# Patient Record
Sex: MALE | Race: WHITE | NOT HISPANIC OR LATINO | Employment: FULL TIME | URBAN - METROPOLITAN AREA
[De-identification: names, ages, dates, MRNs, and addresses within clinical notes are randomized per-mention and may not be internally consistent; named-entity substitution may affect disease eponyms.]

---

## 2017-01-23 ENCOUNTER — OFFICE VISIT (OUTPATIENT)
Dept: INTERNAL MEDICINE | Facility: CLINIC | Age: 37
End: 2017-01-23

## 2017-01-23 VITALS
BODY MASS INDEX: 26.09 KG/M2 | WEIGHT: 192.6 LBS | TEMPERATURE: 98.2 F | HEIGHT: 72 IN | SYSTOLIC BLOOD PRESSURE: 126 MMHG | DIASTOLIC BLOOD PRESSURE: 82 MMHG

## 2017-01-23 DIAGNOSIS — I10 ESSENTIAL HYPERTENSION: ICD-10-CM

## 2017-01-23 DIAGNOSIS — R03.0 ELEVATED BP WITHOUT DIAGNOSIS OF HYPERTENSION: ICD-10-CM

## 2017-01-23 DIAGNOSIS — G47.30 SLEEP APNEA, UNSPECIFIED TYPE: ICD-10-CM

## 2017-01-23 DIAGNOSIS — F41.8 DEPRESSION WITH ANXIETY: Primary | ICD-10-CM

## 2017-01-23 LAB
ALBUMIN SERPL-MCNC: 4.8 G/DL (ref 3.2–4.8)
ALBUMIN/GLOB SERPL: 2 G/DL (ref 1.5–2.5)
ALP SERPL-CCNC: 97 U/L (ref 25–100)
ALT SERPL W P-5'-P-CCNC: 38 U/L (ref 7–40)
ANION GAP SERPL CALCULATED.3IONS-SCNC: 9 MMOL/L (ref 3–11)
AST SERPL-CCNC: 22 U/L (ref 0–33)
BASOPHILS # BLD AUTO: 0.01 10*3/MM3 (ref 0–0.2)
BASOPHILS NFR BLD AUTO: 0.2 % (ref 0–1)
BILIRUB SERPL-MCNC: 0.4 MG/DL (ref 0.3–1.2)
BUN BLD-MCNC: 21 MG/DL (ref 9–23)
BUN/CREAT SERPL: 21 (ref 7–25)
CALCIUM SPEC-SCNC: 10.2 MG/DL (ref 8.7–10.4)
CHLORIDE SERPL-SCNC: 107 MMOL/L (ref 99–109)
CO2 SERPL-SCNC: 29 MMOL/L (ref 20–31)
CREAT BLD-MCNC: 1 MG/DL (ref 0.6–1.3)
DEPRECATED RDW RBC AUTO: 44.1 FL (ref 37–54)
EOSINOPHIL # BLD AUTO: 0.14 10*3/MM3 (ref 0.1–0.3)
EOSINOPHIL NFR BLD AUTO: 2.8 % (ref 0–3)
ERYTHROCYTE [DISTWIDTH] IN BLOOD BY AUTOMATED COUNT: 13.3 % (ref 11.3–14.5)
GFR SERPL CREATININE-BSD FRML MDRD: 84 ML/MIN/1.73
GLOBULIN UR ELPH-MCNC: 2.4 GM/DL
GLUCOSE BLD-MCNC: 95 MG/DL (ref 70–100)
HCT VFR BLD AUTO: 48 % (ref 38.9–50.9)
HGB BLD-MCNC: 16.4 G/DL (ref 13.1–17.5)
IMM GRANULOCYTES # BLD: 0.01 10*3/MM3 (ref 0–0.03)
IMM GRANULOCYTES NFR BLD: 0.2 % (ref 0–0.6)
LYMPHOCYTES # BLD AUTO: 1.43 10*3/MM3 (ref 0.6–4.8)
LYMPHOCYTES NFR BLD AUTO: 28.9 % (ref 24–44)
MCH RBC QN AUTO: 30.9 PG (ref 27–31)
MCHC RBC AUTO-ENTMCNC: 34.2 G/DL (ref 32–36)
MCV RBC AUTO: 90.4 FL (ref 80–99)
MONOCYTES # BLD AUTO: 0.53 10*3/MM3 (ref 0–1)
MONOCYTES NFR BLD AUTO: 10.7 % (ref 0–12)
NEUTROPHILS # BLD AUTO: 2.82 10*3/MM3 (ref 1.5–8.3)
NEUTROPHILS NFR BLD AUTO: 57.2 % (ref 41–71)
PLATELET # BLD AUTO: 247 10*3/MM3 (ref 150–450)
PMV BLD AUTO: 10.9 FL (ref 6–12)
POTASSIUM BLD-SCNC: 5.1 MMOL/L (ref 3.5–5.5)
PROT SERPL-MCNC: 7.2 G/DL (ref 5.7–8.2)
RBC # BLD AUTO: 5.31 10*6/MM3 (ref 4.2–5.76)
SODIUM BLD-SCNC: 145 MMOL/L (ref 132–146)
TSH SERPL DL<=0.05 MIU/L-ACNC: 2.02 MIU/ML (ref 0.35–5.35)
VIT B12 BLD-MCNC: 769 PG/ML (ref 211–911)
WBC NRBC COR # BLD: 4.94 10*3/MM3 (ref 3.5–10.8)

## 2017-01-23 PROCEDURE — 99214 OFFICE O/P EST MOD 30 MIN: CPT | Performed by: FAMILY MEDICINE

## 2017-01-23 PROCEDURE — 84402 ASSAY OF FREE TESTOSTERONE: CPT | Performed by: FAMILY MEDICINE

## 2017-01-23 PROCEDURE — 85025 COMPLETE CBC W/AUTO DIFF WBC: CPT | Performed by: FAMILY MEDICINE

## 2017-01-23 PROCEDURE — 84443 ASSAY THYROID STIM HORMONE: CPT | Performed by: FAMILY MEDICINE

## 2017-01-23 PROCEDURE — 82607 VITAMIN B-12: CPT | Performed by: FAMILY MEDICINE

## 2017-01-23 PROCEDURE — 84403 ASSAY OF TOTAL TESTOSTERONE: CPT | Performed by: FAMILY MEDICINE

## 2017-01-23 PROCEDURE — 80053 COMPREHEN METABOLIC PANEL: CPT | Performed by: FAMILY MEDICINE

## 2017-01-23 RX ORDER — BUPROPION HYDROCHLORIDE 150 MG/1
TABLET ORAL
Qty: 60 TABLET | Refills: 0 | Status: SHIPPED | OUTPATIENT
Start: 2017-01-23 | End: 2017-02-24 | Stop reason: DRUGHIGH

## 2017-01-23 RX ORDER — DESVENLAFAXINE 25 MG/1
TABLET, EXTENDED RELEASE ORAL
Qty: 7 TABLET | Refills: 0 | Status: SHIPPED | OUTPATIENT
Start: 2017-01-23 | End: 2017-02-28

## 2017-01-23 RX ORDER — LISINOPRIL 10 MG/1
10 TABLET ORAL DAILY
Qty: 90 TABLET | Refills: 1 | Status: SHIPPED | OUTPATIENT
Start: 2017-01-23 | End: 2017-02-28

## 2017-01-23 NOTE — PATIENT INSTRUCTIONS
1. PSYCH- depression with anxiety- will stop the zyprexa as unneeded now that his BP is at goal. Will try wellbutrin instead of pristiq to see if he has resolution of the sexual S/E. If not, he can remain on whichever works best. Will check his testosterone level now as this could be an undelrying issue. Will do his routine labs except for lipid  2. CV- HTN- BP at goal. Rf lisinopril x6mo for now.  3. RESP- sleep apnea. Will refer to sleep specialist.  4. RECHECK- 1mo

## 2017-01-23 NOTE — PROGRESS NOTES
Subjective   Eduardo Luna is a 36 y.o. male.     History of Present Illness   Here for 1mo recheck. Last seen 12/22/16.  Pt seen 9/22/16 as a new patient. Pt has a h/o Hodgkins lymphoma 2002 in full remission. Hx PAT post ablation 2008. Last labs 3/2016.     1.ENDO- hypothyroid- currently on levothyroxine 75. At his initial visit pt was c/o raspy voice, trouble concentrating, occasionally feeling depressed, slow speech and dry skin. Was on generic and taking it with other meds. Was changed to branded synthroid and advised how to take correctly.      2.PSYCH/ CV- depression with anxiety, new HTN. Pt developed issues 2002 when diagnosed with lymphoma. Initally he was on Lexapro, valium and ambien. Was still c/o feeling anxious, guilty, feeling overwhelmed, withdrawing and concentration problems on occassion. Was waking up frequently, even on ambien. Was started on low dose Zyprexa and able to wean the valium and ambien. We tried abilify but he did better with Zyprexa. He then weaned the Lexapro (sexual S/E) but continued to feel anxious. Was tried on Effexor and then changed to pristiq due to S/E. At his last visit he was still having sexual S/E and was having elevated BP. Discussed that this could be from the SNRI or be primary, especially given his fam hx. If primary, HTN can contribute to anxiety. Pt was started on lisinopril. Today he reports he has done well with the lisinopril and pristiq. Only remaining issue is the decreased libido. Is still having sleep issues. Can fall asleep but not stay asleep. On discussion, he has a h/o sleep apnea. Was treated with CPAP but did not tolerate it. Last time was 10 yr ago.    The following portions of the patient's history were reviewed and updated as appropriate: current medications, past family history, past medical history, past social history, past surgical history and problem list.    Review of Systems   Cardiovascular: Negative for chest pain.   Gastrointestinal:  "Negative for abdominal distention and abdominal pain.   Skin: Negative for color change.   Neurological: Negative for tremors, speech difficulty and headaches.   Psychiatric/Behavioral: Negative for agitation and confusion.   All other systems reviewed and are negative.        Current Outpatient Prescriptions:   •  buPROPion XL (WELLBUTRIN XL) 150 MG 24 hr tablet, Take 1 tab po qd x1wk then 2 tab po qd, Disp: 60 tablet, Rfl: 0  •  Desvenlafaxine Succinate ER 25 MG tablet sustained-release 24 hour, Take 1 tab po qd x1wk, Disp: 7 tablet, Rfl: 0  •  levothyroxine (SYNTHROID) 75 MCG tablet, Take 1 tablet by mouth Daily. Take 1 tab po qam fasting and wait 1hr for food or other meds. BRAND ONLY, Disp: 30 tablet, Rfl: 5  •  lisinopril (PRINIVIL,ZESTRIL) 10 MG tablet, Take 1 tablet by mouth Daily., Disp: 90 tablet, Rfl: 1  •  Vitamin D, Cholecalciferol, 1000 UNITS capsule, Take 1 tablet by mouth daily., Disp: , Rfl:     Objective     Visit Vitals   • /82   • Temp 98.2 °F (36.8 °C)   • Ht 72\" (182.9 cm)   • Wt 192 lb 9.6 oz (87.4 kg)   • BMI 26.12 kg/m2       Physical Exam   Constitutional: He is oriented to person, place, and time. He appears well-developed and well-nourished.   HENT:   Right Ear: Tympanic membrane and ear canal normal.   Left Ear: Tympanic membrane and ear canal normal.   Mouth/Throat: Oropharynx is clear and moist.   Eyes: Conjunctivae and EOM are normal. Pupils are equal, round, and reactive to light.   Neck: No thyromegaly present.   Cardiovascular: Normal rate and regular rhythm.    Pulmonary/Chest: Effort normal and breath sounds normal.   Neurological: He is alert and oriented to person, place, and time.   Skin: Skin is warm and dry.   Psychiatric: He has a normal mood and affect.   Vitals reviewed.      Assessment/Plan   Eduardo was seen today for follow-up.    Diagnoses and all orders for this visit:    Depression with anxiety  -     buPROPion XL (WELLBUTRIN XL) 150 MG 24 hr tablet; Take 1 tab po " qd x1wk then 2 tab po qd  -     Desvenlafaxine Succinate ER 25 MG tablet sustained-release 24 hour; Take 1 tab po qd x1wk    Essential hypertension    Sleep apnea, unspecified type  -     Ambulatory Referral to Sleep Medicine    Elevated BP without diagnosis of hypertension  -     lisinopril (PRINIVIL,ZESTRIL) 10 MG tablet; Take 1 tablet by mouth Daily.      1. PSYCH- depression with anxiety- will stop the zyprexa as unneeded now that his BP is at goal. Will try wellbutrin instead of pristiq to see if he has resolution of the sexual S/E. If not, he can remain on whichever works best. Will check his testosterone level now as this could be an undelrying issue. Will do his routine labs except for lipid  2. CV- HTN- BP at goal. Rf lisinopril x6mo for now.  3. RESP- sleep apnea. Will refer to sleep specialist.  4. RECHECK- 1mo

## 2017-01-23 NOTE — MR AVS SNAPSHOT
Eduardo Luna   1/23/2017 3:30 PM   Office Visit    Dept Phone:  [phone]   Encounter #:  [id number]    Provider:  Svetlana Henry MD   Department:  [facility] PRIMARY CARE                Your Full Care Plan              Today's Medication Changes          These changes are accurate as of: 1/23/17  4:28 PM.  If you have any questions, ask your nurse or doctor.               New Medication(s)Ordered:     buPROPion  MG 24 hr tablet   Commonly known as:  WELLBUTRIN XL   Take 1 tab po qd x1wk then 2 tab po qd   Started by:  Svetlana Henry MD         Medication(s)that have changed:     Desvenlafaxine Succinate ER 25 MG tablet sustained-release 24 hour   Commonly known as:  PRISTIQ   Take 1 tab po qd x1wk   What changed:    - medication strength  - how much to take  - how to take this  - when to take this  - additional instructions   Changed by:  Svetlana Henry MD         Stop taking medication(s)listed here:     OLANZapine 5 MG tablet   Commonly known as:  ZYPREXA   Stopped by:  Svetlana Henry MD                Where to Get Your Medications      These medications were sent to [facility]/pharmacy #[id number] - [place], [place] - [address] [facility] - [phone]  - [phone] [address] [address]     Phone:  [phone]     buPROPion  MG 24 hr tablet    Desvenlafaxine Succinate ER 25 MG tablet sustained-release 24 hour    lisinopril 10 MG tablet                  Your Updated Medication List          This list is accurate as of: 1/23/17  4:28 PM.  Always use your most recent med list.                buPROPion  MG 24 hr tablet   Commonly known as:  WELLBUTRIN XL   Take 1 tab po qd x1wk then 2 tab po qd       Desvenlafaxine Succinate ER 25 MG tablet sustained-release 24 hour   Commonly known as:  PRISTIQ   Take 1 tab po qd x1wk       levothyroxine 75 MCG tablet   Commonly known as:  SYNTHROID   Take 1 tablet by mouth Daily. Take 1 tab  po qam fasting and wait 1hr for food or other meds. BRAND ONLY       lisinopril 10 MG tablet   Commonly known as:  PRINIVIL,ZESTRIL   Take 1 tablet by mouth Daily.       Vitamin D (Cholecalciferol) 1000 UNITS capsule               We Performed the Following     Ambulatory Referral to Sleep Medicine     CBC & Differential     CBC Auto Differential     Comprehensive Metabolic Panel     Testosterone, Free, Total     TSH     Vitamin B12       You Were Diagnosed With        Codes Comments    Depression with anxiety    -  Primary ICD-10-CM: F41.8  ICD-9-CM: 300.4     Essential hypertension     ICD-10-CM: I10  ICD-9-CM: 401.9     Sleep apnea, unspecified type     ICD-10-CM: G47.30  ICD-9-CM: 780.57     Elevated BP without diagnosis of hypertension     ICD-10-CM: R03.0  ICD-9-CM: 796.2       Instructions    1. PSYCH- depression with anxiety- will stop the zyprexa as unneeded now that his BP is at goal. Will try wellbutrin instead of pristiq to see if he has resolution of the sexual S/E. If not, he can remain on whichever works best. Will check his testosterone level now as this could be an undelrying issue. Will do his routine labs except for lipid  2. CV- HTN- BP at goal. Rf lisinopril x6mo for now.  3. RESP- sleep apnea. Will refer to sleep specialist.  4. RECHECK- 1mo     Patient Instructions History      Upcoming Appointments     Visit Type Date Time Department    FOLLOW UP 1/23/2017  3:30 PM [facility]    FOLLOW UP 1 UNIT 9/5/2017  3:15 PM [facility]      [employer] Signup     Our records indicate that you have an active [employer] account.    You can view your After Visit Summary by going to "[employer] and logging in with your [employer] username and password.  If you don't have a [employer] username and password but a parent or guardian has access to your record, the parent or guardian should login with their own [employer] username and password and access your record to view the After Visit  "Summary.    If you have questions, you can email [email or web address] or call [phone] to talk to our MyChart staff.  Remember, MyChart is NOT to be used for urgent needs.  For medical emergencies, dial 911.               Other Info from Your Visit           Your Appointments     Sep 05, 2017  3:15 PM EDT   FOLLOW UP with Laly Vazquez MD   [facility] HEMATOLOGY  AND ONCOLOGY ([place])    [address] [facility] [address]  [facility] [address] [phone]              Allergies     Amoxicillin  Hives, Itching, Rash      Reason for Visit     Follow-up 1 MONTH RECHECK MOOD, BP      Vital Signs     Blood Pressure Temperature Height Weight Body Mass Index Smoking Status    126/82 98.2 °F (36.8 °C) 72\" (182.9 cm) 192 lb 9.6 oz (87.4 kg) 26.12 kg/m2 Never Smoker      Problems and Diagnoses Noted     Depression with anxiety    High blood pressure    Sleep apnea    Elevated blood pressure          Results         "

## 2017-01-26 LAB
CONV COMMENT: ABNORMAL
TESTOST FREE SERPL-MCNC: 6.6 PG/ML (ref 8.7–25.1)
TESTOST SERPL-MCNC: 297 NG/DL (ref 348–1197)

## 2017-02-21 ENCOUNTER — CONSULT (OUTPATIENT)
Dept: SLEEP MEDICINE | Facility: HOSPITAL | Age: 37
End: 2017-02-21

## 2017-02-21 VITALS
DIASTOLIC BLOOD PRESSURE: 104 MMHG | WEIGHT: 187.6 LBS | OXYGEN SATURATION: 92 % | HEART RATE: 102 BPM | HEIGHT: 72 IN | BODY MASS INDEX: 25.41 KG/M2 | SYSTOLIC BLOOD PRESSURE: 152 MMHG

## 2017-02-21 DIAGNOSIS — G47.33 OBSTRUCTIVE SLEEP APNEA SYNDROME: ICD-10-CM

## 2017-02-21 DIAGNOSIS — G47.61 PERIODIC LIMB MOVEMENT DISORDER: Primary | ICD-10-CM

## 2017-02-21 PROCEDURE — 99244 OFF/OP CNSLTJ NEW/EST MOD 40: CPT | Performed by: INTERNAL MEDICINE

## 2017-02-21 NOTE — PATIENT INSTRUCTIONS
Restless Legs Syndrome  Restless legs syndrome is a condition that causes uncomfortable feelings or sensations in the legs, especially while sitting or lying down. The sensations usually cause an overwhelming urge to move the legs. The arms can also sometimes be affected.  The condition can range from mild to severe. The symptoms often interfere with a person's ability to sleep.  CAUSES  The cause of this condition is not known.  RISK FACTORS  This condition is more likely to develop in:  · People who are older than age 50.  · Pregnant women. In general, restless legs syndrome is more common in women than in men.  · People who have a family history of the condition.  · People who have certain medical conditions, such as iron deficiency, kidney disease, Parkinson disease, or nerve damage.  · People who take certain medicines, such as medicines for high blood pressure, nausea, colds, allergies, depression, and some heart conditions.  SYMPTOMS  The main symptom of this condition is uncomfortable sensations in the legs. These sensations may be:  · Described as pulling, tingling, prickling, throbbing, crawling, or burning.  · Worse while you are sitting or lying down.  · Worse during periods of rest or inactivity.  · Worse at night, often interfering with your sleep.  · Accompanied by a very strong urge to move your legs.  · Temporarily relieved by movement of your legs.  The sensations usually affect both sides of the body. The arms can also be affected, but this is rare. People who have this condition often have tiredness during the day because of their lack of sleep at night.  DIAGNOSIS  This condition may be diagnosed based on your description of the symptoms. You may also have tests, including blood tests, to check for other conditions that may lead to your symptoms. In some cases, you may be asked to spend some time in a sleep lab so your sleeping can be monitored.  TREATMENT  Treatment for this condition is  focused on managing the symptoms. Treatment may include:  · Self-help and lifestyle changes.  · Medicines.  HOME CARE INSTRUCTIONS  · Take medicines only as directed by your health care provider.  · Try these methods to get temporary relief from the uncomfortable sensations:    Massage your legs.    Walk or stretch.    Take a cold or hot bath.  · Practice good sleep habits. For example, go to bed and get up at the same time every day.  · Exercise regularly.  · Practice ways of relaxing, such as yoga or meditation.  · Avoid caffeine and alcohol.  · Do not use any tobacco products, including cigarettes, chewing tobacco, or electronic cigarettes. If you need help quitting, ask your health care provider.  · Keep all follow-up visits as directed by your health care provider. This is important.  SEEK MEDICAL CARE IF:  Your symptoms do not improve with treatment, or they get worse.     This information is not intended to replace advice given to you by your health care provider. Make sure you discuss any questions you have with your health care provider.     Document Released: 12/08/2003 Document Revised: 05/03/2016 Document Reviewed: 12/14/2015  [employer]app[employer] Interactive Patient Education ©2016 [employer]  Sleep Apnea   Sleep apnea is a sleep disorder characterized by abnormal pauses in breathing while you sleep. When your breathing pauses, the level of oxygen in your blood decreases. This causes you to move out of deep sleep and into light sleep. As a result, your quality of sleep is poor, and the system that carries your blood throughout your body (cardiovascular system) experiences stress. If sleep apnea remains untreated, the following conditions can develop:  · High blood pressure (hypertension).  · Coronary artery disease.  · Inability to achieve or maintain an erection (impotence).  · Impairment of your thought process (cognitive dysfunction).  There are three types of sleep apnea:  1. Obstructive sleep apnea--Pauses  in breathing during sleep because of a blocked airway.  2. Central sleep apnea--Pauses in breathing during sleep because the area of the brain that controls your breathing does not send the correct signals to the muscles that control breathing.  3. Mixed sleep apnea--A combination of both obstructive and central sleep apnea.  RISK FACTORS  The following risk factors can increase your risk of developing sleep apnea:  · Being overweight.  · Smoking.  · Having narrow passages in your nose and throat.  · Being of older age.  · Being male.  · Alcohol use.  · Sedative and tranquilizer use.  · Ethnicity. Among individuals younger than 35 years,  Americans are at increased risk of sleep apnea.  SYMPTOMS   · Difficulty staying asleep.  · Daytime sleepiness and fatigue.  · Loss of energy.  · Irritability.  · Loud, heavy snoring.  · Morning headaches.  · Trouble concentrating.  · Forgetfulness.  · Decreased interest in sex.  · Unexplained sleepiness.  DIAGNOSIS   In order to diagnose sleep apnea, your caregiver will perform a physical examination. A sleep study done in the comfort of your own home may be appropriate if you are otherwise healthy. Your caregiver may also recommend that you spend the night in a sleep lab. In the sleep lab, several monitors record information about your heart, lungs, and brain while you sleep. Your leg and arm movements and blood oxygen level are also recorded.  TREATMENT  The following actions may help to resolve mild sleep apnea:  · Sleeping on your side.    · Using a decongestant if you have nasal congestion.    · Avoiding the use of depressants, including alcohol, sedatives, and narcotics.    · Losing weight and modifying your diet if you are overweight.  There also are devices and treatments to help open your airway:  · Oral appliances. These are custom-made mouthpieces that shift your lower jaw forward and slightly open your bite. This opens your airway.  · Devices that create  "positive airway pressure. This positive pressure \"splints\" your airway open to help you breathe better during sleep. The following devices create positive airway pressure:    Continuous positive airway pressure (CPAP) device. The CPAP device creates a continuous level of air pressure with an air pump. The air is delivered to your airway through a mask while you sleep. This continuous pressure keeps your airway open.    Nasal expiratory positive airway pressure (EPAP) device. The EPAP device creates positive air pressure as you exhale. The device consists of single-use valves, which are inserted into each nostril and held in place by adhesive. The valves create very little resistance when you inhale but create much more resistance when you exhale. That increased resistance creates the positive airway pressure. This positive pressure while you exhale keeps your airway open, making it easier to breath when you inhale again.    Bilevel positive airway pressure (BPAP) device. The BPAP device is used mainly in patients with central sleep apnea. This device is similar to the CPAP device because it also uses an air pump to deliver continuous air pressure through a mask. However, with the BPAP machine, the pressure is set at two different levels. The pressure when you exhale is lower than the pressure when you inhale.  · Surgery. Typically, surgery is only done if you cannot comply with less invasive treatments or if the less invasive treatments do not improve your condition. Surgery involves removing excess tissue in your airway to create a wider passage way.     This information is not intended to replace advice given to you by your health care provider. Make sure you discuss any questions you have with your health care provider.     Document Released: 12/08/2003 Document Revised: 01/08/2016 Document Reviewed: 09/26/2016  [employer] Interactive Patient Education ©2016 [employer]    "

## 2017-02-23 NOTE — PROGRESS NOTES
Subjective   Eduardo Luna is a 36 y.o. male is being seen for consultation today at the request of Svetlana Henry MD for the evaluation of snoring and difficulty sleeping.    History of Present Illness  Patient has a history of snoring he says since 2006.  He reportedly snores in all positions.  He is unsure if he stops breathing at night.  He denies awakening gasping for breath.  Says he's occasionally rested in the morning but sometimes not.  He has a headache maybe 1 day per week.  He was diagnosed in 2006 sleep study at the [facility] to have mild obstructive sleep apnea AHI of 8.1 but was not started on therapy.  He has gained weight since then.  He denies falling asleep inappropriately or having problems while driving.  He does have loud snoring has awakened with a dry mouth he denies ever breaking his nose.    He has occasional reflux symptoms he denies hypnagogic hallucinations he has rarely experiences sleep paralysis.  He has occasional kicking and jerking of his legs at night and doesn't not have a problem getting comfortable.  He is gained 30 pounds in the past year.    He goes to bed about 7 PM will fall asleep in 20-30 minutes.  He awakens 3 times during the night.  He thinks gets about 6 hours of sleep but still feels groggy.    Past medical history surgeries had hypertension known for the past 3 months.  He denies any history of heart disease or diabetes.  He has a history of hypothyroidism.  He was having some tachycardia problems in 2008 and had an ablation.    Past medical history surgeries had tonsillectomy said a vasectomy    Allergies are to amoxicillin ragweed and dog's    Habits: Smoking: Without    Ethanol: Without    Caffeine: He has 3 cups of coffee and 4 caffeinated soft drinks per day.    Family history is positive for heart disease hypertension stroke obesity sleep apnea and narcolepsy daytime sleepiness asthma and cancer and mood disorder.  The following portions of the  "patient's history were reviewed and updated as appropriate: allergies, current medications, past family history, past medical history, past social history, past surgical history and problem list.    Review of Systems   Constitutional: Positive for unexpected weight change.   Eyes: Negative.    Respiratory: Negative.    Cardiovascular: Negative.    Gastrointestinal: Negative.    Endocrine: Positive for cold intolerance and heat intolerance.   Musculoskeletal: Negative.    Skin: Negative.    Allergic/Immunologic: Positive for environmental allergies.   Neurological: Positive for headaches.   Hematological: Negative.    Psychiatric/Behavioral: Positive for dysphoric mood. The patient is nervous/anxious.     [place] scores 8/24    Objective    Visit Vitals   • BP (!) 152/104   • Pulse 102   • Ht 72\" (182.9 cm)   • Wt 187 lb 9.6 oz (85.1 kg)   • SpO2 92%   • BMI 25.44 kg/m2       Physical Exam   Constitutional: He is oriented to person, place, and time. He appears well-developed and well-nourished.   HENT:   Head: Normocephalic and atraumatic.   He has nasal airway narrowing with nasal septal deviation the right.  He has Mallampati class III anatomy and  mild retrognathia   Eyes: EOM are normal. Pupils are equal, round, and reactive to light.   Neck: Normal range of motion. Neck supple.   Cardiovascular: Normal rate, regular rhythm and normal heart sounds.    Pulmonary/Chest: Effort normal and breath sounds normal.   Abdominal: Soft. Bowel sounds are normal.   Musculoskeletal: Normal range of motion. He exhibits no edema.   Neurological: He is alert and oriented to person, place, and time.   Skin: Skin is warm and dry.   Psychiatric: He has a normal mood and affect. His behavior is normal.         Assessment/Plan   Eduardo was seen today for sleeping problem.    Diagnoses and all orders for this visit:    Periodic limb movement disorder  -     Polysomnography 4 or More Parameters; Future    Obstructive sleep apnea " syndrome  -     Polysomnography 4 or More Parameters; Future     ration presents issues noted above.  He has a history of snoring and has gained weight since his previous study.  I suspect he still has obstructive sleep apnea.  He also has a history of kicking and jerking his legs at night and may well have significant periodic limb movement disorder.  We will plan to proceed to polysomnogram at [facility] sleep lab.  I've discussed potential therapies including CPAP weight loss oral appliances and surgery.  He is to return in roughly 2 weeks after her study and we'll reassess situation.  We have also discussed possible complications of long-term untreated obstructive sleep apnea.    Jason Recio MD [facility]  Sleep Medicine  Pulmonary and Critical Care Medicine

## 2017-02-24 ENCOUNTER — TELEPHONE (OUTPATIENT)
Dept: INTERNAL MEDICINE | Facility: CLINIC | Age: 37
End: 2017-02-24

## 2017-02-24 RX ORDER — BUPROPION HYDROCHLORIDE 300 MG/1
300 TABLET ORAL DAILY
Qty: 30 TABLET | Refills: 0 | Status: SHIPPED | OUTPATIENT
Start: 2017-02-24 | End: 2017-02-28 | Stop reason: SDUPTHER

## 2017-02-24 NOTE — TELEPHONE ENCOUNTER
----- Message from Eduardo Luna sent at 2/24/2017  1:13 PM EST -----  Regarding: RE: Prescription Question  Contact: [phone]  The 300 mg once a day would be fine. Thank you. Have a great day!    ----- Message -----  From: DAWIT STAUFFER  Sent: 2/23/17, 5:12 PM  To: Eduardo Luna  Subject: RE: Prescription Question    Mona Clark! Would you like me to send that in as the 150 twice a day or 300 once a day? I wasn't sure if it made a big difference to you or not. Just let me know and I'm happy to send in a few days worth as these are generally medications that we don't like to stop without titration. Thanks!       ----- Message -----     From: Eduardo Luna     Sent: 2/23/2017  3:56 PM EST       To: Svetlana Henry MD  Subject: Prescription Question    Dear Dr. Henry & staff,    I only have 2 days of Wellbutrin  X 4 tabs left before my appointment on Tuesday, February 28th. Will it be ok to go without it until my appointment, or could I get a few tablets to get me through?    Have a great day!    Sincerely,    Eduardo Luna

## 2017-02-28 ENCOUNTER — OFFICE VISIT (OUTPATIENT)
Dept: INTERNAL MEDICINE | Facility: CLINIC | Age: 37
End: 2017-02-28

## 2017-02-28 VITALS
WEIGHT: 186 LBS | BODY MASS INDEX: 26.63 KG/M2 | HEIGHT: 70 IN | SYSTOLIC BLOOD PRESSURE: 124 MMHG | TEMPERATURE: 98.2 F | HEART RATE: 76 BPM | RESPIRATION RATE: 16 BRPM | DIASTOLIC BLOOD PRESSURE: 86 MMHG

## 2017-02-28 DIAGNOSIS — R79.89 LOW TESTOSTERONE: ICD-10-CM

## 2017-02-28 DIAGNOSIS — F41.8 DEPRESSION WITH ANXIETY: Primary | ICD-10-CM

## 2017-02-28 PROCEDURE — 99214 OFFICE O/P EST MOD 30 MIN: CPT | Performed by: FAMILY MEDICINE

## 2017-02-28 RX ORDER — BUPROPION HYDROCHLORIDE 300 MG/1
300 TABLET ORAL DAILY
Qty: 30 TABLET | Refills: 5 | Status: SHIPPED | OUTPATIENT
Start: 2017-02-28 | End: 2017-08-14 | Stop reason: SDUPTHER

## 2017-02-28 RX ORDER — TESTOSTERONE 12.5 MG/1.25G
GEL TOPICAL
Qty: 1 BOTTLE | Refills: 0 | Status: SHIPPED | OUTPATIENT
Start: 2017-02-28 | End: 2017-04-14 | Stop reason: SDUPTHER

## 2017-02-28 NOTE — PATIENT INSTRUCTIONS
1. PSYCH- depression with anxiety- mood improved on wellbutrin with no problem S/E. Will continue this and expect to reach maximum improvement in another 2mo.  2. - low testosterone- edu provided today. Pros and cons discussed. Will do trail with androderm. How to apply medicine discussed. Will recheck in 1mo with recheck testosterone, PSA and possibely lipid. Will do contract if pt to remain on treatment.  3. CV- HTN- discussed that his BP is fine today, untreated. This raises the question again of whether is elevated BP is primary or secondary. Will remain off treatment while we continue to address other issues and pt will do routine home monitoring. To log reading and time of day for review at next appointment.  4. RECHECK- 1mo, fasting

## 2017-02-28 NOTE — PROGRESS NOTES
Subjective   Eduardo Luna is a 36 y.o. male.     History of Present Illness   Here for 1mo recheck. Last seen 1/23/17.  Pt was seen 9/22/16 as a new patient. Pt has a h/o Hodgkins lymphoma 2002 in full remission. Hx PAT post ablation 2008. Labs 1/23/17 demo normal CBC, CMP and B12. TSH was at goal on levothyroxine 75. Had low testosterone with total 297, free 6.6.     1.ENDO- hypothyroid- currently on levothyroxine 75. At his initial visit pt was c/o raspy voice, trouble concentrating, occasionally feeling depressed, slow speech and dry skin. Was on generic and taking it with other meds. Was changed to branded synthroid and advised how to take correctly. TSH at goal 1/23/17. Today pt reports that his symptoms have improved with addressing the mood.     2. CV- HTN. Diagnosed 12/22/16. Pt started on Lisinopril HCT and did well. No known cardio sequelae. Today pt reports he stopped the lisinopril HCT due to a cough.    3. RESP- sleep apnea. Had been intolerant to CPAP in past. Was referred back to sleep center for re eval and has sleep study pending 3/2017.     4. - low testosterone. Found on lab 1/23/17. Here for discussion. Today pt reports no hx of issues. His libido has improved since stopping the previous antidepressant.     5.PSYCH- depression with anxiety. Diagnosed 2002 when diagnosed with lymphoma. Initally he was on Lexapro, valium and ambien. Was still c/o feeling anxious, guilty, feeling overwhelmed, withdrawing and concentration problems on occassion. Was waking up frequently, even on ambien. Did well with Zyprexa and was able to wean the valium and ambien. He then weaned the Lexapro but still felt anxious. Was eventually changed to pristiq and did well other than continued libido issues. Was changed to Wellbutrin. Was also then found to have low testosterone. Today he reports no issues with S/E other than dry mouth. Feels well on it.     The following portions of the patient's history were reviewed and  "updated as appropriate: current medications, past family history, past medical history, past social history, past surgical history and problem list.    Review of Systems   Cardiovascular: Negative for chest pain.   Gastrointestinal: Negative for abdominal distention and abdominal pain.   Skin: Negative for color change.   Neurological: Negative for tremors, speech difficulty and headaches.   Psychiatric/Behavioral: Negative for agitation and confusion.   All other systems reviewed and are negative.        Current Outpatient Prescriptions:   •  buPROPion XL (WELLBUTRIN XL) 300 MG 24 hr tablet, Take 1 tablet by mouth Daily., Disp: 30 tablet, Rfl: 5  •  levothyroxine (SYNTHROID) 75 MCG tablet, Take 1 tablet by mouth Daily. Take 1 tab po qam fasting and wait 1hr for food or other meds. BRAND ONLY, Disp: 30 tablet, Rfl: 5  •  Vitamin D, Cholecalciferol, 1000 UNITS capsule, Take 1 tablet by mouth daily., Disp: , Rfl:   •  testosterone 12.5 MG/ACT (1%) gel, 1 pump applied to dry skin qd, Disp: 1 bottle, Rfl: 0    Objective     Visit Vitals   • /86   • Pulse 76   • Temp 98.2 °F (36.8 °C)   • Resp 16   • Ht 70\" (177.8 cm)   • Wt 186 lb (84.4 kg)   • BMI 26.69 kg/m2       Physical Exam   Constitutional: He is oriented to person, place, and time. He appears well-developed and well-nourished.   HENT:   Right Ear: Tympanic membrane and ear canal normal.   Left Ear: Tympanic membrane and ear canal normal.   Mouth/Throat: Oropharynx is clear and moist.   Eyes: Conjunctivae and EOM are normal. Pupils are equal, round, and reactive to light.   Neck: No thyromegaly present.   Cardiovascular: Normal rate and regular rhythm.    Pulmonary/Chest: Effort normal and breath sounds normal.   Neurological: He is alert and oriented to person, place, and time.   Skin: Skin is warm and dry.   Psychiatric: He has a normal mood and affect.   Vitals reviewed.      Assessment/Plan   Eduardo was seen today for anxiety.    Diagnoses and all orders " for this visit:    Depression with anxiety  -     buPROPion XL (WELLBUTRIN XL) 300 MG 24 hr tablet; Take 1 tablet by mouth Daily.    Low testosterone  -     testosterone 12.5 MG/ACT (1%) gel; 1 pump applied to dry skin qd      1. PSYCH- depression with anxiety- mood improved on wellbutrin with no problem S/E. Will continue this and expect to reach maximum improvement in another 2mo.  2. - low testosterone- edu provided today. Pros and cons discussed. Will do trail with androderm. How to apply medicine discussed. Will recheck in 1mo with recheck testosterone, PSA and possibely lipid. Will do contract if pt to remain on treatment.  3. CV- HTN- discussed that his BP is fine today, untreated. This raises the question again of whether is elevated BP is primary or secondary. Will remain off treatment while we continue to address other issues and pt will do routine home monitoring. To log reading and time of day for review at next appointment.  4. RECHECK- 1mo, fasting

## 2017-03-17 ENCOUNTER — HOSPITAL ENCOUNTER (OUTPATIENT)
Dept: SLEEP MEDICINE | Facility: HOSPITAL | Age: 37
Discharge: HOME | End: 2017-03-17
Attending: INTERNAL MEDICINE | Admitting: INTERNAL MEDICINE

## 2017-03-17 VITALS
WEIGHT: 186.6 LBS | SYSTOLIC BLOOD PRESSURE: 155 MMHG | DIASTOLIC BLOOD PRESSURE: 101 MMHG | BODY MASS INDEX: 25.27 KG/M2 | HEIGHT: 72 IN | HEART RATE: 101 BPM | OXYGEN SATURATION: 98 %

## 2017-03-17 DIAGNOSIS — G47.61 PERIODIC LIMB MOVEMENT DISORDER: ICD-10-CM

## 2017-03-17 DIAGNOSIS — G47.33 OBSTRUCTIVE SLEEP APNEA SYNDROME: ICD-10-CM

## 2017-03-17 PROCEDURE — 95810 POLYSOM 6/> YRS 4/> PARAM: CPT | Performed by: INTERNAL MEDICINE

## 2017-03-18 DIAGNOSIS — G47.61 PERIODIC LIMB MOVEMENT DISORDER: ICD-10-CM

## 2017-03-18 DIAGNOSIS — G47.33 OBSTRUCTIVE SLEEP APNEA (ADULT) (PEDIATRIC): Primary | ICD-10-CM

## 2017-03-22 ENCOUNTER — TELEPHONE (OUTPATIENT)
Dept: SLEEP MEDICINE | Facility: HOSPITAL | Age: 37
End: 2017-03-22

## 2017-03-22 DIAGNOSIS — F51.04 PSYCHOPHYSIOLOGICAL INSOMNIA: Primary | ICD-10-CM

## 2017-03-22 NOTE — TELEPHONE ENCOUNTER
I spoke with Mr. Luna, he expressed interest in wanting to pursue home sleep testing.  I explained to him that HST is used in patients whose primary suspected diagnosis is JAVON, and that since he has strong likelihood of PLMD as his primary diagnosis, that the HST is not indicated in his case.      He said that he found the in-lab test uncomfortable and when I asked him what he found most uncomfortable to see if it was an issue that we could correct for him, he stated he thought it was mostly the pillows.  I explained to him that if he wanted to he could bring a pillow from home and we would be happy to let him use our pillowcases (to absorb the paste) if that would help.  I also let him know that Dr. Recio is willing to prescribe Ambien for the night of the next test to help sedate.    He was reluctant to schedule at this time and wants to take some time to think about it.  He will call us back if he wishes to reschedule.

## 2017-03-22 NOTE — TELEPHONE ENCOUNTER
Patient returned phone call. Dr. Recio wants patient to re-do in lab sleep study bcuz there was not enough time recorded.    Patient is not comfortable with re-doing it but he said that if he is able to he can do the HST

## 2017-03-23 DIAGNOSIS — R06.83 SNORING: ICD-10-CM

## 2017-03-23 DIAGNOSIS — G47.33 OSA (OBSTRUCTIVE SLEEP APNEA): Primary | ICD-10-CM

## 2017-03-29 RX ORDER — ZOLPIDEM TARTRATE 5 MG/1
5 TABLET ORAL NIGHTLY PRN
Qty: 2 TABLET | Refills: 0 | OUTPATIENT
Start: 2017-03-29 | End: 2017-04-03

## 2017-03-31 ENCOUNTER — HOSPITAL ENCOUNTER (OUTPATIENT)
Dept: SLEEP MEDICINE | Facility: HOSPITAL | Age: 37
Discharge: HOME | End: 2017-03-31
Attending: INTERNAL MEDICINE | Admitting: INTERNAL MEDICINE

## 2017-03-31 VITALS
OXYGEN SATURATION: 96 % | HEIGHT: 72 IN | WEIGHT: 184.6 LBS | DIASTOLIC BLOOD PRESSURE: 94 MMHG | SYSTOLIC BLOOD PRESSURE: 150 MMHG | HEART RATE: 114 BPM | BODY MASS INDEX: 25 KG/M2

## 2017-03-31 DIAGNOSIS — G47.33 OSA (OBSTRUCTIVE SLEEP APNEA): ICD-10-CM

## 2017-03-31 DIAGNOSIS — R06.83 SNORING: ICD-10-CM

## 2017-03-31 DIAGNOSIS — E34.9 TESTOSTERONE DEFICIENCY: Primary | ICD-10-CM

## 2017-03-31 DIAGNOSIS — E78.5 HYPERLIPIDEMIA, UNSPECIFIED HYPERLIPIDEMIA TYPE: ICD-10-CM

## 2017-03-31 PROCEDURE — 95800 SLP STDY UNATTENDED: CPT | Performed by: INTERNAL MEDICINE

## 2017-04-03 DIAGNOSIS — R06.83 SNORING: ICD-10-CM

## 2017-04-03 DIAGNOSIS — G47.33 OBSTRUCTIVE SLEEP APNEA, ADULT: Primary | ICD-10-CM

## 2017-04-12 ENCOUNTER — LAB (OUTPATIENT)
Dept: LAB | Facility: HOSPITAL | Age: 37
End: 2017-04-12

## 2017-04-12 DIAGNOSIS — E34.9 TESTOSTERONE DEFICIENCY: ICD-10-CM

## 2017-04-12 LAB
ARTICHOKE IGE QN: 146 MG/DL (ref 0–130)
CHOLEST SERPL-MCNC: 201 MG/DL (ref 0–200)
HDLC SERPL-MCNC: 47 MG/DL (ref 40–60)
PSA SERPL-MCNC: 1.18 NG/ML (ref 0–4)
TRIGL SERPL-MCNC: 70 MG/DL (ref 0–150)

## 2017-04-12 PROCEDURE — 84402 ASSAY OF FREE TESTOSTERONE: CPT

## 2017-04-12 PROCEDURE — 36415 COLL VENOUS BLD VENIPUNCTURE: CPT

## 2017-04-12 PROCEDURE — 80061 LIPID PANEL: CPT | Performed by: FAMILY MEDICINE

## 2017-04-12 PROCEDURE — 84403 ASSAY OF TOTAL TESTOSTERONE: CPT

## 2017-04-12 PROCEDURE — G0103 PSA SCREENING: HCPCS

## 2017-04-14 ENCOUNTER — OFFICE VISIT (OUTPATIENT)
Dept: INTERNAL MEDICINE | Facility: CLINIC | Age: 37
End: 2017-04-14

## 2017-04-14 VITALS
TEMPERATURE: 98.4 F | SYSTOLIC BLOOD PRESSURE: 136 MMHG | HEIGHT: 72 IN | DIASTOLIC BLOOD PRESSURE: 84 MMHG | WEIGHT: 182.4 LBS | BODY MASS INDEX: 24.71 KG/M2

## 2017-04-14 DIAGNOSIS — F41.8 DEPRESSION WITH ANXIETY: Primary | ICD-10-CM

## 2017-04-14 DIAGNOSIS — R79.89 LOW TESTOSTERONE: ICD-10-CM

## 2017-04-14 LAB
CONV COMMENT: NORMAL
TESTOST FREE SERPL-MCNC: 13.1 PG/ML (ref 8.7–25.1)
TESTOST SERPL-MCNC: 466 NG/DL (ref 348–1197)

## 2017-04-14 PROCEDURE — 99214 OFFICE O/P EST MOD 30 MIN: CPT | Performed by: FAMILY MEDICINE

## 2017-04-14 RX ORDER — TESTOSTERONE 12.5 MG/1.25G
GEL TOPICAL
Qty: 1 BOTTLE | Refills: 5 | Status: SHIPPED | OUTPATIENT
Start: 2017-04-14 | End: 2017-07-17 | Stop reason: SDUPTHER

## 2017-04-14 RX ORDER — FLUOXETINE 10 MG/1
CAPSULE ORAL
Qty: 30 CAPSULE | Refills: 0 | Status: SHIPPED | OUTPATIENT
Start: 2017-04-14 | End: 2017-05-04 | Stop reason: SDUPTHER

## 2017-04-14 NOTE — PROGRESS NOTES
Subjective   Eduardo Luna is a 36 y.o. male.     History of Present Illness   Here for recheck. Last seen 2/28/17. Pt was seen 9/22/16 as a new patient. Pt has a h/o Hodgkins lymphoma 2002 in full remission. Hx PAT post ablation 2008. Labs 1/23/17 demo normal CBC, CMP and B12. TSH was at goal on levothyroxine 75. Had low testosterone with total 297, free 6.6. Recheck lab 4/12/17 demo normal PSA, total testosterone 466, free 13.1, lipid normal with , tg 70, HDL 47, .     1.ENDO- hypothyroid- currently on levothyroxine 75. At his initial visit pt was c/o raspy voice, trouble concentrating, occasionally feeling depressed, slow speech and dry skin. Was on generic and taking it with other meds. Was changed to branded synthroid and advised how to take correctly. TSH at goal 1/23/17. Pt feeling at goal since mood improved.  2. CV- HTN, resolved. Diagnosed 12/22/16. Pt started on Lisinopril HCT and did well. No known cardio sequelae. Had borderline lipid that improved with treating thyroid and testosterone deficiency. BP also improved and pt was able to stop the lisinopril HCT as well.    3. PSYCH- depression with anxiety. Diagnosed 2002 when diagnosed with lymphoma. Initally he was on Lexapro, valium and ambien. Was still c/o feeling anxious, guilty, feeling overwhelmed, withdrawing and concentration problems on occassion. Was waking up frequently, even on ambien. Did well with Zyprexa and was able to wean the valium and ambien. He then weaned the Lexapro but still felt anxious. Was eventually changed to pristiq and did well other than continued libido issues. Was changed to Wellbutrin and reached goal as of 2/28/17. Today pt reports he does not feel at goal any more. Still has some sadness. Felt funny on buspar in past. Was on prozac in remote past and does not remember how he did on it.    3. RESP- sleep apnea. Had been intolerant to CPAP in past. Was referred back to sleep center for re eval and was + for  "sleep apnea 3/2017. Today he reports he gets the CPAP this week.      5. - low testosterone. Found on lab 1/23/17. Was seen to start treatment; had to change to testim due to insur; was started at 12.5. Here for recheck. Today he reports he was given a generic pump. He feels well on it.      The following portions of the patient's history were reviewed and updated as appropriate: current medications, past family history, past medical history, past social history, past surgical history and problem list.    Review of Systems   Cardiovascular: Negative for chest pain.   Gastrointestinal: Negative for abdominal distention and abdominal pain.   Skin: Negative for color change.   Neurological: Negative for tremors, speech difficulty and headaches.   Psychiatric/Behavioral: Negative for agitation and confusion.   All other systems reviewed and are negative.        Current Outpatient Prescriptions:   •  buPROPion XL (WELLBUTRIN XL) 300 MG 24 hr tablet, Take 1 tablet by mouth Daily., Disp: 30 tablet, Rfl: 5  •  FLUoxetine (PROzac) 10 MG capsule, 1 tab po qd, Disp: 30 capsule, Rfl: 0  •  levothyroxine (SYNTHROID) 75 MCG tablet, Take 1 tablet by mouth Daily. Take 1 tab po qam fasting and wait 1hr for food or other meds. BRAND ONLY, Disp: 30 tablet, Rfl: 5  •  testosterone 12.5 MG/ACT (1%) gel, 1 pump applied to dry skin qd, Disp: 1 bottle, Rfl: 5  •  Vitamin D, Cholecalciferol, 1000 UNITS capsule, Take 1 tablet by mouth daily., Disp: , Rfl:     Objective     /84  Temp 98.4 °F (36.9 °C)  Ht 72\" (182.9 cm)  Wt 182 lb 6.4 oz (82.7 kg)  BMI 24.74 kg/m2    Physical Exam   Constitutional: He is oriented to person, place, and time. He appears well-developed and well-nourished.   HENT:   Right Ear: Tympanic membrane and ear canal normal.   Left Ear: Tympanic membrane and ear canal normal.   Mouth/Throat: Oropharynx is clear and moist.   Eyes: Conjunctivae and EOM are normal. Pupils are equal, round, and reactive to light. "   Neck: No thyromegaly present.   Cardiovascular: Normal rate and regular rhythm.    Pulmonary/Chest: Effort normal and breath sounds normal.   Neurological: He is alert and oriented to person, place, and time.   Skin: Skin is warm and dry.   Psychiatric: He has a normal mood and affect.   Vitals reviewed.      Assessment/Plan   Eduardo was seen today for follow-up.    Diagnoses and all orders for this visit:    Depression with anxiety  -     FLUoxetine (PROzac) 10 MG capsule; 1 tab po qd    Low testosterone  -     testosterone 12.5 MG/ACT (1%) gel; 1 pump applied to dry skin qd      1. PSYCH- depression with anxiety- discussed that now that the testosterone is being replaced he may tolerate serotonin better. However, i will keep the norepinephrine a nd serotonin treatment separate to allow dose flexibility, especially as I think he needs low doses. Will continue the wellbutrin and add prozac 10mg.  2. - testosterone defic- at goal. RF today x6mo. Will do contract today.  3. RECHECK- 1mo

## 2017-04-14 NOTE — PATIENT INSTRUCTIONS
1. PSYCH- depression with anxiety- discussed that now that the testosterone is being replaced he may tolerate serotonin better. However, i will keep the norepinephrine a nd serotonin treatment separate to allow dose flexibility, especially as I think he needs low doses. Will continue the wellbutrin and add prozac 10mg.  2. - testosterone defic- at goal. RF today x6mo. Will do contract today.  3. RECHECK- 1mo

## 2017-05-04 ENCOUNTER — TELEPHONE (OUTPATIENT)
Dept: INTERNAL MEDICINE | Facility: CLINIC | Age: 37
End: 2017-05-04

## 2017-05-04 DIAGNOSIS — F41.8 DEPRESSION WITH ANXIETY: ICD-10-CM

## 2017-05-04 RX ORDER — FLUOXETINE 10 MG/1
CAPSULE ORAL
Qty: 30 CAPSULE | Refills: 0 | Status: SHIPPED | OUTPATIENT
Start: 2017-05-04 | End: 2017-05-19 | Stop reason: SDUPTHER

## 2017-05-19 ENCOUNTER — OFFICE VISIT (OUTPATIENT)
Dept: INTERNAL MEDICINE | Facility: CLINIC | Age: 37
End: 2017-05-19

## 2017-05-19 VITALS
SYSTOLIC BLOOD PRESSURE: 128 MMHG | BODY MASS INDEX: 23.92 KG/M2 | WEIGHT: 176.6 LBS | HEIGHT: 72 IN | TEMPERATURE: 97.9 F | DIASTOLIC BLOOD PRESSURE: 76 MMHG

## 2017-05-19 DIAGNOSIS — F41.8 DEPRESSION WITH ANXIETY: ICD-10-CM

## 2017-05-19 PROCEDURE — 99213 OFFICE O/P EST LOW 20 MIN: CPT | Performed by: FAMILY MEDICINE

## 2017-05-19 RX ORDER — FLUOXETINE 10 MG/1
CAPSULE ORAL
Qty: 30 CAPSULE | Refills: 1 | Status: SHIPPED | OUTPATIENT
Start: 2017-05-19 | End: 2017-07-17 | Stop reason: SDUPTHER

## 2017-06-09 DIAGNOSIS — F41.8 DEPRESSION WITH ANXIETY: ICD-10-CM

## 2017-06-09 RX ORDER — FLUOXETINE 10 MG/1
CAPSULE ORAL
Qty: 30 CAPSULE | Refills: 0 | Status: SHIPPED | OUTPATIENT
Start: 2017-06-09 | End: 2017-07-17 | Stop reason: SDUPTHER

## 2017-06-14 ENCOUNTER — TELEPHONE (OUTPATIENT)
Dept: INTERNAL MEDICINE | Facility: CLINIC | Age: 37
End: 2017-06-14

## 2017-06-14 DIAGNOSIS — E03.9 HYPOTHYROIDISM (ACQUIRED): ICD-10-CM

## 2017-06-14 RX ORDER — LEVOTHYROXINE SODIUM 0.07 MG/1
75 TABLET ORAL DAILY
Qty: 30 TABLET | Refills: 5 | Status: SHIPPED | OUTPATIENT
Start: 2017-06-14 | End: 2017-12-11 | Stop reason: SDUPTHER

## 2017-06-14 NOTE — TELEPHONE ENCOUNTER
Pt sent message via [employer] asking for refills. Rx faxed to pt pharm and informed that refills were sent via [employer] [facility].

## 2017-06-14 NOTE — TELEPHONE ENCOUNTER
----- Message from Eduardo Luna sent at 6/13/2017  5:50 PM EDT -----  Regarding: Prescription Question  Contact: [phone]  Desmond Wheeler or staff of Dr. Henry,     May I have refills for levothyroxine 75 mcg tablets?     Thanks so much! Have a great day!    Sincerely,    Eduardo Luna

## 2017-06-29 ENCOUNTER — OFFICE VISIT (OUTPATIENT)
Dept: SLEEP MEDICINE | Facility: HOSPITAL | Age: 37
End: 2017-06-29

## 2017-06-29 VITALS
HEART RATE: 90 BPM | SYSTOLIC BLOOD PRESSURE: 148 MMHG | DIASTOLIC BLOOD PRESSURE: 96 MMHG | BODY MASS INDEX: 23.84 KG/M2 | WEIGHT: 176 LBS | OXYGEN SATURATION: 96 % | HEIGHT: 72 IN

## 2017-06-29 DIAGNOSIS — G47.33 OBSTRUCTIVE SLEEP APNEA, ADULT: ICD-10-CM

## 2017-06-29 DIAGNOSIS — R06.83 SNORING: Primary | ICD-10-CM

## 2017-06-29 PROCEDURE — 99214 OFFICE O/P EST MOD 30 MIN: CPT | Performed by: INTERNAL MEDICINE

## 2017-06-29 NOTE — PATIENT INSTRUCTIONS
Sleep Apnea  Sleep apnea is a condition in which breathing pauses or becomes shallow during sleep. Episodes of sleep apnea usually last 10 seconds or longer, and they may occur as many as 20 times an hour. Sleep apnea disrupts your sleep and keeps your body from getting the rest that it needs. This condition can increase your risk of certain health problems, including:  · Heart attack.  · Stroke.  · Obesity.  · Diabetes.  · Heart failure.  · Irregular heartbeat.  There are three kinds of sleep apnea:  · Obstructive sleep apnea. This kind is caused by a blocked or collapsed airway.  · Central sleep apnea. This kind happens when the part of the brain that controls breathing does not send the correct signals to the muscles that control breathing.  · Mixed sleep apnea. This is a combination of obstructive and central sleep apnea.  CAUSES  The most common cause of this condition is a collapsed or blocked airway. An airway can collapse or become blocked if:  · Your throat muscles are abnormally relaxed.  · Your tongue and tonsils are larger than normal.  · You are overweight.  · Your airway is smaller than normal.  RISK FACTORS  This condition is more likely to develop in people who:  · Are overweight.  · Smoke.  · Have a smaller than normal airway.  · Are elderly.  · Are male.  · Drink alcohol.  · Take sedatives or tranquilizers.  · Have a family history of sleep apnea.  SYMPTOMS  Symptoms of this condition include:  · Trouble staying asleep.  · Daytime sleepiness and tiredness.  · Irritability.  · Loud snoring.  · Morning headaches.  · Trouble concentrating.  · Forgetfulness.  · Decreased interest in sex.  · Unexplained sleepiness.  · Mood swings.  · Personality changes.  · Feelings of depression.  · Waking up often during the night to urinate.  · Dry mouth.  · Sore throat.  DIAGNOSIS  This condition may be diagnosed with:  · A medical history.  · A physical exam.  · A series of tests that are done while you are  sleeping (sleep study). These tests are usually done in a sleep lab, but they may also be done at home.  TREATMENT  Treatment for this condition aims to restore normal breathing and to ease symptoms during sleep. It may involve managing health issues that can affect breathing, such as high blood pressure or obesity. Treatment may include:  · Sleeping on your side.  · Using a decongestant if you have nasal congestion.  · Avoiding the use of depressants, including alcohol, sedatives, and narcotics.  · Losing weight if you are overweight.  · Making changes to your diet.  · Quitting smoking.  · Using a device to open your airway while you sleep, such as:    An oral appliance. This is a custom-made mouthpiece that shifts your lower jaw forward.    A continuous positive airway pressure (CPAP) device. This device delivers oxygen to your airway through a mask.    A nasal expiratory positive airway pressure (EPAP) device. This device has valves that you put into each nostril.    A bi-level positive airway pressure (BPAP) device. This device delivers oxygen to your airway through a mask.  · Surgery if other treatments do not work. During surgery, excess tissue is removed to create a wider airway.  It is important to get treatment for sleep apnea. Without treatment, this condition can lead to:  · High blood pressure.  · Coronary artery disease.  · (Men) An inability to achieve or maintain an erection (impotence).  · Reduced thinking abilities.  HOME CARE INSTRUCTIONS  · Make any lifestyle changes that your health care provider recommends.  · Eat a healthy, well-balanced diet.  · Take over-the-counter and prescription medicines only as told by your health care provider.  · Avoid using depressants, including alcohol, sedatives, and narcotics.  · Take steps to lose weight if you are overweight.  · If you were given a device to open your airway while you sleep, use it only as told by your health care provider.  · Do not use any  tobacco products, such as cigarettes, chewing tobacco, and e-cigarettes. If you need help quitting, ask your health care provider.  · Keep all follow-up visits as told by your health care provider. This is important.  SEEK MEDICAL CARE IF:  · The device that you received to open your airway during sleep is uncomfortable or does not seem to be working.  · Your symptoms do not improve.  · Your symptoms get worse.  SEEK IMMEDIATE MEDICAL CARE IF:  · You develop chest pain.  · You develop shortness of breath.  · You develop discomfort in your back, arms, or stomach.  · You have trouble speaking.  · You have weakness on one side of your body.  · You have drooping in your face.  These symptoms may represent a serious problem that is an emergency. Do not wait to see if the symptoms will go away. Get medical help right away. Call your local emergency services (911 in the [place] Do not drive yourself to the hospital.     This information is not intended to replace advice given to you by your health care provider. Make sure you discuss any questions you have with your health care provider.     Document Released: 12/08/2003 Document Revised: 04/10/2017 Document Reviewed: 09/26/2016  [employer] Patient Education ©2017 [employer]

## 2017-06-29 NOTE — PROGRESS NOTES
Subjective   Eduardo Luna is a 36 y.o. male is here today for follow-up.  He is seen follow-up of snoring and nonrestorative sleep.  His primary care physician is Dr. Svetlana Henry    History of Present Illness  Patient was seen here February 21 with a history of snoring and nonrestorative sleep has a history of hypertension and hypothyroidism he had home sleep testing done on March 31 that showed mild obstructive sleep apnea he was started on CPAP been used it for a few weeks but could not tolerate CPAP been turned it back in.  He apparently is still snoring and not feeling very rested.  Past Medical History:   Diagnosis Date   • Cancer     hodgkins lymphoma   • Colon polyp    • Depression    • GERD (gastroesophageal reflux disease)    • Hyperlipidemia    • Hypertension    • Migraine    • SVT (supraventricular tachycardia)        Past Surgical History:   Procedure Laterality Date   • LYMPH NODE BIOPSY  11/2001    @ [facility] with Dr. Daniel Marinelli   • PORTACATH PLACEMENT  01/2002    [facility] Placement @ [facility] with Dr. Daniel Marinelli   • TONSILLECTOMY  09/2013    @ [facility] with Lexa Castillo   • VASECTOMY  01/2012    @ [facility] with Dr. Aidan Castellano   • WISDOM TOOTH EXTRACTION  10/1997    @ [facility] with Dr. Momo Nunn           Current Outpatient Prescriptions:   •  buPROPion XL (WELLBUTRIN XL) 300 MG 24 hr tablet, Take 1 tablet by mouth Daily., Disp: 30 tablet, Rfl: 5  •  FLUoxetine (PROzac) 10 MG capsule, 1 tab po qd, Disp: 30 capsule, Rfl: 1  •  FLUoxetine (PROzac) 10 MG capsule, TAKE 1 CAPSULE BY MOUTH EVERYDAY, Disp: 30 capsule, Rfl: 0  •  levothyroxine (SYNTHROID) 75 MCG tablet, Take 1 tablet by mouth Daily. Take 1 tab po qam fasting and wait 1hr for food or other meds., Disp: 30 tablet, Rfl: 5  •  testosterone 12.5 MG/ACT (1%) gel, 1 pump applied to dry skin qd, Disp: 1 bottle, Rfl: 5  •  Vitamin D, Cholecalciferol, 1000 UNITS capsule, Take 1 tablet by mouth daily., Disp: , Rfl:     Allergies  "  Allergen Reactions   • Amoxicillin Hives, Itching and Rash       The following portions of the patient's history were reviewed and updated as appropriate: allergies, current medications and problem list.    Review of Systems   Constitutional: Negative.    HENT: Positive for congestion and postnasal drip.    Eyes: Negative.    Respiratory: Negative.    Cardiovascular: Negative.    Gastrointestinal: Negative.    Endocrine: Positive for cold intolerance.   Genitourinary: Negative.    Musculoskeletal: Negative.    Skin: Negative.    Allergic/Immunologic: Positive for environmental allergies.   Neurological: Positive for headaches.   Hematological: Negative.    Psychiatric/Behavioral: Positive for dysphoric mood. The patient is nervous/anxious.    [place] scores 5/24    Objective     /96  Pulse 90  Ht 72\" (182.9 cm)  Wt 176 lb (79.8 kg)  SpO2 96%  BMI 23.87 kg/m2    Physical Exam   Constitutional: He is oriented to person, place, and time. He appears well-developed and well-nourished.   HENT:   Head: Normocephalic and atraumatic.   He has Mallampati class II anatomy   Eyes: EOM are normal. Pupils are equal, round, and reactive to light.   Neck: Normal range of motion. Neck supple.   Cardiovascular: Normal rate, regular rhythm and normal heart sounds.    Pulmonary/Chest: Effort normal and breath sounds normal.   Abdominal: Soft. Bowel sounds are normal.   Musculoskeletal: Normal range of motion. He exhibits no edema.   Neurological: He is alert and oriented to person, place, and time.   Skin: Skin is warm and dry.   Psychiatric: He has a normal mood and affect. His behavior is normal.    home sleep testing on March 31 showed an  AHI of 10.6.  This consistent with mild obstructive sleep apnea.      Assessment/Plan   Eduardo was seen today for follow-up.    Diagnoses and all orders for this visit:    Snoring  -     Ambulatory Referral to Dentistry    Obstructive sleep apnea, adult  -     Ambulatory Referral to " Dentistry    Patient does have mild obstructive sleep apnea he has been intolerant of CPAP been sent that back.  Do think he would be a good candidate for an oral appliance particularly in combination with weight control and lateral position sleep.  He is to discuss this with his dentist Dr. Grullon.  He is to practice lateral position sleep.  He is to avoid alcohol and sedatives close to bedtime.  We will plan to see him back in roughly 8 months.  He is to contact us earlier symptoms worsen.             Jason Recio MD [facility]  Sleep Medicine  Pulmonary and Critical Care Medicine      06/29/17  3:48 PM

## 2017-07-17 ENCOUNTER — OFFICE VISIT (OUTPATIENT)
Dept: INTERNAL MEDICINE | Facility: CLINIC | Age: 37
End: 2017-07-17

## 2017-07-17 VITALS
SYSTOLIC BLOOD PRESSURE: 128 MMHG | BODY MASS INDEX: 24.22 KG/M2 | HEIGHT: 72 IN | WEIGHT: 178.8 LBS | TEMPERATURE: 98.2 F | DIASTOLIC BLOOD PRESSURE: 78 MMHG

## 2017-07-17 DIAGNOSIS — F41.8 DEPRESSION WITH ANXIETY: ICD-10-CM

## 2017-07-17 DIAGNOSIS — R79.89 LOW TESTOSTERONE: ICD-10-CM

## 2017-07-17 PROCEDURE — 99213 OFFICE O/P EST LOW 20 MIN: CPT | Performed by: FAMILY MEDICINE

## 2017-07-17 RX ORDER — TESTOSTERONE 12.5 MG/1.25G
GEL TOPICAL
Qty: 1 BOTTLE | Refills: 5 | Status: SHIPPED | OUTPATIENT
Start: 2017-07-17 | End: 2018-01-19 | Stop reason: SDUPTHER

## 2017-07-17 RX ORDER — FLUOXETINE 10 MG/1
CAPSULE ORAL
Qty: 60 CAPSULE | Refills: 5 | Status: SHIPPED | OUTPATIENT
Start: 2017-07-17 | End: 2017-09-29 | Stop reason: SDUPTHER

## 2017-07-17 NOTE — PATIENT INSTRUCTIONS
1. PSYCH- depression with anxiety- discussed that he may need to increase the prozac to 20mg just seasonally or episodically. Will write for 10mg #60 so he can adjust the dose accordingly. Continue the wellbutrin at 300mg  2. RECHECK- 6mo routine with annual labs

## 2017-07-17 NOTE — PROGRESS NOTES
Subjective   Eduardo Luna is a 36 y.o. male.     History of Present Illness   Here for 2mo recheck mood. Last seen 5/19/17. Was seen 9/22/16 as a new patient. Pt has a h/o Hodgkins lymphoma 2002 in full remission. Hx PAT post ablation 2008. Labs 1/23/17 demo normal CBC, CMP and B12. TSH was at goal on levothyroxine 75. Had low testosterone with total 297, free 6.6. Recheck lab 4/12/17 demo normal PSA, total testosterone 466, free 13.1, lipid normal with , tg 70, HDL 47, .     1.ENDO- hypothyroid- currently on levothyroxine 75. At his initial visit pt was c/o raspy voice, trouble concentrating, occasionally feeling depressed, slow speech and dry skin. Was on generic and taking it with other meds. Was changed to branded synthroid and advised how to take correctly. TSH at goal 1/23/17. Pt feeling at goal since mood improved.  2. CV- HTN, resolved. Diagnosed 12/22/16. Pt started on Lisinopril HCT and did well. No known cardio sequelae. Had borderline lipid that improved with treating thyroid and testosterone deficiency. BP also improved and pt was able to stop the lisinopril HCT as well.  3. RESP- sleep apnea. Had been intolerant to CPAP in past. Was referred back to sleep center for re eval and was + for sleep apnea 3/2017. Did not tolerate CPAP and has dental appliance in process.   4. - low testosterone. Found on lab 1/23/17. Was seen to start treatment; had to change to testim due to insur; was started at 12.5 pump and did well.      5. PSYCH- depression with anxiety. Diagnosed 2002 when diagnosed with lymphoma. Initally he was on Lexapro, valium and ambien. Was still c/o feeling anxious, guilty, feeling overwhelmed, withdrawing and concentration problems on occassion. Was waking up frequently, even on ambien. Did well with Zyprexa and was able to wean the valium and ambien. He stopped Lexapro but was still sad, even after testosterone replaced. Did not do well with pristiq or buspar. Did well with  "wellbutrin but did not reach goal. Was given addition of low dose Prozac and reached 50% of goal at 1mo with no S/E. Was continued on same. Today he reports he feels he is at goal. Has thought about increasing the prozac to 20mg but is worried about sexual S/E.     The following portions of the patient's history were reviewed and updated as appropriate: current medications, past family history, past medical history, past social history, past surgical history and problem list.    Review of Systems   Cardiovascular: Negative for chest pain.   Gastrointestinal: Negative for abdominal distention and abdominal pain.   Skin: Negative for color change.   Neurological: Negative for tremors, speech difficulty and headaches.   Psychiatric/Behavioral: Negative for agitation and confusion.   All other systems reviewed and are negative.        Current Outpatient Prescriptions:   •  diphenhydrAMINE (SOMINEX) 25 MG tablet, Take 1-2 tablets at bedtime, Disp: , Rfl:   •  buPROPion XL (WELLBUTRIN XL) 300 MG 24 hr tablet, Take 1 tablet by mouth Daily., Disp: 30 tablet, Rfl: 5  •  FLUoxetine (PROzac) 10 MG capsule, Take 1-2 caps po qd, Disp: 60 capsule, Rfl: 5  •  levothyroxine (SYNTHROID) 75 MCG tablet, Take 1 tablet by mouth Daily. Take 1 tab po qam fasting and wait 1hr for food or other meds., Disp: 30 tablet, Rfl: 5  •  testosterone 12.5 MG/ACT (1%) gel, 1 pump applied to dry skin qd, Disp: 1 bottle, Rfl: 5  •  Vitamin D, Cholecalciferol, 1000 UNITS capsule, Take 1 tablet by mouth daily., Disp: , Rfl:     Objective     /78  Temp 98.2 °F (36.8 °C)  Ht 72\" (182.9 cm)  Wt 178 lb 12.8 oz (81.1 kg)  BMI 24.25 kg/m2    Physical Exam   Constitutional: He is oriented to person, place, and time. He appears well-developed and well-nourished.   HENT:   Right Ear: Tympanic membrane and ear canal normal.   Left Ear: Tympanic membrane and ear canal normal.   Mouth/Throat: Oropharynx is clear and moist.   Eyes: Conjunctivae and EOM are " normal. Pupils are equal, round, and reactive to light.   Neck: No thyromegaly present.   Cardiovascular: Normal rate and regular rhythm.    Pulmonary/Chest: Effort normal and breath sounds normal.   Neurological: He is alert and oriented to person, place, and time.   Skin: Skin is warm and dry.   Psychiatric: He has a normal mood and affect.   Vitals reviewed.      Assessment/Plan   Eduardo was seen today for follow-up.    Diagnoses and all orders for this visit:    Depression with anxiety  -     FLUoxetine (PROzac) 10 MG capsule; Take 1-2 caps po qd    Low testosterone  -     testosterone 12.5 MG/ACT (1%) gel; 1 pump applied to dry skin qd      1. PSYCH- depression with anxiety- discussed that he may need to increase the prozac to 20mg just seasonally or episodically. Will write for 10mg #60 so he can adjust the dose accordingly. Continue the wellbutrin at 300mg  2. RECHECK- 6mo routine with annual labs

## 2017-08-14 ENCOUNTER — TELEPHONE (OUTPATIENT)
Dept: INTERNAL MEDICINE | Facility: CLINIC | Age: 37
End: 2017-08-14

## 2017-08-14 DIAGNOSIS — F41.8 DEPRESSION WITH ANXIETY: ICD-10-CM

## 2017-08-14 RX ORDER — BUPROPION HYDROCHLORIDE 300 MG/1
300 TABLET ORAL DAILY
Qty: 90 TABLET | Refills: 0 | Status: SHIPPED | OUTPATIENT
Start: 2017-08-14 | End: 2017-12-02 | Stop reason: SDUPTHER

## 2017-08-14 NOTE — TELEPHONE ENCOUNTER
----- Message from Eduardo Luna sent at 8/14/2017  4:16 PM EDT -----  Regarding: Prescription Question  Contact: [phone]  Desmond Wheeler or staff of Dr. Henry,    I filled my last refill on Wellbutrin XL 300mg today. There is no rush, but may I have refills before 30 days?    Thanks so much! Have a great day!    Eduardo Luna

## 2017-09-26 ENCOUNTER — OFFICE VISIT (OUTPATIENT)
Dept: ONCOLOGY | Facility: CLINIC | Age: 37
End: 2017-09-26

## 2017-09-26 VITALS
HEIGHT: 72 IN | RESPIRATION RATE: 16 BRPM | DIASTOLIC BLOOD PRESSURE: 100 MMHG | HEART RATE: 104 BPM | BODY MASS INDEX: 24.64 KG/M2 | TEMPERATURE: 98.6 F | SYSTOLIC BLOOD PRESSURE: 138 MMHG | WEIGHT: 181.9 LBS

## 2017-09-26 DIAGNOSIS — Z85.71 HISTORY OF HODGKIN'S LYMPHOMA: Primary | ICD-10-CM

## 2017-09-26 PROCEDURE — 99213 OFFICE O/P EST LOW 20 MIN: CPT | Performed by: INTERNAL MEDICINE

## 2017-09-26 NOTE — PROGRESS NOTES
"      PROBLEM LIST:  1. Stage IIB Nodular sclerosing Hodgkin Lymphoma  A) diagnosed age 21, presented with cervical and axillary adenopathy, sweats, weight loss.  Treated with ABVD, changed to CMOPP after acute bleomycin lung reaction.  Chemotherapy followed by IFRT.  2. Hypothyroidism, secondary to radiation therapy    Subjective     HISTORY OF PRESENT ILLNESS:   Eduardo Luna returns for 1 year follow up.  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   He had a colonoscopy which revealed some polyps and was instructed to return in 5 years.  He has had some skin lesions removed by dermatology.  He follow regularly with his PCP and was started on testosterone for low testosterone levels.    Past Medical History, Past Surgical History, Social History, Family History have been reviewed and are without significant changes except as mentioned.    Review of Systems   A comprehensive 14 point review of systems was performed and was negative except as mentioned.    Medications:  The current medication list was reviewed in the EMR    ALLERGIES:    Allergies   Allergen Reactions   • Amoxicillin Hives, Itching and Rash       Objective      /100  Pulse 104  Temp 98.6 °F (37 °C) (Temporal Artery )   Resp 16  Ht 72\" (182.9 cm)  Wt 181 lb 14.4 oz (82.5 kg)  BMI 24.67 kg/m2     Performance Status: 0    General: well appearing male in no acute distress  Neuro: alert and oriented  HEENT: sclera anicteric, oropharynx clear  Lymphatics: no cervical, supraclavicular, or axillary adenopathy  Cardiovascular: regular rate and rhythm, no murmurs  Lungs: clear to auscultation bilaterally  Abdomen: soft, nontender, nondistended.  No palpable organomegaly  Extremeties: no lower extremity edema  Skin: no rashes, lesions, bruising, or petechiae  Psych: mood and affect appropriate      RECENT LABS:  Blood work was reviewed and is notable for normal CBC and CMP in January.  Cholesterol showed total cholesterol 201, HD 47, LDL " 146.  TSH 2.02        Assessment/Plan   Eduardo Luna is a 36 y.o. year old male with a remote history of Hodgkin lymphoma in 2002.  He is doing well and we discussed that the likelihood of lymphoma recurrence at this point is minimal.  We reviewed the NCCN guidelines for long term follow up of Hodgkin lymphoma survivors.  This includes the following:    Annual H&P  Annual flu vaccine  Consider stress test/echo at 10 yr intervals  Consider carotid US at 10 yr intervals  Annual CBC, CMP, fasting glucose.  Biannual lipids    He did have a prior echocardiogram.  He is interested in having a carotid ultrasound to screen for early vascular disease given his history of neck radiation.  I will order this and will call him with the results.    I will otherwise see him back in 1 year.                  Visit time was 15 minutes, greater than 50% spent in counseling      Laly Vazquez MD  [facility]    9/26/2017          CC:

## 2017-09-28 ENCOUNTER — HOSPITAL ENCOUNTER (OUTPATIENT)
Dept: CARDIOLOGY | Facility: HOSPITAL | Age: 37
Discharge: HOME | End: 2017-09-28
Attending: INTERNAL MEDICINE | Admitting: INTERNAL MEDICINE

## 2017-09-28 DIAGNOSIS — Z85.71 HISTORY OF HODGKIN'S LYMPHOMA: ICD-10-CM

## 2017-09-28 LAB
BH CV XLRA MEAS LEFT CCA RATIO VEL: 134 CM/SEC
BH CV XLRA MEAS LEFT DIST CCA EDV: 33.4 CM/SEC
BH CV XLRA MEAS LEFT DIST CCA PSV: 134 CM/SEC
BH CV XLRA MEAS LEFT DIST ICA EDV: 35.4 CM/SEC
BH CV XLRA MEAS LEFT DIST ICA PSV: 81.7 CM/SEC
BH CV XLRA MEAS LEFT ICA RATIO VEL: 105 CM/SEC
BH CV XLRA MEAS LEFT ICA/CCA RATIO: 0.78
BH CV XLRA MEAS LEFT MID ICA EDV: 27.9 CM/SEC
BH CV XLRA MEAS LEFT MID ICA PSV: 80.5 CM/SEC
BH CV XLRA MEAS LEFT PROX CCA EDV: 32.4 CM/SEC
BH CV XLRA MEAS LEFT PROX CCA PSV: 151 CM/SEC
BH CV XLRA MEAS LEFT PROX ECA PSV: 154 CM/SEC
BH CV XLRA MEAS LEFT PROX ICA EDV: 33.4 CM/SEC
BH CV XLRA MEAS LEFT PROX ICA PSV: 105 CM/SEC
BH CV XLRA MEAS LEFT PROX SCLA PSV: 164 CM/SEC
BH CV XLRA MEAS LEFT VERTEBRAL A EDV: 17.3 CM/SEC
BH CV XLRA MEAS LEFT VERTEBRAL A PSV: 57.8 CM/SEC
BH CV XLRA MEAS RIGHT CCA RATIO VEL: 127 CM/SEC
BH CV XLRA MEAS RIGHT DIST CCA EDV: 40.9 CM/SEC
BH CV XLRA MEAS RIGHT DIST CCA PSV: 127 CM/SEC
BH CV XLRA MEAS RIGHT DIST ICA EDV: 39.3 CM/SEC
BH CV XLRA MEAS RIGHT DIST ICA PSV: 81.7 CM/SEC
BH CV XLRA MEAS RIGHT ICA RATIO VEL: 89.6 CM/SEC
BH CV XLRA MEAS RIGHT ICA/CCA RATIO: 0.71
BH CV XLRA MEAS RIGHT MID ICA EDV: 36.9 CM/SEC
BH CV XLRA MEAS RIGHT MID ICA PSV: 89.6 CM/SEC
BH CV XLRA MEAS RIGHT PROX CCA EDV: 37.7 CM/SEC
BH CV XLRA MEAS RIGHT PROX CCA PSV: 149 CM/SEC
BH CV XLRA MEAS RIGHT PROX ECA PSV: 116 CM/SEC
BH CV XLRA MEAS RIGHT PROX ICA EDV: 29.9 CM/SEC
BH CV XLRA MEAS RIGHT PROX ICA PSV: 82.5 CM/SEC
BH CV XLRA MEAS RIGHT PROX SCLA PSV: 140 CM/SEC
BH CV XLRA MEAS RIGHT VERTEBRAL A EDV: 19.6 CM/SEC
BH CV XLRA MEAS RIGHT VERTEBRAL A PSV: 60.5 CM/SEC

## 2017-09-28 PROCEDURE — 93880 EXTRACRANIAL BILAT STUDY: CPT | Performed by: INTERNAL MEDICINE

## 2017-09-28 PROCEDURE — 93880 EXTRACRANIAL BILAT STUDY: CPT

## 2017-09-29 DIAGNOSIS — F41.8 DEPRESSION WITH ANXIETY: ICD-10-CM

## 2017-09-29 RX ORDER — FLUOXETINE 10 MG/1
CAPSULE ORAL
Qty: 180 CAPSULE | Refills: 2 | Status: SHIPPED | OUTPATIENT
Start: 2017-09-29 | End: 2018-01-19 | Stop reason: SDUPTHER

## 2017-12-02 DIAGNOSIS — F41.8 DEPRESSION WITH ANXIETY: ICD-10-CM

## 2017-12-04 RX ORDER — BUPROPION HYDROCHLORIDE 300 MG/1
TABLET ORAL
Qty: 90 TABLET | Refills: 0 | Status: SHIPPED | OUTPATIENT
Start: 2017-12-04 | End: 2018-01-19 | Stop reason: SDUPTHER

## 2017-12-11 DIAGNOSIS — E03.9 HYPOTHYROIDISM (ACQUIRED): ICD-10-CM

## 2017-12-11 RX ORDER — LEVOTHYROXINE SODIUM 0.07 MG/1
TABLET ORAL
Qty: 30 TABLET | Refills: 5 | Status: SHIPPED | OUTPATIENT
Start: 2017-12-11 | End: 2018-01-19 | Stop reason: SDUPTHER

## 2018-01-08 PROCEDURE — 87070 CULTURE OTHR SPECIMN AEROBIC: CPT | Performed by: NURSE PRACTITIONER

## 2018-01-10 ENCOUNTER — TELEPHONE (OUTPATIENT)
Dept: URGENT CARE | Facility: CLINIC | Age: 38
End: 2018-01-10

## 2018-01-19 ENCOUNTER — OFFICE VISIT (OUTPATIENT)
Dept: INTERNAL MEDICINE | Facility: CLINIC | Age: 38
End: 2018-01-19

## 2018-01-19 VITALS
TEMPERATURE: 98.3 F | SYSTOLIC BLOOD PRESSURE: 122 MMHG | HEIGHT: 72 IN | DIASTOLIC BLOOD PRESSURE: 76 MMHG | WEIGHT: 189.8 LBS | BODY MASS INDEX: 25.71 KG/M2

## 2018-01-19 DIAGNOSIS — E03.9 HYPOTHYROIDISM (ACQUIRED): Primary | ICD-10-CM

## 2018-01-19 DIAGNOSIS — F41.8 DEPRESSION WITH ANXIETY: ICD-10-CM

## 2018-01-19 DIAGNOSIS — R79.89 LOW TESTOSTERONE: ICD-10-CM

## 2018-01-19 PROBLEM — I10 ESSENTIAL HYPERTENSION: Status: RESOLVED | Noted: 2017-01-23 | Resolved: 2018-01-19

## 2018-01-19 LAB
25(OH)D3 SERPL-MCNC: 24.8 NG/ML
ALBUMIN SERPL-MCNC: 4.6 G/DL (ref 3.2–4.8)
ALBUMIN/GLOB SERPL: 2.1 G/DL (ref 1.5–2.5)
ALP SERPL-CCNC: 82 U/L (ref 25–100)
ALT SERPL W P-5'-P-CCNC: 43 U/L (ref 7–40)
ANION GAP SERPL CALCULATED.3IONS-SCNC: 9 MMOL/L (ref 3–11)
ARTICHOKE IGE QN: 167 MG/DL (ref 0–130)
AST SERPL-CCNC: 23 U/L (ref 0–33)
BASOPHILS # BLD AUTO: 0.03 10*3/MM3 (ref 0–0.2)
BASOPHILS NFR BLD AUTO: 0.5 % (ref 0–1)
BILIRUB SERPL-MCNC: 0.4 MG/DL (ref 0.3–1.2)
BUN BLD-MCNC: 19 MG/DL (ref 9–23)
BUN/CREAT SERPL: 17.3 (ref 7–25)
CALCIUM SPEC-SCNC: 9.7 MG/DL (ref 8.7–10.4)
CHLORIDE SERPL-SCNC: 105 MMOL/L (ref 99–109)
CHOLEST SERPL-MCNC: 229 MG/DL (ref 0–200)
CO2 SERPL-SCNC: 28 MMOL/L (ref 20–31)
CREAT BLD-MCNC: 1.1 MG/DL (ref 0.6–1.3)
DEPRECATED RDW RBC AUTO: 44.9 FL (ref 37–54)
EOSINOPHIL # BLD AUTO: 0.11 10*3/MM3 (ref 0–0.3)
EOSINOPHIL NFR BLD AUTO: 1.8 % (ref 0–3)
ERYTHROCYTE [DISTWIDTH] IN BLOOD BY AUTOMATED COUNT: 13.5 % (ref 11.3–14.5)
GFR SERPL CREATININE-BSD FRML MDRD: 75 ML/MIN/1.73
GLOBULIN UR ELPH-MCNC: 2.2 GM/DL
GLUCOSE BLD-MCNC: 73 MG/DL (ref 70–100)
HCT VFR BLD AUTO: 45 % (ref 38.9–50.9)
HDLC SERPL-MCNC: 59 MG/DL (ref 40–60)
HGB BLD-MCNC: 15.1 G/DL (ref 13.1–17.5)
IMM GRANULOCYTES # BLD: 0.01 10*3/MM3 (ref 0–0.03)
IMM GRANULOCYTES NFR BLD: 0.2 % (ref 0–0.6)
LYMPHOCYTES # BLD AUTO: 1.4 10*3/MM3 (ref 0.6–4.8)
LYMPHOCYTES NFR BLD AUTO: 22.3 % (ref 24–44)
MCH RBC QN AUTO: 30.8 PG (ref 27–31)
MCHC RBC AUTO-ENTMCNC: 33.6 G/DL (ref 32–36)
MCV RBC AUTO: 91.8 FL (ref 80–99)
MONOCYTES # BLD AUTO: 0.73 10*3/MM3 (ref 0–1)
MONOCYTES NFR BLD AUTO: 11.6 % (ref 0–12)
NEUTROPHILS # BLD AUTO: 3.99 10*3/MM3 (ref 1.5–8.3)
NEUTROPHILS NFR BLD AUTO: 63.6 % (ref 41–71)
PLATELET # BLD AUTO: 276 10*3/MM3 (ref 150–450)
PMV BLD AUTO: 9.9 FL (ref 6–12)
POTASSIUM BLD-SCNC: 4.9 MMOL/L (ref 3.5–5.5)
PROT SERPL-MCNC: 6.8 G/DL (ref 5.7–8.2)
RBC # BLD AUTO: 4.9 10*6/MM3 (ref 4.2–5.76)
SODIUM BLD-SCNC: 142 MMOL/L (ref 132–146)
T4 FREE SERPL-MCNC: 1.28 NG/DL (ref 0.89–1.76)
TRIGL SERPL-MCNC: 116 MG/DL (ref 0–150)
TSH SERPL DL<=0.05 MIU/L-ACNC: 1.6 MIU/ML (ref 0.35–5.35)
VIT B12 BLD-MCNC: 896 PG/ML (ref 211–911)
WBC NRBC COR # BLD: 6.27 10*3/MM3 (ref 3.5–10.8)

## 2018-01-19 PROCEDURE — 80061 LIPID PANEL: CPT | Performed by: FAMILY MEDICINE

## 2018-01-19 PROCEDURE — 82607 VITAMIN B-12: CPT | Performed by: FAMILY MEDICINE

## 2018-01-19 PROCEDURE — 84443 ASSAY THYROID STIM HORMONE: CPT | Performed by: FAMILY MEDICINE

## 2018-01-19 PROCEDURE — 84402 ASSAY OF FREE TESTOSTERONE: CPT | Performed by: FAMILY MEDICINE

## 2018-01-19 PROCEDURE — 82306 VITAMIN D 25 HYDROXY: CPT | Performed by: FAMILY MEDICINE

## 2018-01-19 PROCEDURE — 84439 ASSAY OF FREE THYROXINE: CPT | Performed by: FAMILY MEDICINE

## 2018-01-19 PROCEDURE — 84403 ASSAY OF TOTAL TESTOSTERONE: CPT | Performed by: FAMILY MEDICINE

## 2018-01-19 PROCEDURE — 85025 COMPLETE CBC W/AUTO DIFF WBC: CPT | Performed by: FAMILY MEDICINE

## 2018-01-19 PROCEDURE — 80053 COMPREHEN METABOLIC PANEL: CPT | Performed by: FAMILY MEDICINE

## 2018-01-19 PROCEDURE — 99214 OFFICE O/P EST MOD 30 MIN: CPT | Performed by: FAMILY MEDICINE

## 2018-01-19 RX ORDER — TESTOSTERONE 12.5 MG/1.25G
GEL TOPICAL
Qty: 1 BOTTLE | Refills: 5 | Status: SHIPPED | OUTPATIENT
Start: 2018-01-19 | End: 2018-02-16

## 2018-01-19 RX ORDER — FLUOXETINE 10 MG/1
CAPSULE ORAL
Qty: 180 CAPSULE | Refills: 1 | Status: SHIPPED | OUTPATIENT
Start: 2018-01-19 | End: 2018-07-19 | Stop reason: SDUPTHER

## 2018-01-19 RX ORDER — PROPRANOLOL HYDROCHLORIDE 20 MG/1
TABLET ORAL
Qty: 30 TABLET | Refills: 0 | Status: SHIPPED | OUTPATIENT
Start: 2018-01-19 | End: 2018-04-19 | Stop reason: SDUPTHER

## 2018-01-19 RX ORDER — BUPROPION HYDROCHLORIDE 300 MG/1
300 TABLET ORAL EVERY MORNING
Qty: 90 TABLET | Refills: 1 | Status: SHIPPED | OUTPATIENT
Start: 2018-01-19 | End: 2018-07-19 | Stop reason: SDUPTHER

## 2018-01-19 RX ORDER — LEVOTHYROXINE SODIUM 0.07 MG/1
TABLET ORAL
Qty: 90 TABLET | Refills: 1 | Status: SHIPPED | OUTPATIENT
Start: 2018-01-19 | End: 2018-07-19 | Stop reason: SDUPTHER

## 2018-01-19 NOTE — PROGRESS NOTES
Subjective   Eduardo Luna is a 37 y.o. male.     History of Present Illness   Here for routine 6mo recheck with labs. Last seen 7/17/17 for 2mo recheck mood. Was seen 9/22/16 as a new patient. Pt has a h/o Hodgkins lymphoma 2002 in full remission. Hx PAT post ablation 2008. Labs 1/23/17 demo normal CBC, CMP and B12. TSH was at goal on levothyroxine 75. Had low testosterone with total 297, free 6.6. Recheck lab 4/12/17 demo normal PSA, total testosterone 466, free 13.1, lipid normal with , tg 70, HDL 47, . Sees Heme/Onc and Sleep Medicine ([facility])    1.ENDO- hypothyroid- currently on levothyroxine 75. At his initial visit pt was c/o raspy voice, trouble concentrating, occasionally feeling depressed, slow speech and dry skin. Was on generic and taking it with other meds. Was changed to branded synthroid and advised how to take correctly. TSH at goal 1/23/17. Pt feeling at goal since mood improved. Today he reports he feels at goal.    2. - low testosterone. Found on lab 1/23/17. Was seen to start treatment; had to change to testim due to insur; was started at 12.5 pump and did well. Today he reports he feels well.    3. PSYCH- depression with anxiety. Diagnosed 2002 when diagnosed with lymphoma. Initally he was on Lexapro, valium and ambien. Was still c/o feeling anxious, guilty, feeling overwhelmed, withdrawing and concentration problems on occassion. Was waking up frequently, even on ambien. Did well with Zyprexa and was able to wean the valium and ambien. He stopped Lexapro but was still sad, even after testosterone replaced. Did not do well with pristiq or buspar. Did well with wellbutrin and Prozac 10. Was at goal at discussion 7/17/17 except for situational issues. Was advised he could increase the Prozac to 20mg situationally. Today pt reports he has done well since on prozac 20mg. Is gaining weight.     4. CV- no HTN. However, today pt reports he gets BP done at derm before procedures and is  "high.    The following portions of the patient's history were reviewed and updated as appropriate: current medications, past family history, past medical history, past social history, past surgical history and problem list.    Review of Systems   Cardiovascular: Negative for chest pain.   Gastrointestinal: Negative for abdominal distention and abdominal pain.   Skin: Negative for color change.   Neurological: Negative for tremors, speech difficulty and headaches.   Psychiatric/Behavioral: Negative for agitation and confusion.   All other systems reviewed and are negative.        Current Outpatient Prescriptions:   •  buPROPion XL (WELLBUTRIN XL) 300 MG 24 hr tablet, Take 1 tablet by mouth Every Morning., Disp: 90 tablet, Rfl: 1  •  diphenhydrAMINE (SOMINEX) 25 MG tablet, Take 1-2 tablets at bedtime, Disp: , Rfl:   •  FLUoxetine (PROzac) 10 MG capsule, Take 1-2 caps po qd, Disp: 180 capsule, Rfl: 1  •  levothyroxine (SYNTHROID, LEVOTHROID) 75 MCG tablet, TAKE 1 TAB BY MOUTH IN THE AM FASTING AND WAIT 1HR FOR FOOD OR OTHER MEDS, Disp: 30 tablet, Rfl: 5  •  propranolol (INDERAL) 20 MG tablet, Take 1 tab po 1hr prior to doctor visits /  procedures, Disp: 30 tablet, Rfl: 0  •  testosterone 12.5 MG/ACT (1%) gel, 1 pump applied to dry skin qd, Disp: 1 bottle, Rfl: 5  •  Vitamin D, Cholecalciferol, 1000 UNITS capsule, Take 1 tablet by mouth daily., Disp: , Rfl:     Objective     /76  Temp 98.3 °F (36.8 °C)  Ht 182.9 cm (72\")  Wt 86.1 kg (189 lb 12.8 oz)  BMI 25.74 kg/m2    Physical Exam   Constitutional: He is oriented to person, place, and time. He appears well-developed and well-nourished.   HENT:   Right Ear: Tympanic membrane and ear canal normal.   Left Ear: Tympanic membrane and ear canal normal.   Mouth/Throat: Oropharynx is clear and moist.   Eyes: Conjunctivae and EOM are normal. Pupils are equal, round, and reactive to light.   Neck: No thyromegaly present.   Cardiovascular: Normal rate and regular " "rhythm.    Pulmonary/Chest: Effort normal and breath sounds normal.   Neurological: He is alert and oriented to person, place, and time.   Skin: Skin is warm and dry.   Psychiatric: He has a normal mood and affect.   Vitals reviewed.      Assessment/Plan   Eduardo was seen today for follow-up.    Diagnoses and all orders for this visit:    Hypothyroidism (acquired)  -     CBC & Differential  -     Comprehensive Metabolic Panel  -     Lipid Panel  -     Vitamin B12  -     Vitamin D 25 Hydroxy  -     TSH  -     T4, Free  -     levothyroxine (SYNTHROID, LEVOTHROID) 75 MCG tablet; TAKE 1 TAB BY MOUTH IN THE AM FASTING AND WAIT 1HR FOR FOOD OR OTHER MEDS  -     CBC Auto Differential    Low testosterone  -     Testosterone, Free, Total  -     testosterone 12.5 MG/ACT (1%) gel; 1 pump applied to dry skin qd    Depression with anxiety  -     propranolol (INDERAL) 20 MG tablet; Take 1 tab po 1hr prior to doctor visits /  procedures  -     FLUoxetine (PROzac) 10 MG capsule; Take 1-2 caps po qd  -     buPROPion XL (WELLBUTRIN XL) 300 MG 24 hr tablet; Take 1 tablet by mouth Every Morning.      1. ENDO- hypothyroid- clinically at goal. Labs today but will RF his current dose of synthroid 75 x6mo.  2.- testosterone defic- clinically at goal. Labs today but will RF his current dose x6mo.  3. PSYCH- depression with anxiety- mood at goal on wellbutrin with higher dose of prozac. Advised using a water bottle to address the \"munchies\". RF x6mo today. Discussed \"white coat syndrome\" re his BP. Will write for propranolol to take prior to Derm procedures.  4. RECHECK- 6mo CPE       "

## 2018-01-19 NOTE — PATIENT INSTRUCTIONS
"1. ENDO- hypothyroid- clinically at goal. Labs today but will RF his current dose of synthroid 75 x6mo.  2.- testosterone defic- clinically at goal. Labs today but will RF his current dose x6mo.  3. PSYCH- depression with anxiety- mood at goal on wellbutrin with higher dose of prozac. Advised using a water bottle to address the \"munchies\". RF x6mo today. Discussed \"white coat syndrome\" re his BP. Will write for propranolol to take prior to Derm procedures.  4. RECHECK- 6mo CPE  "

## 2018-01-23 LAB
TESTOST FREE SERPL-MCNC: 6.7 PG/ML (ref 8.7–25.1)
TESTOST SERPL-MCNC: 251 NG/DL (ref 264–916)

## 2018-02-16 ENCOUNTER — OFFICE VISIT (OUTPATIENT)
Dept: INTERNAL MEDICINE | Facility: CLINIC | Age: 38
End: 2018-02-16

## 2018-02-16 VITALS
SYSTOLIC BLOOD PRESSURE: 126 MMHG | TEMPERATURE: 98.3 F | BODY MASS INDEX: 25.81 KG/M2 | HEIGHT: 72 IN | WEIGHT: 190.6 LBS | DIASTOLIC BLOOD PRESSURE: 70 MMHG

## 2018-02-16 DIAGNOSIS — E78.00 PURE HYPERCHOLESTEROLEMIA: ICD-10-CM

## 2018-02-16 DIAGNOSIS — R79.89 LOW TESTOSTERONE: Primary | ICD-10-CM

## 2018-02-16 LAB
ARTICHOKE IGE QN: 193 MG/DL (ref 0–130)
CHOLEST SERPL-MCNC: 261 MG/DL (ref 0–200)
HDLC SERPL-MCNC: 64 MG/DL (ref 40–60)
TRIGL SERPL-MCNC: 97 MG/DL (ref 0–150)

## 2018-02-16 PROCEDURE — 99214 OFFICE O/P EST MOD 30 MIN: CPT | Performed by: FAMILY MEDICINE

## 2018-02-16 PROCEDURE — 84402 ASSAY OF FREE TESTOSTERONE: CPT | Performed by: FAMILY MEDICINE

## 2018-02-16 PROCEDURE — 80061 LIPID PANEL: CPT | Performed by: FAMILY MEDICINE

## 2018-02-16 PROCEDURE — 84403 ASSAY OF TOTAL TESTOSTERONE: CPT | Performed by: FAMILY MEDICINE

## 2018-02-16 RX ORDER — TESTOSTERONE 40.5 MG/2.5G
1 GEL TOPICAL DAILY
Qty: 2.5 G | Refills: 5 | Status: SHIPPED | OUTPATIENT
Start: 2018-02-16 | End: 2018-07-19

## 2018-02-16 NOTE — PATIENT INSTRUCTIONS
1. - testosterone defic- will change his testosterone dose to the 40mg so he can do 1 pump again. Will recheck his levels today.  2. CV- hyperlipid- will recheck his level today now that his testosterone should be back at goal. However, given his fam hx, this could be genetic. Discussed lipitor and will start this if his LDL is still over 160. Pt advised that if we do use the lipitor and he gets muscle aches, to cut the dose in half. Will provide with lipid diet guideline today.  3. RECHECK- keep appt July. Will make fasting appt in 1mo if we start lipitor.

## 2018-02-16 NOTE — PROGRESS NOTES
Subjective   Eduardo Luna is a 37 y.o. male.     History of Present Illness   Here for 1mo recheck testosterone and cholesterol. Last seen 1/19/18 for routine 6mo recheck. Was seen 9/22/16 as a new patient. Pt has a h/o Hodgkins lymphoma 2002 in full remission. Hx PAT post ablation 2008. Labs 1/19/18 demo normal CBC, CMP and B12. Thyroid at goal on synthroi 75 with TSH 1.6 and free T4 1.28. Testosterone low again with total 251, free 6.7; pt on testim. Lipid was high with , tg 116, HDL 59, . Labs 1/23/17 demo low testosterone with total 297, free 6.6. Recheck lab 4/12/17 demo normal PSA, total testosterone 466, free 13.1, lipid normal with , tg 70, HDL 47, . Sees Heme/Onc and Sleep Medicine ([facility])     1.- low testosterone. Found on lab 1/23/17. Was seen to start treatment; had to change to testim due to insur; was started at 12.5 pump and did well. However, at his last visit, while he felt well, his lab was low again. Pt also reported having left over medicine every month. Was advised to increase his dose (2 to 4) Today he reports he still feels fine. Is at 4 pumps.     2. CV- hyperlipid. Found on screening labs 1/19/18. Had been normal on screen 4/12/17. No HTN but does have situational elevations at derm. Today pt reports his father had high cholesterol.     3.ENDO- hypothyroid- currently on levothyroxine 75. At his initial visit pt was c/o raspy voice, trouble concentrating, occasionally feeling depressed, slow speech and dry skin. Was on generic and taking it with other meds. Was changed to branded synthroid and advised how to take correctly. Has been at goal on synthroid 75 since 1/23/17; still at goal at visit 1/19/18 including on labs..  4. PSYCH- depression with anxiety. Diagnosed 2002 when diagnosed with lymphoma. Initally he was on Lexapro, valium and ambien. Was still c/o feeling anxious, guilty, feeling overwhelmed, withdrawing and concentration problems on occassion. Was waking  "up frequently, even on ambien. Did well with Zyprexa and was able to wean the valium and ambien. He stopped Lexapro but was still sad, even after testosterone replaced. Did not do well with pristiq or buspar. Eventually did well with just Wellbutrin and Prozac.     The following portions of the patient's history were reviewed and updated as appropriate: current medications, past family history, past medical history, past social history, past surgical history and problem list.    Review of Systems   Cardiovascular: Negative for chest pain.   Gastrointestinal: Negative for abdominal distention and abdominal pain.   Skin: Negative for color change.   Neurological: Negative for tremors, speech difficulty and headaches.   Psychiatric/Behavioral: Negative for agitation and confusion.   All other systems reviewed and are negative.        Current Outpatient Prescriptions:   •  Cholecalciferol (VITAMIN D3) 2000 units capsule, , Disp: , Rfl:   •  buPROPion XL (WELLBUTRIN XL) 300 MG 24 hr tablet, Take 1 tablet by mouth Every Morning., Disp: 90 tablet, Rfl: 1  •  diphenhydrAMINE (SOMINEX) 25 MG tablet, Take 1-2 tablets at bedtime, Disp: , Rfl:   •  FLUoxetine (PROzac) 10 MG capsule, Take 1-2 caps po qd, Disp: 180 capsule, Rfl: 1  •  levothyroxine (SYNTHROID, LEVOTHROID) 75 MCG tablet, TAKE 1 TAB BY MOUTH IN THE AM FASTING AND WAIT 1HR FOR FOOD OR OTHER MEDS, Disp: 30 tablet, Rfl: 5  •  propranolol (INDERAL) 20 MG tablet, Take 1 tab po 1hr prior to doctor visits /  procedures, Disp: 30 tablet, Rfl: 0  •  Testosterone 40.5 MG/2.5GM (1.62%) gel, Place 1 Act on the skin Daily., Disp: 2.5 g, Rfl: 5    Objective     /70  Temp 98.3 °F (36.8 °C)  Ht 182.9 cm (72\")  Wt 86.5 kg (190 lb 9.6 oz)  BMI 25.85 kg/m2    Physical Exam   Constitutional: He is oriented to person, place, and time. He appears well-developed and well-nourished.   HENT:   Right Ear: Tympanic membrane and ear canal normal.   Left Ear: Tympanic membrane and " ear canal normal.   Mouth/Throat: Oropharynx is clear and moist.   Eyes: Conjunctivae and EOM are normal. Pupils are equal, round, and reactive to light.   Neck: No thyromegaly present.   Cardiovascular: Normal rate and regular rhythm.    Pulmonary/Chest: Effort normal and breath sounds normal.   Neurological: He is alert and oriented to person, place, and time.   Skin: Skin is warm and dry.   Psychiatric: He has a normal mood and affect.   Vitals reviewed.      Assessment/Plan   Eduardo was seen today for follow-up.    Diagnoses and all orders for this visit:    Low testosterone  -     Testosterone 40.5 MG/2.5GM (1.62%) gel; Place 1 Act on the skin Daily.  -     Testosterone, Free, Total    Pure hypercholesterolemia  -     Lipid Panel        1. - testosterone defic- will change his testosterone dose to the 40mg so he can do 1 pump again. Will recheck his levels today.  2. CV- hyperlipid- will recheck his level today now that his testosterone should be back at goal. However, given his fam hx, this could be genetic. Discussed lipitor and will start this if his LDL is still over 160. Pt advised that if we do use the lipitor and he gets muscle aches, to cut the dose in half. Will provide with lipid diet guideline today.  3. RECHECK- keep appt July. Will make fasting appt in 1mo if we start lipitor.

## 2018-02-19 ENCOUNTER — TELEPHONE (OUTPATIENT)
Dept: INTERNAL MEDICINE | Facility: CLINIC | Age: 38
End: 2018-02-19

## 2018-02-19 RX ORDER — ATORVASTATIN CALCIUM 10 MG/1
10 TABLET, FILM COATED ORAL DAILY
Qty: 30 TABLET | Refills: 0 | Status: SHIPPED | OUTPATIENT
Start: 2018-02-19 | End: 2018-03-18 | Stop reason: SDUPTHER

## 2018-02-20 LAB
TESTOST FREE SERPL-MCNC: 11.3 PG/ML (ref 8.7–25.1)
TESTOST SERPL-MCNC: 520 NG/DL (ref 264–916)

## 2018-02-21 ENCOUNTER — TELEPHONE (OUTPATIENT)
Dept: INTERNAL MEDICINE | Facility: CLINIC | Age: 38
End: 2018-02-21

## 2018-02-21 NOTE — TELEPHONE ENCOUNTER
----- Message from Svetlana Henry MD sent at 2/20/2018 12:42 PM EST -----  Your testosterone level is perfect on the 40mg. I want you to continue this dose. The Rx showing in my computer is 40mg. Perhaps the pharmacy only had 20mg and he needs to use 2 pumps? Please check with the pharmacy.

## 2018-03-15 DIAGNOSIS — E78.00 PURE HYPERCHOLESTEROLEMIA: Primary | ICD-10-CM

## 2018-03-19 ENCOUNTER — LAB (OUTPATIENT)
Dept: LAB | Facility: HOSPITAL | Age: 38
End: 2018-03-19

## 2018-03-19 DIAGNOSIS — E78.00 PURE HYPERCHOLESTEROLEMIA: ICD-10-CM

## 2018-03-19 LAB
ARTICHOKE IGE QN: 102 MG/DL (ref 0–130)
CHOLEST SERPL-MCNC: 164 MG/DL (ref 0–200)
HDLC SERPL-MCNC: 53 MG/DL (ref 40–60)
TRIGL SERPL-MCNC: 115 MG/DL (ref 0–150)

## 2018-03-19 PROCEDURE — 36415 COLL VENOUS BLD VENIPUNCTURE: CPT

## 2018-03-19 PROCEDURE — 80061 LIPID PANEL: CPT

## 2018-03-19 RX ORDER — ATORVASTATIN CALCIUM 10 MG/1
10 TABLET, FILM COATED ORAL DAILY
Qty: 30 TABLET | Refills: 3 | Status: SHIPPED | OUTPATIENT
Start: 2018-03-19 | End: 2018-04-10 | Stop reason: SDUPTHER

## 2018-03-19 RX ORDER — ATORVASTATIN CALCIUM 10 MG/1
TABLET, FILM COATED ORAL
Qty: 30 TABLET | Refills: 0 | Status: SHIPPED | OUTPATIENT
Start: 2018-03-19 | End: 2018-03-19 | Stop reason: SDUPTHER

## 2018-04-10 ENCOUNTER — TELEPHONE (OUTPATIENT)
Dept: INTERNAL MEDICINE | Facility: CLINIC | Age: 38
End: 2018-04-10

## 2018-04-10 RX ORDER — ATORVASTATIN CALCIUM 10 MG/1
10 TABLET, FILM COATED ORAL DAILY
Qty: 90 TABLET | Refills: 0 | Status: SHIPPED | OUTPATIENT
Start: 2018-04-10 | End: 2018-07-12 | Stop reason: SDUPTHER

## 2018-04-10 NOTE — TELEPHONE ENCOUNTER
----- Message from Eduardo Luna sent at 4/10/2018  4:06 PM EDT -----  Regarding: Prescription Question  Contact: [phone]  Desmond Wheeler,    May I have a 90 day supply of atorvastatin 10mg?    Have a good day!     Eduardo Luna

## 2018-04-19 DIAGNOSIS — F41.8 DEPRESSION WITH ANXIETY: ICD-10-CM

## 2018-04-19 RX ORDER — PROPRANOLOL HYDROCHLORIDE 20 MG/1
TABLET ORAL
Qty: 90 TABLET | Refills: 0 | Status: SHIPPED | OUTPATIENT
Start: 2018-04-19 | End: 2018-07-19

## 2018-04-19 RX ORDER — TESTOSTERONE 16.2 MG/G
GEL TRANSDERMAL
Refills: 5 | COMMUNITY
Start: 2018-02-16 | End: 2018-07-19 | Stop reason: SDUPTHER

## 2018-04-19 NOTE — TELEPHONE ENCOUNTER
Per chris Saldana for pt to take medication daily. Rx faxed to pt pharmacy and advised via [employer] that this has been done.

## 2018-05-31 ENCOUNTER — HOSPITAL ENCOUNTER (EMERGENCY)
Facility: HOSPITAL | Age: 38
Discharge: HOME | End: 2018-06-01
Attending: EMERGENCY MEDICINE | Admitting: EMERGENCY MEDICINE

## 2018-05-31 ENCOUNTER — APPOINTMENT (OUTPATIENT)
Dept: CT IMAGING | Facility: HOSPITAL | Age: 38
End: 2018-05-31

## 2018-05-31 DIAGNOSIS — N20.1 RIGHT URETERAL STONE: Primary | ICD-10-CM

## 2018-05-31 LAB
ALBUMIN SERPL-MCNC: 5 G/DL (ref 3.2–4.8)
ALBUMIN/GLOB SERPL: 1.9 G/DL (ref 1.5–2.5)
ALP SERPL-CCNC: 94 U/L (ref 25–100)
ALT SERPL W P-5'-P-CCNC: 74 U/L (ref 7–40)
ANION GAP SERPL CALCULATED.3IONS-SCNC: 5 MMOL/L (ref 3–11)
AST SERPL-CCNC: 38 U/L (ref 0–33)
BACTERIA UR QL AUTO: ABNORMAL /HPF
BASOPHILS # BLD AUTO: 0.02 10*3/MM3 (ref 0–0.2)
BASOPHILS NFR BLD AUTO: 0.2 % (ref 0–1)
BILIRUB SERPL-MCNC: 0.5 MG/DL (ref 0.3–1.2)
BILIRUB UR QL STRIP: NEGATIVE
BUN BLD-MCNC: 16 MG/DL (ref 9–23)
BUN/CREAT SERPL: 16 (ref 7–25)
CALCIUM SPEC-SCNC: 9.8 MG/DL (ref 8.7–10.4)
CHLORIDE SERPL-SCNC: 105 MMOL/L (ref 99–109)
CLARITY UR: CLEAR
CO2 SERPL-SCNC: 34 MMOL/L (ref 20–31)
COLOR UR: YELLOW
CREAT BLD-MCNC: 1 MG/DL (ref 0.6–1.3)
DEPRECATED RDW RBC AUTO: 43.6 FL (ref 37–54)
EOSINOPHIL # BLD AUTO: 0.13 10*3/MM3 (ref 0–0.3)
EOSINOPHIL NFR BLD AUTO: 1.2 % (ref 0–3)
ERYTHROCYTE [DISTWIDTH] IN BLOOD BY AUTOMATED COUNT: 13.4 % (ref 11.3–14.5)
GFR SERPL CREATININE-BSD FRML MDRD: 84 ML/MIN/1.73
GLOBULIN UR ELPH-MCNC: 2.7 GM/DL
GLUCOSE BLD-MCNC: 122 MG/DL (ref 70–100)
GLUCOSE UR STRIP-MCNC: NEGATIVE MG/DL
HCT VFR BLD AUTO: 47.6 % (ref 38.9–50.9)
HGB BLD-MCNC: 16.4 G/DL (ref 13.1–17.5)
HGB UR QL STRIP.AUTO: ABNORMAL
HOLD SPECIMEN: NORMAL
HOLD SPECIMEN: NORMAL
HYALINE CASTS UR QL AUTO: ABNORMAL /LPF
IMM GRANULOCYTES # BLD: 0.03 10*3/MM3 (ref 0–0.03)
IMM GRANULOCYTES NFR BLD: 0.3 % (ref 0–0.6)
KETONES UR QL STRIP: NEGATIVE
LEUKOCYTE ESTERASE UR QL STRIP.AUTO: NEGATIVE
LIPASE SERPL-CCNC: 43 U/L (ref 6–51)
LYMPHOCYTES # BLD AUTO: 1.43 10*3/MM3 (ref 0.6–4.8)
LYMPHOCYTES NFR BLD AUTO: 13.7 % (ref 24–44)
MCH RBC QN AUTO: 31.1 PG (ref 27–31)
MCHC RBC AUTO-ENTMCNC: 34.5 G/DL (ref 32–36)
MCV RBC AUTO: 90.2 FL (ref 80–99)
MONOCYTES # BLD AUTO: 0.85 10*3/MM3 (ref 0–1)
MONOCYTES NFR BLD AUTO: 8.1 % (ref 0–12)
NEUTROPHILS # BLD AUTO: 8 10*3/MM3 (ref 1.5–8.3)
NEUTROPHILS NFR BLD AUTO: 76.8 % (ref 41–71)
NITRITE UR QL STRIP: NEGATIVE
PH UR STRIP.AUTO: 7 [PH] (ref 5–8)
PLATELET # BLD AUTO: 241 10*3/MM3 (ref 150–450)
PMV BLD AUTO: 10.2 FL (ref 6–12)
POTASSIUM BLD-SCNC: 4.7 MMOL/L (ref 3.5–5.5)
PROT SERPL-MCNC: 7.7 G/DL (ref 5.7–8.2)
PROT UR QL STRIP: ABNORMAL
RBC # BLD AUTO: 5.28 10*6/MM3 (ref 4.2–5.76)
RBC # UR: ABNORMAL /HPF
REF LAB TEST METHOD: ABNORMAL
SODIUM BLD-SCNC: 144 MMOL/L (ref 132–146)
SP GR UR STRIP: 1.02 (ref 1–1.03)
SQUAMOUS #/AREA URNS HPF: ABNORMAL /HPF
UROBILINOGEN UR QL STRIP: ABNORMAL
WBC NRBC COR # BLD: 10.43 10*3/MM3 (ref 3.5–10.8)
WBC UR QL AUTO: ABNORMAL /HPF
WHOLE BLOOD HOLD SPECIMEN: NORMAL
WHOLE BLOOD HOLD SPECIMEN: NORMAL

## 2018-05-31 PROCEDURE — 83690 ASSAY OF LIPASE: CPT

## 2018-05-31 PROCEDURE — 99284 EMERGENCY DEPT VISIT MOD MDM: CPT

## 2018-05-31 PROCEDURE — 80053 COMPREHEN METABOLIC PANEL: CPT

## 2018-05-31 PROCEDURE — 96374 THER/PROPH/DIAG INJ IV PUSH: CPT

## 2018-05-31 PROCEDURE — 36415 COLL VENOUS BLD VENIPUNCTURE: CPT

## 2018-05-31 PROCEDURE — 85025 COMPLETE CBC W/AUTO DIFF WBC: CPT

## 2018-05-31 PROCEDURE — 96361 HYDRATE IV INFUSION ADD-ON: CPT

## 2018-05-31 PROCEDURE — 81001 URINALYSIS AUTO W/SCOPE: CPT | Performed by: EMERGENCY MEDICINE

## 2018-05-31 PROCEDURE — 74177 CT ABD & PELVIS W/CONTRAST: CPT

## 2018-05-31 PROCEDURE — 25010000002 IOPAMIDOL 61 % SOLUTION: Performed by: EMERGENCY MEDICINE

## 2018-05-31 RX ORDER — SODIUM CHLORIDE 0.9 % (FLUSH) 0.9 %
10 SYRINGE (ML) INJECTION AS NEEDED
Status: DISCONTINUED | OUTPATIENT
Start: 2018-05-31 | End: 2018-06-01 | Stop reason: HOSPADM

## 2018-05-31 RX ADMIN — SODIUM CHLORIDE 1000 ML: 9 INJECTION, SOLUTION INTRAVENOUS at 22:29

## 2018-05-31 RX ADMIN — IOPAMIDOL 100 ML: 612 INJECTION, SOLUTION INTRAVENOUS at 23:33

## 2018-06-01 VITALS
WEIGHT: 190 LBS | HEIGHT: 72 IN | TEMPERATURE: 98.1 F | OXYGEN SATURATION: 97 % | SYSTOLIC BLOOD PRESSURE: 171 MMHG | DIASTOLIC BLOOD PRESSURE: 124 MMHG | BODY MASS INDEX: 25.73 KG/M2 | RESPIRATION RATE: 18 BRPM | HEART RATE: 105 BPM

## 2018-06-01 PROCEDURE — 25010000002 KETOROLAC TROMETHAMINE PER 15 MG: Performed by: NURSE PRACTITIONER

## 2018-06-01 PROCEDURE — 96374 THER/PROPH/DIAG INJ IV PUSH: CPT

## 2018-06-01 RX ORDER — TAMSULOSIN HYDROCHLORIDE 0.4 MG/1
1 CAPSULE ORAL DAILY
Qty: 7 CAPSULE | Refills: 0 | Status: SHIPPED | OUTPATIENT
Start: 2018-06-01 | End: 2018-07-19

## 2018-06-01 RX ORDER — TAMSULOSIN HYDROCHLORIDE 0.4 MG/1
0.4 CAPSULE ORAL ONCE
Status: COMPLETED | OUTPATIENT
Start: 2018-06-01 | End: 2018-06-01

## 2018-06-01 RX ORDER — KETOROLAC TROMETHAMINE 30 MG/ML
30 INJECTION, SOLUTION INTRAMUSCULAR; INTRAVENOUS EVERY 6 HOURS PRN
Status: DISCONTINUED | OUTPATIENT
Start: 2018-06-01 | End: 2018-06-01 | Stop reason: HOSPADM

## 2018-06-01 RX ORDER — HYDROCODONE BITARTRATE AND ACETAMINOPHEN 5; 325 MG/1; MG/1
1 TABLET ORAL EVERY 6 HOURS PRN
Qty: 12 TABLET | Refills: 0 | Status: SHIPPED | OUTPATIENT
Start: 2018-06-01 | End: 2018-07-19

## 2018-06-01 RX ORDER — ONDANSETRON 4 MG/1
4 TABLET, ORALLY DISINTEGRATING ORAL 4 TIMES DAILY PRN
Qty: 16 TABLET | Refills: 0 | Status: SHIPPED | OUTPATIENT
Start: 2018-06-01 | End: 2018-07-19

## 2018-06-01 RX ADMIN — TAMSULOSIN HYDROCHLORIDE 0.4 MG: 0.4 CAPSULE ORAL at 01:09

## 2018-06-01 RX ADMIN — KETOROLAC TROMETHAMINE 30 MG: 30 INJECTION, SOLUTION INTRAMUSCULAR; INTRAVENOUS at 00:59

## 2018-06-01 NOTE — ED PROVIDER NOTES
Subjective   Eduardo Luna is a 37 y.o.male who presents to the ED by personal vehicle with c/o RLQ pain with onset this morning upon waking. His RLQ pain remained constant until 1600 where it improved without intervention but then worsened several hours ago prompting him to come to [facility] ED for evaluation. He denies nausea, vomiting, decreased PO intake, radiating pain, diarrhea, constipation or any other complaints at this time. He experienced a normal bowel movement one hour ago. He denies any h/o appendectomy or any other abdominal surgeries.           History provided by:  Patient  Abdominal Pain   Pain location:  RLQ  Pain radiates to:  Does not radiate  Pain severity:  Moderate  Onset quality:  Sudden  Duration:  12 hours  Timing:  Constant  Progression:  Waxing and waning  Chronicity:  New  Context: awakening from sleep    Relieved by:  Nothing  Worsened by:  Nothing  Ineffective treatments:  None tried  Associated symptoms: no chills, no constipation, no diarrhea, no fever, no nausea and no vomiting    Risk factors comment:  Cancer      Review of Systems   Constitutional: Negative for appetite change, chills and fever.   Gastrointestinal: Positive for abdominal pain. Negative for constipation, diarrhea, nausea and vomiting.   All other systems reviewed and are negative.      Past Medical History:   Diagnosis Date   • Cancer     hodgkins lymphoma   • Colon polyp    • Depression    • GERD (gastroesophageal reflux disease)    • Hyperlipidemia    • Hypertension    • Migraine    • SVT (supraventricular tachycardia)        Allergies   Allergen Reactions   • Amoxicillin Hives, Itching and Rash       Past Surgical History:   Procedure Laterality Date   • LYMPH NODE BIOPSY  11/2001    @ [facility] with Dr. Daniel Marinelli   • MOLE REMOVAL  11/2016    Dr Loyd at [facility] dermatology.    • PORTACATH PLACEMENT  01/2002    [facility] Placement @ [facility] with Dr. Daniel Marinelli   • TONSILLECTOMY  09/2013    @ [facility] with Lexa Castillo    • VASECTOMY  01/2012    @ [facility] with Dr. Aidan Castellano   • WISDOM TOOTH EXTRACTION  10/1997    @ [facility] with Dr. Momo Nunn       Family History   Problem Relation Age of Onset   • Hypertension Mother    • Colon cancer Father    • Colon polyps Father    • Hypertension Father    • Hyperlipidemia Father    • Breast cancer Sister        Social History     Social History   • Marital status:      Social History Main Topics   • Smoking status: Never Smoker   • Smokeless tobacco: Never Used   • Alcohol use No   • Drug use: No     Other Topics Concern   • Not on file         Objective   Physical Exam   Constitutional: He is oriented to person, place, and time. He appears well-developed and well-nourished. No distress.   HENT:   Head: Normocephalic and atraumatic.   Right Ear: External ear normal.   Left Ear: External ear normal.   Nose: Nose normal.   Eyes: Conjunctivae and EOM are normal. No scleral icterus.   Neck: Normal range of motion. Neck supple.   Cardiovascular: Normal rate and regular rhythm.    Pulmonary/Chest: Effort normal and breath sounds normal. No respiratory distress.   Abdominal: Soft. Bowel sounds are normal. He exhibits no distension. There is tenderness (Mild RLQ tenderness).   Musculoskeletal: Normal range of motion. He exhibits no tenderness.   Neurological: He is alert and oriented to person, place, and time. No cranial nerve deficit. Coordination normal.   Skin: Skin is warm and dry. Capillary refill takes less than 2 seconds. He is not diaphoretic.   Psychiatric: He has a normal mood and affect. His behavior is normal.   Nursing note and vitals reviewed.      Procedures         ED Course  ED Course as of Jun 01 0123 Fri Jun 01, 2018   0121 Patient is advised results at this time.Pt will be dc home. Pt to take meds as ordered. Pt to f/u with PCP/ urology.  Pt agrees and verb understanding.   [KG]      ED Course User Index  [KG] CORINNE Rayo      Recent  Results (from the past 24 hour(s))   Comprehensive Metabolic Panel    Collection Time: 05/31/18  9:49 PM   Result Value Ref Range    Glucose 122 (H) 70 - 100 mg/dL    BUN 16 9 - 23 mg/dL    Creatinine 1.00 0.60 - 1.30 mg/dL    Sodium 144 132 - 146 mmol/L    Potassium 4.7 3.5 - 5.5 mmol/L    Chloride 105 99 - 109 mmol/L    CO2 34.0 (H) 20.0 - 31.0 mmol/L    Calcium 9.8 8.7 - 10.4 mg/dL    Total Protein 7.7 5.7 - 8.2 g/dL    Albumin 5.00 (H) 3.20 - 4.80 g/dL    ALT (SGPT) 74 (H) 7 - 40 U/L    AST (SGOT) 38 (H) 0 - 33 U/L    Alkaline Phosphatase 94 25 - 100 U/L    Total Bilirubin 0.5 0.3 - 1.2 mg/dL    eGFR Non African Amer 84 >60 mL/min/1.73    Globulin 2.7 gm/dL    A/G Ratio 1.9 1.5 - 2.5 g/dL    BUN/Creatinine Ratio 16.0 7.0 - 25.0    Anion Gap 5.0 3.0 - 11.0 mmol/L   Lipase    Collection Time: 05/31/18  9:49 PM   Result Value Ref Range    Lipase 43 6 - 51 U/L   Light Blue Top    Collection Time: 05/31/18  9:49 PM   Result Value Ref Range    Extra Tube hold for add-on    Green Top (Gel)    Collection Time: 05/31/18  9:49 PM   Result Value Ref Range    Extra Tube Hold for add-ons.    Lavender Top    Collection Time: 05/31/18  9:49 PM   Result Value Ref Range    Extra Tube hold for add-on    Gold Top - SST    Collection Time: 05/31/18  9:49 PM   Result Value Ref Range    Extra Tube Hold for add-ons.    CBC Auto Differential    Collection Time: 05/31/18  9:49 PM   Result Value Ref Range    WBC 10.43 3.50 - 10.80 10*3/mm3    RBC 5.28 4.20 - 5.76 10*6/mm3    Hemoglobin 16.4 13.1 - 17.5 g/dL    Hematocrit 47.6 38.9 - 50.9 %    MCV 90.2 80.0 - 99.0 fL    MCH 31.1 (H) 27.0 - 31.0 pg    MCHC 34.5 32.0 - 36.0 g/dL    RDW 13.4 11.3 - 14.5 %    RDW-SD 43.6 37.0 - 54.0 fl    MPV 10.2 6.0 - 12.0 fL    Platelets 241 150 - 450 10*3/mm3    Neutrophil % 76.8 (H) 41.0 - 71.0 %    Lymphocyte % 13.7 (L) 24.0 - 44.0 %    Monocyte % 8.1 0.0 - 12.0 %    Eosinophil % 1.2 0.0 - 3.0 %    Basophil % 0.2 0.0 - 1.0 %    Immature Grans % 0.3 0.0  - 0.6 %    Neutrophils, Absolute 8.00 1.50 - 8.30 10*3/mm3    Lymphocytes, Absolute 1.43 0.60 - 4.80 10*3/mm3    Monocytes, Absolute 0.85 0.00 - 1.00 10*3/mm3    Eosinophils, Absolute 0.13 0.00 - 0.30 10*3/mm3    Basophils, Absolute 0.02 0.00 - 0.20 10*3/mm3    Immature Grans, Absolute 0.03 0.00 - 0.03 10*3/mm3   Urinalysis With / Culture If Indicated - Urine, Clean Catch    Collection Time: 05/31/18 10:20 PM   Result Value Ref Range    Color, UA Yellow Yellow, Straw    Appearance, UA Clear Clear    pH, UA 7.0 5.0 - 8.0    Specific Gravity, UA 1.020 1.001 - 1.030    Glucose, UA Negative Negative    Ketones, UA Negative Negative    Bilirubin, UA Negative Negative    Blood, UA Large (3+) (A) Negative    Protein, UA Trace (A) Negative    Leuk Esterase, UA Negative Negative    Nitrite, UA Negative Negative    Urobilinogen, UA 0.2 E.U./dL 0.2 - 1.0 E.U./dL   Urinalysis, Microscopic Only - Urine, Clean Catch    Collection Time: 05/31/18 10:20 PM   Result Value Ref Range    RBC, UA Too Numerous to Count (A) None Seen, 0-2 /HPF    WBC, UA 0-2 None Seen, 0-2 /HPF    Bacteria, UA None Seen None Seen, Trace /HPF    Squamous Epithelial Cells, UA 0-2 None Seen, 0-2 /HPF    Hyaline Casts, UA 0-6 0 - 6 /LPF    Methodology Automated Microscopy      Note: In addition to lab results from this visit, the labs listed above may include labs taken at another facility or during a different encounter within the last 24 hours. Please correlate lab times with ED admission and discharge times for further clarification of the services performed during this visit.    CT Abdomen Pelvis With Contrast   Final Result     OBSTRUCTIVE DISTAL RIGHT URETERIC CALCULUS JUST PROXIMAL TO THE    VESICOURETERAL JUNCTION. Also noted nonobstructive RIGHT renal stone(s).         Other nonemergent/incidental findings as described.         NOTE: NORMAL APPENDIX without CT evidence of acute appendicitis.         THIS DOCUMENT HAS BEEN ELECTRONICALLY SIGNED BY  "ULYSSES MAHMOOD MD        Vitals:    05/31/18 2126 06/01/18 0037 06/01/18 0100   BP: (!) 184/125 (!) 173/120 (!) 171/124   BP Location:  Left arm Left arm   Patient Position:  Lying Lying   Pulse: 100 101 105   Resp: 18     Temp: 98.1 °F (36.7 °C)     SpO2: 99% 97% 97%   Weight: 86.2 kg (190 lb)     Height: 182.9 cm (72\")       Medications   sodium chloride 0.9 % flush 10 mL (not administered)   sodium chloride 0.9 % flush 10 mL (not administered)   ketorolac (TORADOL) injection 30 mg (30 mg Intravenous Given 6/1/18 0059)   iopamidol (ISOVUE-300) 61 % injection 100 mL (100 mL Intravenous Given 5/31/18 2333)   tamsulosin (FLOMAX) 24 hr capsule 0.4 mg (0.4 mg Oral Given 6/1/18 0109)     ECG/EMG Results (last 24 hours)     ** No results found for the last 24 hours. **                          [facility]    Final diagnoses:   Right ureteral stone       Documentation assistance provided by ana Penn.  Information recorded by the scribe was done at my direction and has been verified and validated by me.     Matthew Penn  05/31/18 4374       CORINNE Rayo  06/01/18 0123    "

## 2018-07-12 ENCOUNTER — TELEPHONE (OUTPATIENT)
Dept: INTERNAL MEDICINE | Facility: CLINIC | Age: 38
End: 2018-07-12

## 2018-07-12 RX ORDER — ATORVASTATIN CALCIUM 10 MG/1
10 TABLET, FILM COATED ORAL DAILY
Qty: 90 TABLET | Refills: 0 | Status: SHIPPED | OUTPATIENT
Start: 2018-07-12 | End: 2018-07-19 | Stop reason: SDUPTHER

## 2018-07-12 NOTE — TELEPHONE ENCOUNTER
----- Message from Eduardo Luna sent at 7/11/2018  2:50 PM EDT -----  Regarding: RE: Visit Follow-Up Question  Contact: [phone]  Desmond Wheeler,    Thank you for getting me in with Teresa on the 19th. I'll run out of atorvastatin 10mg before my appointment. May I have a refill for a 90 day supply called into [facility] retail pharmacy?    Thanks so much! We'll see you on the 19th!    Eduardo Luna  ----- Message -----  From: DAWIT STAUFFER  Sent: 7/3/18, 10:31 AM  To: Eduardo Luna  Subject: RE: Visit Follow-Up Question    Mona Clark! I can actually put you in the spot with Teresa as procedures always give 30 minutes but I doubt she will need that long. So I'll just schedule you with her  On the 19th :) Have a good day and holiday!     Summer Stinson       ----- Message -----     From: Eduardo Luna     Sent: 7/2/2018  6:25 PM EDT       To: Svetlana Henry MD  Subject: Visit Follow-Up Question    Desmond Wheeler,    I have an appointment with Dr. Henry on Friday, July 20th at 3:15.  I was wondering if there might be an open appointment on Friday, July 13th or August 3rd with the latest appointment you have in the day. If not, Would it be possible to make my appointment on the same day as Teresa's, and close to the same time? Thursday, July 19th, or July 26th at 3:30 or later?    Thanks for your help! Have a good day!    Eduardo Luna

## 2018-07-19 ENCOUNTER — OFFICE VISIT (OUTPATIENT)
Dept: INTERNAL MEDICINE | Facility: CLINIC | Age: 38
End: 2018-07-19

## 2018-07-19 VITALS
WEIGHT: 200.4 LBS | SYSTOLIC BLOOD PRESSURE: 118 MMHG | BODY MASS INDEX: 27.14 KG/M2 | HEIGHT: 72 IN | DIASTOLIC BLOOD PRESSURE: 84 MMHG | TEMPERATURE: 98.4 F

## 2018-07-19 DIAGNOSIS — E78.00 PURE HYPERCHOLESTEROLEMIA: ICD-10-CM

## 2018-07-19 DIAGNOSIS — E03.9 HYPOTHYROIDISM (ACQUIRED): Primary | ICD-10-CM

## 2018-07-19 DIAGNOSIS — R79.89 LOW TESTOSTERONE: ICD-10-CM

## 2018-07-19 DIAGNOSIS — F51.01 PRIMARY INSOMNIA: ICD-10-CM

## 2018-07-19 DIAGNOSIS — F41.8 DEPRESSION WITH ANXIETY: ICD-10-CM

## 2018-07-19 PROCEDURE — 99214 OFFICE O/P EST MOD 30 MIN: CPT | Performed by: FAMILY MEDICINE

## 2018-07-19 RX ORDER — BUPROPION HYDROCHLORIDE 300 MG/1
300 TABLET ORAL EVERY MORNING
Qty: 90 TABLET | Refills: 1 | Status: SHIPPED | OUTPATIENT
Start: 2018-07-19 | End: 2018-10-29

## 2018-07-19 RX ORDER — LEVOTHYROXINE SODIUM 0.07 MG/1
TABLET ORAL
Qty: 90 TABLET | Refills: 1 | Status: SHIPPED | OUTPATIENT
Start: 2018-07-19 | End: 2019-02-05 | Stop reason: SDUPTHER

## 2018-07-19 RX ORDER — TESTOSTERONE 16.2 MG/G
GEL TRANSDERMAL
Qty: 75 G | Refills: 5 | Status: SHIPPED | OUTPATIENT
Start: 2018-07-19 | End: 2018-07-20

## 2018-07-19 RX ORDER — PROPRANOLOL HCL 60 MG
60 CAPSULE, EXTENDED RELEASE 24HR ORAL DAILY
Qty: 90 CAPSULE | Refills: 1 | Status: SHIPPED | OUTPATIENT
Start: 2018-07-19 | End: 2018-10-29 | Stop reason: SDUPTHER

## 2018-07-19 RX ORDER — FLUOXETINE HYDROCHLORIDE 20 MG/1
20 CAPSULE ORAL DAILY
Qty: 90 CAPSULE | Refills: 1 | Status: SHIPPED | OUTPATIENT
Start: 2018-07-19 | End: 2019-01-13 | Stop reason: SDUPTHER

## 2018-07-19 RX ORDER — ATORVASTATIN CALCIUM 10 MG/1
10 TABLET, FILM COATED ORAL DAILY
Qty: 90 TABLET | Refills: 1 | Status: SHIPPED | OUTPATIENT
Start: 2018-07-19 | End: 2019-02-05 | Stop reason: SDUPTHER

## 2018-07-19 NOTE — PROGRESS NOTES
Subjective   Eduardo Luna is a 37 y.o. male.     History of Present Illness   Here for routine. Last seen 2/16/18 for 1mo recheck testosterone and cholesterol. Last seen 1/19/18 for routine 6mo recheck. Was seen 9/22/16 as a new patient. Pt has a h/o Hodgkins lymphoma 2002 in full remission. Hx PAT post ablation 2008. Lab 3/19/18 demo , tg 115, HDL 53,  on Lipitor. Lab 2/16/18 demo testosterone at goal on 4 pumps. Labs 1/19/18 demo normal CBC, CMP and B12. Thyroid at goal on synthroid 75 with TSH 1.6 and free T4 1.28. Testosterone low again with total 251, free 6.7; pt on testim. Lipid was high with , tg 116, HDL 59, . Labs 1/23/17 demo low testosterone with total 297, free 6.6. Recheck lab 4/12/17 demo normal PSA, total testosterone 466, free 13.1, lipid normal with , tg 70, HDL 47, . Sees Heme/Onc and Sleep Medicine ([facility])     1.- low testosterone, new issue kidney stone. Low testosterone was found on lab 1/23/17. Pt has done well since on 4 pumps with lab at goal 3/19/18. Pt went to  5/31/18 and was diagnosed with kidney stone. Had UA with large amount of blood. Had normal CBC, CMP and lipase except ALT 74. CT demo right distal stone. Pt was treated with Flomax, zofran and hydrocodone. Today he reports he is currently using 1 pump a day to get 2mo use of a Rx due to cost. Feels ok. He was able to pass the stone.      2. ENDO- hypothyroid- currently on levothyroxine 75. At his initial visit pt was c/o raspy voice, trouble concentrating, occasionally feeling depressed, slow speech and dry skin. Was on generic and taking it with other meds. Was changed to branded synthroid and advised how to take correctly. Has been at goal on synthroid 75 since 1/23/17; still at goal at visit 1/19/18 including on labs. Today pt reports he still feels at goal.    3. PSYCH- depression with anxiety. Diagnosed 2002 when diagnosed with lymphoma. Initally he was on Lexapro, valium and ambien. Was  still c/o feeling anxious, guilty, feeling overwhelmed, withdrawing and concentration problems on occassion. Was waking up frequently, even on ambien. Did well with Zyprexa and was able to wean the valium and ambien. He stopped Lexapro but was still sad, even after testosterone replaced. Did not do well with pristiq or buspar. Eventually did well with just Wellbutrin and Prozac. Today pt reports he is still doing well. He is taking the propranolol qd now and doing well and would like to change to XR. He is having sleep issues again and would like to try something. Is having issues getting to sleep and staying to sleep. Has not responded to trazodone. Would like something non narcotic.    4.CV- hyperlipid. Found on screening labs 1/19/18. Had been normal on screen 4/12/17. Pt worked on diet but LDL was 193 on lab 2/16/18. Was started on Lipitor and did well with recheck lab at goal 3/19/18. Today he reports no S/E with lipitor.     5. RESP- JAVON. Pt using oral device. Is still biting his tongue.     The following portions of the patient's history were reviewed and updated as appropriate: current medications, past family history, past medical history, past social history, past surgical history and problem list.    Review of Systems   Cardiovascular: Negative for chest pain.   Gastrointestinal: Negative for abdominal distention and abdominal pain.   Skin: Negative for color change.   Neurological: Negative for tremors, speech difficulty and headaches.   Psychiatric/Behavioral: Negative for agitation and confusion.   All other systems reviewed and are negative.        Current Outpatient Prescriptions:   •  ANDROGEL PUMP 20.25 MG/ACT (1.62%) gel, Apply daily, Disp: 75 g, Rfl: 5  •  atorvastatin (LIPITOR) 10 MG tablet, Take 1 tablet by mouth Daily., Disp: 90 tablet, Rfl: 1  •  buPROPion XL (WELLBUTRIN XL) 300 MG 24 hr tablet, Take 1 tablet by mouth Every Morning., Disp: 90 tablet, Rfl: 1  •  Cholecalciferol (VITAMIN D3)  "2000 units capsule, , Disp: , Rfl:   •  diphenhydrAMINE (SOMINEX) 25 MG tablet, Take 1-2 tablets at bedtime, Disp: , Rfl:   •  FLUoxetine (PROzac) 20 MG capsule, Take 1 capsule by mouth Daily., Disp: 90 capsule, Rfl: 1  •  levothyroxine (SYNTHROID, LEVOTHROID) 75 MCG tablet, TAKE 1 TAB BY MOUTH IN THE AM FASTING AND WAIT 1HR FOR FOOD OR OTHER MEDS, Disp: 90 tablet, Rfl: 1  •  propranolol LA (INDERAL LA) 60 MG 24 hr capsule, Take 1 capsule by mouth Daily., Disp: 90 capsule, Rfl: 1  •  Suvorexant (BELSOMRA) 10 MG tablet, Take 1 tablet by mouth Every Night., Disp: 10 tablet, Rfl: 0  •  Suvorexant (BELSOMRA) 15 MG tablet, Take 1 tablet by mouth Every Night., Disp: 10 tablet, Rfl: 0  •  Suvorexant (BELSOMRA) 20 MG tablet, Take 20 mg by mouth Every Night., Disp: 10 tablet, Rfl: 0    Objective     /84   Temp 98.4 °F (36.9 °C)   Ht 182.9 cm (72\")   Wt 90.9 kg (200 lb 6.4 oz)   BMI 27.18 kg/m²     Physical Exam   Constitutional: He is oriented to person, place, and time. He appears well-developed and well-nourished.   HENT:   Right Ear: Tympanic membrane and ear canal normal.   Left Ear: Tympanic membrane and ear canal normal.   Mouth/Throat: Oropharynx is clear and moist.   Eyes: Pupils are equal, round, and reactive to light. Conjunctivae and EOM are normal.   Neck: No thyromegaly present.   Cardiovascular: Normal rate and regular rhythm.    Pulmonary/Chest: Effort normal and breath sounds normal.   Neurological: He is alert and oriented to person, place, and time.   Skin: Skin is warm and dry.   Psychiatric: He has a normal mood and affect.   Vitals reviewed.      Assessment/Plan   Eduardo was seen today for follow-up.    Diagnoses and all orders for this visit:    Hypothyroidism (acquired)  -     levothyroxine (SYNTHROID, LEVOTHROID) 75 MCG tablet; TAKE 1 TAB BY MOUTH IN THE AM FASTING AND WAIT 1HR FOR FOOD OR OTHER MEDS    Low testosterone  -     ANDROGEL PUMP 20.25 MG/ACT (1.62%) gel; Apply daily    Pure " hypercholesterolemia  -     atorvastatin (LIPITOR) 10 MG tablet; Take 1 tablet by mouth Daily.    Depression with anxiety  -     buPROPion XL (WELLBUTRIN XL) 300 MG 24 hr tablet; Take 1 tablet by mouth Every Morning.  -     FLUoxetine (PROzac) 20 MG capsule; Take 1 capsule by mouth Daily.  -     propranolol LA (INDERAL LA) 60 MG 24 hr capsule; Take 1 capsule by mouth Daily.    Primary insomnia  -     Suvorexant (BELSOMRA) 10 MG tablet; Take 1 tablet by mouth Every Night.  -     Suvorexant (BELSOMRA) 15 MG tablet; Take 1 tablet by mouth Every Night.  -     Suvorexant (BELSOMRA) 20 MG tablet; Take 20 mg by mouth Every Night.        1. ENDO- hypothryoid- clinically at goal. Will RF synthroid 75 today x6mo with recheck lab in 6mo  2. - low testosterone- clinically at goal. Also discussed kidney stones. Will address if he has additional issues. Discussed the mild LFT elevation which I believe was just situational.  3. CV- hyperlipid- at goal on lipitor. RF x6mo today. Discussed that he needs to take this for a year before trying to stop it and using just diet.   4. PSYCH- depression with anxiety- mood at goal. RF meds today.  5. RECHECK- 6mo CPE

## 2018-07-19 NOTE — PATIENT INSTRUCTIONS
1. ENDO- hypothryoid- clinically at goal. Will RF synthroid 75 today x6mo with recheck lab in 6mo  2. - low testosterone- clinically at goal. Also discussed kidney stones. Will address if he has additional issues. Discussed the mild LFT elevation which I believe was just situational.  3. CV- hyperlipid- at goal on lipitor. RF x6mo today. Discussed that he needs to take this for a year before trying to stop it and using just diet.   4. PSYCH- depression with anxiety- mood at goal. RF meds today.  5. RECHECK- 6mo CPE

## 2018-07-20 ENCOUNTER — PATIENT MESSAGE (OUTPATIENT)
Dept: INTERNAL MEDICINE | Facility: CLINIC | Age: 38
End: 2018-07-20

## 2018-07-20 DIAGNOSIS — R79.89 LOW TESTOSTERONE: ICD-10-CM

## 2018-07-23 RX ORDER — TESTOSTERONE 16.2 MG/G
GEL TRANSDERMAL
Qty: 75 G | Refills: 5 | Status: SHIPPED | OUTPATIENT
Start: 2018-07-23 | End: 2019-01-22 | Stop reason: SDUPTHER

## 2018-07-23 NOTE — TELEPHONE ENCOUNTER
Pharmacy states for insurance purposes they must know how many pumps, please correct and fax. Thank you!

## 2018-07-26 NOTE — TELEPHONE ENCOUNTER
From: Eduardo Luna  To: Svetlana Henry MD  Sent: 7/20/2018 9:34 AM EDT  Subject: Prescription Question    Hi Summer,    Could you take diphenhydramine 25mg off my med profile? I'm not taking it anymore.     Thanks so much! Have a great weekend!    Eduardo Luna

## 2018-08-26 DIAGNOSIS — F41.8 DEPRESSION WITH ANXIETY: ICD-10-CM

## 2018-08-27 RX ORDER — BUPROPION HYDROCHLORIDE 300 MG/1
300 TABLET ORAL EVERY MORNING
Qty: 90 TABLET | Refills: 1 | Status: SHIPPED | OUTPATIENT
Start: 2018-08-27 | End: 2018-12-06 | Stop reason: SDUPTHER

## 2018-09-24 NOTE — PROGRESS NOTES
"      PROBLEM LIST:  1. Stage IIB Nodular sclerosing Hodgkin Lymphoma  A) diagnosed age 21, presented with cervical and axillary adenopathy, sweats, weight loss.  Treated with ABVD, changed to CMOPP after acute bleomycin lung reaction.  Chemotherapy followed by IFRT.  2. Hypothyroidism, secondary to radiation therapy    Subjective     HISTORY OF PRESENT ILLNESS:   Eduardo Luna returns for 1 year follow up.   He had a kidney stone this summer.  He says he's undergoing evaluation for sleep apnea and may be having a septoplasty and turbinate reduction.  Otherwise he has been in good health.    Past Medical History, Past Surgical History, Social History, Family History have been reviewed and are without significant changes except as mentioned.    Review of Systems   A comprehensive 14 point review of systems was performed and was negative except as mentioned.    Medications:  The current medication list was reviewed in the EMR    ALLERGIES:    Allergies   Allergen Reactions   • Amoxicillin Hives, Itching and Rash       Objective      /98 Comment: LUE  Pulse 82   Temp 98 °F (36.7 °C) (Temporal Artery )   Resp 16   Ht 182.9 cm (72\")   Wt 92.5 kg (204 lb)   SpO2 97% Comment: RA  BMI 27.67 kg/m²      Performance Status: 0    General: well appearing male in no acute distress  Neuro: alert and oriented  HEENT: sclera anicteric, oropharynx clear  Lymphatics: no cervical, supraclavicular, or axillary adenopathy  Cardiovascular: regular rate and rhythm, no murmurs  Lungs: clear to auscultation bilaterally  Abdomen: soft, nontender, nondistended.  No palpable organomegaly  Extremeties: no lower extremity edema  Skin: no rashes, lesions, bruising, or petechiae  Psych: mood and affect appropriate      RECENT LABS:  From 5/31/18 - glucose 122, ALT 74, AST 38.  Normal CBC.  Lipids march 2018 - total chol 164, HDL 53, ,   Jan 2018 - TSH 1.599        Assessment/Plan   Eduardo Luna is a 37 y.o. year old male with a " remote history of Hodgkin lymphoma in 2002.  He returns for monitoring for long term toxicity of treatment.    The NCCN guidelines for long term follow up of Hodgkin lymphoma survivors includes the following:    Annual H&P  Annual flu vaccine  Consider stress test/echo at 10 yr intervals  Consider carotid US at 10 yr intervals  Annual CBC, CMP, fasting glucose, TSH.  Biannual lipids    He is at risk for early cardiovascular disease and early screening is recommended.  We will plan for repeat screening carotid duplex in 2027 and stress echo in 2024.    I will otherwise see him back in 1 year.                  Visit time was 15 minutes, greater than 50% spent in counseling      Laly Vazquez MD  [facility]ematology [facility] On[facility]    9/25/2018          CC:

## 2018-09-25 ENCOUNTER — OFFICE VISIT (OUTPATIENT)
Dept: ONCOLOGY | Facility: CLINIC | Age: 38
End: 2018-09-25

## 2018-09-25 VITALS
BODY MASS INDEX: 27.63 KG/M2 | RESPIRATION RATE: 16 BRPM | DIASTOLIC BLOOD PRESSURE: 98 MMHG | WEIGHT: 204 LBS | HEART RATE: 82 BPM | TEMPERATURE: 98 F | OXYGEN SATURATION: 97 % | SYSTOLIC BLOOD PRESSURE: 144 MMHG | HEIGHT: 72 IN

## 2018-09-25 DIAGNOSIS — Z85.71 HISTORY OF HODGKIN'S LYMPHOMA: Primary | ICD-10-CM

## 2018-09-25 PROCEDURE — 99213 OFFICE O/P EST LOW 20 MIN: CPT | Performed by: INTERNAL MEDICINE

## 2018-10-17 ENCOUNTER — ANESTHESIA EVENT (OUTPATIENT)
Dept: PERIOP | Facility: HOSPITAL | Age: 38
End: 2018-10-17

## 2018-10-18 ENCOUNTER — ANESTHESIA (OUTPATIENT)
Dept: PERIOP | Facility: HOSPITAL | Age: 38
End: 2018-10-18

## 2018-10-18 ENCOUNTER — HOSPITAL ENCOUNTER (OUTPATIENT)
Facility: HOSPITAL | Age: 38
Setting detail: HOSPITAL OUTPATIENT SURGERY
Discharge: HOME | End: 2018-10-18
Attending: OTOLARYNGOLOGY | Admitting: ANESTHESIOLOGY

## 2018-10-18 VITALS
DIASTOLIC BLOOD PRESSURE: 76 MMHG | RESPIRATION RATE: 20 BRPM | SYSTOLIC BLOOD PRESSURE: 112 MMHG | TEMPERATURE: 98.2 F | HEART RATE: 81 BPM | OXYGEN SATURATION: 96 %

## 2018-10-18 LAB
ANION GAP SERPL CALCULATED.3IONS-SCNC: 7 MMOL/L (ref 3–11)
BUN BLD-MCNC: 18 MG/DL (ref 9–23)
BUN/CREAT SERPL: 17.1 (ref 7–25)
CALCIUM SPEC-SCNC: 9.7 MG/DL (ref 8.7–10.4)
CHLORIDE SERPL-SCNC: 103 MMOL/L (ref 99–109)
CO2 SERPL-SCNC: 30 MMOL/L (ref 20–31)
CREAT BLD-MCNC: 1.05 MG/DL (ref 0.6–1.3)
DEPRECATED RDW RBC AUTO: 41.4 FL (ref 37–54)
ERYTHROCYTE [DISTWIDTH] IN BLOOD BY AUTOMATED COUNT: 12.9 % (ref 11.3–14.5)
GFR SERPL CREATININE-BSD FRML MDRD: 79 ML/MIN/1.73
GLUCOSE BLD-MCNC: 82 MG/DL (ref 70–100)
HCT VFR BLD AUTO: 48.4 % (ref 38.9–50.9)
HGB BLD-MCNC: 16.7 G/DL (ref 13.1–17.5)
MCH RBC QN AUTO: 30.4 PG (ref 27–31)
MCHC RBC AUTO-ENTMCNC: 34.5 G/DL (ref 32–36)
MCV RBC AUTO: 88 FL (ref 80–99)
PLATELET # BLD AUTO: 223 10*3/MM3 (ref 150–450)
PMV BLD AUTO: 10.4 FL (ref 6–12)
POTASSIUM BLD-SCNC: 4.1 MMOL/L (ref 3.5–5.5)
RBC # BLD AUTO: 5.5 10*6/MM3 (ref 4.2–5.76)
SODIUM BLD-SCNC: 140 MMOL/L (ref 132–146)
WBC NRBC COR # BLD: 5.34 10*3/MM3 (ref 3.5–10.8)

## 2018-10-18 PROCEDURE — 25010000002 PHENYLEPHRINE PER 1 ML: Performed by: NURSE ANESTHETIST, CERTIFIED REGISTERED

## 2018-10-18 PROCEDURE — 25010000002 FENTANYL CITRATE (PF) 100 MCG/2ML SOLUTION: Performed by: NURSE ANESTHETIST, CERTIFIED REGISTERED

## 2018-10-18 PROCEDURE — 93005 ELECTROCARDIOGRAM TRACING: CPT | Performed by: ANESTHESIOLOGY

## 2018-10-18 PROCEDURE — 25010000002 ONDANSETRON PER 1 MG: Performed by: NURSE ANESTHETIST, CERTIFIED REGISTERED

## 2018-10-18 PROCEDURE — 25010000002 PROPOFOL 10 MG/ML EMULSION: Performed by: NURSE ANESTHETIST, CERTIFIED REGISTERED

## 2018-10-18 PROCEDURE — 85027 COMPLETE CBC AUTOMATED: CPT | Performed by: ANESTHESIOLOGY

## 2018-10-18 PROCEDURE — 25010000002 DEXAMETHASONE PER 1 MG: Performed by: NURSE ANESTHETIST, CERTIFIED REGISTERED

## 2018-10-18 PROCEDURE — 80048 BASIC METABOLIC PNL TOTAL CA: CPT | Performed by: ANESTHESIOLOGY

## 2018-10-18 PROCEDURE — 25010000002 NEOSTIGMINE 10 MG/10ML SOLUTION: Performed by: NURSE ANESTHETIST, CERTIFIED REGISTERED

## 2018-10-18 PROCEDURE — 93010 ELECTROCARDIOGRAM REPORT: CPT | Performed by: INTERNAL MEDICINE

## 2018-10-18 PROCEDURE — 25010000002 HYDROMORPHONE PER 4 MG: Performed by: NURSE ANESTHETIST, CERTIFIED REGISTERED

## 2018-10-18 RX ORDER — SODIUM CHLORIDE 0.9 % (FLUSH) 0.9 %
3 SYRINGE (ML) INJECTION EVERY 12 HOURS SCHEDULED
Status: DISCONTINUED | OUTPATIENT
Start: 2018-10-18 | End: 2018-10-18 | Stop reason: HOSPADM

## 2018-10-18 RX ORDER — SODIUM CHLORIDE, SODIUM LACTATE, POTASSIUM CHLORIDE, CALCIUM CHLORIDE 600; 310; 30; 20 MG/100ML; MG/100ML; MG/100ML; MG/100ML
9 INJECTION, SOLUTION INTRAVENOUS CONTINUOUS
Status: DISCONTINUED | OUTPATIENT
Start: 2018-10-18 | End: 2018-10-18 | Stop reason: HOSPADM

## 2018-10-18 RX ORDER — FAMOTIDINE 20 MG/1
20 TABLET, FILM COATED ORAL ONCE
Status: DISCONTINUED | OUTPATIENT
Start: 2018-10-18 | End: 2018-10-18 | Stop reason: HOSPADM

## 2018-10-18 RX ORDER — FAMOTIDINE 20 MG/1
20 TABLET, FILM COATED ORAL ONCE
Status: COMPLETED | OUTPATIENT
Start: 2018-10-18 | End: 2018-10-18

## 2018-10-18 RX ORDER — SULFAMETHOXAZOLE AND TRIMETHOPRIM 800; 160 MG/1; MG/1
1 TABLET ORAL EVERY 12 HOURS
Qty: 14 TABLET | Refills: 0 | Status: SHIPPED | OUTPATIENT
Start: 2018-10-18 | End: 2018-10-29

## 2018-10-18 RX ORDER — FAMOTIDINE 10 MG/ML
20 INJECTION, SOLUTION INTRAVENOUS ONCE
Status: DISCONTINUED | OUTPATIENT
Start: 2018-10-18 | End: 2018-10-18 | Stop reason: HOSPADM

## 2018-10-18 RX ORDER — MAGNESIUM HYDROXIDE 1200 MG/15ML
LIQUID ORAL AS NEEDED
Status: DISCONTINUED | OUTPATIENT
Start: 2018-10-18 | End: 2018-10-18 | Stop reason: HOSPADM

## 2018-10-18 RX ORDER — LIDOCAINE HYDROCHLORIDE 10 MG/ML
INJECTION, SOLUTION EPIDURAL; INFILTRATION; INTRACAUDAL; PERINEURAL AS NEEDED
Status: DISCONTINUED | OUTPATIENT
Start: 2018-10-18 | End: 2018-10-18 | Stop reason: SURG

## 2018-10-18 RX ORDER — PROPOFOL 10 MG/ML
VIAL (ML) INTRAVENOUS AS NEEDED
Status: DISCONTINUED | OUTPATIENT
Start: 2018-10-18 | End: 2018-10-18 | Stop reason: SURG

## 2018-10-18 RX ORDER — LIDOCAINE HYDROCHLORIDE 10 MG/ML
0.5 INJECTION, SOLUTION EPIDURAL; INFILTRATION; INTRACAUDAL; PERINEURAL ONCE AS NEEDED
Status: COMPLETED | OUTPATIENT
Start: 2018-10-18 | End: 2018-10-18

## 2018-10-18 RX ORDER — SODIUM CHLORIDE 0.9 % (FLUSH) 0.9 %
3-10 SYRINGE (ML) INJECTION AS NEEDED
Status: DISCONTINUED | OUTPATIENT
Start: 2018-10-18 | End: 2018-10-18 | Stop reason: HOSPADM

## 2018-10-18 RX ORDER — BACITRACIN ZINC 500 [USP'U]/G
OINTMENT TOPICAL AS NEEDED
Status: DISCONTINUED | OUTPATIENT
Start: 2018-10-18 | End: 2018-10-18 | Stop reason: HOSPADM

## 2018-10-18 RX ORDER — FENTANYL CITRATE 50 UG/ML
50 INJECTION, SOLUTION INTRAMUSCULAR; INTRAVENOUS
Status: DISCONTINUED | OUTPATIENT
Start: 2018-10-18 | End: 2018-10-18 | Stop reason: HOSPADM

## 2018-10-18 RX ORDER — OXYCODONE HYDROCHLORIDE AND ACETAMINOPHEN 5; 325 MG/1; MG/1
1 TABLET ORAL ONCE AS NEEDED
Status: DISCONTINUED | OUTPATIENT
Start: 2018-10-18 | End: 2018-10-18 | Stop reason: HOSPADM

## 2018-10-18 RX ORDER — HYDROMORPHONE HYDROCHLORIDE 1 MG/ML
0.5 INJECTION, SOLUTION INTRAMUSCULAR; INTRAVENOUS; SUBCUTANEOUS
Status: DISCONTINUED | OUTPATIENT
Start: 2018-10-18 | End: 2018-10-18 | Stop reason: HOSPADM

## 2018-10-18 RX ORDER — SODIUM CHLORIDE, SODIUM LACTATE, POTASSIUM CHLORIDE, CALCIUM CHLORIDE 600; 310; 30; 20 MG/100ML; MG/100ML; MG/100ML; MG/100ML
100 INJECTION, SOLUTION INTRAVENOUS CONTINUOUS
Status: DISCONTINUED | OUTPATIENT
Start: 2018-10-18 | End: 2018-10-18 | Stop reason: HOSPADM

## 2018-10-18 RX ORDER — ROCURONIUM BROMIDE 10 MG/ML
INJECTION, SOLUTION INTRAVENOUS AS NEEDED
Status: DISCONTINUED | OUTPATIENT
Start: 2018-10-18 | End: 2018-10-18 | Stop reason: SURG

## 2018-10-18 RX ORDER — MEPERIDINE HYDROCHLORIDE 25 MG/ML
12.5 INJECTION INTRAMUSCULAR; INTRAVENOUS; SUBCUTANEOUS
Status: DISCONTINUED | OUTPATIENT
Start: 2018-10-18 | End: 2018-10-18 | Stop reason: HOSPADM

## 2018-10-18 RX ORDER — NALOXONE HCL 0.4 MG/ML
0.4 VIAL (ML) INJECTION AS NEEDED
Status: DISCONTINUED | OUTPATIENT
Start: 2018-10-18 | End: 2018-10-18 | Stop reason: HOSPADM

## 2018-10-18 RX ORDER — DEXAMETHASONE SODIUM PHOSPHATE 4 MG/ML
INJECTION, SOLUTION INTRA-ARTICULAR; INTRALESIONAL; INTRAMUSCULAR; INTRAVENOUS; SOFT TISSUE AS NEEDED
Status: DISCONTINUED | OUTPATIENT
Start: 2018-10-18 | End: 2018-10-18 | Stop reason: SURG

## 2018-10-18 RX ORDER — LIDOCAINE HYDROCHLORIDE 10 MG/ML
0.5 INJECTION, SOLUTION EPIDURAL; INFILTRATION; INTRACAUDAL; PERINEURAL ONCE AS NEEDED
Status: DISCONTINUED | OUTPATIENT
Start: 2018-10-18 | End: 2018-10-18 | Stop reason: HOSPADM

## 2018-10-18 RX ORDER — ONDANSETRON 2 MG/ML
INJECTION INTRAMUSCULAR; INTRAVENOUS AS NEEDED
Status: DISCONTINUED | OUTPATIENT
Start: 2018-10-18 | End: 2018-10-18 | Stop reason: SURG

## 2018-10-18 RX ORDER — NEOSTIGMINE METHYLSULFATE 1 MG/ML
INJECTION, SOLUTION INTRAVENOUS AS NEEDED
Status: DISCONTINUED | OUTPATIENT
Start: 2018-10-18 | End: 2018-10-18 | Stop reason: SURG

## 2018-10-18 RX ORDER — LIDOCAINE HYDROCHLORIDE AND EPINEPHRINE 10; 10 MG/ML; UG/ML
INJECTION, SOLUTION INFILTRATION; PERINEURAL AS NEEDED
Status: DISCONTINUED | OUTPATIENT
Start: 2018-10-18 | End: 2018-10-18 | Stop reason: HOSPADM

## 2018-10-18 RX ORDER — OXYMETAZOLINE HYDROCHLORIDE 0.05 G/100ML
SPRAY NASAL AS NEEDED
Status: DISCONTINUED | OUTPATIENT
Start: 2018-10-18 | End: 2018-10-18 | Stop reason: HOSPADM

## 2018-10-18 RX ORDER — LABETALOL HYDROCHLORIDE 5 MG/ML
5 INJECTION, SOLUTION INTRAVENOUS
Status: DISCONTINUED | OUTPATIENT
Start: 2018-10-18 | End: 2018-10-18 | Stop reason: HOSPADM

## 2018-10-18 RX ORDER — HYDRALAZINE HYDROCHLORIDE 20 MG/ML
10 INJECTION INTRAMUSCULAR; INTRAVENOUS EVERY 6 HOURS PRN
Status: DISCONTINUED | OUTPATIENT
Start: 2018-10-18 | End: 2018-10-18 | Stop reason: HOSPADM

## 2018-10-18 RX ORDER — FENTANYL CITRATE 50 UG/ML
INJECTION, SOLUTION INTRAMUSCULAR; INTRAVENOUS AS NEEDED
Status: DISCONTINUED | OUTPATIENT
Start: 2018-10-18 | End: 2018-10-18 | Stop reason: SURG

## 2018-10-18 RX ORDER — HYDROCODONE BITARTRATE AND ACETAMINOPHEN 5; 325 MG/1; MG/1
1-2 TABLET ORAL EVERY 4 HOURS PRN
Qty: 40 TABLET | Refills: 0 | Status: SHIPPED | OUTPATIENT
Start: 2018-10-18 | End: 2018-12-06 | Stop reason: SDUPTHER

## 2018-10-18 RX ORDER — ONDANSETRON 2 MG/ML
4 INJECTION INTRAMUSCULAR; INTRAVENOUS ONCE AS NEEDED
Status: COMPLETED | OUTPATIENT
Start: 2018-10-18 | End: 2018-10-18

## 2018-10-18 RX ORDER — EPHEDRINE SULFATE 50 MG/ML
5 INJECTION, SOLUTION INTRAVENOUS ONCE AS NEEDED
Status: DISCONTINUED | OUTPATIENT
Start: 2018-10-18 | End: 2018-10-18 | Stop reason: HOSPADM

## 2018-10-18 RX ADMIN — PROPOFOL 200 MG: 10 INJECTION, EMULSION INTRAVENOUS at 14:35

## 2018-10-18 RX ADMIN — FAMOTIDINE 20 MG: 20 TABLET ORAL at 12:48

## 2018-10-18 RX ADMIN — PHENYLEPHRINE HYDROCHLORIDE 80 MCG: 10 INJECTION INTRAVENOUS at 15:02

## 2018-10-18 RX ADMIN — PHENYLEPHRINE HYDROCHLORIDE 80 MCG: 10 INJECTION INTRAVENOUS at 15:26

## 2018-10-18 RX ADMIN — SODIUM CHLORIDE, POTASSIUM CHLORIDE, SODIUM LACTATE AND CALCIUM CHLORIDE: 600; 310; 30; 20 INJECTION, SOLUTION INTRAVENOUS at 15:30

## 2018-10-18 RX ADMIN — HYDROMORPHONE HYDROCHLORIDE 0.5 MG: 1 INJECTION, SOLUTION INTRAMUSCULAR; INTRAVENOUS; SUBCUTANEOUS at 17:45

## 2018-10-18 RX ADMIN — DEXAMETHASONE SODIUM PHOSPHATE 8 MG: 4 INJECTION, SOLUTION INTRAMUSCULAR; INTRAVENOUS at 14:35

## 2018-10-18 RX ADMIN — SODIUM CHLORIDE, POTASSIUM CHLORIDE, SODIUM LACTATE AND CALCIUM CHLORIDE 9 ML/HR: 600; 310; 30; 20 INJECTION, SOLUTION INTRAVENOUS at 12:48

## 2018-10-18 RX ADMIN — LABETALOL HYDROCHLORIDE 5 MG: 5 INJECTION, SOLUTION INTRAVENOUS at 16:34

## 2018-10-18 RX ADMIN — LABETALOL HYDROCHLORIDE 5 MG: 5 INJECTION, SOLUTION INTRAVENOUS at 16:20

## 2018-10-18 RX ADMIN — FENTANYL CITRATE: 50 INJECTION, SOLUTION INTRAMUSCULAR; INTRAVENOUS at 16:00

## 2018-10-18 RX ADMIN — PROPOFOL 100 MG: 10 INJECTION, EMULSION INTRAVENOUS at 14:36

## 2018-10-18 RX ADMIN — LIDOCAINE HYDROCHLORIDE 30 MG: 10 INJECTION, SOLUTION EPIDURAL; INFILTRATION; INTRACAUDAL; PERINEURAL at 14:35

## 2018-10-18 RX ADMIN — LIDOCAINE HYDROCHLORIDE 0.5 ML: 10 INJECTION, SOLUTION EPIDURAL; INFILTRATION; INTRACAUDAL; PERINEURAL at 12:48

## 2018-10-18 RX ADMIN — FENTANYL CITRATE 100 MCG: 50 INJECTION, SOLUTION INTRAMUSCULAR; INTRAVENOUS at 14:35

## 2018-10-18 RX ADMIN — FENTANYL CITRATE: 50 INJECTION, SOLUTION INTRAMUSCULAR; INTRAVENOUS at 16:15

## 2018-10-18 RX ADMIN — NEOSTIGMINE METHYLSULFATE 3 MG: 1 INJECTION, SOLUTION INTRAVENOUS at 15:32

## 2018-10-18 RX ADMIN — ROCURONIUM BROMIDE 40 MG: 10 SOLUTION INTRAVENOUS at 14:35

## 2018-10-18 RX ADMIN — SODIUM CHLORIDE, POTASSIUM CHLORIDE, SODIUM LACTATE AND CALCIUM CHLORIDE: 600; 310; 30; 20 INJECTION, SOLUTION INTRAVENOUS at 14:32

## 2018-10-18 RX ADMIN — ONDANSETRON 4 MG: 2 INJECTION, SOLUTION INTRAMUSCULAR; INTRAVENOUS at 17:00

## 2018-10-18 RX ADMIN — ONDANSETRON 4 MG: 2 INJECTION INTRAMUSCULAR; INTRAVENOUS at 15:25

## 2018-10-18 RX ADMIN — HYDROMORPHONE HYDROCHLORIDE 0.5 MG: 1 INJECTION, SOLUTION INTRAMUSCULAR; INTRAVENOUS; SUBCUTANEOUS at 16:49

## 2018-10-18 NOTE — ANESTHESIA PROCEDURE NOTES
Airway  Urgency: elective    Airway not difficult    General Information and Staff    Patient location during procedure: OR  CRNA: ABRAHAN BARRAZA    Indications and Patient Condition  Indications for airway management: airway protection    Preoxygenated: yes  MILS not maintained throughout  Mask difficulty assessment: 1 - vent by mask    Final Airway Details  Final airway type: endotracheal airway      Successful airway: ETT  Cuffed: yes   Successful intubation technique: direct laryngoscopy  Endotracheal tube insertion site: oral  Blade: Rc  Blade size: 3  ETT size: 7.5 mm  Cormack-Lehane Classification: grade I - full view of glottis  Placement verified by: chest auscultation and capnometry   Cuff volume (mL): 5  Measured from: lips  ETT to lips (cm): 0  Number of attempts at approach: 1    Additional Comments  Negative epigastric sounds, Breath sound equal bilaterally with symmetric chest rise and fall

## 2018-10-18 NOTE — OP NOTE
SEPTOPLASTY, RESECTION INFERIOR TURBINATES  Procedure Report    Patient Name:  Eduardo Luna  YOB: 1980    Date of Surgery:  10/18/2018     Indications:  38 year old white male with nasal obstruction and JAVON. Has had a tonsillectomy. On cpap for javon but still symptomatic and cpap not working well. Felt that this could be due to his nasal obstruction. He elected to proceed with septoplasty and bilateral inferior turbinate reduction to try to improve his physical anatomic obstruction and improve his JAVON or at least his CPAP function. Discussed risks.    Pre-op Diagnosis:   deviated septum, inferior turbinate hypertrophy       Post-Op Diagnosis Codes:     * Deviated septum [J34.2]     * Hypertrophy of both inferior nasal turbinates [J34.3]    Procedure/CPT® Codes:  AZ REPAIR OF NASAL SEPTUM [56405]  AZ EXCISION TURBINATE,SUBMUCOUS [26859]    Procedure(s):  SEPTOPLASTY, BILATERAL RESECTION INFERIOR TURBINATES    Staff:  Surgeon(s):  Lexa Castillo MD         Anesthesia: General    Estimated Blood Loss: minimal    Implants:    Nothing was implanted during the procedure    Specimen:          None      Findings: bilateral nsd, bith    Complications: none     Description of Procedure: Patient was taken to the operating room and placed on the table in supine position.  After induction of general endotracheal anesthesia the nose was prepped by injecting 1% lidocaine with epinephrine in each submucosal area of the septum.  Afrin-soaked pledgets were placed for decongestion and the patient was draped.    Pledgets were removed.  Left sided hemitransfixion incision was made.  A mucoperichondrial flap was elevated on the left side.  The bony cartilaginous junction was disarticulated.  Bony septum that was deviated was removed in a submucous fashion.  Cartilaginous septum was addressed as well leaving about 2 cm of dorsal and caudal cartilage.  Irrigation was performed.  The hemitransfixion incision was closed with  chromic suture.  A 40 plain gut quilting stitch was placed between the flaps.    Attention was turned to the inferior turbinates.  Each turbinate was injected with injectable saline.  A 2 mm inferior turbinate blade was used to make an incision in the anterior aspect of each inferior turbinate.  Submucous elevation and then resection was performed with the start instrument.  Each inferior turbinate was then outfractured with a [place] elevator.  Bro splints were placed on each side of the nasal cavity and sutured with a nylon suture.  Telfa packing was placed in each nasal cavity and sutured anteriorly to one another.  He tolerated the procedure well and was awakened from anesthesia and taken to the recovery room in stable condition.  He will go home on Bactrim and Ware      Lexa Castillo MD     Date: 10/18/2018  Time: 2:15 PM

## 2018-10-18 NOTE — ANESTHESIA PREPROCEDURE EVALUATION
Anesthesia Evaluation     Patient summary reviewed and Nursing notes reviewed   NPO Solid Status: > 8 hours  NPO Liquid Status: > 8 hours           Airway   Mallampati: I  TM distance: >3 FB  Neck ROM: full  No difficulty expected  Dental - normal exam     Pulmonary - normal exam   Cardiovascular   Exercise tolerance: good (4-7 METS)    Rhythm: regular  Rate: normal        Neuro/Psych  GI/Hepatic/Renal/Endo      Musculoskeletal     Abdominal    Substance History      OB/GYN          Other                        Anesthesia Plan    ASA 2     general     intravenous induction   Anesthetic plan, all risks, benefits, and alternatives have been provided, discussed and informed consent has been obtained with: patient.

## 2018-10-18 NOTE — ANESTHESIA POSTPROCEDURE EVALUATION
Patient: Eduardo Luna    Procedure Summary     Date:  10/18/18 Room / Location:   [facility] / [facility]    Anesthesia Start:  1432 Anesthesia Stop:      Procedure:  SEPTOPLASTY, BILATERAL RESECTION INFERIOR TURBINATES (Bilateral Nose) Diagnosis:       Deviated septum      Hypertrophy of both inferior nasal turbinates      (deviated septum, inferior turbinate hypertrophy)    Surgeon:  Lexa Castillo MD Provider:  Zana Perez MD    Anesthesia Type:  general ASA Status:  2          Anesthesia Type: general  Last vitals  BP   (!) 156/114 (10/18/18 1238)167/107   Temp   97.7 °F (36.5 °C) (10/18/18 1238)97.8   Pulse   81 (10/18/18 1238)79   Resp   18 (10/18/18 1238)  18   SpO2   98 % (10/18/18 1238)95     Post Anesthesia Care and Evaluation    Patient location during evaluation: PACU  Patient participation: complete - patient participated  Level of consciousness: awake and alert  Pain score: 0  Pain management: adequate  Airway patency: patent  Anesthetic complications: No anesthetic complications  PONV Status: none  Cardiovascular status: hemodynamically stable and acceptable  Respiratory status: nonlabored ventilation, acceptable and nasal cannula  Hydration status: acceptable

## 2018-10-18 NOTE — BRIEF OP NOTE
SEPTOPLASTY, RESECTION INFERIOR TURBINATES  Progress Note    Eduardo BEHZAD Jeremy  10/18/2018    Pre-op Diagnosis:   deviated septum, inferior turbinate hypertrophy       Post-Op Diagnosis Codes:     * Deviated septum [J34.2]     * Hypertrophy of both inferior nasal turbinates [J34.3]    Procedure/CPT® Codes:  NV REPAIR OF NASAL SEPTUM [17447]  NV EXCISION TURBINATE,SUBMUCOUS [89919]    Procedure(s):  SEPTOPLASTY, BILATERAL RESECTION INFERIOR TURBINATES    Surgeon(s):  Lexa Castillo MD    Anesthesia: General    Staff:   * No surgical staff found *    Estimated Blood Loss: minimal    Urine Voided: * No values recorded between 10/18/2018 12:00 AM and 10/18/2018  2:15 PM *    Specimens:                None      Drains:      Findings: bilateranl nsd, bith    Complications: none      Lexa Castillo MD     Date: 10/18/2018  Time: 2:15 PM

## 2018-10-29 DIAGNOSIS — F41.8 DEPRESSION WITH ANXIETY: ICD-10-CM

## 2018-10-29 RX ORDER — PROPRANOLOL HYDROCHLORIDE 80 MG/1
80 CAPSULE, EXTENDED RELEASE ORAL DAILY
Qty: 30 CAPSULE | Refills: 0 | Status: SHIPPED | OUTPATIENT
Start: 2018-10-29 | End: 2018-11-27 | Stop reason: SDUPTHER

## 2018-11-27 DIAGNOSIS — F41.8 DEPRESSION WITH ANXIETY: ICD-10-CM

## 2018-11-27 RX ORDER — PROPRANOLOL HYDROCHLORIDE 80 MG/1
80 CAPSULE, EXTENDED RELEASE ORAL DAILY
Qty: 30 CAPSULE | Refills: 0 | Status: SHIPPED | OUTPATIENT
Start: 2018-11-27 | End: 2018-12-06 | Stop reason: SDUPTHER

## 2018-12-06 ENCOUNTER — OFFICE VISIT (OUTPATIENT)
Dept: INTERNAL MEDICINE | Facility: CLINIC | Age: 38
End: 2018-12-06

## 2018-12-06 VITALS
BODY MASS INDEX: 27.44 KG/M2 | DIASTOLIC BLOOD PRESSURE: 86 MMHG | HEIGHT: 72 IN | WEIGHT: 202.6 LBS | SYSTOLIC BLOOD PRESSURE: 144 MMHG | TEMPERATURE: 97.3 F

## 2018-12-06 DIAGNOSIS — F41.8 DEPRESSION WITH ANXIETY: ICD-10-CM

## 2018-12-06 PROCEDURE — 99214 OFFICE O/P EST MOD 30 MIN: CPT | Performed by: FAMILY MEDICINE

## 2018-12-06 RX ORDER — ONDANSETRON 4 MG/1
4 TABLET, ORALLY DISINTEGRATING ORAL EVERY 8 HOURS PRN
Qty: 30 TABLET | Refills: 0 | Status: SHIPPED | OUTPATIENT
Start: 2018-12-06 | End: 2019-05-16

## 2018-12-06 RX ORDER — PROPRANOLOL HYDROCHLORIDE 120 MG/1
120 CAPSULE, EXTENDED RELEASE ORAL DAILY
Qty: 90 CAPSULE | Refills: 0 | Status: SHIPPED | OUTPATIENT
Start: 2018-12-06 | End: 2019-03-15

## 2018-12-06 NOTE — PROGRESS NOTES
Subjective   Eduardo Luna is a 38 y.o. male.     History of Present Illness   Here for BP concerns. Last seen 7/19/18 for routine. Was seen 9/22/16 as a new patient. Pt has a h/o Hodgkins lymphoma 2002 in full remission. Hx PAT post ablation 2008. Lab 3/19/18 demo , tg 115, HDL 53,  on Lipitor. Lab 2/16/18 demo testosterone at goal on 4 pumps. Labs 1/19/18 demo normal CBC, CMP and B12. Thyroid at goal on synthroid 75 with TSH 1.6 and free T4 1.28. Testosterone low again with total 251, free 6.7; pt on testim. Lipid was high with , tg 116, HDL 59, . Labs 1/23/17 demo low testosterone with total 297, free 6.6. Recheck lab 4/12/17 demo normal PSA, total testosterone 466, free 13.1, lipid normal with , tg 70, HDL 47, . Sees Heme/Onc and Sleep Medicine ([facility])     1.CV- hyperlipid. Found on screening labs 1/19/18. Had been normal on screen 4/12/17. Pt worked on diet but LDL was 193 on lab 2/16/18. Was started on Lipitor and did well with recheck lab at goal 3/19/18. Pt has h/o PAT post ablation. Has had some SVT related to anxiety, controlled on propranolol. Today he reports no racing heart. His BP at home is normal.      2. PSYCH- depression with anxiety. Diagnosed 2002 when diagnosed with lymphoma. Initally he was on Lexapro, valium and ambien. Was still c/o feeling anxious, guilty, feeling overwhelmed, withdrawing and concentration problems on occassion. Was waking up frequently, even on ambien. Was tried on multiple meds including Zyprexa, pristiq, buspar and trazodone. Did well once on Wellbutrin and Prozac once he was on propranolol. On discussion he has taken viibryd in the past and it worked well at first but did not keep him at goal. Was not on wellbutrin with it.      3. ENDO- hypothyroid- currently on levothyroxine 75. At his initial visit pt was c/o raspy voice, trouble concentrating, occasionally feeling depressed, slow speech and dry skin. Was on generic and taking it with  other meds. Was changed to branded synthroid and advised how to take correctly. Has been at goal on synthroid 75 since 1/23/17; lab at goal 1/19/18 and pt clinically at goal 7/19/18.  4. - low testosterone, new issue kidney stone. Low testosterone was found on lab 1/23/17. Pt has done well since on 4 pumps with lab at goal 3/19/18; since reduced dose to 1 pump/day due to cost; felt well encough. Pt went to  5/31/18 and was diagnosed with kidney stone. Had UA with large amount of blood. Had normal CBC, CMP and lipase except ALT 74. CT demo right distal stone. Pt was treated with Flomax, zofran and hydrocodone and passed it..   5. RESP- JAVON. Pt using oral device. Is now post septoplasty    The following portions of the patient's history were reviewed and updated as appropriate: current medications, past family history, past medical history, past social history, past surgical history and problem list.    Review of Systems   Respiratory: Negative for shortness of breath.    Cardiovascular: Negative for chest pain and palpitations.   Gastrointestinal: Negative for abdominal distention and abdominal pain.   Musculoskeletal: Negative for neck pain.   Skin: Negative for color change.   Neurological: Positive for headaches. Negative for tremors and speech difficulty.   Psychiatric/Behavioral: Negative for agitation and confusion.   All other systems reviewed and are negative.        Current Outpatient Medications:   •  ANDROGEL PUMP 20.25 MG/ACT (1.62%) gel, Apply 4 pumps daily, Disp: 75 g, Rfl: 5  •  atorvastatin (LIPITOR) 10 MG tablet, Take 1 tablet by mouth Daily., Disp: 90 tablet, Rfl: 1  •  Cholecalciferol (VITAMIN D3) 2000 units capsule, , Disp: , Rfl:   •  FLUoxetine (PROzac) 20 MG capsule, Take 1 capsule by mouth Daily., Disp: 90 capsule, Rfl: 1  •  hepatitis A (HAVRIX) 1440 EL U/ML vaccine, Inject 1 mL into the appropriate muscle as directed by prescriber. Repeat in 6 months, Disp: 1 mL, Rfl: 1  •  levothyroxine  "(SYNTHROID, LEVOTHROID) 75 MCG tablet, TAKE 1 TAB BY MOUTH IN THE AM FASTING AND WAIT 1HR FOR FOOD OR OTHER MEDS, Disp: 90 tablet, Rfl: 1  •  ondansetron ODT (ZOFRAN ODT) 4 MG disintegrating tablet, Take 1 tablet by mouth Every 8 (Eight) Hours As Needed for Nausea or Vomiting., Disp: 30 tablet, Rfl: 0  •  propranolol LA (INDERAL LA) 120 MG 24 hr capsule, Take 1 capsule by mouth Daily., Disp: 90 capsule, Rfl: 0    Objective     /86   Temp 97.3 °F (36.3 °C)   Ht 182.9 cm (72\")   Wt 91.9 kg (202 lb 9.6 oz)   BMI 27.48 kg/m²     Physical Exam   Constitutional: He is oriented to person, place, and time. He appears well-developed and well-nourished.   HENT:   Right Ear: Tympanic membrane and ear canal normal.   Left Ear: Tympanic membrane and ear canal normal.   Mouth/Throat: Oropharynx is clear and moist.   Eyes: Conjunctivae and EOM are normal. Pupils are equal, round, and reactive to light.   Neck: No thyromegaly present.   Cardiovascular: Normal rate and regular rhythm.   Pulmonary/Chest: Effort normal and breath sounds normal.   Neurological: He is alert and oriented to person, place, and time.   Skin: Skin is warm and dry.   Psychiatric: He has a normal mood and affect.   Vitals reviewed.      Assessment/Plan   Eduardo was seen today for follow-up.    Diagnoses and all orders for this visit:    Depression with anxiety  -     propranolol LA (INDERAL LA) 120 MG 24 hr capsule; Take 1 capsule by mouth Daily.    Other orders  -     ondansetron ODT (ZOFRAN ODT) 4 MG disintegrating tablet; Take 1 tablet by mouth Every 8 (Eight) Hours As Needed for Nausea or Vomiting.        1. PSYCH/ CV- palpitations and mood- will increase the propranolol from 80 to 120 mg. Pt is watch his BP at home to make sure he does not go too low. If he does well, to continue this until his next routine. However, it it does not work, we can consider changing the prozac to viibryd. Pt to email for change within the next 4wk if needed.  2. " RECHECK- keep appt Feb

## 2018-12-06 NOTE — PATIENT INSTRUCTIONS
1. PSYCH/ CV- palpitations and mood- will increase the propranolol from 80 to 120 mg. Pt is watch his BP at home to make sure he does not go too low. If he does well, to continue this until his next routine. However, it it does not work, we can consider changing the prozac to viibryd. Pt to email for change within the next 4wk if needed.  2. RECHECK- keep appt Feb

## 2018-12-11 RX ORDER — BUPROPION HYDROCHLORIDE 300 MG/1
300 TABLET ORAL DAILY
Qty: 90 TABLET | Refills: 0 | Status: SHIPPED | OUTPATIENT
Start: 2018-12-11 | End: 2019-02-05 | Stop reason: SDUPTHER

## 2018-12-11 NOTE — TELEPHONE ENCOUNTER
Pt emailed and stated that wellbutrin  mg was taken out of his chart. I will put this back in and refill this now.

## 2018-12-18 RX ORDER — HYDROXYZINE HYDROCHLORIDE 25 MG/1
25-50 TABLET, FILM COATED ORAL NIGHTLY PRN
Qty: 60 TABLET | Refills: 1 | Status: SHIPPED | OUTPATIENT
Start: 2018-12-18 | End: 2019-02-05

## 2019-01-13 DIAGNOSIS — F41.8 DEPRESSION WITH ANXIETY: ICD-10-CM

## 2019-01-14 RX ORDER — FLUOXETINE HYDROCHLORIDE 20 MG/1
20 CAPSULE ORAL DAILY
Qty: 90 CAPSULE | Refills: 1 | Status: SHIPPED | OUTPATIENT
Start: 2019-01-14 | End: 2019-02-05

## 2019-01-22 DIAGNOSIS — R79.89 LOW TESTOSTERONE: ICD-10-CM

## 2019-01-22 RX ORDER — TESTOSTERONE 16.2 MG/G
GEL TRANSDERMAL
Qty: 75 G | Refills: 5 | Status: SHIPPED | OUTPATIENT
Start: 2019-01-22 | End: 2019-06-20

## 2019-01-22 NOTE — TELEPHONE ENCOUNTER
"The pharmacy states that if you put on the prescription \"process as generic\" it should be okay. Rx has been corrected and is ready to fax. Thank you!   "

## 2019-01-22 NOTE — TELEPHONE ENCOUNTER
Regarding: RE: RE: RE: Prescription Question  Contact: [phone]  ----- Message from Mychart, Generic sent at 1/22/2019 12:01 PM EST -----    Desmond Henry,    They said they called and talked to Summer. She should have the information you need.    Thanks again!    ----- Message -----  From: Svetlana Henry MD  Sent: 1/21/19, 12:59 PM  To: Eduardo Luna  Subject: RE: RE: Prescription Question    Our Rx does not show brand. Can you find out exactly which generic and how they want it written?    ----- Message -----     From: Eduardo Luna     Sent: 1/21/2019 10:55 AM EST       To: Svetlana Henry MD  Subject: RE: RE: Prescription Question    Desmond Henry,    My prescription insurance, [employer], says there are generic versions available for a $10.00 co-pay. They said my current prescription was written for name brand. I'm thinking that was because of the dosage. Do I need a new prescription in order to get a generic?    Eduardo Luna    ----- Message -----  From: Svetlana Henry MD  Sent: 1/14/19, 5:42 PM  To: Eduardo Luna  Subject: RE: Prescription Question    I will ask Summer what she knows about getting this covered as she has done a lot of the PA's. You can also check [employer] and see if one suits you. Lastly, you can check with your insurance to see if there is one they cover better now that it is a new year.     ----- Message -----     From: Eduardo Luna     Sent: 1/14/2019  4:17 PM EST       To: Svetlana Henry MD  Subject: Prescription Question    Desmond Henry,    The [employer] website is no longer offering a discount card for their products. The co-pay on my insurance is $60.00 per month. I won't be able to work this in my budget. I was wondering if there are any other options. If not, I'll have to discontinue treatment once my last bottle runs out. Thanks for your help. Have a great day!    Eduardo Luna

## 2019-02-05 ENCOUNTER — OFFICE VISIT (OUTPATIENT)
Dept: INTERNAL MEDICINE | Facility: CLINIC | Age: 39
End: 2019-02-05

## 2019-02-05 VITALS
SYSTOLIC BLOOD PRESSURE: 130 MMHG | TEMPERATURE: 97.1 F | HEIGHT: 72 IN | BODY MASS INDEX: 27.85 KG/M2 | DIASTOLIC BLOOD PRESSURE: 72 MMHG | WEIGHT: 205.6 LBS

## 2019-02-05 DIAGNOSIS — E03.9 HYPOTHYROIDISM (ACQUIRED): ICD-10-CM

## 2019-02-05 DIAGNOSIS — E78.00 PURE HYPERCHOLESTEROLEMIA: ICD-10-CM

## 2019-02-05 DIAGNOSIS — I47.1 PAT (PAROXYSMAL ATRIAL TACHYCARDIA) (HCC): ICD-10-CM

## 2019-02-05 DIAGNOSIS — K21.9 GASTRO-ESOPHAGEAL REFLUX DISEASE WITHOUT ESOPHAGITIS: ICD-10-CM

## 2019-02-05 DIAGNOSIS — R79.89 LOW TESTOSTERONE: ICD-10-CM

## 2019-02-05 DIAGNOSIS — Z00.00 ANNUAL PHYSICAL EXAM: Primary | ICD-10-CM

## 2019-02-05 DIAGNOSIS — F41.8 DEPRESSION WITH ANXIETY: ICD-10-CM

## 2019-02-05 LAB
25(OH)D3 SERPL-MCNC: 29.9 NG/ML
ALBUMIN SERPL-MCNC: 4.9 G/DL (ref 3.2–4.8)
ALBUMIN/GLOB SERPL: 2.3 G/DL (ref 1.5–2.5)
ALP SERPL-CCNC: 122 U/L (ref 25–100)
ALT SERPL W P-5'-P-CCNC: 102 U/L (ref 7–40)
ANION GAP SERPL CALCULATED.3IONS-SCNC: 8 MMOL/L (ref 3–11)
ARTICHOKE IGE QN: 121 MG/DL (ref 0–130)
AST SERPL-CCNC: 51 U/L (ref 0–33)
BASOPHILS # BLD AUTO: 0.02 10*3/MM3 (ref 0–0.2)
BASOPHILS NFR BLD AUTO: 0.3 % (ref 0–1)
BILIRUB SERPL-MCNC: 0.4 MG/DL (ref 0.3–1.2)
BUN BLD-MCNC: 14 MG/DL (ref 9–23)
BUN/CREAT SERPL: 12.8 (ref 7–25)
CALCIUM SPEC-SCNC: 9.8 MG/DL (ref 8.7–10.4)
CHLORIDE SERPL-SCNC: 104 MMOL/L (ref 99–109)
CHOLEST SERPL-MCNC: 190 MG/DL (ref 0–200)
CO2 SERPL-SCNC: 31 MMOL/L (ref 20–31)
CREAT BLD-MCNC: 1.09 MG/DL (ref 0.6–1.3)
DEPRECATED RDW RBC AUTO: 44.8 FL (ref 37–54)
EOSINOPHIL # BLD AUTO: 0.17 10*3/MM3 (ref 0–0.3)
EOSINOPHIL NFR BLD AUTO: 2.5 % (ref 0–3)
ERYTHROCYTE [DISTWIDTH] IN BLOOD BY AUTOMATED COUNT: 13.8 % (ref 11.3–14.5)
GFR SERPL CREATININE-BSD FRML MDRD: 75 ML/MIN/1.73
GLOBULIN UR ELPH-MCNC: 2.1 GM/DL
GLUCOSE BLD-MCNC: 85 MG/DL (ref 70–100)
HCT VFR BLD AUTO: 48.9 % (ref 38.9–50.9)
HDLC SERPL-MCNC: 57 MG/DL (ref 40–60)
HGB BLD-MCNC: 16.4 G/DL (ref 13.1–17.5)
IMM GRANULOCYTES # BLD AUTO: 0.02 10*3/MM3 (ref 0–0.03)
IMM GRANULOCYTES NFR BLD AUTO: 0.3 % (ref 0–0.6)
LYMPHOCYTES # BLD AUTO: 1.78 10*3/MM3 (ref 0.6–4.8)
LYMPHOCYTES NFR BLD AUTO: 26.2 % (ref 24–44)
MCH RBC QN AUTO: 30 PG (ref 27–31)
MCHC RBC AUTO-ENTMCNC: 33.5 G/DL (ref 32–36)
MCV RBC AUTO: 89.4 FL (ref 80–99)
MONOCYTES # BLD AUTO: 0.67 10*3/MM3 (ref 0–1)
MONOCYTES NFR BLD AUTO: 9.9 % (ref 0–12)
NEUTROPHILS # BLD AUTO: 4.13 10*3/MM3 (ref 1.5–8.3)
NEUTROPHILS NFR BLD AUTO: 60.8 % (ref 41–71)
PLATELET # BLD AUTO: 251 10*3/MM3 (ref 150–450)
PMV BLD AUTO: 10.4 FL (ref 6–12)
POTASSIUM BLD-SCNC: 4.4 MMOL/L (ref 3.5–5.5)
PROT SERPL-MCNC: 7 G/DL (ref 5.7–8.2)
RBC # BLD AUTO: 5.47 10*6/MM3 (ref 4.2–5.76)
SODIUM BLD-SCNC: 143 MMOL/L (ref 132–146)
T4 FREE SERPL-MCNC: 1.32 NG/DL (ref 0.89–1.76)
TRIGL SERPL-MCNC: 170 MG/DL (ref 0–150)
TSH SERPL DL<=0.05 MIU/L-ACNC: 4.79 MIU/ML (ref 0.35–5.35)
VIT B12 BLD-MCNC: 921 PG/ML (ref 211–911)
WBC NRBC COR # BLD: 6.79 10*3/MM3 (ref 3.5–10.8)

## 2019-02-05 PROCEDURE — 99213 OFFICE O/P EST LOW 20 MIN: CPT | Performed by: FAMILY MEDICINE

## 2019-02-05 PROCEDURE — 86800 THYROGLOBULIN ANTIBODY: CPT | Performed by: FAMILY MEDICINE

## 2019-02-05 PROCEDURE — 86376 MICROSOMAL ANTIBODY EACH: CPT | Performed by: FAMILY MEDICINE

## 2019-02-05 PROCEDURE — 84443 ASSAY THYROID STIM HORMONE: CPT | Performed by: FAMILY MEDICINE

## 2019-02-05 PROCEDURE — 84403 ASSAY OF TOTAL TESTOSTERONE: CPT | Performed by: FAMILY MEDICINE

## 2019-02-05 PROCEDURE — 85025 COMPLETE CBC W/AUTO DIFF WBC: CPT | Performed by: FAMILY MEDICINE

## 2019-02-05 PROCEDURE — 80074 ACUTE HEPATITIS PANEL: CPT | Performed by: FAMILY MEDICINE

## 2019-02-05 PROCEDURE — 82306 VITAMIN D 25 HYDROXY: CPT | Performed by: FAMILY MEDICINE

## 2019-02-05 PROCEDURE — 84439 ASSAY OF FREE THYROXINE: CPT | Performed by: FAMILY MEDICINE

## 2019-02-05 PROCEDURE — 80061 LIPID PANEL: CPT | Performed by: FAMILY MEDICINE

## 2019-02-05 PROCEDURE — 82607 VITAMIN B-12: CPT | Performed by: FAMILY MEDICINE

## 2019-02-05 PROCEDURE — 84402 ASSAY OF FREE TESTOSTERONE: CPT | Performed by: FAMILY MEDICINE

## 2019-02-05 PROCEDURE — 80053 COMPREHEN METABOLIC PANEL: CPT | Performed by: FAMILY MEDICINE

## 2019-02-05 PROCEDURE — 99395 PREV VISIT EST AGE 18-39: CPT | Performed by: FAMILY MEDICINE

## 2019-02-05 RX ORDER — HYDROXYZINE PAMOATE 25 MG/1
100 CAPSULE ORAL 3 TIMES DAILY PRN
Qty: 180 CAPSULE | Refills: 1 | Status: SHIPPED | OUTPATIENT
Start: 2019-02-05 | End: 2019-04-19 | Stop reason: SDUPTHER

## 2019-02-05 RX ORDER — RANITIDINE 300 MG/1
300 TABLET ORAL
Qty: 90 TABLET | Refills: 1 | Status: SHIPPED | OUTPATIENT
Start: 2019-02-05 | End: 2019-03-15

## 2019-02-05 RX ORDER — ATORVASTATIN CALCIUM 10 MG/1
10 TABLET, FILM COATED ORAL DAILY
Qty: 90 TABLET | Refills: 1 | Status: SHIPPED | OUTPATIENT
Start: 2019-02-05 | End: 2019-07-09 | Stop reason: SDUPTHER

## 2019-02-05 RX ORDER — BUPROPION HYDROCHLORIDE 300 MG/1
300 TABLET ORAL DAILY
Qty: 90 TABLET | Refills: 0 | Status: SHIPPED | OUTPATIENT
Start: 2019-02-05 | End: 2019-04-18 | Stop reason: SDUPTHER

## 2019-02-05 RX ORDER — LEVOTHYROXINE SODIUM 0.07 MG/1
75 TABLET ORAL EVERY MORNING
Qty: 90 TABLET | Refills: 1 | Status: SHIPPED | OUTPATIENT
Start: 2019-02-05 | End: 2019-07-09 | Stop reason: SDUPTHER

## 2019-02-05 NOTE — PATIENT INSTRUCTIONS
1. CPE- screening labs. Discussed [facility] but pt does not qualify at this time. To continue the colonoscopy at 5yr intervals for now  2. CV- PAT, hyperlipid- will continue the propranolol at 120mg for now. May consider alternative if treating mood does not resolve the BP fluctuations.   3. PSYCH- will change the prozac to viibryd. No change in wellbutrin.   4. ENDO- hypothyroid- clinically at goal. Will confirm with labs (will include thyroid abs) but will send RF synthroid 75 now.  5. - tesostosterone defic- will recheck his level with pt on 2 pumps currently.  6. RECHECK- 1mo PAT, mood

## 2019-02-05 NOTE — PROGRESS NOTES
Subjective   Eduardo Luna is a 38 y.o. male.   History of Present Illness     Here for CPE. Last seen 12/6/18 for BP concerns. Was seen 7/19/18 for routine. Was seen 9/22/16 as a new patient. Pt has a h/o Hodgkins lymphoma 2002 in full remission. Hx PAT post ablation 2008. Lab 3/19/18 demo , tg 115, HDL 53,  on Lipitor. Lab 2/16/18 demo testosterone at goal on 4 pumps. Labs 1/19/18 demo normal CBC, CMP and B12. Thyroid at goal on synthroid 75 with TSH 1.6 and free T4 1.28. Testosterone low again with total 251, free 6.7; pt on testim. Lipid was high with , tg 116, HDL 59, . Labs 1/23/17 demo low testosterone with total 297, free 6.6. Recheck lab 4/12/17 demo normal PSA, total testosterone 466, free 13.1, lipid normal with , tg 70, HDL 47, . Sees Heme/Onc and Sleep Medicine ([facility])     1.CV- SVT, hyperlipid. Lipid diagnosed 1/19/18. Recheck LDL was 193 on lab 2/16/18. Was started on Lipitor and did well with recheck lab at goal 3/19/18. Pt has h/o PAT post ablation. Has had some SVT related to anxiety, controlled on propranolol. Pt was then seen 12/6/18 to change to XL propranolol 120. Today he reports he is doing well on the higher dose of propranolol. Is not going too low. Still goes high with procedures or anything where he feels stressed.       2. PSYCH- depression with anxiety. Diagnosed 2002 when diagnosed with lymphoma. Initally he was on Lexapro, valium and ambien. Was still c/o feeling anxious, guilty, feeling overwhelmed, withdrawing and concentration problems on occassion. Was waking up frequently, even on ambien. Was tried on multiple meds including Zyprexa, pristiq, buspar and trazodone. Did well once on Wellbutrin and Prozac once he was on propranolol. On discussion he has taken viibryd in the past and it worked well at first but did not keep him at goal. Was not on wellbutrin with it. Today he reports he does not feel at goal. Would like to retry the viibryd with the  wellbutrin.      3. ENDO- hypothyroid- currently on levothyroxine 75. At his initial visit pt was c/o raspy voice, trouble concentrating, occasionally feeling depressed, slow speech and dry skin. Was on generic and taking it with other meds. Was changed to branded synthroid and advised how to take correctly. Has been at goal on synthroid 75 since 17; lab at goal 18 and pt clinically at goal 18. Today he reports he feels at goal.    4. - low testosterone, new issue kidney stone. Low testosterone was found on lab 17. Pt has done well since on 4 pumps with lab at goal 3/19/18; since reduced dose to 1 pump/day due to cost; felt well encough. Pt went to  18 and was diagnosed with kidney stone. Had UA with large amount of blood. Had normal CBC, CMP and lipase except ALT 74. CT demo right distal stone. Pt was treated with Flomax, zofran and hydrocodone and passed it. Today pt reports he was finally able to get this covered and is now on the correct dose of 2 pumps. Cannot tell if it is making any difference in how he feels.     5. RESP- JAVON. Pt using oral device. Is now post septoplasty    6. CPE- initial here  CV- risk factors: see above. No fam hx premature CAD   GI- no rectal bleeding. Father  of colon ca. Previous colonoscopy:  with 2 adenomas; advised 5yr. Sister had breast ca. Paternal grandmother had a ca that met to her liver. Mother  from sarcoma.  - no prostate or bladder c/o. No ED. No fam hx prostate ca.  RESP- never a smoker.  Immunizations: Last TDaP: 11. No previous pneumovax or zostavax.    The following portions of the patient's history were reviewed and updated as appropriate: current medications, past family history, past medical history, past social history, past surgical history and problem list.    Review of Systems   Cardiovascular: Negative for chest pain.   Gastrointestinal: Negative for abdominal distention and abdominal pain.   Skin: Negative for  "color change.   Neurological: Negative for tremors, speech difficulty and headaches.   Psychiatric/Behavioral: Negative for agitation and confusion.   All other systems reviewed and are negative.        Current Outpatient Medications:   •  atorvastatin (LIPITOR) 10 MG tablet, Take 1 tablet by mouth Daily., Disp: 90 tablet, Rfl: 1  •  buPROPion XL (WELLBUTRIN XL) 300 MG 24 hr tablet, Take 1 tablet by mouth Daily for 90 days., Disp: 90 tablet, Rfl: 0  •  Cholecalciferol (VITAMIN D3) 2000 units capsule, , Disp: , Rfl:   •  hydrOXYzine pamoate (VISTARIL) 25 MG capsule, Take 4 capsules by mouth 3 (Three) Times a Day As Needed for Anxiety., Disp: 180 capsule, Rfl: 1  •  levothyroxine (SYNTHROID, LEVOTHROID) 75 MCG tablet, TAKE 1 TAB BY MOUTH IN THE AM FASTING AND WAIT 1HR FOR FOOD OR OTHER MEDS, Disp: 90 tablet, Rfl: 1  •  omeprazole (priLOSEC) 40 MG capsule, Take 1 capsule by mouth Every Morning., Disp: 30 capsule, Rfl: 1  •  ondansetron ODT (ZOFRAN ODT) 4 MG disintegrating tablet, Take 1 tablet by mouth Every 8 (Eight) Hours As Needed for Nausea or Vomiting., Disp: 30 tablet, Rfl: 0  •  propranolol LA (INDERAL LA) 120 MG 24 hr capsule, Take 1 capsule by mouth Daily., Disp: 90 capsule, Rfl: 0  •  raNITIdine (ZANTAC) 300 MG tablet, Take 1 tablet by mouth every night at bedtime., Disp: 90 tablet, Rfl: 1  •  Testosterone (ANDROGEL PUMP) 20.25 MG/ACT (1.62%) gel, Apply 4 pumps daily, Disp: 75 g, Rfl: 5      Objective     /72   Temp 97.1 °F (36.2 °C)   Ht 182.9 cm (72\")   Wt 93.3 kg (205 lb 9.6 oz)   BMI 27.88 kg/m²     Physical Exam   Constitutional: He is oriented to person, place, and time. He appears well-developed and well-nourished.   HENT:   Head: Normocephalic.   Right Ear: Tympanic membrane and ear canal normal.   Left Ear: Tympanic membrane and ear canal normal.   Mouth/Throat: Oropharynx is clear and moist.   Eyes: Conjunctivae, EOM and lids are normal. Pupils are equal, round, and reactive to light. "   Neck: Normal range of motion. Neck supple. No thyromegaly present.   Cardiovascular: Normal rate, regular rhythm and normal heart sounds.   Pulses:       Carotid pulses are 2+ on the right side, and 2+ on the left side.       Femoral pulses are 2+ on the right side, and 2+ on the left side.  Pulmonary/Chest: Effort normal and breath sounds normal.   Abdominal: Soft. Normal appearance and bowel sounds are normal. He exhibits no distension. There is no hepatosplenomegaly. There is no tenderness. Hernia confirmed negative in the right inguinal area and confirmed negative in the left inguinal area.   Musculoskeletal: Normal range of motion. He exhibits no edema or tenderness.   Lymphadenopathy:     He has no cervical adenopathy. No inguinal adenopathy noted on the right or left side.   Neurological: He is alert and oriented to person, place, and time.   Skin: Skin is warm and dry.   Psychiatric: He has a normal mood and affect. His behavior is normal.   Nursing note and vitals reviewed.      Reviewed the following with the patient: Discussion today with patient regarding current guidelines for timing/ frequency of colonoscopy and lab tests (including PSA).     Immunizations discussed today with current recommendations advised for DTaP, Zostavax, Pneumovax and Prevnar 13.    Appropriate diet and stretching discussed with handouts provided.        Assessment/Plan   Eduardo was seen today for annual exam.    Diagnoses and all orders for this visit:    Annual physical exam  -     CBC & Differential  -     Comprehensive Metabolic Panel  -     Lipid Panel  -     Vitamin B12  -     Vitamin D 25 Hydroxy  -     CBC Auto Differential    PAT (paroxysmal atrial tachycardia) (CMS/HCC)    Pure hypercholesterolemia  -     atorvastatin (LIPITOR) 10 MG tablet; Take 1 tablet by mouth Daily.    Hypothyroidism (acquired)  -     TSH  -     T4, Free  -     Thyroid Antibodies  -     levothyroxine (SYNTHROID, LEVOTHROID) 75 MCG tablet; TAKE 1  TAB BY MOUTH IN THE AM FASTING AND WAIT 1HR FOR FOOD OR OTHER MEDS    Depression with anxiety  -     hydrOXYzine pamoate (VISTARIL) 25 MG capsule; Take 4 capsules by mouth 3 (Three) Times a Day As Needed for Anxiety.    Low testosterone  -     Testosterone, Free, Total    Gastro-esophageal reflux disease without esophagitis  -     raNITIdine (ZANTAC) 300 MG tablet; Take 1 tablet by mouth every night at bedtime.    Other orders  -     buPROPion XL (WELLBUTRIN XL) 300 MG 24 hr tablet; Take 1 tablet by mouth Daily for 90 days.        1. CPE- screening labs. Discussed [facility] but pt does not qualify at this time. To continue the colonoscopy at 5yr intervals for now  2. CV- PAT, hyperlipid- will continue the propranolol at 120mg for now. May consider alternative if treating mood does not resolve the BP fluctuations.   3. PSYCH- will change the prozac to viibryd. No change in wellbutrin.   4. ENDO- hypothyroid- clinically at goal. Will confirm with labs (will include thyroid abs) but will send RF synthroid 75 now.  5. - tesostosterone defic- will recheck his level with pt on 2 pumps currently.  6. RECHECK- 1mo PAT, mood

## 2019-02-06 DIAGNOSIS — K76.9 HEPATIC DYSFUNCTION: Primary | ICD-10-CM

## 2019-02-06 DIAGNOSIS — R89.9 ABNORMAL LABORATORY TEST RESULT: Primary | ICD-10-CM

## 2019-02-06 LAB
HAV IGM SERPL QL IA: NORMAL
HBV CORE IGM SERPL QL IA: NORMAL
HBV SURFACE AG SERPL QL IA: NORMAL
HCV AB SER DONR QL: NORMAL

## 2019-02-06 RX ORDER — VILAZODONE HYDROCHLORIDE 10 MG/1
10 TABLET ORAL DAILY
Qty: 30 TABLET | Refills: 0 | Status: SHIPPED | OUTPATIENT
Start: 2019-02-06 | End: 2019-03-04 | Stop reason: SDUPTHER

## 2019-02-07 LAB
THYROGLOB AB SERPL-ACNC: <1 IU/ML (ref 0–0.9)
THYROPEROXIDASE AB SERPL-ACNC: <6 IU/ML (ref 0–34)

## 2019-02-08 ENCOUNTER — HOSPITAL ENCOUNTER (OUTPATIENT)
Dept: ULTRASOUND IMAGING | Facility: HOSPITAL | Age: 39
Discharge: HOME | End: 2019-02-08
Admitting: FAMILY MEDICINE

## 2019-02-08 LAB
TESTOST FREE SERPL-MCNC: 5.6 PG/ML (ref 8.7–25.1)
TESTOST SERPL-MCNC: 252 NG/DL (ref 264–916)

## 2019-02-08 PROCEDURE — 76705 ECHO EXAM OF ABDOMEN: CPT

## 2019-02-14 ENCOUNTER — OFFICE VISIT (OUTPATIENT)
Dept: SLEEP MEDICINE | Facility: HOSPITAL | Age: 39
End: 2019-02-14

## 2019-02-14 VITALS
DIASTOLIC BLOOD PRESSURE: 106 MMHG | SYSTOLIC BLOOD PRESSURE: 172 MMHG | BODY MASS INDEX: 28.31 KG/M2 | HEIGHT: 72 IN | WEIGHT: 209 LBS | HEART RATE: 80 BPM | OXYGEN SATURATION: 98 %

## 2019-02-14 DIAGNOSIS — Z98.890 S/P SURGERY ON NASAL SEPTUM: ICD-10-CM

## 2019-02-14 DIAGNOSIS — R06.89 GASPING FOR BREATH: ICD-10-CM

## 2019-02-14 DIAGNOSIS — R06.83 SNORING: ICD-10-CM

## 2019-02-14 DIAGNOSIS — G47.33 OSA (OBSTRUCTIVE SLEEP APNEA): Primary | ICD-10-CM

## 2019-02-14 PROCEDURE — 99213 OFFICE O/P EST LOW 20 MIN: CPT | Performed by: NURSE PRACTITIONER

## 2019-02-14 NOTE — PROGRESS NOTES
Subjective: Follow-up        Chief Complaint:   Chief Complaint   Patient presents with   • Follow-up       HPI:    Eduardo Luna is a 38 y.o. male here for follow-up of lea.  Patient was last seen on 6/29/17.  Patient does have mild obstructive sleep apnea and was started on CPAP therapy area patient did turn his machine back and because he could not tolerate.  He did get the oral mandibular device but still continued to  Snore and was  still excessively sleepy the next day.  He continued to have apneas.  He did see Dr. Castillo at the [facility] clinic and had a deviated septum repair and inferior nasal turbinate surgery.  Dr. Castillo has requested he have a split-night study to reassess sleep apnea.    Patient is sleeping 4-5 hours nightly and does not feel refreshed upon awakening.  He has an [place] score of 15/24.        Current medications are:   Current Outpatient Medications:   •  atorvastatin (LIPITOR) 10 MG tablet, Take 1 tablet by mouth Daily., Disp: 90 tablet, Rfl: 1  •  buPROPion XL (WELLBUTRIN XL) 300 MG 24 hr tablet, Take 1 tablet by mouth Daily for 90 days., Disp: 90 tablet, Rfl: 0  •  Cholecalciferol (VITAMIN D3 PO), 4,000 Units., Disp: , Rfl:   •  hydrOXYzine pamoate (VISTARIL) 25 MG capsule, Take 4 capsules by mouth 3 (Three) Times a Day As Needed for Anxiety., Disp: 180 capsule, Rfl: 1  •  levothyroxine (SYNTHROID, LEVOTHROID) 75 MCG tablet, Take 1 tablet by mouth Every Morning 1 hour prior to food or other medications, Disp: 90 tablet, Rfl: 1  •  ondansetron ODT (ZOFRAN ODT) 4 MG disintegrating tablet, Take 1 tablet by mouth Every 8 (Eight) Hours As Needed for Nausea or Vomiting., Disp: 30 tablet, Rfl: 0  •  propranolol LA (INDERAL LA) 120 MG 24 hr capsule, Take 1 capsule by mouth Daily., Disp: 90 capsule, Rfl: 0  •  raNITIdine (ZANTAC) 300 MG tablet, Take 1 tablet by mouth every night at bedtime., Disp: 90 tablet, Rfl: 1  •  Testosterone (ANDROGEL PUMP) 20.25 MG/ACT (1.62%) gel, Apply 4 pumps  daily, Disp: 75 g, Rfl: 5  •  vilazodone (VIIBRYD) 10 MG tablet tablet, Take 1 tablet by mouth Daily., Disp: 30 tablet, Rfl: 0  •  omeprazole (priLOSEC) 40 MG capsule, Take 1 capsule by mouth Every Morning., Disp: 30 capsule, Rfl: 1.      The patient's relevant past medical, surgical, family and social history were reviewed and updated in Epic as appropriate.       Review of Systems   Constitutional: Positive for fatigue.   HENT: Positive for postnasal drip.    Endocrine: Positive for cold intolerance.   Allergic/Immunologic: Positive for environmental allergies.   Neurological: Positive for headaches.   Psychiatric/Behavioral: Positive for dysphoric mood and sleep disturbance. The patient is nervous/anxious.          Objective:    Physical Exam   Constitutional: He is oriented to person, place, and time. He appears well-developed and well-nourished.   HENT:   Head: Normocephalic and atraumatic.   Mouth/Throat: Oropharynx is clear and moist.   Mallampati 2 anatomy   Eyes: Conjunctivae are normal.   Neck: Neck supple. No thyromegaly present.   Cardiovascular: Normal rate and regular rhythm.   Pulmonary/Chest: Effort normal and breath sounds normal.   Lymphadenopathy:     He has no cervical adenopathy.   Neurological: He is alert and oriented to person, place, and time.   Skin: Skin is warm and dry.   Psychiatric: He has a normal mood and affect. His behavior is normal. Judgment and thought content normal.   Nursing note and vitals reviewed.        ASSESSMENT/PLAN    Eduardo was seen today for follow-up.    Diagnoses and all orders for this visit:    JAVON (obstructive sleep apnea)  -     Polysomnography 4 or More Parameters; Future    Snoring  -     Polysomnography 4 or More Parameters; Future    Gasping for breath  -     Polysomnography 4 or More Parameters; Future    S/P surgery on nasal septum            1. Counseled patient regarding multimodal approach with healthy nutrition, healthy sleep, regular physical activity,  social activities, counseling, and medications. Encouraged to practice lateral sleep position. Avoid alcohol and sedatives close to bedtime.  2. Split-night study ordered.  I will see patient back after testing.    I have reviewed the results of my evaluation and impression and discussed my recommendations in detail with the patient.      Signed by  CORINNE Teague    February 14, 2019      CC: Svetlana Henry MD          No ref. provider found

## 2019-03-04 RX ORDER — VILAZODONE HYDROCHLORIDE 10 MG/1
10 TABLET ORAL DAILY
Qty: 30 TABLET | Refills: 0 | Status: SHIPPED | OUTPATIENT
Start: 2019-03-04 | End: 2019-03-15 | Stop reason: SDUPTHER

## 2019-03-12 NOTE — PROGRESS NOTES
LVM 4/6/2017  
Patient called back and would like to be set up on pap therapy. Patient did not have a preference so went down our rotation and will be using karla  
within normal limits

## 2019-03-13 ENCOUNTER — HOSPITAL ENCOUNTER (OUTPATIENT)
Dept: SLEEP MEDICINE | Facility: HOSPITAL | Age: 39
Discharge: HOME | End: 2019-03-13
Admitting: NURSE PRACTITIONER

## 2019-03-13 VITALS
DIASTOLIC BLOOD PRESSURE: 109 MMHG | BODY MASS INDEX: 27.98 KG/M2 | HEIGHT: 72 IN | HEART RATE: 109 BPM | SYSTOLIC BLOOD PRESSURE: 176 MMHG | OXYGEN SATURATION: 90 % | WEIGHT: 206.57 LBS

## 2019-03-13 DIAGNOSIS — R06.83 SNORING: ICD-10-CM

## 2019-03-13 DIAGNOSIS — G47.33 OSA (OBSTRUCTIVE SLEEP APNEA): ICD-10-CM

## 2019-03-13 DIAGNOSIS — R06.89 GASPING FOR BREATH: ICD-10-CM

## 2019-03-13 PROCEDURE — 95810 POLYSOM 6/> YRS 4/> PARAM: CPT | Performed by: INTERNAL MEDICINE

## 2019-03-13 PROCEDURE — 95810 POLYSOM 6/> YRS 4/> PARAM: CPT

## 2019-03-14 DIAGNOSIS — I47.1 PAT (PAROXYSMAL ATRIAL TACHYCARDIA) (HCC): ICD-10-CM

## 2019-03-14 DIAGNOSIS — G47.33 OSA (OBSTRUCTIVE SLEEP APNEA): Primary | ICD-10-CM

## 2019-03-15 ENCOUNTER — OFFICE VISIT (OUTPATIENT)
Dept: INTERNAL MEDICINE | Facility: CLINIC | Age: 39
End: 2019-03-15

## 2019-03-15 VITALS
SYSTOLIC BLOOD PRESSURE: 128 MMHG | DIASTOLIC BLOOD PRESSURE: 84 MMHG | WEIGHT: 208.4 LBS | TEMPERATURE: 97.4 F | HEIGHT: 72 IN | BODY MASS INDEX: 28.23 KG/M2

## 2019-03-15 DIAGNOSIS — R79.89 LFT ELEVATION: ICD-10-CM

## 2019-03-15 DIAGNOSIS — F41.8 DEPRESSION WITH ANXIETY: Primary | ICD-10-CM

## 2019-03-15 DIAGNOSIS — E03.9 HYPOTHYROIDISM (ACQUIRED): ICD-10-CM

## 2019-03-15 DIAGNOSIS — R79.89 LOW TESTOSTERONE: ICD-10-CM

## 2019-03-15 DIAGNOSIS — I47.1 PAT (PAROXYSMAL ATRIAL TACHYCARDIA) (HCC): ICD-10-CM

## 2019-03-15 LAB
ALBUMIN SERPL-MCNC: 4.88 G/DL (ref 3.2–4.8)
ALP SERPL-CCNC: 117 U/L (ref 25–100)
ALT SERPL W P-5'-P-CCNC: 53 U/L (ref 7–40)
AST SERPL-CCNC: 33 U/L (ref 0–33)
BILIRUB CONJ SERPL-MCNC: 0.1 MG/DL (ref 0–0.2)
BILIRUB INDIRECT SERPL-MCNC: 0.3 MG/DL (ref 0.1–1.1)
BILIRUB SERPL-MCNC: 0.4 MG/DL (ref 0.3–1.2)
PROT SERPL-MCNC: 6.8 G/DL (ref 5.7–8.2)
T4 FREE SERPL-MCNC: 1.38 NG/DL (ref 0.89–1.76)
TSH SERPL DL<=0.05 MIU/L-ACNC: 2.32 MIU/ML (ref 0.35–5.35)

## 2019-03-15 PROCEDURE — 84439 ASSAY OF FREE THYROXINE: CPT | Performed by: FAMILY MEDICINE

## 2019-03-15 PROCEDURE — 99214 OFFICE O/P EST MOD 30 MIN: CPT | Performed by: FAMILY MEDICINE

## 2019-03-15 PROCEDURE — 84443 ASSAY THYROID STIM HORMONE: CPT | Performed by: FAMILY MEDICINE

## 2019-03-15 PROCEDURE — 84402 ASSAY OF FREE TESTOSTERONE: CPT | Performed by: FAMILY MEDICINE

## 2019-03-15 PROCEDURE — 80076 HEPATIC FUNCTION PANEL: CPT | Performed by: FAMILY MEDICINE

## 2019-03-15 PROCEDURE — 84403 ASSAY OF TOTAL TESTOSTERONE: CPT | Performed by: FAMILY MEDICINE

## 2019-03-15 RX ORDER — VILAZODONE HYDROCHLORIDE 10 MG/1
10 TABLET ORAL DAILY
Qty: 90 TABLET | Refills: 0 | Status: SHIPPED | OUTPATIENT
Start: 2019-03-15 | End: 2019-03-25 | Stop reason: SDUPTHER

## 2019-03-15 RX ORDER — CLONIDINE HYDROCHLORIDE 0.3 MG/1
0.3 TABLET ORAL EVERY 8 HOURS PRN
Qty: 180 TABLET | Refills: 0 | Status: SHIPPED | OUTPATIENT
Start: 2019-03-15 | End: 2019-06-06 | Stop reason: SINTOL

## 2019-03-15 RX ORDER — RANITIDINE 300 MG/1
TABLET ORAL
COMMUNITY
End: 2019-04-19 | Stop reason: SDUPTHER

## 2019-03-15 NOTE — PROGRESS NOTES
Subjective   Eduardo Luna is a 38 y.o. male.     History of Present Illness   Here for 1mo recheck mood, testosterone and LFT abn. Last seen 2/5/19 for CPE. Wasseen 12/6/18 for BP concerns. Was seen 7/19/18 for routine. Was seen 9/22/16 as a new patient. Pt has a h/o Hodgkins lymphoma 2002 in full remission. Hx PAT post ablation 2008. Lab 2/5/19 demo normal CBC, CMP (except , previous 74), B12 and vit D 30. Lipid at goal on Lipitor with , tg 170, HDL 57, . Thyroid at goal on synthroid 75 with TSH 4.8 and free T4 1.32. TT still low on 2 pumps with , free T 5.6. Lab 3/19/18 demo , tg 115, HDL 53,  on Lipitor. Lab 2/16/18 demo testosterone at goal on 4 pumps. Labs 1/19/18 demo normal CBC, CMP and B12. Thyroid at goal on synthroid 75 with TSH 1.6 and free T4 1.28. Testosterone low again with total 251, free 6.7; pt on testim. Lipid was high with , tg 116, HDL 59, . Labs 1/23/17 demo low testosterone with total 297, free 6.6. Recheck lab 4/12/17 demo normal PSA, total testosterone 466, free 13.1, lipid normal with , tg 70, HDL 47, . Sees Heme/Onc and Sleep Medicine ([facility])     1.PSYCH- depression with anxiety. Diagnosed 2002 when diagnosed with lymphoma. Initally he was on Lexapro, valium and ambien. Was still c/o feeling anxious, guilty, feeling overwhelmed, withdrawing and concentration problems on occassion. Was waking up frequently, even on ambien. Was tried on multiple meds including Zyprexa, pristiq, buspar and trazodone. Did well once on Wellbutrin and Prozac once he was on propranolol. But then relapsed and was changed to viibryd with Wellbutrin, no change in propranolol. Today pt reports no S/E. Is feeling less anxious but not at goal. Is having some irritability. Stopped the propranolol as he ran out and it did not seem to be helping anyway.     2./ GI- low testosterone, kidney stone mild LFT elevation. Low testosterone was found on lab  1/23/17. Pt has done well since on 4 pumps with lab at goal 3/19/18; since reduced dose to 1 pump/day due to cost; felt well encough. Pt went to  5/31/18 and was diagnosed with kidney stone. Had UA with large amount of blood. Had normal CBC, CMP and lipase except ALT 74. CT demo right distal stone. Pt was treated with Flomax, zofran and hydrocodone and passed it. However, his recheck ALT was 102. Hepatitis panel was neg. Pt was not at goal on testosterone 2 pumps and was increased to 3 pumps. Today pt reports      3. ENDO- hypothyroid- currently on levothyroxine 75. At his initial visit pt was c/o raspy voice, trouble concentrating, occasionally feeling depressed, slow speech and dry skin. Was on generic and taking it with other meds. Was changed to branded synthroid and advised how to take correctly. Has been at goal on synthroid 75 since 1/23/17; lab at goal 2/5/19 and pt clinically at goal as well.   4. CV- SVT, hyperlipid. Lipid diagnosed 1/19/18. Recheck LDL was 193 on lab 2/16/18. Was started on Lipitor and did well with recheck lab at goal 3/19/18. Pt has h/o PAT post ablation. Has had some SVT related to anxiety, controlled on propranolol. Pt was then seen 12/6/18 to change to XL propranolol 120 and did well but would still have high BP if stressed.   5. RESP- JAVON. Pt using oral device. Is now post septoplasty    The following portions of the patient's history were reviewed and updated as appropriate: current medications, past family history, past medical history, past social history, past surgical history and problem list.    Review of Systems   Cardiovascular: Negative for chest pain.   Gastrointestinal: Negative for abdominal distention and abdominal pain.   Skin: Negative for color change.   Neurological: Negative for tremors, speech difficulty and headaches.   Psychiatric/Behavioral: Negative for agitation and confusion.   All other systems reviewed and are negative.        Current Outpatient  "Medications:   •  atorvastatin (LIPITOR) 10 MG tablet, Take 1 tablet by mouth Daily., Disp: 90 tablet, Rfl: 1  •  buPROPion XL (WELLBUTRIN XL) 300 MG 24 hr tablet, Take 1 tablet by mouth Daily for 90 days., Disp: 90 tablet, Rfl: 0  •  Cholecalciferol (VITAMIN D3 PO), 4,000 Units., Disp: , Rfl:   •  CloNIDine (CATAPRES) 0.3 MG tablet, q8hr prn elevated BP, Disp: 180 tablet, Rfl: 0  •  hydrOXYzine pamoate (VISTARIL) 25 MG capsule, Take 4 capsules by mouth 3 (Three) Times a Day As Needed for Anxiety., Disp: 180 capsule, Rfl: 1  •  levothyroxine (SYNTHROID, LEVOTHROID) 75 MCG tablet, Take 1 tablet by mouth Every Morning 1 hour prior to food or other medications, Disp: 90 tablet, Rfl: 1  •  ondansetron ODT (ZOFRAN ODT) 4 MG disintegrating tablet, Take 1 tablet by mouth Every 8 (Eight) Hours As Needed for Nausea or Vomiting., Disp: 30 tablet, Rfl: 0  •  raNITIdine (ZANTAC) 300 MG tablet, ranitidine 300 mg tablet, Disp: , Rfl:   •  Testosterone (ANDROGEL PUMP) 20.25 MG/ACT (1.62%) gel, Apply 4 pumps daily, Disp: 75 g, Rfl: 5  •  vilazodone (VIIBRYD) 10 MG tablet tablet, Take 1 tablet by mouth Daily., Disp: 90 tablet, Rfl: 0    Objective     /84   Temp 97.4 °F (36.3 °C)   Ht 182.9 cm (72\")   Wt 94.5 kg (208 lb 6.4 oz)   BMI 28.26 kg/m²     Physical Exam   Constitutional: He is oriented to person, place, and time. He appears well-developed and well-nourished.   HENT:   Right Ear: Tympanic membrane and ear canal normal.   Left Ear: Tympanic membrane and ear canal normal.   Mouth/Throat: Oropharynx is clear and moist.   Eyes: Conjunctivae and EOM are normal. Pupils are equal, round, and reactive to light.   Neck: No thyromegaly present.   Cardiovascular: Normal rate and regular rhythm.   Pulmonary/Chest: Effort normal and breath sounds normal.   Neurological: He is alert and oriented to person, place, and time.   Skin: Skin is warm and dry.   Psychiatric: He has a normal mood and affect.   Vitals " reviewed.      Assessment/Plan   Eduardo was seen today for follow-up.    Diagnoses and all orders for this visit:    Depression with anxiety  -     vilazodone (VIIBRYD) 10 MG tablet tablet; Take 1 tablet by mouth Daily.    Low testosterone  -     Testosterone, Free, Total    LFT elevation  -     Hepatic Function Panel    Hypothyroidism (acquired)  -     TSH  -     T4, Free    PAT (paroxysmal atrial tachycardia) (CMS/HCC)  -     CloNIDine (CATAPRES) 0.3 MG tablet; q8hr prn elevated BP        1. PSYCH- depression with anxiety- mood improving but not at goal. Will increase the viibryd from 10 to 20mg. Discussed that this can take a total of 3mo of treatment to reach goal. Will stop the propranolol as ineffective. Discussed that his BP may relate more to mood or hormonal issues. Will provide with Rx of clonidine to use as needed but will focus more on the possible underlying issues for now.   2. GI- elevated LFT- discussed that this could be secondary to his testosterone or thyroid defic. Will recheck the LFT and his testosterone and TSH/ free T4 today.   3. - low testosterone- will recheck his level with him on 3 pumps a day now.   4. RECHECK- 1mo

## 2019-03-15 NOTE — PATIENT INSTRUCTIONS
1. PSYCH- depression with anxiety- mood improving but not at goal. Will increase the viibryd from 10 to 20mg. Discussed that this can take a total of 3mo of treatment to reach goal. Will stop the propranolol as ineffective. Discussed that his BP may relate more to mood or hormonal issues. Will provide with Rx of clonidine to use as needed but will focus more on the possible underlying issues for now.   2. GI- elevated LFT- discussed that this could be secondary to his testosterone or thyroid defic. Will recheck the LFT and his testosterone and TSH/ free T4 today.   3. - low testosterone- will recheck his level with him on 3 pumps a day now.   4. RECHECK- 1mo

## 2019-03-18 LAB
TESTOST FREE SERPL-MCNC: 5.6 PG/ML (ref 8.7–25.1)
TESTOST SERPL-MCNC: 304 NG/DL (ref 264–916)

## 2019-03-22 NOTE — PROGRESS NOTES
CALLED PATIENT AND ADVISED OF STUDY RESULTS. PATIENT VERBALIZED UNDERSTANDING AND WAS AGREEABLE TO PAP THERAPY. FAXED ORDER TO [facility]  03/22/19 TRC

## 2019-03-25 DIAGNOSIS — F41.8 DEPRESSION WITH ANXIETY: ICD-10-CM

## 2019-03-25 RX ORDER — VILAZODONE HYDROCHLORIDE 20 MG/1
20 TABLET ORAL DAILY
Qty: 90 TABLET | Refills: 1 | Status: SHIPPED | OUTPATIENT
Start: 2019-03-25 | End: 2019-08-31

## 2019-04-18 ENCOUNTER — OFFICE VISIT (OUTPATIENT)
Dept: INTERNAL MEDICINE | Facility: CLINIC | Age: 39
End: 2019-04-18

## 2019-04-18 VITALS
BODY MASS INDEX: 27.9 KG/M2 | TEMPERATURE: 98.4 F | SYSTOLIC BLOOD PRESSURE: 128 MMHG | DIASTOLIC BLOOD PRESSURE: 80 MMHG | WEIGHT: 206 LBS | HEIGHT: 72 IN

## 2019-04-18 DIAGNOSIS — R79.89 LOW TESTOSTERONE: ICD-10-CM

## 2019-04-18 DIAGNOSIS — I10 ESSENTIAL HYPERTENSION: ICD-10-CM

## 2019-04-18 DIAGNOSIS — F41.8 DEPRESSION WITH ANXIETY: Primary | ICD-10-CM

## 2019-04-18 PROCEDURE — 99214 OFFICE O/P EST MOD 30 MIN: CPT | Performed by: FAMILY MEDICINE

## 2019-04-18 RX ORDER — BUPROPION HYDROCHLORIDE 300 MG/1
300 TABLET ORAL DAILY
Qty: 90 TABLET | Refills: 1 | Status: SHIPPED | OUTPATIENT
Start: 2019-04-18 | End: 2019-06-06

## 2019-04-18 RX ORDER — VALSARTAN 160 MG/1
160 TABLET ORAL DAILY
Qty: 90 TABLET | Refills: 0 | Status: SHIPPED | OUTPATIENT
Start: 2019-04-18 | End: 2019-05-16

## 2019-04-18 NOTE — PATIENT INSTRUCTIONS
1. PSYCH- depression with anxiety- doing well with viibryd 20 and wellbutrin. RF x6mo now.  2. - low testosterone- clinically at goal. Will continue on 3 pumps. No recheck of labs indicated at this time.   3. CV- HTN- discussed that he may have intermittently high BP. Will do trial with diovan which can address the BP without causing any cardio affects.   4. RECHECK- 1mo

## 2019-04-18 NOTE — PROGRESS NOTES
Subjective   Eduardo Luna is a 38 y.o. male.     History of Present Illness   Here for 1mo recheck. Last seen 3/15/19 for 1mo recheck mood, testosterone and LFT abn. Last seen 2/5/19 for CPE. Wasseen 12/6/18 for BP concerns. Was seen 7/19/18 for routine. Was seen 9/22/16 as a new patient. Pt has a h/o Hodgkins lymphoma 2002 in full remission. Hx PAT post ablation 2008. Lab 3/15/19 demo normal LFT, thyroid at goal with TSH 2.32 and free T4 1.38 on synthroid 75. Testosterone improving on replacement with total 304, free 5.6. Lab 2/5/19 demo normal CBC, CMP (except , previous 74), B12 and vit D 30. Lipid at goal on Lipitor with , tg 170, HDL 57, . Thyroid at goal on synthroid 75 with TSH 4.8 and free T4 1.32. Testosterone still low on 2 pumps with , free T 5.6. Sees Heme/Onc and Sleep Medicine ([facility])     1.PSYCH- depression with anxiety. Diagnosed 2002 when diagnosed with lymphoma. Initally he was on Lexapro, valium and ambien. Was still c/o feeling anxious, guilty, feeling overwhelmed, withdrawing and concentration problems on occassion. Was waking up frequently, even on ambien. Was tried on multiple meds including Zyprexa, pristiq, buspar and trazodone. Did well once on Wellbutrin and Prozac once he was on propranolol. But then relapsed and was changed to viibryd with Wellbutrin. Eventually stopped the propranolol as ineffective but was given clonidine to use prn. At his last visit he was feeling better, less anxious but still irritable. Recheck thyroid still at goal. Testosterone not at goal yet. Pt was increased on viibryd from 10 to 20mg. Today pt reports no S/E and he is feeling well.      2./ GI- low testosterone, kidney stone mild LFT elevation. Low testosterone was found on lab 1/23/17. Pt has done well since on 4 pumps with lab at goal 3/19/18; since reduced dose to 1 pump/day due to cost; felt well encough. Pt went to  5/31/18 and was diagnosed with kidney stone. Had UA with  large amount of blood. Had normal CBC, CMP and lipase except ALT 74. CT demo right distal stone. Pt was treated with Flomax, zofran and hydrocodone and passed it. However, his recheck ALT was 102. Hepatitis panel was neg and recheck LFT was normal. Pt was not at goal on testosterone 2 pumps and was increased to 3 pumps; was improving but still low with , free 5.6.  Was given longer trial on 3 pumps. Today pt reports he is doing well and feels his mood is good and he feels well in general.     3. CV- SVT, hyperlipid. Lipid diagnosed 1/19/18. Recheck LDL was 193 on lab 2/16/18. Was started on Lipitor and did well with recheck lab at goal 3/19/18. Pt has h/o PAT post ablation. Has had some SVT related to anxiety, controlled on propranolol. Pt was then seen 12/6/18 to change to XL propranolol 120 and did well but would still have high BP if stressed. Was eventually changed to clonidine. Today he reports this does lower his BP but is also very sedating. BP is still going high in evenings, up to 174.     4.ENDO- hypothyroid- currently on levothyroxine 75. At his initial visit pt was c/o raspy voice, trouble concentrating, occasionally feeling depressed, slow speech and dry skin. Was on generic and taking it with other meds. Was changed to branded synthroid and advised how to take correctly. Has been at goal on synthroid 75 since 1/23/17; lab at goal 2/5/19 and pt clinically at goal as well. Lab still at goal 3/15/19.  5. RESP- JAVON. Pt using oral device. Is now post septoplasty    The following portions of the patient's history were reviewed and updated as appropriate: current medications, past family history, past medical history, past social history, past surgical history and problem list.    Review of Systems   Cardiovascular: Negative for chest pain.   Gastrointestinal: Negative for abdominal distention and abdominal pain.   Skin: Negative for color change.   Neurological: Negative for tremors, speech difficulty  "and headaches.   Psychiatric/Behavioral: Negative for agitation and confusion.   All other systems reviewed and are negative.        Current Outpatient Medications:   •  atorvastatin (LIPITOR) 10 MG tablet, Take 1 tablet by mouth Daily., Disp: 90 tablet, Rfl: 1  •  buPROPion XL (WELLBUTRIN XL) 300 MG 24 hr tablet, Take 1 tablet by mouth Daily for 90 days., Disp: 90 tablet, Rfl: 1  •  Cholecalciferol (VITAMIN D3 PO), 4,000 Units., Disp: , Rfl:   •  CloNIDine (CATAPRES) 0.3 MG tablet, Take 1 tablet by mouth Every 8 (Eight) Hours As Needed for elevated BP, Disp: 180 tablet, Rfl: 0  •  hydrOXYzine pamoate (VISTARIL) 25 MG capsule, Take 4 capsules by mouth 3 (Three) Times a Day As Needed for Anxiety., Disp: 180 capsule, Rfl: 1  •  levothyroxine (SYNTHROID, LEVOTHROID) 75 MCG tablet, Take 1 tablet by mouth Every Morning 1 hour prior to food or other medications, Disp: 90 tablet, Rfl: 1  •  ondansetron ODT (ZOFRAN ODT) 4 MG disintegrating tablet, Take 1 tablet by mouth Every 8 (Eight) Hours As Needed for Nausea or Vomiting., Disp: 30 tablet, Rfl: 0  •  raNITIdine (ZANTAC) 300 MG tablet, ranitidine 300 mg tablet, Disp: , Rfl:   •  Testosterone (ANDROGEL PUMP) 20.25 MG/ACT (1.62%) gel, Apply 4 pumps daily, Disp: 75 g, Rfl: 5  •  valsartan (DIOVAN) 160 MG tablet, Take 1 tablet by mouth Daily., Disp: 90 tablet, Rfl: 0  •  vilazodone (VIIBRYD) 20 MG tablet tablet, Take 1 tablet by mouth Daily., Disp: 90 tablet, Rfl: 1    Objective     /80 (BP Location: Left arm, Patient Position: Sitting, Cuff Size: Adult)   Temp 98.4 °F (36.9 °C) (Temporal)   Ht 182.9 cm (72\")   Wt 93.4 kg (206 lb)   BMI 27.94 kg/m²     Physical Exam   Constitutional: He is oriented to person, place, and time. He appears well-developed and well-nourished.   HENT:   Right Ear: Tympanic membrane and ear canal normal.   Left Ear: Tympanic membrane and ear canal normal.   Mouth/Throat: Oropharynx is clear and moist.   Eyes: Conjunctivae and EOM are " normal. Pupils are equal, round, and reactive to light.   Neck: No thyromegaly present.   Cardiovascular: Normal rate and regular rhythm.   Pulmonary/Chest: Effort normal and breath sounds normal.   Neurological: He is alert and oriented to person, place, and time.   Skin: Skin is warm and dry.   Psychiatric: He has a normal mood and affect.   Vitals reviewed.      Assessment/Plan   Eduardo was seen today for follow-up.    Diagnoses and all orders for this visit:    Depression with anxiety  -     buPROPion XL (WELLBUTRIN XL) 300 MG 24 hr tablet; Take 1 tablet by mouth Daily for 90 days.    Low testosterone    Essential hypertension    Other orders  -     valsartan (DIOVAN) 160 MG tablet; Take 1 tablet by mouth Daily.        1. PSYCH- depression with anxiety- doing well with viibryd 20 and wellbutrin. RF x6mo now.  2. - low testosterone- clinically at goal. Will continue on 3 pumps. No recheck of labs indicated at this time.   3. CV- HTN- discussed that he may have intermittently high BP. Will do trial with diovan which can address the BP without causing any cardio affects.   4. RECHECK- 1mo

## 2019-04-19 ENCOUNTER — TELEPHONE (OUTPATIENT)
Dept: INTERNAL MEDICINE | Facility: CLINIC | Age: 39
End: 2019-04-19

## 2019-04-19 DIAGNOSIS — F41.8 DEPRESSION WITH ANXIETY: ICD-10-CM

## 2019-04-19 RX ORDER — RANITIDINE 300 MG/1
300 TABLET ORAL NIGHTLY
Qty: 90 TABLET | Refills: 2 | Status: SHIPPED | OUTPATIENT
Start: 2019-04-19 | End: 2019-10-11

## 2019-04-19 RX ORDER — HYDROXYZINE PAMOATE 25 MG/1
100 CAPSULE ORAL 3 TIMES DAILY PRN
Qty: 180 CAPSULE | Refills: 2 | Status: SHIPPED | OUTPATIENT
Start: 2019-04-19 | End: 2019-06-20

## 2019-04-19 NOTE — TELEPHONE ENCOUNTER
Regarding: Prescription Question  Contact: [phone]  ----- Message from [place], Generic sent at 4/18/2019  5:16 PM EDT -----    Hi Dr. Henry,    I forgot to ask for refills on a couple of medicines. May i have refills for:    Ranitidine 300mg (90 day supply)  Hydroxyzine Pamoate 25mg (90 day supply if possible)     Thank you!     Eduardo Luna

## 2019-05-16 ENCOUNTER — OFFICE VISIT (OUTPATIENT)
Dept: INTERNAL MEDICINE | Facility: CLINIC | Age: 39
End: 2019-05-16

## 2019-05-16 VITALS
DIASTOLIC BLOOD PRESSURE: 76 MMHG | BODY MASS INDEX: 27.74 KG/M2 | WEIGHT: 204.8 LBS | TEMPERATURE: 99.3 F | SYSTOLIC BLOOD PRESSURE: 130 MMHG | HEIGHT: 72 IN

## 2019-05-16 DIAGNOSIS — I10 ESSENTIAL HYPERTENSION: Primary | ICD-10-CM

## 2019-05-16 DIAGNOSIS — I47.1 PAT (PAROXYSMAL ATRIAL TACHYCARDIA) (HCC): ICD-10-CM

## 2019-05-16 PROCEDURE — 99213 OFFICE O/P EST LOW 20 MIN: CPT | Performed by: FAMILY MEDICINE

## 2019-05-16 RX ORDER — DILTIAZEM HYDROCHLORIDE 180 MG/1
180 CAPSULE, EXTENDED RELEASE ORAL DAILY
Qty: 30 CAPSULE | Refills: 0 | Status: SHIPPED | OUTPATIENT
Start: 2019-05-16 | End: 2019-06-20

## 2019-05-16 NOTE — PATIENT INSTRUCTIONS
1. CV- HTN, PVT- discussed that he is not responding to the diovan. Will stop this. Will do trial with diltiazem. Could also consider toprol as an alternative beta blocker. Can still use clonidine if needed but the goal is to not need it. However, if nothing else works well, he can use this for his BP but would need a lower dose, taken tid.   2. RECHECK- keep recheck with new PCP

## 2019-05-16 NOTE — PROGRESS NOTES
Subjective   Eduardo Luna is a 38 y.o. male.     History of Present Illness   Here for 1mo recheck HTN. Last seen 4/18/19 for 1mo recheck. Last seen 3/15/19 for 1mo recheck mood, testosterone and LFT abn. Last seen 2/5/19 for CPE. Wasseen 12/6/18 for BP concerns. Was seen 7/19/18 for routine. Was seen 9/22/16 as a new patient. Pt has a h/o Hodgkins lymphoma 2002 in full remission. Hx PAT post ablation 2008. Lab 3/15/19 demo normal LFT, thyroid at goal with TSH 2.32 and free T4 1.38 on synthroid 75. Testosterone improving on replacement with total 304, free 5.6. Lab 2/5/19 demo normal CBC, CMP (except , previous 74), B12 and vit D 30. Lipid at goal on Lipitor with , tg 170, HDL 57, . Thyroid at goal on synthroid 75 with TSH 4.8 and free T4 1.32. Testosterone still low on 2 pumps with , free T 5.6. Sees Heme/Onc and Sleep Medicine (J[facility]ON)     1. CV- SVT, hyperlipid. Lipid diagnosed 1/19/18. Recheck LDL was 193 on lab 2/16/18. Was started on Lipitor and did well with recheck lab at goal 3/19/18. Pt has h/o PAT post ablation. Has had some SVT related to anxiety, controlled on propranolol. Pt was then seen 12/6/18 to change to XL propranolol 120 and did well but would still have high BP if stressed. Was eventually changed to clonidine. Worked but was sedating, BP still up to 174 hs. Was changed to diovan. Today he reports no S/E but his BP has not responded either. Does not feel any different. Has used the clonidine when he needs to and this does work and then he sleeps well but it is giving him constipation and is making him very sleepy; is limiting it to nighttime.     2.PSYCH- depression with anxiety. Diagnosed 2002 when diagnosed with lymphoma. Initally he was on Lexapro, valium and ambien. Was still c/o feeling anxious, guilty, feeling overwhelmed, withdrawing and concentration problems on occassion. Was waking up frequently, even on ambien. Was tried on multiple meds including Zyprexa,  pristiq, buspar and trazodone. Did well once on Wellbutrin and Prozac once he was on propranolol. But then relapsed and was changed to viibryd with Wellbutrin and was improving but was still irritable and was increased on viibryd to 20mg and reached goal as of visit 4/18/19.   3./ GI- low testosterone, kidney stone mild LFT elevation. Low testosterone was found on lab 1/23/17. Pt has done well since on 4 pumps with lab at goal 3/19/18; since reduced dose to 1 pump/day due to cost; felt well encough. Pt went to  5/31/18 and was diagnosed with kidney stone. Had UA with large amount of blood. Had normal CBC, CMP and lipase except ALT 74. CT demo right distal stone. Pt was treated with Flomax, zofran and hydrocodone and passed it. However, his recheck ALT was 102. Hepatitis panel was neg and recheck LFT was normal. Pt was not at goal on testosterone 2 pumps and was increased to 3 pumps; was improving but still low with , free 5.6.  Was given longer trial on 3 pumps and reached goal.   4.ENDO- hypothyroid- currently on levothyroxine 75. At his initial visit pt was c/o raspy voice, trouble concentrating, occasionally feeling depressed, slow speech and dry skin. Was on generic and taking it with other meds. Was changed to branded synthroid and advised how to take correctly. Has been at goal on synthroid 75 since 1/23/17; lab at goal 2/5/19 and pt clinically at goal as well. Lab still at goal 3/15/19.  5. RESP- JAVON. Pt using oral device. Is now post septoplasty    The following portions of the patient's history were reviewed and updated as appropriate: current medications, past family history, past medical history, past social history, past surgical history and problem list.    Review of Systems   Cardiovascular: Negative for chest pain.   Gastrointestinal: Negative for abdominal distention and abdominal pain.   Skin: Negative for color change.   Neurological: Negative for tremors, speech difficulty and headaches.  "  Psychiatric/Behavioral: Negative for agitation and confusion.   All other systems reviewed and are negative.        Current Outpatient Medications:   •  atorvastatin (LIPITOR) 10 MG tablet, Take 1 tablet by mouth Daily., Disp: 90 tablet, Rfl: 1  •  buPROPion XL (WELLBUTRIN XL) 300 MG 24 hr tablet, Take 1 tablet by mouth Daily., Disp: 90 tablet, Rfl: 1  •  Cholecalciferol (VITAMIN D3 PO), 4,000 Units., Disp: , Rfl:   •  CloNIDine (CATAPRES) 0.3 MG tablet, Take 1 tablet by mouth Every 8 (Eight) Hours As Needed for elevated BP, Disp: 180 tablet, Rfl: 0  •  diltiazem XR (DILACOR XR) 180 MG 24 hr capsule, Take 1 capsule by mouth Daily., Disp: 30 capsule, Rfl: 0  •  hydrOXYzine pamoate (VISTARIL) 25 MG capsule, Take 4 capsules by mouth 3 (Three) Times a Day As Needed for Anxiety., Disp: 180 capsule, Rfl: 2  •  levothyroxine (SYNTHROID, LEVOTHROID) 75 MCG tablet, Take 1 tablet by mouth Every Morning 1 hour prior to food or other medications, Disp: 90 tablet, Rfl: 1  •  raNITIdine (ZANTAC) 300 MG tablet, Take 1 tablet by mouth Every Night., Disp: 90 tablet, Rfl: 2  •  Testosterone (ANDROGEL PUMP) 20.25 MG/ACT (1.62%) gel, Apply 4 pumps daily, Disp: 75 g, Rfl: 5  •  vilazodone (VIIBRYD) 20 MG tablet tablet, Take 1 tablet by mouth Daily., Disp: 90 tablet, Rfl: 1    Objective     /76   Temp 99.3 °F (37.4 °C)   Ht 182.9 cm (72\")   Wt 92.9 kg (204 lb 12.8 oz)   BMI 27.78 kg/m²     Physical Exam   Constitutional: He is oriented to person, place, and time. He appears well-developed and well-nourished.   HENT:   Right Ear: Tympanic membrane and ear canal normal.   Left Ear: Tympanic membrane and ear canal normal.   Mouth/Throat: Oropharynx is clear and moist.   Eyes: Conjunctivae and EOM are normal. Pupils are equal, round, and reactive to light.   Neck: No thyromegaly present.   Cardiovascular: Normal rate and regular rhythm.   Pulmonary/Chest: Effort normal and breath sounds normal.   Neurological: He is alert and " oriented to person, place, and time.   Skin: Skin is warm and dry.   Psychiatric: He has a normal mood and affect.   Vitals reviewed.      Assessment/Plan   Eduardo was seen today for follow-up.    Diagnoses and all orders for this visit:    Essential hypertension  -     diltiazem XR (DILACOR XR) 180 MG 24 hr capsule; Take 1 capsule by mouth Daily.    PAT (paroxysmal atrial tachycardia) (CMS/HCC)  -     diltiazem XR (DILACOR XR) 180 MG 24 hr capsule; Take 1 capsule by mouth Daily.        1. CV- HTN, PVT- discussed that he is not responding to the diovan. Will stop this. Will do trial with diltiazem. Could also consider toprol as an alternative beta blocker. Can still use clonidine if needed but the goal is to not need it. However, if nothing else works well, he can use this for his BP but would need a lower dose, taken tid.   2. RECHECK- keep recheck with new PCP

## 2019-05-24 ENCOUNTER — OFFICE VISIT (OUTPATIENT)
Dept: SLEEP MEDICINE | Facility: HOSPITAL | Age: 39
End: 2019-05-24

## 2019-05-24 VITALS
HEIGHT: 72 IN | SYSTOLIC BLOOD PRESSURE: 175 MMHG | BODY MASS INDEX: 28.01 KG/M2 | DIASTOLIC BLOOD PRESSURE: 108 MMHG | HEART RATE: 109 BPM | OXYGEN SATURATION: 97 % | WEIGHT: 206.8 LBS

## 2019-05-24 DIAGNOSIS — G47.33 OSA (OBSTRUCTIVE SLEEP APNEA): Primary | ICD-10-CM

## 2019-05-24 PROCEDURE — 99213 OFFICE O/P EST LOW 20 MIN: CPT | Performed by: NURSE PRACTITIONER

## 2019-05-24 NOTE — PATIENT INSTRUCTIONS

## 2019-06-06 ENCOUNTER — OFFICE VISIT (OUTPATIENT)
Dept: FAMILY MEDICINE CLINIC | Facility: CLINIC | Age: 39
End: 2019-06-06

## 2019-06-06 ENCOUNTER — APPOINTMENT (OUTPATIENT)
Dept: LAB | Facility: HOSPITAL | Age: 39
End: 2019-06-06

## 2019-06-06 VITALS
SYSTOLIC BLOOD PRESSURE: 134 MMHG | BODY MASS INDEX: 28.04 KG/M2 | HEART RATE: 114 BPM | OXYGEN SATURATION: 98 % | DIASTOLIC BLOOD PRESSURE: 84 MMHG | WEIGHT: 207 LBS | HEIGHT: 72 IN

## 2019-06-06 DIAGNOSIS — R74.8 ELEVATED ALKALINE PHOSPHATASE LEVEL: ICD-10-CM

## 2019-06-06 DIAGNOSIS — M53.3 COCCYGEAL PAIN, ACUTE: ICD-10-CM

## 2019-06-06 DIAGNOSIS — R79.89 LOW TESTOSTERONE: ICD-10-CM

## 2019-06-06 DIAGNOSIS — I10 ESSENTIAL HYPERTENSION: Primary | ICD-10-CM

## 2019-06-06 DIAGNOSIS — F41.8 DEPRESSION WITH ANXIETY: ICD-10-CM

## 2019-06-06 DIAGNOSIS — F51.01 PRIMARY INSOMNIA: ICD-10-CM

## 2019-06-06 LAB
ALBUMIN SERPL-MCNC: 4.7 G/DL (ref 3.5–5.2)
ALBUMIN/GLOB SERPL: 1.6 G/DL
ALP SERPL-CCNC: 106 U/L (ref 39–117)
ALT SERPL W P-5'-P-CCNC: 53 U/L (ref 1–41)
ANION GAP SERPL CALCULATED.3IONS-SCNC: 12.8 MMOL/L
AST SERPL-CCNC: 21 U/L (ref 1–40)
BILIRUB SERPL-MCNC: 0.4 MG/DL (ref 0.2–1.2)
BUN BLD-MCNC: 21 MG/DL (ref 6–20)
BUN/CREAT SERPL: 16.7 (ref 7–25)
CALCIUM SPEC-SCNC: 10.1 MG/DL (ref 8.6–10.5)
CHLORIDE SERPL-SCNC: 99 MMOL/L (ref 98–107)
CO2 SERPL-SCNC: 28.2 MMOL/L (ref 22–29)
CREAT BLD-MCNC: 1.26 MG/DL (ref 0.76–1.27)
GFR SERPL CREATININE-BSD FRML MDRD: 64 ML/MIN/1.73
GLOBULIN UR ELPH-MCNC: 2.9 GM/DL
GLUCOSE BLD-MCNC: 76 MG/DL (ref 65–99)
POTASSIUM BLD-SCNC: 4.5 MMOL/L (ref 3.5–5.2)
PROT SERPL-MCNC: 7.6 G/DL (ref 6–8.5)
SODIUM BLD-SCNC: 140 MMOL/L (ref 136–145)

## 2019-06-06 PROCEDURE — 84402 ASSAY OF FREE TESTOSTERONE: CPT | Performed by: FAMILY MEDICINE

## 2019-06-06 PROCEDURE — 80053 COMPREHEN METABOLIC PANEL: CPT | Performed by: FAMILY MEDICINE

## 2019-06-06 PROCEDURE — 99214 OFFICE O/P EST MOD 30 MIN: CPT | Performed by: FAMILY MEDICINE

## 2019-06-06 PROCEDURE — 84403 ASSAY OF TOTAL TESTOSTERONE: CPT | Performed by: FAMILY MEDICINE

## 2019-06-06 RX ORDER — AMITRIPTYLINE HYDROCHLORIDE 10 MG/1
10 TABLET, FILM COATED ORAL NIGHTLY
Qty: 30 TABLET | Refills: 1 | Status: SHIPPED | OUTPATIENT
Start: 2019-06-06 | End: 2019-06-19 | Stop reason: SINTOL

## 2019-06-06 RX ORDER — BUPROPION HYDROCHLORIDE 150 MG/1
150 TABLET ORAL DAILY
Qty: 30 TABLET | Refills: 0 | Status: SHIPPED | OUTPATIENT
Start: 2019-06-06 | End: 2019-07-09

## 2019-06-06 NOTE — PROGRESS NOTES
Subjective   Eduardo Luna is a 38 y.o. male.     Chief Complaint   Patient presents with   • Establish Care       History of Present Illness     Previous primary care: Svetlana Henry, last saw 5/16/19    Chronic health conditions:  Hypertension: Controlled with diltiazem 180 mg daily  Hypothyroidism: Currently prescribed levothyroxine 75 mcg daily  Depression and anxiety: Stable on a combination of Viibryd, Wellbutrin, and hydroxyzine    Acute complaints:  Eduardo Luna is a 38 y.o. male who presents today to establish care.  Acutely today he complains of coccygeal pain that has been present for a couple of months.  It is painful when he sits down for extended periods, less painful when standing or walking.  He denies any bowel abnormalities.    The following portions of the patient's history were reviewed and updated as appropriate: allergies, current medications, past family history, past medical history, past social history, past surgical history and problem list.    Active Ambulatory Problems     Diagnosis Date Noted   • History of Hodgkin's lymphoma 09/05/2016   • PAT (paroxysmal atrial tachycardia) (CMS/HCC) 09/22/2016   • Hypothyroidism (acquired) 09/22/2016   • Depression with anxiety 09/22/2016   • Essential hypertension 01/23/2017   • JAVON (obstructive sleep apnea) 01/23/2017   • Periodic limb movement disorder 02/21/2017   • Low testosterone 02/28/2017   • Pure hypercholesterolemia 02/16/2018   • Primary insomnia 07/19/2018   • LFT elevation 03/15/2019     Resolved Ambulatory Problems     Diagnosis Date Noted   • Snoring      Past Medical History:   Diagnosis Date   • Cancer (CMS/HCC)    • Colon polyp    • Depression    • GERD (gastroesophageal reflux disease)    • Hyperlipidemia    • Hypertension    • Migraine    • Sleep apnea    • SVT (supraventricular tachycardia) (CMS/[facility])      Past Surgical History:   Procedure Laterality Date   • CARDIAC ABLATION  2008    SVT   • LYMPH NODE BIOPSY  11/2001     "@ [facility] with Dr. Daniel Marinelli   • MOLE REMOVAL  11/2016    Dr Loyd at [facility] dermatology.    • PORTACATH PLACEMENT  01/2002    Groshong Placement @ [facility] with Dr. Daniel Marinelli   • SEPTOPLASTY, RESECTION INFERIOR TURBINATES Bilateral 10/18/2018    Procedure: SEPTOPLASTY, BILATERAL RESECTION INFERIOR TURBINATES;  Surgeon: Lexa Castillo MD;  Location: [facility];  Service: ENT   • TONSILLECTOMY  09/2013    @ [facility] with Lexa Castillo   • VASECTOMY  01/2012    @ [facility] with Dr. Aidan Castellano   • WISDOM TOOTH EXTRACTION  10/1997    @ [facility] with Dr. Momo Nunn     Family History   Problem Relation Age of Onset   • Hypertension Mother    • Cancer Mother    • Colon cancer Father    • Colon polyps Father    • Hypertension Father    • Hyperlipidemia Father    • Breast cancer Sister    • Arthritis Maternal Grandmother      Social History     Socioeconomic History   • Marital status: Single     Spouse name: Not on file   • Number of children: Not on file   • Years of education: Not on file   • Highest education level: Not on file   Tobacco Use   • Smoking status: Never Smoker   • Smokeless tobacco: Never Used   Substance and Sexual Activity   • Alcohol use: No   • Drug use: No   • Sexual activity: Defer         Review of Systems   Constitutional: Negative.    Respiratory: Negative.    Cardiovascular: Negative.    Musculoskeletal: Positive for back pain.       Objective   Blood pressure 134/84, pulse 114, height 182.9 cm (72.01\"), weight 93.9 kg (207 lb), SpO2 98 %.  Nursing note reviewed  Physical Exam  Const: NAD, A&Ox4, Pleasant, Cooperative  Eyes: EOMI, no conjunctivitis  ENT: No nasal discharge present, neck supple  Cardiac: Regular rate and rhythm, no cyanosis  Resp: Respiratory rate within normal limits, no increased work of breathing, no audible wheezing or retractions noted  GI: No distention or ascites  MSK: No mass palpated on deep palpation of the sacrum and coccyx.  There is very minimal " tenderness to palpation  Neurologic: CN II-XII grossly intact  Psych: Appropriate mood and behavior.  Skin: Pink, warm, dry  Procedures  Assessment/Plan   Eduardo was seen today for establish care.    Diagnoses and all orders for this visit:    Essential hypertension    Depression with anxiety  -     buPROPion XL (WELLBUTRIN XL) 150 MG 24 hr tablet; Take 1 tablet by mouth Daily.    Coccygeal pain, acute  -     XR Sacrum & Coccyx; Future    Low testosterone  -     Testosterone (Free & Total), LC / MS; Future  -     Testosterone (Free & Total), LC / MS    Primary insomnia  -     amitriptyline (ELAVIL) 10 MG tablet; Take 1 tablet by mouth Every Night.    Elevated alkaline phosphatase level  -     Comprehensive Metabolic Panel; Future  -     Comprehensive Metabolic Panel        Acute concerns:  #1  Coccygeal pain  X-ray of sacrum and coccyx unremarkable per report, although there is a shadow that may be prostate versus bladder, unclear  Given his duration of pain and location, recommend MRI      Chronic health conditions:  #1  Hypertension  Adequate control today    #2  Low testosterone  He is currently prescribed AndroGel 1.62%, he is interested in stopping this medication    #3  Insomnia  Trial of Elavil 10 mg at night    #4  Elevated alkaline phosphatase  Repeat along with GGT     #5 depression and anxiety  Reduce Wellbutrin to 150 mg for 2 weeks and then stop  Continue Viibryd    We will plan to obtain previous records both for chronic preventative care as well as those related to the current episode of care.  Any records that the patient brought with him today were reviewed personally by me during the visit today and will be scanned into the chart for posterity.    Patient Instructions   1.  Reduce Wellbutrin to 150mg for 2 weeks, then stop.    2.  Xray today for coccydynia.    3.  Try holding hydroxyzine and clonidine while you start Elavil at night. You may also try Valerian Root at night for sleep and anxiety.    4.   Use Miralax for constipation.      Return in about 4 weeks (around 7/4/2019).    Ambulatory progress note signed and attested to by Vlad Pink D.O. on 6/19/2019 at 06:58.

## 2019-06-06 NOTE — PATIENT INSTRUCTIONS
1.  Reduce Wellbutrin to 150mg for 2 weeks, then stop.    2.  Xray today for coccydynia.    3.  Try holding hydroxyzine and clonidine while you start Elavil at night. You may also try Valerian Root at night for sleep and anxiety.    4.  Use Miralax for constipation.

## 2019-06-07 ENCOUNTER — HOSPITAL ENCOUNTER (OUTPATIENT)
Dept: GENERAL RADIOLOGY | Facility: HOSPITAL | Age: 39
Discharge: HOME | End: 2019-06-07
Admitting: FAMILY MEDICINE

## 2019-06-07 DIAGNOSIS — M53.3 COCCYGEAL PAIN, ACUTE: ICD-10-CM

## 2019-06-07 PROCEDURE — 72220 X-RAY EXAM SACRUM TAILBONE: CPT

## 2019-06-12 ENCOUNTER — PATIENT MESSAGE (OUTPATIENT)
Dept: FAMILY MEDICINE CLINIC | Facility: CLINIC | Age: 39
End: 2019-06-12

## 2019-06-12 DIAGNOSIS — M53.3 COCCYGEAL PAIN, ACUTE: Primary | ICD-10-CM

## 2019-06-12 LAB
TESTOST FREE SERPL-MCNC: 15.8 PG/ML (ref 8.7–25.1)
TESTOST SERPL-MCNC: 640.1 NG/DL (ref 264–916)

## 2019-06-17 ENCOUNTER — PATIENT MESSAGE (OUTPATIENT)
Dept: FAMILY MEDICINE CLINIC | Facility: CLINIC | Age: 39
End: 2019-06-17

## 2019-06-17 DIAGNOSIS — G47.9 DYSSOMNIA: Primary | ICD-10-CM

## 2019-06-19 ENCOUNTER — HOSPITAL ENCOUNTER (OUTPATIENT)
Dept: MRI IMAGING | Facility: HOSPITAL | Age: 39
Discharge: HOME | End: 2019-06-19
Admitting: FAMILY MEDICINE

## 2019-06-19 DIAGNOSIS — M53.3 COCCYGEAL PAIN, ACUTE: ICD-10-CM

## 2019-06-19 PROCEDURE — 72197 MRI PELVIS W/O & W/DYE: CPT

## 2019-06-19 PROCEDURE — A9577 INJ MULTIHANCE: HCPCS | Performed by: FAMILY MEDICINE

## 2019-06-19 PROCEDURE — 0 GADOBENATE DIMEGLUMINE 529 MG/ML SOLUTION: Performed by: FAMILY MEDICINE

## 2019-06-19 RX ORDER — ZOLPIDEM TARTRATE 5 MG/1
5 TABLET ORAL NIGHTLY PRN
Qty: 30 TABLET | Refills: 0 | Status: SHIPPED | OUTPATIENT
Start: 2019-06-19 | End: 2019-07-09 | Stop reason: SDUPTHER

## 2019-06-19 RX ADMIN — GADOBENATE DIMEGLUMINE 18 ML: 529 INJECTION, SOLUTION INTRAVENOUS at 16:36

## 2019-06-20 ENCOUNTER — OFFICE VISIT (OUTPATIENT)
Dept: FAMILY MEDICINE CLINIC | Facility: CLINIC | Age: 39
End: 2019-06-20

## 2019-06-20 VITALS
WEIGHT: 203 LBS | DIASTOLIC BLOOD PRESSURE: 80 MMHG | HEART RATE: 109 BPM | OXYGEN SATURATION: 99 % | HEIGHT: 72 IN | BODY MASS INDEX: 27.5 KG/M2 | SYSTOLIC BLOOD PRESSURE: 134 MMHG

## 2019-06-20 DIAGNOSIS — J06.9 ACUTE URI: Primary | ICD-10-CM

## 2019-06-20 PROCEDURE — 99214 OFFICE O/P EST MOD 30 MIN: CPT | Performed by: FAMILY MEDICINE

## 2019-06-20 RX ORDER — BROMPHENIRAMINE MALEATE, PSEUDOEPHEDRINE HYDROCHLORIDE, AND DEXTROMETHORPHAN HYDROBROMIDE 2; 30; 10 MG/5ML; MG/5ML; MG/5ML
5 SYRUP ORAL 4 TIMES DAILY PRN
Qty: 118 ML | Refills: 1 | Status: SHIPPED | OUTPATIENT
Start: 2019-06-20 | End: 2019-06-30

## 2019-06-20 RX ORDER — AZITHROMYCIN 250 MG/1
TABLET, FILM COATED ORAL
Qty: 6 TABLET | Refills: 0 | Status: SHIPPED | OUTPATIENT
Start: 2019-06-20 | End: 2019-07-09

## 2019-06-25 NOTE — PROGRESS NOTES
Subjective   Eduardo Luna is a 38 y.o. male.     Chief Complaint   Patient presents with   • Cough     ongoing for 1 week, productive        Cough   This is a new problem. The current episode started in the past 7 days. The problem has been unchanged. The problem occurs hourly. The cough is productive of brown sputum. Associated symptoms include headaches. Pertinent negatives include no chest pain, chills, ear congestion, ear pain, fever, heartburn, hemoptysis, myalgias, nasal congestion, postnasal drip, rash, rhinorrhea, sore throat, shortness of breath, sweats, weight loss or wheezing. Nothing aggravates the symptoms.        Eduardo Luna presents today for 1 week of URI symptoms.    The following portions of the patient's history were reviewed and updated as appropriate: allergies, current medications, past family history, past medical history, past social history, past surgical history and problem list.    Active Ambulatory Problems     Diagnosis Date Noted   • History of Hodgkin's lymphoma 09/05/2016   • PAT (paroxysmal atrial tachycardia) (CMS/HCC) 09/22/2016   • Hypothyroidism (acquired) 09/22/2016   • Depression with anxiety 09/22/2016   • Essential hypertension 01/23/2017   • JAVON (obstructive sleep apnea) 01/23/2017   • Periodic limb movement disorder 02/21/2017   • Low testosterone 02/28/2017   • Pure hypercholesterolemia 02/16/2018   • Primary insomnia 07/19/2018   • LFT elevation 03/15/2019     Resolved Ambulatory Problems     Diagnosis Date Noted   • Snoring      Past Medical History:   Diagnosis Date   • Cancer (CMS/HCC)    • Colon polyp    • Depression    • GERD (gastroesophageal reflux disease)    • Hyperlipidemia    • Hypertension    • Migraine    • Sleep apnea    • SVT (supraventricular tachycardia) (CMS/HCC)      Past Surgical History:   Procedure Laterality Date   • CARDIAC ABLATION  2008    SVT   • LYMPH NODE BIOPSY  11/2001    @ [facility] with Dr. Daniel Marinelli   • MOLE REMOVAL  11/2016      "[place] at [facility] dermatology.    • PORTACATH PLACEMENT  01/2002    Groshong Placement @ [facility] with Dr. Daniel Marinelli   • SEPTOPLASTY, RESECTION INFERIOR TURBINATES Bilateral 10/18/2018    Procedure: SEPTOPLASTY, BILATERAL RESECTION INFERIOR TURBINATES;  Surgeon: Lexa Castillo MD;  Location: [facility];  Service: ENT   • [facility]2013    [facility]   • VASECTOMY  01/2012    @ [facility] with Dr. Aidan Castellano   • WISDOM TOOTH EXTRACTION  10/1997    @ [facility] with Dr. Momo Nunn     Family History   Problem Relation Age of Onset   • Hypertension Mother    • Cancer Mother    • Colon cancer Father    • Colon polyps Father    • Hypertension Father    • Hyperlipidemia Father    • Breast cancer Sister    • Arthritis Maternal Grandmother      Social History     Socioeconomic History   • Marital status: Significant Other     Spouse name: Not on file   • Number of children: Not on file   • Years of education: Not on file   • Highest education level: Not on file   Tobacco Use   • Smoking status: Never Smoker   • Smokeless tobacco: Never Used   Substance and Sexual Activity   • Alcohol use: No   • Drug use: No   • Sexual activity: Defer         Review of Systems   Constitutional: Negative for chills, fever and weight loss.   HENT: Negative for ear pain, postnasal drip, rhinorrhea and sore throat.    Respiratory: Positive for cough. Negative for hemoptysis, shortness of breath and wheezing.    Cardiovascular: Negative for chest pain.   Gastrointestinal: Negative for heartburn.   Musculoskeletal: Negative for myalgias.   Skin: Negative for rash.   Neurological: Positive for headaches.       Objective   Blood pressure 134/80, pulse 109, height 182.9 cm (72.01\"), weight 92.1 kg (203 lb), SpO2 99 %.  Nursing note reviewed  Physical Exam  Const: NAD, A&Ox4, Pleasant, Cooperative  Eyes: EOMI, no conjunctivitis  ENT: No nasal discharge present, neck supple  Cardiac: Regular rate and rhythm, " no cyanosis  Resp: Respiratory rate within normal limits, no increased work of breathing, no audible wheezing or retractions noted.  Nonproductive cough noted  Procedures  Assessment/Plan   Eduardo was seen today for cough.    Diagnoses and all orders for this visit:    Acute URI  -     brompheniramine-pseudoephedrine-DM 30-2-10 MG/5ML syrup; Take 5 mL by mouth 4 (Four) Times a Day As Needed for Allergies (mucous) for up to 10 days.  -     azithromycin (ZITHROMAX Z-ANITA) 250 MG tablet; Take 2 tablets the first day, then 1 tablet daily for 4 days.        #1  Acute URI  Symptomatic treatment with Bromfed-DM  Delayed treatment with antibiotic if not improved over the next few days    There are no Patient Instructions on file for this visit.    No Follow-up on file.    Ambulatory progress note signed and attested to by Vlad Pink D.O.

## 2019-07-09 ENCOUNTER — OFFICE VISIT (OUTPATIENT)
Dept: FAMILY MEDICINE CLINIC | Facility: CLINIC | Age: 39
End: 2019-07-09

## 2019-07-09 VITALS
HEIGHT: 72 IN | OXYGEN SATURATION: 98 % | HEART RATE: 111 BPM | SYSTOLIC BLOOD PRESSURE: 138 MMHG | DIASTOLIC BLOOD PRESSURE: 80 MMHG | WEIGHT: 203 LBS | BODY MASS INDEX: 27.5 KG/M2

## 2019-07-09 DIAGNOSIS — K21.9 GASTRO-ESOPHAGEAL REFLUX DISEASE WITHOUT ESOPHAGITIS: ICD-10-CM

## 2019-07-09 DIAGNOSIS — E78.00 PURE HYPERCHOLESTEROLEMIA: ICD-10-CM

## 2019-07-09 DIAGNOSIS — I10 ESSENTIAL HYPERTENSION: Primary | ICD-10-CM

## 2019-07-09 DIAGNOSIS — G47.9 DYSSOMNIA: ICD-10-CM

## 2019-07-09 DIAGNOSIS — E03.9 HYPOTHYROIDISM (ACQUIRED): ICD-10-CM

## 2019-07-09 PROCEDURE — 99214 OFFICE O/P EST MOD 30 MIN: CPT | Performed by: FAMILY MEDICINE

## 2019-07-09 RX ORDER — ZOLPIDEM TARTRATE 5 MG/1
5 TABLET ORAL NIGHTLY PRN
Qty: 30 TABLET | Refills: 2 | Status: SHIPPED | OUTPATIENT
Start: 2019-07-18 | End: 2019-10-11 | Stop reason: SDUPTHER

## 2019-07-09 RX ORDER — ATORVASTATIN CALCIUM 10 MG/1
10 TABLET, FILM COATED ORAL DAILY
Qty: 90 TABLET | Refills: 1 | Status: SHIPPED | OUTPATIENT
Start: 2019-07-09 | End: 2020-01-03 | Stop reason: SDUPTHER

## 2019-07-09 RX ORDER — LEVOTHYROXINE SODIUM 0.07 MG/1
75 TABLET ORAL EVERY MORNING
Qty: 90 TABLET | Refills: 1 | Status: SHIPPED | OUTPATIENT
Start: 2019-07-09 | End: 2020-03-11 | Stop reason: SDUPTHER

## 2019-07-09 NOTE — PROGRESS NOTES
Subjective   Eduardo Luna is a 38 y.o. male.     Chief Complaint   Patient presents with   • Follow-up       History of Present Illness     Chronic health conditions:  Hypertension: Had been controlled with diltiazem 180 mg daily, he self discontinued this medication last month and has been doing well.  Hypothyroidism: Currently prescribed levothyroxine 75 mcg daily  Depression and anxiety: Stable on Viibryd.  He came off of his other medications over the past few months.  Insomnia: He is doing well on Ambien 5 mg daily  GERD: With hiatal hernia.  More symptomatic lately.    Acute complaints:  Eduardo Luna is a 38 y.o. male who presents today to follow-up on the above issues    The following portions of the patient's history were reviewed and updated as appropriate: allergies, current medications, past family history, past medical history, past social history, past surgical history and problem list.    Active Ambulatory Problems     Diagnosis Date Noted   • History of Hodgkin's lymphoma 09/05/2016   • PAT (paroxysmal atrial tachycardia) (CMS/HCC) 09/22/2016   • Hypothyroidism (acquired) 09/22/2016   • Depression with anxiety 09/22/2016   • Essential hypertension 01/23/2017   • JAVON (obstructive sleep apnea) 01/23/2017   • Periodic limb movement disorder 02/21/2017   • Low testosterone 02/28/2017   • Pure hypercholesterolemia 02/16/2018   • Primary insomnia 07/19/2018   • LFT elevation 03/15/2019     Resolved Ambulatory Problems     Diagnosis Date Noted   • Snoring      Past Medical History:   Diagnosis Date   • Cancer (CMS/HCC)    • Colon polyp    • Depression    • GERD (gastroesophageal reflux disease)    • Hyperlipidemia    • Hypertension    • Migraine    • Sleep apnea    • SVT (supraventricular tachycardia) (CMS/[facility])      Past Surgical History:   Procedure Laterality Date   • CARDIAC ABLATION  2008    SVT   • LYMPH NODE BIOPSY  11/2001    @ [facility] with Dr. Daniel Marinelli   • MOLE REMOVAL  11/2016      [facility]    • PORTACATH PLACEMENT  01/2002    Groshong Placement @ [facility] with Dr. Daniel Marinelli   • SEPTOPLASTY, RESECTION INFERIOR TURBINATES Bilateral 10/18/2018    Procedure: SEPTOPLASTY, BILATERAL RESECTION INFERIOR TURBINATES;  Surgeon: Lexa Castillo MD;  Location: [facility];  Service: ENT   • TONSILLECTOMY  09/2013    @ [facility] with Lexa Castillo   • VASECTOMY  01/2012    @ [facility] with Dr. Aidan Castellano   • WISDOM TOOTH EXTRACTION  10/1997    @ [facility] with Dr. Momo Nunn     Family History   Problem Relation Age of Onset   • Hypertension Mother    • Cancer Mother    • Colon cancer Father    • Colon polyps Father    • Hypertension Father    • Hyperlipidemia Father    • Breast cancer Sister    • Arthritis Maternal Grandmother      Social History     Socioeconomic History   • Marital status: Significant Other     Spouse name: Not on file   • Number of children: Not on file   • Years of education: Not on file   • Highest education level: Not on file   Tobacco Use   • Smoking status: Never Smoker   • Smokeless tobacco: Never Used   Substance and Sexual Activity   • Alcohol use: No   • Drug use: No   • Sexual activity: Defer         Review of Systems   Constitutional: Negative.    Respiratory: Negative.    Cardiovascular: Negative.    Musculoskeletal: Positive for back pain.       Objective   Blood pressure 138/80, pulse 111, height 182.9 cm (72.01\"), weight 92.1 kg (203 lb), SpO2 98 %.  Nursing note reviewed  Physical Exam  Const: NAD, A&Ox4, Pleasant, Cooperative  Eyes: EOMI, no conjunctivitis  ENT: No nasal discharge present, neck supple  Cardiac: Regular rate and rhythm, no cyanosis  Resp: Respiratory rate within normal limits, no increased work of breathing, no audible wheezing or retractions noted  GI: No distention or ascites  MSK: No mass palpated on deep palpation of the sacrum and coccyx.  There is very minimal tenderness to palpation  Neurologic: CN II-XII " grossly intact  Psych: Appropriate mood and behavior.  Skin: Pink, warm, dry  Procedures  Assessment/Plan   Eduardo was seen today for follow-up.    Diagnoses and all orders for this visit:    Essential hypertension    Dyssomnia  -     zolpidem (AMBIEN) 5 MG tablet; Take 1 tablet by mouth At Night As Needed for Sleep.    Pure hypercholesterolemia  -     atorvastatin (LIPITOR) 10 MG tablet; Take 1 tablet by mouth Daily.    Hypothyroidism (acquired)  -     levothyroxine (SYNTHROID, LEVOTHROID) 75 MCG tablet; Take 1 tablet by mouth Every Morning 1 hour prior to food or other medications    Gastro-esophageal reflux disease without esophagitis  -     Ambulatory referral for Screening EGD        Chronic health conditions:  #1  Hypertension  Adequate control today off of medication    #2  Low testosterone  His testosterone was adequate on recent round of labs, it is okay for him to continue off of the AndroGel    #3  Insomnia  Trial of Elavil 10 mg at night was unsuccessful, he was placed onto a trial of Ambien 5 mg nightly as needed which has done well in the past and is doing well currently    #4  Elevated alkaline phosphatase  Has returned to normal    #5 depression and anxiety  He was able to discontinue his Wellbutrin  Continue Viibryd    #6 hypothyroidism  Continue levothyroxine 75 mcg daily    #7 hyperlipidemia  Continue atorvastatin 10 mg daily    #8 left eye twitch sporadic  May be a tic side effect of his Viibryd long-term, encouraged him to see his eye doctor to rule out an ocular dysfunction    Patient Instructions   1.  See eye doctor about eye twitch.    2.  Continue Viibryd.    3.  Continue Ambien 5mg    4.  You will be called about scheduling the endoscopy.      Return in about 3 months (around 10/9/2019).    Ambulatory progress note signed and attested to by Vlad Pink D.O.

## 2019-07-09 NOTE — PATIENT INSTRUCTIONS
1.  See eye doctor about eye twitch.    2.  Continue Viibryd.    3.  Continue Ambien 5mg    4.  You will be called about scheduling the endoscopy.

## 2019-07-26 ENCOUNTER — OUTSIDE FACILITY SERVICE (OUTPATIENT)
Dept: GASTROENTEROLOGY | Facility: CLINIC | Age: 39
End: 2019-07-26

## 2019-07-26 ENCOUNTER — LAB REQUISITION (OUTPATIENT)
Dept: LAB | Facility: HOSPITAL | Age: 39
End: 2019-07-26

## 2019-07-26 DIAGNOSIS — K21.9 GASTRO-ESOPHAGEAL REFLUX DISEASE WITHOUT ESOPHAGITIS: ICD-10-CM

## 2019-07-26 PROCEDURE — 88305 TISSUE EXAM BY PATHOLOGIST: CPT | Performed by: INTERNAL MEDICINE

## 2019-07-26 PROCEDURE — 43239 EGD BIOPSY SINGLE/MULTIPLE: CPT | Performed by: INTERNAL MEDICINE

## 2019-07-29 LAB
CYTO UR: NORMAL
LAB AP CASE REPORT: NORMAL
LAB AP CLINICAL INFORMATION: NORMAL
PATH REPORT.FINAL DX SPEC: NORMAL
PATH REPORT.GROSS SPEC: NORMAL

## 2019-07-30 ENCOUNTER — PATIENT MESSAGE (OUTPATIENT)
Dept: FAMILY MEDICINE CLINIC | Facility: CLINIC | Age: 39
End: 2019-07-30

## 2019-07-30 DIAGNOSIS — I10 ESSENTIAL HYPERTENSION: Primary | ICD-10-CM

## 2019-08-01 RX ORDER — CLONIDINE HYDROCHLORIDE 0.1 MG/1
0.1 TABLET ORAL 2 TIMES DAILY PRN
Qty: 60 TABLET | Refills: 0 | Status: SHIPPED | OUTPATIENT
Start: 2019-08-01 | End: 2019-09-03 | Stop reason: SDUPTHER

## 2019-08-06 ENCOUNTER — HOSPITAL ENCOUNTER (OUTPATIENT)
Dept: CARDIOLOGY | Facility: HOSPITAL | Age: 39
Discharge: HOME | End: 2019-08-06
Admitting: FAMILY MEDICINE

## 2019-08-06 ENCOUNTER — CLINICAL SUPPORT (OUTPATIENT)
Dept: CARDIOLOGY | Facility: HOSPITAL | Age: 39
End: 2019-08-06

## 2019-08-06 DIAGNOSIS — I10 ESSENTIAL HYPERTENSION: ICD-10-CM

## 2019-08-06 PROCEDURE — 93786 AMBL BP MNTR W/SW REC ONLY: CPT

## 2019-08-06 NOTE — PROGRESS NOTES
Reason for Visit to [facility]: 24hr. Ambulatory Blood Pressure Monitor.    Mr. Luna saw Dr Vlad Pink on 8/6/2019 and he  ordered a 24hr ambulatory blood pressure monitor, which he comes in for today.      The monitor was placed on his left arm & he was instructed in its use and had no questions.      He was provided with written instructions to refer to as well.  He will wear the monitor until tomorrow around 3:30pm.      He will bring it back to our office tomorrow or mail it to us for download.      Results will be forwarded to Dr. Brendan Pink for analysis and treatment adjustment if indicated.    Sadaf Garsia MA 08/06/19 3:41 PM

## 2019-08-26 ENCOUNTER — PATIENT MESSAGE (OUTPATIENT)
Dept: FAMILY MEDICINE CLINIC | Facility: CLINIC | Age: 39
End: 2019-08-26

## 2019-08-26 DIAGNOSIS — I10 ESSENTIAL HYPERTENSION: ICD-10-CM

## 2019-08-30 ENCOUNTER — PATIENT MESSAGE (OUTPATIENT)
Dept: FAMILY MEDICINE CLINIC | Facility: CLINIC | Age: 39
End: 2019-08-30

## 2019-08-30 DIAGNOSIS — F41.8 DEPRESSION WITH ANXIETY: Primary | ICD-10-CM

## 2019-08-31 RX ORDER — FLUOXETINE HYDROCHLORIDE 20 MG/1
20 CAPSULE ORAL DAILY
Qty: 90 CAPSULE | Refills: 1 | Status: SHIPPED | OUTPATIENT
Start: 2019-08-31 | End: 2020-02-14 | Stop reason: SDUPTHER

## 2019-09-03 RX ORDER — CLONIDINE HYDROCHLORIDE 0.1 MG/1
0.1 TABLET ORAL 2 TIMES DAILY PRN
Qty: 180 TABLET | Refills: 0 | Status: SHIPPED | OUTPATIENT
Start: 2019-09-03 | End: 2020-02-10

## 2019-09-03 NOTE — TELEPHONE ENCOUNTER
From: Eduardo Luna  To: Vlad Pink DO  Sent: 8/26/2019 2:09 PM EDT  Subject: Prescription Question    Hi Dr. Pink,    Is it safe to take clonidine everyday for long term? My BP is only slightly elevated on 0.1 mg. There might be times when I need a 0.2 or 0.3mg dose for things that trigger anxiety. If it's safe to take everyday, may I have a 90 day supply of the 0.1 mg? I don't need it yet because I still have some. I was just getting prepared.    Thank you for your help!    Eduardo Luna

## 2019-10-10 ENCOUNTER — OFFICE VISIT (OUTPATIENT)
Dept: ONCOLOGY | Facility: CLINIC | Age: 39
End: 2019-10-10

## 2019-10-10 VITALS
OXYGEN SATURATION: 98 % | WEIGHT: 192 LBS | HEIGHT: 72 IN | RESPIRATION RATE: 18 BRPM | SYSTOLIC BLOOD PRESSURE: 167 MMHG | HEART RATE: 108 BPM | BODY MASS INDEX: 26.01 KG/M2 | TEMPERATURE: 98.1 F | DIASTOLIC BLOOD PRESSURE: 113 MMHG

## 2019-10-10 DIAGNOSIS — Z85.71 HISTORY OF HODGKIN'S LYMPHOMA: Primary | ICD-10-CM

## 2019-10-10 PROCEDURE — 99213 OFFICE O/P EST LOW 20 MIN: CPT | Performed by: INTERNAL MEDICINE

## 2019-10-10 NOTE — PROGRESS NOTES
"      PROBLEM LIST:  1. Stage IIB Nodular sclerosing Hodgkin Lymphoma  A) diagnosed age 21, presented with cervical and axillary adenopathy, sweats, weight loss.  Treated with ABVD, changed to CMOPP after acute bleomycin lung reaction.  Chemotherapy followed by IFRT.  2. Hypothyroidism, secondary to radiation therapy    Subjective     HISTORY OF PRESENT ILLNESS:   Abrahan Luna returns for 1 year follow up.  He turned 39 yesterday.  He says he is been feeling well.  His blood pressure is elevated today.  He typically takes clonidine twice a day and did not take it this morning.    Past Medical History, Past Surgical History, Social History, Family History have been reviewed and are without significant changes except as mentioned.    Review of Systems   A comprehensive 14 point review of systems was performed and was negative except as mentioned.    Medications:  The current medication list was reviewed in the EMR    ALLERGIES:    Allergies   Allergen Reactions   • Amoxicillin Hives, Itching and Rash       Objective      BP (!) 167/113 Comment: LUE  Pulse 108   Temp 98.1 °F (36.7 °C) (Temporal)   Resp 18   Ht 182.9 cm (72\")   Wt 87.1 kg (192 lb)   SpO2 98% Comment: RA  BMI 26.04 kg/m²      Performance Status: 0    General: well appearing male in no acute distress  Neuro: alert and oriented  HEENT: sclera anicteric, oropharynx clear  Lymphatics: no cervical, supraclavicular, or axillary adenopathy  Cardiovascular: regular rate and rhythm, no murmurs  Lungs: clear to auscultation bilaterally  Abdomen: soft, nontender, nondistended.  No palpable organomegaly  Extremeties: no lower extremity edema  Skin: no rashes, lesions, bruising, or petechiae  Psych: mood and affect appropriate      RECENT LABS:  Results for ABRAHAN LUNA (MRN [id number]) as of 10/10/2019 15:48   Ref. Range 2/5/2019 15:08   TSH Baseline Latest Ref Range: 0.350 - 5.350 mIU/mL 4.795   Free T4 Latest Ref Range: 0.89 - 1.76 ng/dL 1.32 "   Thyroglobulin Ab Latest Ref Range: 0.0 - 0.9 IU/mL <1.0   Thyroid Peroxidase Antibody Latest Ref Range: 0 - 34 IU/mL <6   Total Cholesterol Latest Ref Range: 0 - 200 mg/dL 190   HDL Cholesterol Latest Ref Range: 40 - 60 mg/dL 57   LDL Cholesterol  Latest Ref Range: 0 - 130 mg/dL 121   Triglycerides Latest Ref Range: 0 - 150 mg/dL 170 (H)           Assessment/Plan   Eduardo Luna is a 39 y.o. year old male with a remote history of Hodgkin lymphoma in 2002.  He returns for monitoring for long term toxicity of treatment.    The NCCN guidelines for long term follow up of Hodgkin lymphoma survivors includes the following:    Annual H&P  Annual flu vaccine  Consider stress test/echo at 10 yr intervals  Consider carotid US at 10 yr intervals  Annual CBC, CMP, fasting glucose, TSH.  Biannual lipids    He is at risk for early cardiovascular disease and early screening is recommended.  He should have a screening carotid duplex in 2027 and stress echo in 2024.    He would like to continue follow-up monitoring with his primary care provider.      Hypertension: Asked him to take his clonidine when he returns home and check his blood pressure to make sure it does not remain elevated in this range.    I will be happy to see him back as needed.                  I spent 15 minutes with the patient. I spent > 50% percent of this time counseling and discussing prognosis, diagnostic testing, evaluation, current status and management.      Laly Vazquez MD  [facility]    10/10/2019          CC:

## 2019-10-11 ENCOUNTER — OFFICE VISIT (OUTPATIENT)
Dept: FAMILY MEDICINE CLINIC | Facility: CLINIC | Age: 39
End: 2019-10-11

## 2019-10-11 VITALS
DIASTOLIC BLOOD PRESSURE: 89 MMHG | BODY MASS INDEX: 26.14 KG/M2 | SYSTOLIC BLOOD PRESSURE: 148 MMHG | WEIGHT: 193 LBS | HEIGHT: 72 IN | HEART RATE: 102 BPM | OXYGEN SATURATION: 98 %

## 2019-10-11 DIAGNOSIS — G47.9 DYSSOMNIA: ICD-10-CM

## 2019-10-11 DIAGNOSIS — E78.00 PURE HYPERCHOLESTEROLEMIA: ICD-10-CM

## 2019-10-11 DIAGNOSIS — I10 ESSENTIAL HYPERTENSION: ICD-10-CM

## 2019-10-11 DIAGNOSIS — F41.8 DEPRESSION WITH ANXIETY: Primary | ICD-10-CM

## 2019-10-11 DIAGNOSIS — R79.89 LOW TESTOSTERONE: ICD-10-CM

## 2019-10-11 DIAGNOSIS — E03.9 HYPOTHYROIDISM (ACQUIRED): ICD-10-CM

## 2019-10-11 PROCEDURE — 99214 OFFICE O/P EST MOD 30 MIN: CPT | Performed by: FAMILY MEDICINE

## 2019-10-11 RX ORDER — ZOLPIDEM TARTRATE 5 MG/1
5 TABLET ORAL NIGHTLY PRN
Qty: 30 TABLET | Refills: 5 | Status: SHIPPED | OUTPATIENT
Start: 2019-10-11 | End: 2020-04-17 | Stop reason: SDUPTHER

## 2019-10-11 RX ORDER — METOPROLOL SUCCINATE 25 MG/1
25 TABLET, EXTENDED RELEASE ORAL DAILY
Qty: 30 TABLET | Refills: 2 | Status: SHIPPED | OUTPATIENT
Start: 2019-10-11 | End: 2019-10-23 | Stop reason: DRUGHIGH

## 2019-10-18 ENCOUNTER — LAB (OUTPATIENT)
Dept: LAB | Facility: HOSPITAL | Age: 39
End: 2019-10-18

## 2019-10-18 DIAGNOSIS — E03.9 HYPOTHYROIDISM (ACQUIRED): ICD-10-CM

## 2019-10-18 DIAGNOSIS — E78.00 PURE HYPERCHOLESTEROLEMIA: ICD-10-CM

## 2019-10-18 DIAGNOSIS — I10 ESSENTIAL HYPERTENSION: ICD-10-CM

## 2019-10-18 DIAGNOSIS — R79.89 LOW TESTOSTERONE: ICD-10-CM

## 2019-10-18 LAB
ALBUMIN SERPL-MCNC: 4.7 G/DL (ref 3.5–5.2)
ALBUMIN/GLOB SERPL: 2 G/DL
ALP SERPL-CCNC: 98 U/L (ref 39–117)
ALT SERPL W P-5'-P-CCNC: 29 U/L (ref 1–41)
ANION GAP SERPL CALCULATED.3IONS-SCNC: 11.4 MMOL/L (ref 5–15)
AST SERPL-CCNC: 16 U/L (ref 1–40)
BILIRUB SERPL-MCNC: 0.7 MG/DL (ref 0.2–1.2)
BUN BLD-MCNC: 13 MG/DL (ref 6–20)
BUN/CREAT SERPL: 12.9 (ref 7–25)
CALCIUM SPEC-SCNC: 9.6 MG/DL (ref 8.6–10.5)
CHLORIDE SERPL-SCNC: 101 MMOL/L (ref 98–107)
CHOLEST SERPL-MCNC: 135 MG/DL (ref 0–200)
CO2 SERPL-SCNC: 28.6 MMOL/L (ref 22–29)
CREAT BLD-MCNC: 1.01 MG/DL (ref 0.76–1.27)
GFR SERPL CREATININE-BSD FRML MDRD: 82 ML/MIN/1.73
GLOBULIN UR ELPH-MCNC: 2.4 GM/DL
GLUCOSE BLD-MCNC: 98 MG/DL (ref 65–99)
HDLC SERPL-MCNC: 50 MG/DL (ref 40–60)
LDLC SERPL CALC-MCNC: 72 MG/DL (ref 0–100)
LDLC/HDLC SERPL: 1.44 {RATIO}
POTASSIUM BLD-SCNC: 4.4 MMOL/L (ref 3.5–5.2)
PROT SERPL-MCNC: 7.1 G/DL (ref 6–8.5)
SODIUM BLD-SCNC: 141 MMOL/L (ref 136–145)
TESTOST SERPL-MCNC: 338 NG/DL (ref 249–836)
TRIGL SERPL-MCNC: 66 MG/DL (ref 0–150)
TSH SERPL DL<=0.05 MIU/L-ACNC: 2.24 UIU/ML (ref 0.27–4.2)
VLDLC SERPL-MCNC: 13.2 MG/DL

## 2019-10-18 PROCEDURE — 84443 ASSAY THYROID STIM HORMONE: CPT

## 2019-10-18 PROCEDURE — 84403 ASSAY OF TOTAL TESTOSTERONE: CPT

## 2019-10-18 PROCEDURE — 80053 COMPREHEN METABOLIC PANEL: CPT

## 2019-10-18 PROCEDURE — 80061 LIPID PANEL: CPT

## 2019-10-23 RX ORDER — METOPROLOL SUCCINATE 50 MG/1
50 TABLET, EXTENDED RELEASE ORAL DAILY
Qty: 30 TABLET | Refills: 5 | Status: SHIPPED | OUTPATIENT
Start: 2019-10-23 | End: 2020-01-22 | Stop reason: SDUPTHER

## 2020-01-03 DIAGNOSIS — E78.00 PURE HYPERCHOLESTEROLEMIA: ICD-10-CM

## 2020-01-03 RX ORDER — ATORVASTATIN CALCIUM 10 MG/1
10 TABLET, FILM COATED ORAL DAILY
Qty: 90 TABLET | Refills: 1 | Status: SHIPPED | OUTPATIENT
Start: 2020-01-03 | End: 2020-06-29 | Stop reason: SDUPTHER

## 2020-01-21 ENCOUNTER — PATIENT MESSAGE (OUTPATIENT)
Dept: FAMILY MEDICINE CLINIC | Facility: CLINIC | Age: 40
End: 2020-01-21

## 2020-02-10 ENCOUNTER — OFFICE VISIT (OUTPATIENT)
Dept: FAMILY MEDICINE CLINIC | Facility: CLINIC | Age: 40
End: 2020-02-10

## 2020-02-10 VITALS
WEIGHT: 190 LBS | DIASTOLIC BLOOD PRESSURE: 78 MMHG | OXYGEN SATURATION: 99 % | BODY MASS INDEX: 25.73 KG/M2 | HEIGHT: 72 IN | SYSTOLIC BLOOD PRESSURE: 140 MMHG | HEART RATE: 80 BPM

## 2020-02-10 DIAGNOSIS — F41.8 DEPRESSION WITH ANXIETY: ICD-10-CM

## 2020-02-10 DIAGNOSIS — R74.8 ELEVATED ALKALINE PHOSPHATASE LEVEL: ICD-10-CM

## 2020-02-10 DIAGNOSIS — E03.9 HYPOTHYROIDISM (ACQUIRED): ICD-10-CM

## 2020-02-10 DIAGNOSIS — I10 ESSENTIAL HYPERTENSION: ICD-10-CM

## 2020-02-10 DIAGNOSIS — M79.645 FINGER PAIN, LEFT: ICD-10-CM

## 2020-02-10 DIAGNOSIS — E78.00 PURE HYPERCHOLESTEROLEMIA: ICD-10-CM

## 2020-02-10 DIAGNOSIS — Z00.00 PREVENTATIVE HEALTH CARE: Primary | ICD-10-CM

## 2020-02-10 DIAGNOSIS — K21.9 GASTRO-ESOPHAGEAL REFLUX DISEASE WITHOUT ESOPHAGITIS: ICD-10-CM

## 2020-02-10 DIAGNOSIS — M53.3 COCCYGEAL PAIN, CHRONIC: ICD-10-CM

## 2020-02-10 DIAGNOSIS — G89.29 COCCYGEAL PAIN, CHRONIC: ICD-10-CM

## 2020-02-10 PROCEDURE — 99396 PREV VISIT EST AGE 40-64: CPT | Performed by: FAMILY MEDICINE

## 2020-02-10 RX ORDER — METOPROLOL SUCCINATE 50 MG/1
75 TABLET, EXTENDED RELEASE ORAL DAILY
Qty: 90 TABLET | Refills: 0
Start: 2020-02-10 | End: 2020-03-19

## 2020-02-10 NOTE — PROGRESS NOTES
Subjective   Eduardo Luna is a 39 y.o. male.     Chief Complaint   Patient presents with   • Annual Exam       History of Present Illness     Chronic health conditions:  Hypertension: Had been controlled with diltiazem 180 mg daily, he self discontinued this medication last month and has been doing well.  Hypothyroidism: Currently prescribed levothyroxine 75 mcg daily  Hyperlipidemia: He has required atorvastatin 10 mg over the past year  Depression and anxiety: Stable on fluoxetine 20 mg.  He came off of his other medications over the past 1 year.  Insomnia: He has previously done well with zolpidem 5 mg as needed, he is improved and has not had need of this medication over a year  GERD: With hiatal hernia.  More symptomatic lately.  History of non-Hodgkin lymphoma: He was following with heme-onc  Their recommendations for ongoing care and follow-up included:  Annual flu vaccine  Consider stress test/echo at 10 yr intervals  Consider carotid US at 10 yr intervals  Annual CBC, CMP, fasting glucose, TSH.  Biannual lipids    Acute complaints:  Eduardo Luna is a 39 y.o. male who presents today for annual exam.  He reports that he has had left index finger swelling and pain for the past few weeks.  No direct trauma as far as he knows.  The pain is relatively mild about a 3/10, but has just stuck around and occasionally makes it feel like it is tough to bend.  He also has had persistent tailbone pain over the last year.  X-ray and MRI were negative, he did home exercises and stretches, anti-inflammatories and ice, but has not improved.  The tailbone pain is a dull ache, worsened by prolonged sitting, it is to the point that he obtained a hemorrhoid pillow to try to help.    The following portions of the patient's history were reviewed and updated as appropriate: allergies, current medications, past family history, past medical history, past social history, past surgical history and problem list.    Active  Ambulatory Problems     Diagnosis Date Noted   • History of Hodgkin's lymphoma 09/05/2016   • PAT (paroxysmal atrial tachycardia) (CMS/HCC) 09/22/2016   • Hypothyroidism (acquired) 09/22/2016   • Depression with anxiety 09/22/2016   • Essential hypertension 01/23/2017   • JAVON (obstructive sleep apnea) 01/23/2017   • Periodic limb movement disorder 02/21/2017   • Low testosterone 02/28/2017   • Pure hypercholesterolemia 02/16/2018   • Primary insomnia 07/19/2018   • LFT elevation 03/15/2019   • Preventative health care 02/13/2020     Resolved Ambulatory Problems     Diagnosis Date Noted   • Snoring      Past Medical History:   Diagnosis Date   • Cancer (CMS/HCC)    • Colon polyp    • Depression    • GERD (gastroesophageal reflux disease)    • Hyperlipidemia    • Hypertension    • Migraine    • Sleep apnea    • SVT (supraventricular tachycardia) (CMS/HCC)      Past Surgical History:   Procedure Laterality Date   • CARDIAC ABLATION  2008    SVT   • LYMPH NODE BIOPSY  11/2001    @ [facility] with Dr. Daniel Marinelli   • MOLE REMOVAL  11/2016    Dr Loyd at [facility] dermatology.    • PORTACATH PLACEMENT  01/2002    Groshong Placement @ [facility] with Dr. Daniel Marinelli   • SEPTOPLASTY, RESECTION INFERIOR TURBINATES Bilateral 10/18/2018    Procedure: SEPTOPLASTY, BILATERAL RESECTION INFERIOR TURBINATES;  Surgeon: Lexa Castillo MD;  Location: [facility];  Service: ENT   • TONSILLECTOMY  09/2013    @ [facility] with Lexa Castillo   • VASECTOMY  01/2012    @ [facility] with Dr. Aidan Castellano   • WISDOM TOOTH EXTRACTION  10/1997    @ [facility] with Dr. Momo Nunn     Family History   Problem Relation Age of Onset   • Hypertension Mother    • Cancer Mother    • Colon cancer Father    • Colon polyps Father    • Hypertension Father    • Hyperlipidemia Father    • Breast cancer Sister    • Arthritis Maternal Grandmother      Social History     Socioeconomic History   • Marital status: Significant Other     Spouse name: Not on file  "  • Number of children: Not on file   • Years of education: Not on file   • Highest education level: Not on file   Tobacco Use   • Smoking status: Never Smoker   • Smokeless tobacco: Never Used   Substance and Sexual Activity   • Alcohol use: No   • Drug use: No   • Sexual activity: Defer         Review of Systems   Constitutional: Negative.    Respiratory: Negative.    Cardiovascular: Negative.    Musculoskeletal: Positive for back pain.       Objective   Blood pressure 140/78, pulse 80, height 182.9 cm (72.01\"), weight 86.2 kg (190 lb), SpO2 99 %.  Nursing note reviewed  Physical Exam  Const: NAD, A&Ox4, Pleasant, Cooperative  Eyes: EOMI, no conjunctivitis  ENT: No nasal discharge present, neck supple  Cardiac: Regular rate and rhythm, no cyanosis  Resp: Respiratory rate within normal limits, no increased work of breathing, no audible wheezing or retractions noted  GI: No distention or ascites  MSK: No mass palpated on deep palpation of the sacrum and coccyx.  There is very minimal tenderness to palpation  Neurologic: CN II-XII grossly intact  Psych: Appropriate mood and behavior.  Skin: Pink, warm, dry  Procedures  Assessment/Plan   Eduardo was seen today for annual exam.    Diagnoses and all orders for this visit:    Preventative health care  -     Comprehensive Metabolic Panel; Future  -     CBC & Differential; Future  -     Lipid Panel; Future  -     TSH Rfx On Abnormal To Free T4; Future  -     Urinalysis With Microscopic If Indicated (No Culture) - Urine, Clean Catch; Future  -     Microalbumin / Creatinine Urine Ratio - Urine, Clean Catch; Future    Essential hypertension  -     Urinalysis With Microscopic If Indicated (No Culture) - Urine, Clean Catch; Future  -     Microalbumin / Creatinine Urine Ratio - Urine, Clean Catch; Future  -     metoprolol succinate XL (TOPROL XL) 50 MG 24 hr tablet; Take 1.5 tablets by mouth Daily.    Pure hypercholesterolemia  -     Comprehensive Metabolic Panel; Future  -     " Lipid Panel; Future    Depression with anxiety    Hypothyroidism (acquired)  -     TSH Rfx On Abnormal To Free T4; Future    Gastro-esophageal reflux disease without esophagitis    Elevated alkaline phosphatase level  -     Comprehensive Metabolic Panel; Future    Coccygeal pain, chronic  -     CBC & Differential; Future  -     CT Lumbar Spine With & Without Contrast; Future    Finger pain, left  -     XR Hand 3+ View Left; Future        Chronic health conditions:  #1  Hypertension  His control had been fairly good, however more recently has required doses of clonidine    #2  Low testosterone  His testosterone was adequate on recent round of labs last year, it is okay for him to continue off of the AndroGel as long as he remains asymptomatic    #3  Insomnia  Trial of Elavil 10 mg at night was unsuccessful, he was placed onto a trial of Ambien 5 mg nightly as needed which has done well in the past.  He has not had chronic need for this medication    #4  Elevated alkaline phosphatase  Has returned to normal, but recommend rechecking yearly    #5 depression and anxiety  He has been able to discontinue Wellbutrin and Viibryd and is maintained on fluoxetine 20 mg as monotherapy    #6 hypothyroidism  Continue levothyroxine 75 mcg daily    #7 hyperlipidemia  Continue atorvastatin 10 mg daily    #8 Chronic coccygeal pain  Over 1 year, x-ray and MRI last summer were unremarkable  Given persistence and history of Hodgkin lymphoma I did recommend a CT which has been ordered today    #9 left finger pain  Recommended x-ray, conservative management okay he does have some mild tenderness in his proximal phalanx midshaft    The preventative exam has been reviewed in detail.  The patient has been fully counseled on preventative guidelines for vaccines, cancer screenings, and other health maintenance needs. The patient was counseled on maintaining a lifestyle to promote good health and to minimize chronic diseases.  The patient has  been assisted with scheduling healthcare procedures for the coming year and given a written document outlining these recommendations. Age-appropriate screening measures have been ordered for the patient today as indicated above.    There are no Patient Instructions on file for this visit.    Return in about 6 months (around 8/10/2020).    Ambulatory progress note signed and attested to by Vlad Pink D.O.

## 2020-02-11 ENCOUNTER — LAB (OUTPATIENT)
Dept: LAB | Facility: HOSPITAL | Age: 40
End: 2020-02-11

## 2020-02-11 DIAGNOSIS — I10 ESSENTIAL HYPERTENSION: ICD-10-CM

## 2020-02-11 DIAGNOSIS — G89.29 COCCYGEAL PAIN, CHRONIC: ICD-10-CM

## 2020-02-11 DIAGNOSIS — E78.00 PURE HYPERCHOLESTEROLEMIA: ICD-10-CM

## 2020-02-11 DIAGNOSIS — E03.9 HYPOTHYROIDISM (ACQUIRED): ICD-10-CM

## 2020-02-11 DIAGNOSIS — Z00.00 PREVENTATIVE HEALTH CARE: ICD-10-CM

## 2020-02-11 DIAGNOSIS — M53.3 COCCYGEAL PAIN, CHRONIC: ICD-10-CM

## 2020-02-11 DIAGNOSIS — R74.8 ELEVATED ALKALINE PHOSPHATASE LEVEL: ICD-10-CM

## 2020-02-11 LAB
ALBUMIN SERPL-MCNC: 4.7 G/DL (ref 3.5–5.2)
ALBUMIN UR-MCNC: <1.2 MG/DL
ALBUMIN/GLOB SERPL: 2.1 G/DL
ALP SERPL-CCNC: 94 U/L (ref 39–117)
ALT SERPL W P-5'-P-CCNC: 41 U/L (ref 1–41)
ANION GAP SERPL CALCULATED.3IONS-SCNC: 12.3 MMOL/L (ref 5–15)
AST SERPL-CCNC: 9 U/L (ref 1–40)
BASOPHILS # BLD AUTO: 0.03 10*3/MM3 (ref 0–0.2)
BASOPHILS NFR BLD AUTO: 0.6 % (ref 0–1.5)
BILIRUB SERPL-MCNC: 0.4 MG/DL (ref 0.2–1.2)
BILIRUB UR QL STRIP: NEGATIVE
BUN BLD-MCNC: 13 MG/DL (ref 6–20)
BUN/CREAT SERPL: 15.5 (ref 7–25)
CALCIUM SPEC-SCNC: 9.4 MG/DL (ref 8.6–10.5)
CHLORIDE SERPL-SCNC: 101 MMOL/L (ref 98–107)
CHOLEST SERPL-MCNC: 143 MG/DL (ref 0–200)
CLARITY UR: CLEAR
CO2 SERPL-SCNC: 27.7 MMOL/L (ref 22–29)
COLOR UR: YELLOW
CREAT BLD-MCNC: 0.84 MG/DL (ref 0.76–1.27)
CREAT UR-MCNC: 317.2 MG/DL
DEPRECATED RDW RBC AUTO: 42.6 FL (ref 37–54)
EOSINOPHIL # BLD AUTO: 0.13 10*3/MM3 (ref 0–0.4)
EOSINOPHIL NFR BLD AUTO: 2.4 % (ref 0.3–6.2)
ERYTHROCYTE [DISTWIDTH] IN BLOOD BY AUTOMATED COUNT: 12.9 % (ref 12.3–15.4)
GFR SERPL CREATININE-BSD FRML MDRD: 101 ML/MIN/1.73
GLOBULIN UR ELPH-MCNC: 2.2 GM/DL
GLUCOSE BLD-MCNC: 92 MG/DL (ref 65–99)
GLUCOSE UR STRIP-MCNC: NEGATIVE MG/DL
HCT VFR BLD AUTO: 49.1 % (ref 37.5–51)
HDLC SERPL-MCNC: 48 MG/DL (ref 40–60)
HGB BLD-MCNC: 16.8 G/DL (ref 13–17.7)
HGB UR QL STRIP.AUTO: NEGATIVE
IMM GRANULOCYTES # BLD AUTO: 0.01 10*3/MM3 (ref 0–0.05)
IMM GRANULOCYTES NFR BLD AUTO: 0.2 % (ref 0–0.5)
KETONES UR QL STRIP: NEGATIVE
LDLC SERPL CALC-MCNC: 73 MG/DL (ref 0–100)
LDLC/HDLC SERPL: 1.53 {RATIO}
LEUKOCYTE ESTERASE UR QL STRIP.AUTO: NEGATIVE
LYMPHOCYTES # BLD AUTO: 1.12 10*3/MM3 (ref 0.7–3.1)
LYMPHOCYTES NFR BLD AUTO: 21.1 % (ref 19.6–45.3)
MCH RBC QN AUTO: 31.1 PG (ref 26.6–33)
MCHC RBC AUTO-ENTMCNC: 34.2 G/DL (ref 31.5–35.7)
MCV RBC AUTO: 90.8 FL (ref 79–97)
MICROALBUMIN/CREAT UR: NORMAL MG/G{CREAT}
MONOCYTES # BLD AUTO: 0.47 10*3/MM3 (ref 0.1–0.9)
MONOCYTES NFR BLD AUTO: 8.8 % (ref 5–12)
NEUTROPHILS # BLD AUTO: 3.56 10*3/MM3 (ref 1.7–7)
NEUTROPHILS NFR BLD AUTO: 66.9 % (ref 42.7–76)
NITRITE UR QL STRIP: NEGATIVE
NRBC BLD AUTO-RTO: 0 /100 WBC (ref 0–0.2)
PH UR STRIP.AUTO: <=5 [PH] (ref 5–8)
PLATELET # BLD AUTO: 229 10*3/MM3 (ref 140–450)
PMV BLD AUTO: 10.8 FL (ref 6–12)
POTASSIUM BLD-SCNC: 4.2 MMOL/L (ref 3.5–5.2)
PROT SERPL-MCNC: 6.9 G/DL (ref 6–8.5)
PROT UR QL STRIP: NEGATIVE
RBC # BLD AUTO: 5.41 10*6/MM3 (ref 4.14–5.8)
SODIUM BLD-SCNC: 141 MMOL/L (ref 136–145)
SP GR UR STRIP: 1.03 (ref 1–1.03)
TRIGL SERPL-MCNC: 109 MG/DL (ref 0–150)
TSH SERPL DL<=0.05 MIU/L-ACNC: 2.47 UIU/ML (ref 0.27–4.2)
UROBILINOGEN UR QL STRIP: NORMAL
VLDLC SERPL-MCNC: 21.8 MG/DL (ref 5–40)
WBC NRBC COR # BLD: 5.32 10*3/MM3 (ref 3.4–10.8)

## 2020-02-11 PROCEDURE — 82570 ASSAY OF URINE CREATININE: CPT

## 2020-02-11 PROCEDURE — 84443 ASSAY THYROID STIM HORMONE: CPT

## 2020-02-11 PROCEDURE — 81003 URINALYSIS AUTO W/O SCOPE: CPT

## 2020-02-11 PROCEDURE — 85025 COMPLETE CBC W/AUTO DIFF WBC: CPT

## 2020-02-11 PROCEDURE — 84153 ASSAY OF PSA TOTAL: CPT | Performed by: FAMILY MEDICINE

## 2020-02-11 PROCEDURE — 80061 LIPID PANEL: CPT

## 2020-02-11 PROCEDURE — 80053 COMPREHEN METABOLIC PANEL: CPT

## 2020-02-11 PROCEDURE — 82043 UR ALBUMIN QUANTITATIVE: CPT

## 2020-02-13 ENCOUNTER — HOSPITAL ENCOUNTER (OUTPATIENT)
Dept: GENERAL RADIOLOGY | Facility: HOSPITAL | Age: 40
Discharge: HOME | End: 2020-02-13
Admitting: FAMILY MEDICINE

## 2020-02-13 DIAGNOSIS — M79.645 FINGER PAIN, LEFT: ICD-10-CM

## 2020-02-13 PROBLEM — Z00.00 PREVENTATIVE HEALTH CARE: Status: ACTIVE | Noted: 2020-02-13

## 2020-02-13 PROCEDURE — 73130 X-RAY EXAM OF HAND: CPT

## 2020-02-14 ENCOUNTER — LAB (OUTPATIENT)
Dept: LAB | Facility: HOSPITAL | Age: 40
End: 2020-02-14

## 2020-02-14 DIAGNOSIS — Z51.81 ENCOUNTER FOR MONITORING TESTOSTERONE REPLACEMENT THERAPY: Primary | ICD-10-CM

## 2020-02-14 DIAGNOSIS — Z79.890 ENCOUNTER FOR MONITORING TESTOSTERONE REPLACEMENT THERAPY: Primary | ICD-10-CM

## 2020-02-14 DIAGNOSIS — E55.9 VITAMIN D INSUFFICIENCY: ICD-10-CM

## 2020-02-14 LAB
25(OH)D3 SERPL-MCNC: 52.7 NG/ML (ref 30–100)
PSA SERPL-MCNC: 1 NG/ML (ref 0–4)

## 2020-02-14 PROCEDURE — 82306 VITAMIN D 25 HYDROXY: CPT

## 2020-02-20 ENCOUNTER — TELEPHONE (OUTPATIENT)
Dept: FAMILY MEDICINE CLINIC | Facility: CLINIC | Age: 40
End: 2020-02-20

## 2020-02-20 NOTE — TELEPHONE ENCOUNTER
AP WITH  PRE-CERT CALLED IS REQUESTING A CALL BACK, NEEDS SOME CLARIFICATION ON PT. PLEASE CALL BACK TO [facility]. [phone]

## 2020-02-21 ENCOUNTER — APPOINTMENT (OUTPATIENT)
Dept: CT IMAGING | Facility: HOSPITAL | Age: 40
End: 2020-02-21

## 2020-03-06 ENCOUNTER — APPOINTMENT (OUTPATIENT)
Dept: CT IMAGING | Facility: HOSPITAL | Age: 40
End: 2020-03-06

## 2020-03-11 ENCOUNTER — OFFICE VISIT (OUTPATIENT)
Dept: INTERNAL MEDICINE | Facility: CLINIC | Age: 40
End: 2020-03-11

## 2020-03-11 VITALS
WEIGHT: 191 LBS | BODY MASS INDEX: 26.74 KG/M2 | HEART RATE: 84 BPM | HEIGHT: 71 IN | SYSTOLIC BLOOD PRESSURE: 164 MMHG | TEMPERATURE: 98.3 F | DIASTOLIC BLOOD PRESSURE: 120 MMHG

## 2020-03-11 DIAGNOSIS — I10 ESSENTIAL HYPERTENSION: Primary | ICD-10-CM

## 2020-03-11 DIAGNOSIS — Z85.71 HISTORY OF HODGKIN'S LYMPHOMA: ICD-10-CM

## 2020-03-11 DIAGNOSIS — E03.9 HYPOTHYROIDISM (ACQUIRED): ICD-10-CM

## 2020-03-11 DIAGNOSIS — G89.29 COCCYGEAL PAIN, CHRONIC: ICD-10-CM

## 2020-03-11 DIAGNOSIS — F41.8 DEPRESSION WITH ANXIETY: ICD-10-CM

## 2020-03-11 DIAGNOSIS — M53.3 COCCYGEAL PAIN, CHRONIC: ICD-10-CM

## 2020-03-11 DIAGNOSIS — E78.2 MIXED HYPERLIPIDEMIA: ICD-10-CM

## 2020-03-11 DIAGNOSIS — R79.89 LOW TESTOSTERONE: ICD-10-CM

## 2020-03-11 DIAGNOSIS — M79.645 PAIN OF FINGER OF LEFT HAND: ICD-10-CM

## 2020-03-11 DIAGNOSIS — F51.01 PRIMARY INSOMNIA: ICD-10-CM

## 2020-03-11 PROCEDURE — 99204 OFFICE O/P NEW MOD 45 MIN: CPT | Performed by: NURSE PRACTITIONER

## 2020-03-11 RX ORDER — BUSPIRONE HYDROCHLORIDE 5 MG/1
5 TABLET ORAL 2 TIMES DAILY
Qty: 60 TABLET | Refills: 1 | Status: SHIPPED | OUTPATIENT
Start: 2020-03-11 | End: 2020-04-17

## 2020-03-11 RX ORDER — LEVOTHYROXINE SODIUM 0.07 MG/1
75 TABLET ORAL EVERY MORNING
Qty: 90 TABLET | Refills: 1 | Status: SHIPPED | OUTPATIENT
Start: 2020-03-11 | End: 2020-08-25 | Stop reason: SDUPTHER

## 2020-03-11 RX ORDER — AMLODIPINE BESYLATE 5 MG/1
5 TABLET ORAL DAILY
Qty: 30 TABLET | Refills: 1 | Status: SHIPPED | OUTPATIENT
Start: 2020-03-11 | End: 2020-04-30 | Stop reason: SDUPTHER

## 2020-03-11 NOTE — PROGRESS NOTES
Eduardo Luna  1980  [id number]  Patient Care Team:  Marlen Reza APRN as PCP - General (Internal Medicine)    Eduardo Luna is a 39 y.o. here today to establish care.  This patient is accompanied by their wife who contributes to the history of their care.  Previously under the care of Dr. lVad Pink.  He had a physical with labs 2/10/2020  Chief Complaint   Patient presents with   • Establish Care       HPI:   HTN:  Thought to be white coat htn, had amb monitoring last year, currently beta blocker only.  He checks his blood pressure almost daily at home and in the morning he is running 124-130/84 and after work highest 135/90.  He has seen Dr. Pink for cardiology in the past.  Toprol at 50 mg was increased to 75 mg last month at physical to see if it would help with blood pressure control and anxiety.  High sodium intake, good water intake, less soda, no leg swelling, pain palpitations.  Was on diltiazem at one point in time but off for a year.  It was thought that beta-blocker may help with his anxiety but that has not seemed to help.  Tried lisinopril in the past but it gave him a tickle/cough.  He did not tolerate hydrochlorothiazide.  Clonidine works but has side effect of sedation.    Tailbone pain x 1-2 yrs, sometimes rash after cleaning after bm, has colonoscopies every 5 yrs with [facility], last ~4 yrs ago.  (Father  of colon cancer at age 73) past problems hemorrhoids.  Bowel movements are normal, denies constipation or diarrhea.  His job had previously required prolonged sitting for a couple of years which he suspected started the problem.  But he has been in different position and moving more since last November.  Pain is not better.  He does not treat this with any medication.  Prior PCP ordered a CT last month but it was denied by insurance.  Past history of Hodgkin's lymphoma with chemo and radiation.    Lifelong anxiety: Currently on fluoxetine 20 mg daily.  He seems to have  less sexual side effects with this drug than others that he is tried in the past.  Past trials include citalopram, Zoloft and Lexapro which caused sexual dysfunction and Buspar (dizziness), abilify (vomiting), zxprexa (lethargy).  He was also on Wellbutrin with Viibryd but thought it might be increasing his blood pressure.  Viibryd also caused sexual side effects.  Those were both stopped about a year ago.  No counseling  Anxiety began during childhood, no meds as a child or adolescent  Hodgkin's lymphoma ~ 4315-3836, mother  soon after and father      Insomnia: Ambien 5 mg restarted this past yr with good management, gets 4-5 hrs sleep at most which is not new.  He does not have any side effects.    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  Had x-ray which was negative.  Continues to have some mild swelling and discomfort.  No meds.  He is right-handed.  No other joint problems in the past.    Hyperlipidemia: Treated with statin since 2019.    Previously on AndroGel for testosterone deficiency.  Last level was normal at 338 on 10/18/2019.    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  Past Medical History:   Diagnosis Date   • Allergic     Since childhood   • Anxiety     Since childhood   • Cancer (CMS/HCC)     hodgkins lymphoma   • Colon polyp    • Depression    • GERD (gastroesophageal reflux disease)    • Hyperlipidemia    • Hypertension    • Hypothyroidism     Acquired from radiation therapy   • Kidney stone     diagnosed with C.T scan in [facility]   • Migraine    • Sleep apnea    • SVT (supraventricular tachycardia) (CMS/HCC)      Past Surgical History:   Procedure Laterality Date   • CARDIAC ABLATION      SVT   • COLONOSCOPY      Dr. Yovani Aden   • LYMPH NODE  BIOPSY  11/2001    @ [facility] with Dr. Daniel Marinelli   • MOLE REMOVAL  11/2016    Dr Loyd at [facility] dermatology.    • PORTACATH PLACEMENT  01/2002    [facility] Placement @ [facility] with Dr. Daniel Marinelli   • SEPTOPLASTY  10/18/2018    Dr. Lexa Castillo   • SEPTOPLASTY, RESECTION INFERIOR TURBINATES Bilateral 10/18/2018    Procedure: SEPTOPLASTY, BILATERAL RESECTION INFERIOR TURBINATES;  Surgeon: Lexa Castillo MD;  Location: [facility] OR;  Service: ENT   • TONSILLECTOMY  09/2013    @ [facility] with Lexa Castillo   • VASECTOMY  01/2012    @ [facility] with Dr. Aidan Castellano   • WISDOM TOOTH EXTRACTION  10/1997    @ [facility] with Dr. Momo Nunn     Family History   Problem Relation Age of Onset   • Hypertension Mother    • Cancer Mother         Soft tissue sarcoma   • Depression Mother    • Hyperlipidemia Mother    • Colon cancer Father    • Colon polyps Father    • Hypertension Father    • Hyperlipidemia Father    • Cancer Father         Colon cancer   • Breast cancer Sister    • Arthritis Maternal Grandmother    • Asthma Maternal Grandmother    • Hyperlipidemia Maternal Grandmother    • Hypertension Maternal Grandmother    • Heart disease Paternal Uncle    • Hyperlipidemia Brother    • Hypertension Brother    • Hypertension Maternal Grandfather      Social History     Tobacco Use   Smoking Status Never Smoker   Smokeless Tobacco Never Used     Allergies   Allergen Reactions   • Lisinopril Other (See Comments)     Throat tickle   • Amoxicillin Hives, Itching and Rash       Current Outpatient Medications:   •  atorvastatin (LIPITOR) 10 MG tablet, Take 1 tablet by mouth Daily., Disp: 90 tablet, Rfl: 1  •  Cholecalciferol 4000 units capsule, Take 1 capsule by mouth Daily., Disp: , Rfl:   •  desonide (DESOWEN) 0.05 % cream, Apply to rash twice a day, tapering as improves. Not for long term use., Disp: 60 g, Rfl: 0  •  FLUoxetine (PROZAC) 20 MG capsule, Take 1 capsule by mouth Daily., Disp: 90 capsule, Rfl: 1  •   "levothyroxine (SYNTHROID, LEVOTHROID) 75 MCG tablet, Take 1 tablet by mouth Every Morning 1 hour prior to food or other medications, Disp: 90 tablet, Rfl: 1  •  metoprolol succinate XL (TOPROL XL) 50 MG 24 hr tablet, Take 1.5 tablets by mouth Daily., Disp: 90 tablet, Rfl: 0  •  omeprazole (priLOSEC) 40 MG capsule, Take 1 capsule by mouth Daily., Disp: 90 capsule, Rfl: 1  •  polyethylene glycol (MIRALAX) powder powder, , Disp: , Rfl:   •  zolpidem (AMBIEN) 5 MG tablet, Take 1 tablet by mouth At Night As Needed for Sleep., Disp: 30 tablet, Rfl: 5  •  amLODIPine (NORVASC) 5 MG tablet, Take 1 tablet by mouth Daily., Disp: 30 tablet, Rfl: 1  •  busPIRone (BUSPAR) 5 MG tablet, Take 1 tablet by mouth 2 (Two) Times a Day., Disp: 60 tablet, Rfl: 1    Review of Systems   Constitutional: Negative for chills, fatigue and fever.   HENT: Negative for congestion, ear pain and sinus pressure.    Respiratory: Negative for cough, chest tightness, shortness of breath and wheezing.    Cardiovascular: Negative for chest pain and palpitations.   Gastrointestinal: Negative for abdominal pain, blood in stool and constipation.   Skin: Negative for color change.   Allergic/Immunologic: Positive for environmental allergies.   Neurological: Negative for dizziness, speech difficulty and headache.   Psychiatric/Behavioral: Negative for decreased concentration. The patient is nervous/anxious.        BP (!) 164/120 (BP Location: Left arm, Patient Position: Sitting, Cuff Size: Adult)   Pulse 84   Temp 98.3 °F (36.8 °C) (Temporal)   Ht 181.5 cm (71.46\")   Wt 86.6 kg (191 lb)   BMI 26.30 kg/m²     Physical Exam   Constitutional: He appears well-developed and well-nourished.   HENT:   Head: Normocephalic and atraumatic.   Right Ear: External ear normal.   Left Ear: External ear normal.   Mouth/Throat: Oropharynx is clear and moist.   Eyes: Conjunctivae and EOM are normal.   Neck: Normal range of motion. Neck supple.   Cardiovascular: Normal rate, " regular rhythm and normal heart sounds.   Pulmonary/Chest: Effort normal and breath sounds normal.   Abdominal: Soft. Bowel sounds are normal.   Musculoskeletal: Normal range of motion.   Neurological: He is alert.   Skin: Skin is warm and dry.   Psychiatric: He has a normal mood and affect. His behavior is normal. Thought content normal.       Results Review:  I reviewed the patient's new clinical results.    Assessment/Plan:  Patient Instructions   Problem List Items Addressed This Visit        Cardiovascular and Mediastinum    Essential hypertension - Primary    Overview     WHITECOAT         Relevant Medications    metoprolol succinate XL (TOPROL XL) 50 MG 24 hr tablet    amLODIPine (NORVASC) 5 MG tablet    Mixed hyperlipidemia    Relevant Medications    atorvastatin (LIPITOR) 10 MG tablet       Endocrine    Hypothyroidism (acquired)    Relevant Medications    metoprolol succinate XL (TOPROL XL) 50 MG 24 hr tablet    levothyroxine (SYNTHROID, LEVOTHROID) 75 MCG tablet       Nervous and Auditory    Coccygeal pain, chronic    Relevant Orders    CT Lumbar Spine With & Without Contrast       Other    History of Hodgkin's lymphoma    Overview     2002         Relevant Orders    CT Lumbar Spine With & Without Contrast    Depression with anxiety    Relevant Medications    zolpidem (AMBIEN) 5 MG tablet    FLUoxetine (PROZAC) 20 MG capsule    busPIRone (BUSPAR) 5 MG tablet    Low testosterone    Primary insomnia      Other Visit Diagnoses     Pain of finger of left hand        We will continue to monitor and refer prn if persistant             Diagnosis Plan   1. Essential hypertension      Will trial amlodipine 5 mg daily.  Continue to monitor blood pressure at home.   2. Hypothyroidism (acquired)  levothyroxine (SYNTHROID, LEVOTHROID) 75 MCG tablet    Continue levothyroxine, refill today.  Recent labs normal   3. Depression with anxiety      Continue Prozac 20 mg and trial BuSpar small dose twice daily.  May increase  Prozac at next visit   4. History of Hodgkin's lymphoma  CT Lumbar Spine With & Without Contrast    Remission   5. Coccygeal pain, chronic  CT Lumbar Spine With & Without Contrast    Will evaluate with CT   6. Low testosterone      Will recheck level in 6 months   7. Primary insomnia      Currently using Ambien.   8. Mixed hyperlipidemia      Continue daily statin and encourage improved diet modifications.   9. Pain of finger of left hand      We will continue to monitor and refer prn if persistant       There are no Patient Instructions on file for this visit.    Plan of care reviewed with patient at the conclusion of today's visit. Education was provided regarding diagnosis and management.  Patient verbalizes understanding of and agreement with management plan.    Return in about 4 weeks (around 4/8/2020).    * Please note that portions of this note were completed with a voice recognition program. Efforts were made to edit the dictation but occasionally words are transcribed.     CORINNE Javier    Answers for HPI/ROS submitted by the patient on 3/4/2020   What is the primary reason for your visit?: Other  Please describe your symptoms.: Establish care. Follow up on elevated BP. Pain in index finger on left hand. Tailbone pain.  Have you had these symptoms before?: Yes  How long have you been having these symptoms?: Other  Please list any medications you are currently taking for this condition.: Metoprolol XL 75 mg for BP  Please describe any probable cause for these symptoms. : Anxiety. Possibly diet.

## 2020-03-13 PROBLEM — E78.2 MIXED HYPERLIPIDEMIA: Status: ACTIVE | Noted: 2018-02-16

## 2020-03-19 DIAGNOSIS — I10 ESSENTIAL HYPERTENSION: ICD-10-CM

## 2020-03-19 RX ORDER — METOPROLOL SUCCINATE 100 MG/1
100 TABLET, EXTENDED RELEASE ORAL DAILY
Qty: 90 TABLET | Refills: 1 | Status: SHIPPED | OUTPATIENT
Start: 2020-03-19 | End: 2020-08-25 | Stop reason: SDUPTHER

## 2020-03-24 ENCOUNTER — HOSPITAL ENCOUNTER (OUTPATIENT)
Dept: CT IMAGING | Facility: HOSPITAL | Age: 40
Discharge: HOME | End: 2020-03-24
Admitting: NURSE PRACTITIONER

## 2020-03-24 DIAGNOSIS — G89.29 COCCYGEAL PAIN, CHRONIC: ICD-10-CM

## 2020-03-24 DIAGNOSIS — M53.3 COCCYGEAL PAIN, CHRONIC: ICD-10-CM

## 2020-03-24 DIAGNOSIS — Z85.71 HISTORY OF HODGKIN'S LYMPHOMA: ICD-10-CM

## 2020-03-24 PROCEDURE — 25010000002 IOPAMIDOL 61 % SOLUTION: Performed by: NURSE PRACTITIONER

## 2020-03-24 PROCEDURE — 72133 CT LUMBAR SPINE W/O & W/DYE: CPT

## 2020-03-24 RX ADMIN — IOPAMIDOL 95 ML: 612 INJECTION, SOLUTION INTRAVENOUS at 15:05

## 2020-04-17 ENCOUNTER — TELEMEDICINE (OUTPATIENT)
Dept: INTERNAL MEDICINE | Facility: CLINIC | Age: 40
End: 2020-04-17

## 2020-04-17 DIAGNOSIS — R79.89 LOW TESTOSTERONE: ICD-10-CM

## 2020-04-17 DIAGNOSIS — F51.01 PRIMARY INSOMNIA: ICD-10-CM

## 2020-04-17 DIAGNOSIS — F41.1 GAD (GENERALIZED ANXIETY DISORDER): Primary | ICD-10-CM

## 2020-04-17 DIAGNOSIS — I10 ESSENTIAL HYPERTENSION: ICD-10-CM

## 2020-04-17 PROCEDURE — 99213 OFFICE O/P EST LOW 20 MIN: CPT | Performed by: NURSE PRACTITIONER

## 2020-04-17 RX ORDER — BUSPIRONE HYDROCHLORIDE 10 MG/1
10 TABLET ORAL 2 TIMES DAILY
Qty: 60 TABLET | Refills: 2 | Status: SHIPPED | OUTPATIENT
Start: 2020-04-17 | End: 2020-06-01 | Stop reason: SDUPTHER

## 2020-04-17 RX ORDER — ZOLPIDEM TARTRATE 5 MG/1
5 TABLET ORAL NIGHTLY PRN
Qty: 30 TABLET | Refills: 2 | Status: SHIPPED | OUTPATIENT
Start: 2020-04-17 | End: 2020-07-21 | Stop reason: SDUPTHER

## 2020-04-17 NOTE — PATIENT INSTRUCTIONS
This patient is on a controlled substance which improves symptoms/quality of life and is aware of the risks, benefits and possible side-effects current treatment. The patient denies any medication side-effects at this time. A controlled substance agreement will be obtained or is currently on file. We reviewed required monitoring for controlled substances including but not limited to quarterly follow-up visits, annual depression screening, and urine drug screens to which the patient is agreeable. A KENNY report has been or shortly will be reviewed. There are no signs of deviation or misuse.     Continue medications as prescribed.  Check BP at home and keep a log for your next visit.    •In general, adults should engage in aerobic physical activity 3-4 times a week with each session lasting an average of 40 minutes.  Goal for 150 minutes per week total.  •Moderate (brisk walking or jogging) to vigorous (running or biking) physical activity is recommended.  •There are many helpful strategies for heart-healthy eating, including the DASH diet and the [employer]'s Choose My Plate.   •Patients with high blood pressure should consume no more than 2,400 mg of sodium a day, ideally reducing sodium intake to 1,500 mg a day. However, even reducing sodium intake in one's current diet by 1,000 mg each day can help lower blood pressure.    Limit alcohol consumption.    Information obtained from the American College of Cardiology and American Heart Association.  Avoid caffeine or reserve for morning only.  Avoid heavy late-night snacks (light snack at bedtime may help).  Avoid alcohol within 6 hours of bedtime.    Daily exercise improves quality of sleep and may be more effective than medication.  Avoid exercise within 4 hours of bedtime.

## 2020-04-17 NOTE — PROGRESS NOTES
Eduardo Luna  1980  [id number]  Patient Care Team:  Marlen Reza APRN as PCP - General (Internal Medicine)    Eduardo Luna is a pleasant 39 y.o. male who presents for evaluation of Anxiety; Hypertension; and Insomnia      Chief Complaint   Patient presents with   • Anxiety   • Hypertension   • Insomnia   This video visit occurred during the Coronavirus (Covid-19) public health emergency.  Patient identity has been confirmed using name and date of birth.  Patient confirmed they are physically located in [place].  I discussed with the patient the nature of our telemedicine visits that:  --I would evaluate the patient and recommend diagnostics and treatment based on my assessment.  --Our sessions are not being recorded in the personal health information is protected.  --Our team would provide follow-up care in person if/when needed.        HPI:   Hypertension Bp is good out of the office.  No cp, sob.  See readings via email.  No leg swelling with amlodipine 5 mg daily added 3/11.  Continues metoprolol 100mg    Anxiety:  Continued prozac 20mg and added buspar 5 BID after last visit which has helped with anxiety overall.     Insomnia:  Using ambien HS now and due for refill    Past Medical History:   Diagnosis Date   • Allergic     Since childhood   • Anxiety     Since childhood   • Cancer (CMS/HCC)     hodgkins lymphoma   • Colon polyp    • Depression    • GERD (gastroesophageal reflux disease)    • Hyperlipidemia    • Hypertension    • Hypothyroidism     Acquired from radiation therapy   • Kidney stone     diagnosed with C.T scan in E[facility]   • Migraine    • Sleep apnea    • SVT (supraventricular tachycardia) (CMS/HCC)      Past Surgical History:   Procedure Laterality Date   • CARDIAC ABLATION  2008    SVT   • COLONOSCOPY      Dr. Yovani Aden   • LYMPH NODE BIOPSY  11/2001    @ [facility] with Dr. Daniel Marinelli   • MOLE REMOVAL  11/2016    Dr Loyd at [facility] dermatology.    • PORTACATH PLACEMENT  01/2002    Anne  Placement @ [facility] with Dr. Daniel Marinelli   • SEPTOPLASTY  10/18/2018    Dr. Lexa Castillo   • SEPTOPLASTY, RESECTION INFERIOR TURBINATES Bilateral 10/18/2018    Procedure: SEPTOPLASTY, BILATERAL RESECTION INFERIOR TURBINATES;  Surgeon: Lexa Castillo MD;  Location: [facility];  Service: ENT   • TONSILLECTOMY  09/2013    @ [facility] with Lexa Castillo   • VASECTOMY  01/2012    @ [facility] with Dr. Aidan Castellano   • WISDOM TOOTH EXTRACTION  10/1997    @ [facility] with Dr. Momo Nunn     Family History   Problem Relation Age of Onset   • Hypertension Mother    • Cancer Mother         Soft tissue sarcoma   • Depression Mother    • Hyperlipidemia Mother    • Colon cancer Father    • Colon polyps Father    • Hypertension Father    • Hyperlipidemia Father    • Cancer Father         Colon cancer   • Breast cancer Sister    • Arthritis Maternal Grandmother    • Asthma Maternal Grandmother    • Hyperlipidemia Maternal Grandmother    • Hypertension Maternal Grandmother    • Heart disease Paternal Uncle    • Hyperlipidemia Brother    • Hypertension Brother    • Hypertension Maternal Grandfather      Social History     Tobacco Use   Smoking Status Never Smoker   Smokeless Tobacco Never Used     Allergies   Allergen Reactions   • Lisinopril Other (See Comments)     Throat tickle   • Amoxicillin Hives, Itching and Rash       Current Outpatient Medications:   •  amLODIPine (NORVASC) 5 MG tablet, Take 1 tablet by mouth Daily., Disp: 30 tablet, Rfl: 1  •  atorvastatin (LIPITOR) 10 MG tablet, Take 1 tablet by mouth Daily., Disp: 90 tablet, Rfl: 1  •  busPIRone (BUSPAR) 10 MG tablet, Take 1 tablet by mouth 2 (Two) Times a Day., Disp: 60 tablet, Rfl: 2  •  Cholecalciferol 4000 units capsule, Take 1 capsule by mouth Daily., Disp: , Rfl:   •  desonide (DESOWEN) 0.05 % cream, Apply to rash twice a day, tapering as improves. Not for long term use., Disp: 60 g, Rfl: 0  •  FLUoxetine (PROZAC) 20 MG capsule, Take 1 capsule by  mouth Daily., Disp: 90 capsule, Rfl: 1  •  levothyroxine (SYNTHROID, LEVOTHROID) 75 MCG tablet, Take 1 tablet by mouth Every Morning 1 hour prior to food or other medications, Disp: 90 tablet, Rfl: 1  •  metoprolol succinate XL (TOPROL-XL) 100 MG 24 hr tablet, Take 1 tablet by mouth Daily., Disp: 90 tablet, Rfl: 1  •  omeprazole (priLOSEC) 40 MG capsule, Take 1 capsule by mouth Daily., Disp: 90 capsule, Rfl: 1  •  zolpidem (Ambien) 5 MG tablet, Take 1 tablet by mouth At Night As Needed for Sleep., Disp: 30 tablet, Rfl: 2    Review of Systems  There were no vitals taken for this visit.    Physical Exam   Constitutional: He appears well-developed and well-nourished.   Pulmonary/Chest: Effort normal.   Skin: Skin is warm and dry.   Psychiatric: He has a normal mood and affect. His behavior is normal.   Vitals reviewed.      Results Review:  None    Assessment/Plan:  Eduardo was seen today for anxiety, hypertension and insomnia.    Diagnoses and all orders for this visit:    ANTONIA (generalized anxiety disorder)  Comments:  Continue Prozac 20 mg and increase BuSpar to 10 mg twice daily.    Essential hypertension  Comments:  Continue metoprolol 100 mg daily and amlodipine 5 mg daily    Low testosterone  Comments:  Will check labs  Orders:  -     Testosterone, Free, Total; Future    Primary insomnia  Comments:  Refill Ambien  Orders:  -     zolpidem (Ambien) 5 MG tablet; Take 1 tablet by mouth At Night As Needed for Sleep.    Other orders  -     busPIRone (BUSPAR) 10 MG tablet; Take 1 tablet by mouth 2 (Two) Times a Day.       Patient Instructions   This patient is on a controlled substance which improves symptoms/quality of life and is aware of the risks, benefits and possible side-effects current treatment. The patient denies any medication side-effects at this time. A controlled substance agreement will be obtained or is currently on file. We reviewed required monitoring for controlled substances including but not limited to  quarterly follow-up visits, annual depression screening, and urine drug screens to which the patient is agreeable. A KENNY report has been or shortly will be reviewed. There are no signs of deviation or misuse.     Continue medications as prescribed.  Check BP at home and keep a log for your next visit.    •In general, adults should engage in aerobic physical activity 3-4 times a week with each session lasting an average of 40 minutes.  Goal for 150 minutes per week total.  •Moderate (brisk walking or jogging) to vigorous (running or biking) physical activity is recommended.  •There are many helpful strategies for heart-healthy eating, including the DASH diet and the [employer]'s Choose My Plate.   •Patients with high blood pressure should consume no more than 2,400 mg of sodium a day, ideally reducing sodium intake to 1,500 mg a day. However, even reducing sodium intake in one's current diet by 1,000 mg each day can help lower blood pressure.    Limit alcohol consumption.    Information obtained from the American College of Cardiology and American Heart Association.  Avoid caffeine or reserve for morning only.  Avoid heavy late-night snacks (light snack at bedtime may help).  Avoid alcohol within 6 hours of bedtime.    Daily exercise improves quality of sleep and may be more effective than medication.  Avoid exercise within 4 hours of bedtime.      Plan of care reviewed with patient at the conclusion of today's visit. Education was provided regarding diagnosis, management and any prescribed or recommended OTC medications.  Patient verbalizes understanding of and agreement with management plan.    No follow-ups on file.    *Note that portions of this note were completed with a voice recognition program.  Efforts were made to edit the dictation but occasionally words are transcribed.    CORINNE Javier

## 2020-04-20 ENCOUNTER — LAB (OUTPATIENT)
Dept: LAB | Facility: HOSPITAL | Age: 40
End: 2020-04-20

## 2020-04-20 DIAGNOSIS — R79.89 LOW TESTOSTERONE: ICD-10-CM

## 2020-04-20 PROCEDURE — 84402 ASSAY OF FREE TESTOSTERONE: CPT

## 2020-04-20 PROCEDURE — 84403 ASSAY OF TOTAL TESTOSTERONE: CPT

## 2020-04-21 LAB
TESTOST FREE SERPL-MCNC: 8.1 PG/ML (ref 6.8–21.5)
TESTOST SERPL-MCNC: 474 NG/DL (ref 264–916)

## 2020-04-29 ENCOUNTER — PATIENT MESSAGE (OUTPATIENT)
Dept: INTERNAL MEDICINE | Facility: CLINIC | Age: 40
End: 2020-04-29

## 2020-04-30 RX ORDER — AMLODIPINE BESYLATE 5 MG/1
5 TABLET ORAL DAILY
Qty: 90 TABLET | Refills: 1 | Status: SHIPPED | OUTPATIENT
Start: 2020-04-30 | End: 2020-10-26 | Stop reason: SDUPTHER

## 2020-04-30 NOTE — TELEPHONE ENCOUNTER
From: Eduardo Luna  To: Marlen Reza APRN  Sent: 4/29/2020 5:42 PM EDT  Subject: Prescription Question    Desmond Gee,    I have 10 tabs of amlodipine 5mg. In the next few days, may I have refills for a 90 day supply?    Thanks so much! Enjoy your evening.    Eduardo Luna

## 2020-05-07 ENCOUNTER — OFFICE VISIT (OUTPATIENT)
Dept: GASTROENTEROLOGY | Facility: CLINIC | Age: 40
End: 2020-05-07

## 2020-05-07 VITALS
HEART RATE: 87 BPM | BODY MASS INDEX: 26.16 KG/M2 | SYSTOLIC BLOOD PRESSURE: 172 MMHG | WEIGHT: 190 LBS | DIASTOLIC BLOOD PRESSURE: 117 MMHG

## 2020-05-07 DIAGNOSIS — L29.0 PRURITUS ANI: Primary | ICD-10-CM

## 2020-05-07 DIAGNOSIS — Z80.0 FAMILY HISTORY OF GI MALIGNANCY: ICD-10-CM

## 2020-05-07 DIAGNOSIS — D12.6 ADENOMATOUS POLYP OF COLON, UNSPECIFIED PART OF COLON: ICD-10-CM

## 2020-05-07 PROCEDURE — 99213 OFFICE O/P EST LOW 20 MIN: CPT | Performed by: INTERNAL MEDICINE

## 2020-05-07 NOTE — PROGRESS NOTES
Answers for HPI/ROS submitted by the patient on 5/5/2020   What is the primary reason for your visit?: Other  Please describe your symptoms.: itchy, redness, burning,  inflammation on skin around anus about an hour or so after a bowel movement.  Have you had these symptoms before?: Yes  How long have you been having these symptoms?: 1-4 weeks ago  Please list any medications you are currently taking for this condition.: Calmoseptine Ointment  Please describe any probable cause for these symptoms. : possibly my diet    PCP:  Marlen Reza APRN     No referring provider defined for this encounter.    Chief Complaint   Patient presents with   • Follow-up     rectal irritation        HPI   Patient is known to me.  He has had some troubles were he is having some itching after bowel movements there is also some redness around his perianal area.  He has been using some ointment which is over-the-counter to help from that standpoint.  He does have a history of polyps.  He has a family history of polyps in her brother.  His father had colon cancer.  The patient himself has had a history of Hodgkin's lymphoma.  His last colonoscopy was in 2016.    Allergies   Allergen Reactions   • Lisinopril Other (See Comments)     Throat tickle   • Amoxicillin Hives, Itching and Rash          Current Outpatient Medications:   •  amLODIPine (NORVASC) 5 MG tablet, Take 1 tablet by mouth Daily., Disp: 90 tablet, Rfl: 1  •  atorvastatin (LIPITOR) 10 MG tablet, Take 1 tablet by mouth Daily., Disp: 90 tablet, Rfl: 1  •  busPIRone (BUSPAR) 10 MG tablet, Take 1 tablet by mouth 2 (Two) Times a Day., Disp: 60 tablet, Rfl: 2  •  Cholecalciferol 4000 units capsule, Take 1 capsule by mouth Daily., Disp: , Rfl:   •  desonide (DESOWEN) 0.05 % cream, Apply to rash twice a day, tapering as improves. Not for long term use., Disp: 60 g, Rfl: 0  •  FLUoxetine (PROZAC) 20 MG capsule, Take 1 capsule by mouth Daily., Disp: 90 capsule, Rfl: 1  •   levothyroxine (SYNTHROID, LEVOTHROID) 75 MCG tablet, Take 1 tablet by mouth Every Morning 1 hour prior to food or other medications, Disp: 90 tablet, Rfl: 1  •  metoprolol succinate XL (TOPROL-XL) 100 MG 24 hr tablet, Take 1 tablet by mouth Daily., Disp: 90 tablet, Rfl: 1  •  omeprazole (priLOSEC) 40 MG capsule, Take 1 capsule by mouth Daily., Disp: 90 capsule, Rfl: 1  •  zolpidem (Ambien) 5 MG tablet, Take 1 tablet by mouth At Night As Needed for Sleep., Disp: 30 tablet, Rfl: 2     Past Medical History:   Diagnosis Date   • Allergic     Since childhood   • Anxiety     Since childhood   • Cancer (CMS/HCC)     hodgkins lymphoma   • Colon polyp    • Depression    • GERD (gastroesophageal reflux disease)    • Hyperlipidemia    • Hypertension    • Hypothyroidism     Acquired from radiation therapy   • Kidney stone     diagnosed with C.T scan in E.R.   • Migraine    • Sleep apnea    • SVT (supraventricular tachycardia) (CMS/HCC)        Past Surgical History:   Procedure Laterality Date   • CARDIAC ABLATION  2008    SVT   • COLONOSCOPY      Dr. Yovani Aden   • LYMPH NODE BIOPSY  11/2001    @ [facility] with Dr. Daniel Marinelli   • MOLE REMOVAL  11/2016    Dr Loyd at [facility] dermatology.    • PORTACATH PLACEMENT  01/2002    Groshong Placement @ [facility] with Dr. Daniel Marinelli   • SEPTOPLASTY  10/18/2018    Dr. Lexa Castillo   • SEPTOPLASTY, RESECTION INFERIOR TURBINATES Bilateral 10/18/2018    Procedure: SEPTOPLASTY, BILATERAL RESECTION INFERIOR TURBINATES;  Surgeon: Lexa Castillo MD;  Location: [facility];  Service: ENT   • TONSILLECTOMY  09/2013    @ [facility] with Lexa Castillo   • VASECTOMY  01/2012    @ [facility] with Dr. Aidan Castellano   • WISDOM TOOTH EXTRACTION  10/1997    @ [facility] with Dr. Momo Nunn        Social History     Socioeconomic History   • Marital status: Significant Other     Spouse name: Not on file   • Number of children: Not on file   • Years of education: Not on file   • Highest education  level: Not on file   Tobacco Use   • Smoking status: Never Smoker   • Smokeless tobacco: Never Used   Substance and Sexual Activity   • Alcohol use: No   • Drug use: Never   • Sexual activity: Yes     Partners: Female     Birth control/protection: Surgical     Comment: Vasectomy        Family History   Problem Relation Age of Onset   • Hypertension Mother    • Cancer Mother         Soft tissue sarcoma   • Depression Mother    • Hyperlipidemia Mother    • Colon cancer Father    • Colon polyps Father    • Hypertension Father    • Hyperlipidemia Father    • Cancer Father         Colon cancer   • Breast cancer Sister    • Arthritis Maternal Grandmother    • Asthma Maternal Grandmother    • Hyperlipidemia Maternal Grandmother    • Hypertension Maternal Grandmother    • Heart disease Paternal Uncle    • Hyperlipidemia Brother    • Hypertension Brother    • Hypertension Maternal Grandfather         Review of Systems   Constitutional: Negative.    HENT: Negative.    Respiratory: Negative.    Cardiovascular: Negative.    Gastrointestinal: Positive for rectal pain.   Endocrine: Negative.    Musculoskeletal: Negative.    Allergic/Immunologic: Negative.    Neurological: Negative.    Hematological: Negative.    Psychiatric/Behavioral: Negative.         Vitals:    05/07/20 1530   BP: (!) 172/117   Pulse: 87        Physical Exam   General Appearance: Alert, in no acute distress   Head: Normocephalic, without obvious abnormality, atraumatic   Eyes: Lids and lashes normal, conjunctivae and sclerae normal, no icterus, no pallor, corneas clear, PERRLA   Ears: Ears appear intact with no abnormalities noted   Chest Wall: Symmetrical respiratory expansion   Extremities: Moves all extremities well, no edema, no cyanosis, no redness   Skin: No bleeding, bruising or rash, the perianal area does have some increased erythema but it is relatively mild.  There is no sign of bacterial infection.   Neurologic: Cranial nerves 2 - 12 grossly  intact, no focal deficits     Review of systems was reviewed and positives are noted. All of the remaining review of systems in that system are negative.    Eduardo was seen today for follow-up.    Diagnoses and all orders for this visit:    Pruritus ani    Family history of GI malignancy    Adenomatous polyp of colon, unspecified part of colon    Impressions and plan #1 pruritus ani: Sometimes this can be from rigorous cleaning of the area.  He is going to wipe after bowel movements.  He will take then take some moist toilet paper and dab.  He will then dab dry.  Sometimes increasing the fiber in the diet will help as well.  Evaluating the area there could be a very mild yeast infection.  He is going to try some clotrimazole around that area.  This is unlikely to do any harm.    #2 history of colon polyps and family history colon cancer: He also has had some what of a change in his bowel habits.  While I do not think there is anything too concerning he has had polyps, brother with colon polyps and a father with colon cancer.  In addition he has had Hodgkin's lymphoma in the past.  We talked about the merits of repeating a colonoscopy.  He is over 4 years from his last evaluation.  We will get that scheduled the next few months.    Yovani Aden MD

## 2020-05-08 RX ORDER — CLONIDINE HYDROCHLORIDE 0.1 MG/1
0.1 TABLET ORAL 2 TIMES DAILY PRN
Qty: 20 TABLET | Refills: 0 | Status: SHIPPED | OUTPATIENT
Start: 2020-05-08 | End: 2021-05-18 | Stop reason: SDUPTHER

## 2020-05-12 ENCOUNTER — OFFICE VISIT (OUTPATIENT)
Dept: PREADMISSION TESTING | Facility: HOSPITAL | Age: 40
End: 2020-05-12

## 2020-05-12 LAB
REF LAB TEST METHOD: NORMAL
SARS-COV-2 RNA RESP QL NAA+PROBE: NOT DETECTED

## 2020-05-12 PROCEDURE — U0004 COV-19 TEST NON-CDC HGH THRU: HCPCS | Performed by: INTERNAL MEDICINE

## 2020-05-12 PROCEDURE — U0002 COVID-19 LAB TEST NON-CDC: HCPCS | Performed by: INTERNAL MEDICINE

## 2020-05-15 ENCOUNTER — OUTSIDE FACILITY SERVICE (OUTPATIENT)
Dept: GASTROENTEROLOGY | Facility: CLINIC | Age: 40
End: 2020-05-15

## 2020-05-15 PROCEDURE — 88305 TISSUE EXAM BY PATHOLOGIST: CPT | Performed by: INTERNAL MEDICINE

## 2020-05-15 PROCEDURE — 45385 COLONOSCOPY W/LESION REMOVAL: CPT | Performed by: INTERNAL MEDICINE

## 2020-05-18 ENCOUNTER — LAB REQUISITION (OUTPATIENT)
Dept: LAB | Facility: HOSPITAL | Age: 40
End: 2020-05-18

## 2020-05-18 DIAGNOSIS — Z86.010 PERSONAL HISTORY OF COLONIC POLYPS: ICD-10-CM

## 2020-05-18 DIAGNOSIS — Z80.0 FAMILY HISTORY OF MALIGNANT NEOPLASM OF DIGESTIVE ORGANS: ICD-10-CM

## 2020-05-18 DIAGNOSIS — Z12.11 ENCOUNTER FOR SCREENING FOR MALIGNANT NEOPLASM OF COLON: ICD-10-CM

## 2020-05-28 ENCOUNTER — PATIENT MESSAGE (OUTPATIENT)
Dept: INTERNAL MEDICINE | Facility: CLINIC | Age: 40
End: 2020-05-28

## 2020-06-01 RX ORDER — BUSPIRONE HYDROCHLORIDE 7.5 MG/1
7.5 TABLET ORAL 2 TIMES DAILY
Qty: 60 TABLET | Refills: 2 | Status: SHIPPED | OUTPATIENT
Start: 2020-06-01 | End: 2020-08-25 | Stop reason: SDUPTHER

## 2020-06-29 DIAGNOSIS — E78.00 PURE HYPERCHOLESTEROLEMIA: ICD-10-CM

## 2020-06-29 RX ORDER — ATORVASTATIN CALCIUM 10 MG/1
10 TABLET, FILM COATED ORAL DAILY
Qty: 90 TABLET | Refills: 1 | Status: SHIPPED | OUTPATIENT
Start: 2020-06-29 | End: 2021-01-04 | Stop reason: SDUPTHER

## 2020-07-01 RX ORDER — FLUOXETINE HYDROCHLORIDE 40 MG/1
40 CAPSULE ORAL DAILY
Qty: 30 CAPSULE | Refills: 2 | Status: SHIPPED | OUTPATIENT
Start: 2020-07-01 | End: 2020-08-25 | Stop reason: SDUPTHER

## 2020-07-21 ENCOUNTER — OFFICE VISIT (OUTPATIENT)
Dept: INTERNAL MEDICINE | Facility: CLINIC | Age: 40
End: 2020-07-21

## 2020-07-21 VITALS
TEMPERATURE: 97.1 F | SYSTOLIC BLOOD PRESSURE: 136 MMHG | WEIGHT: 195 LBS | HEART RATE: 76 BPM | HEIGHT: 71 IN | BODY MASS INDEX: 27.3 KG/M2 | DIASTOLIC BLOOD PRESSURE: 88 MMHG

## 2020-07-21 DIAGNOSIS — F51.01 PRIMARY INSOMNIA: ICD-10-CM

## 2020-07-21 DIAGNOSIS — M25.50 ARTHRALGIA, UNSPECIFIED JOINT: ICD-10-CM

## 2020-07-21 DIAGNOSIS — F41.8 DEPRESSION WITH ANXIETY: Primary | ICD-10-CM

## 2020-07-21 DIAGNOSIS — R53.83 FATIGUE DUE TO DEPRESSION: ICD-10-CM

## 2020-07-21 DIAGNOSIS — Z79.899 LONG-TERM USE OF HIGH-RISK MEDICATION: ICD-10-CM

## 2020-07-21 DIAGNOSIS — I10 ESSENTIAL HYPERTENSION: ICD-10-CM

## 2020-07-21 DIAGNOSIS — E55.9 VITAMIN D DEFICIENCY: ICD-10-CM

## 2020-07-21 DIAGNOSIS — F32.A FATIGUE DUE TO DEPRESSION: ICD-10-CM

## 2020-07-21 DIAGNOSIS — E78.2 MIXED HYPERLIPIDEMIA: ICD-10-CM

## 2020-07-21 PROCEDURE — 99214 OFFICE O/P EST MOD 30 MIN: CPT | Performed by: NURSE PRACTITIONER

## 2020-07-21 RX ORDER — ZOLPIDEM TARTRATE 5 MG/1
5 TABLET ORAL NIGHTLY PRN
Qty: 30 TABLET | Refills: 2 | Status: SHIPPED | OUTPATIENT
Start: 2020-07-21 | End: 2020-10-09

## 2020-07-21 NOTE — PROGRESS NOTES
Eduardo Luna  1980  [id number]  Patient Care Team:  Marlen Reza APRN as PCP - General (Internal Medicine)    Eduardo Luna is a pleasant 39 y.o. male who presents for evaluation of Hypertension and Depression      Chief Complaint   Patient presents with   • Hypertension   • Depression       HPI:   Depression and sadness and mood swings, worse since last visit.  Recent increase Prozac to 40 mg on 6/30/2020 after email communication.  Denies SI. Continues BuSpar 7.5 mg twice daily.  Went up to 10 mg twice daily but he had side effects.  Helping with social anxiety.  Still working full time, no unplanned absences.    Insomnia:Sleeping well as long as he uses ambien 5 mg most nights, gets 4-5 hrs. of sleep avg    Hypertension: Overall better, compliant with meds  Past Medical History:   Diagnosis Date   • Allergic     Since childhood   • Anxiety     Since childhood   • Cancer (CMS/HCC)     hodgkins lymphoma   • Colon polyp    • Depression    • GERD (gastroesophageal reflux disease)    • Hyperlipidemia    • Hypertension    • Hypothyroidism     Acquired from radiation therapy   • Kidney stone     diagnosed with C.T scan in [facility]   • Migraine    • Sleep apnea    • SVT (supraventricular tachycardia) (CMS/HCC)      Past Surgical History:   Procedure Laterality Date   • CARDIAC ABLATION  2008    SVT   • COLONOSCOPY      Dr. Yovani Aden   • LYMPH NODE BIOPSY  11/2001    @ [facility] with Dr. Daniel Marinelli   • MOLE REMOVAL  11/2016    Dr Loyd at [facility] dermatology.    • PORTACATH PLACEMENT  01/2002    Groshong Placement @ [facility] with Dr. Daniel Marinelli   • SEPTOPLASTY  10/18/2018    Dr. Lexa Castillo   • SEPTOPLASTY, RESECTION INFERIOR TURBINATES Bilateral 10/18/2018    Procedure: SEPTOPLASTY, BILATERAL RESECTION INFERIOR TURBINATES;  Surgeon: Lexa Castillo MD;  Location: [facility];  Service: ENT   • TONSILLECTOMY  09/2013    @ [facility] with Lexa Castillo   • VASECTOMY  01/2012    @ [facility] with Dr. Aidan Castellano   •  WISDOM TOOTH EXTRACTION  10/1997    @ [facility] with Dr. Momo Nunn     Family History   Problem Relation Age of Onset   • Hypertension Mother    • Cancer Mother         Soft tissue sarcoma   • Depression Mother    • Hyperlipidemia Mother    • Colon cancer Father    • Colon polyps Father    • Hypertension Father    • Hyperlipidemia Father    • Cancer Father         Colon cancer   • Breast cancer Sister    • Arthritis Maternal Grandmother    • Asthma Maternal Grandmother    • Hyperlipidemia Maternal Grandmother    • Hypertension Maternal Grandmother    • Heart disease Paternal Uncle    • Hyperlipidemia Brother    • Hypertension Brother    • Hypertension Maternal Grandfather      Social History     Tobacco Use   Smoking Status Never Smoker   Smokeless Tobacco Never Used     Allergies   Allergen Reactions   • Lisinopril Other (See Comments)     Throat tickle   • Amoxicillin Hives, Itching and Rash       Current Outpatient Medications:   •  amLODIPine (NORVASC) 5 MG tablet, Take 1 tablet by mouth Daily., Disp: 90 tablet, Rfl: 1  •  atorvastatin (LIPITOR) 10 MG tablet, Take 1 tablet by mouth Daily., Disp: 90 tablet, Rfl: 1  •  busPIRone (BUSPAR) 7.5 MG tablet, Take 1 tablet by mouth 2 (Two) Times a Day., Disp: 60 tablet, Rfl: 2  •  Cholecalciferol 4000 units capsule, Take 1 capsule by mouth Daily., Disp: , Rfl:   •  cloNIDine (Catapres) 0.1 MG tablet, Take 1 tablet by mouth 2 (Two) Times a Day As Needed for High Blood Pressure., Disp: 20 tablet, Rfl: 0  •  desonide (DESOWEN) 0.05 % cream, Apply to rash twice a day, tapering as improves. Not for long term use., Disp: 60 g, Rfl: 0  •  FLUoxetine (PROzac) 40 MG capsule, Take 1 capsule by mouth Daily., Disp: 30 capsule, Rfl: 2  •  levothyroxine (SYNTHROID, LEVOTHROID) 75 MCG tablet, Take 1 tablet by mouth Every Morning 1 hour prior to food or other medications, Disp: 90 tablet, Rfl: 1  •  metoprolol succinate XL (TOPROL-XL) 100 MG 24 hr tablet, Take 1 tablet by  "mouth Daily., Disp: 90 tablet, Rfl: 1  •  omeprazole (priLOSEC) 40 MG capsule, Take 1 capsule by mouth Daily., Disp: 90 capsule, Rfl: 1  •  zolpidem (Ambien) 5 MG tablet, Take 1 tablet by mouth At Night As Needed for Sleep., Disp: 30 tablet, Rfl: 2    Review of Systems   Constitutional: Negative for chills, fatigue and fever.   HENT: Negative for congestion, ear pain and sinus pressure.    Eyes: Negative for blurred vision.   Respiratory: Negative for cough, chest tightness, shortness of breath and wheezing.    Cardiovascular: Negative for chest pain and palpitations.   Gastrointestinal: Negative for abdominal pain, blood in stool and constipation.   Musculoskeletal: Negative for neck pain.   Skin: Negative for color change.   Allergic/Immunologic: Negative for environmental allergies.   Neurological: Negative for dizziness, speech difficulty and headache.   Psychiatric/Behavioral: Negative for decreased concentration. The patient is not nervous/anxious.      /88 (BP Location: Left arm, Patient Position: Sitting, Cuff Size: Adult)   Pulse 76   Temp 97.1 °F (36.2 °C) (Temporal)   Ht 181.5 cm (71.46\")   Wt 88.5 kg (195 lb)   BMI 26.85 kg/m²     Physical Exam    Results Review:  None    Assessment/Plan:  Eduardo was seen today for hypertension and depression.    Diagnoses and all orders for this visit:    Depression with anxiety  Comments:  continue fluoxetine 40mg and buspar 7.5 mg BID for now, encouarge counselor  Orders:  -     Ambulatory Referral to Behavioral Health    Fatigue due to depression  Comments:  encourage counseling  Orders:  -     Vitamin B12; Future    Primary insomnia  Comments:  Refill Ambien to use prn  Orders:  -     zolpidem (Ambien) 5 MG tablet; Take 1 tablet by mouth At Night As Needed for Sleep.    Essential hypertension  Comments:  sig improvement, continue all meds and bp monitoring at home  Orders:  -     CBC & Differential; Future  -     Comprehensive Metabolic Panel; " Future    Mixed hyperlipidemia  Comments:  cont daily statin  Orders:  -     Lipid Panel; Future  -     TSH Rfx On Abnormal To Free T4; Future    Vitamin D deficiency  Comments:  continue vit d supplement, will check with labs  Orders:  -     Vitamin D 25 Hydroxy; Future    Long-term use of high-risk medication  Comments:  uds with labs for ambien rx  Orders:  -     Urine Drug Screen - Urine, Clean Catch; Future       Patient Instructions   Major Depressive Disorder, Adult  Major depressive disorder (MDD) is a mental health condition. MDD often makes you feel sad, hopeless, or helpless. MDD can also cause symptoms in your body. MDD can affect your:  · Work.  · School.  · Relationships.  · Other normal activities.  MDD can range from mild to very bad. It may occur once (single episode MDD). It can also occur many times (recurrent MDD).  The main symptoms of MDD often include:  · Feeling sad, depressed, or irritable most of the time.  · Loss of interest.  MDD symptoms also include:  · Sleeping too much or too little.  · Eating too much or too little.  · A change in your weight.  · Feeling tired (fatigue) or having low energy.  · Feeling worthless.  · Feeling guilty.  · Trouble making decisions.  · Trouble thinking clearly.  · Thoughts of suicide or harming others.  · Feeling weak.  · Feeling agitated.  · Keeping yourself from being around other people (isolation).  Follow these instructions at home:  Activity  · Do these things as told by your doctor:  ? Go back to your normal activities.  ? Exercise regularly.  ? Spend time outdoors.  Alcohol  · Talk with your doctor about how alcohol can affect your antidepressant medicines.  · Do not drink alcohol. Or, limit how much alcohol you drink.  ? This means no more than 1 drink a day for nonpregnant women and 2 drinks a day for men. One drink equals one of these:  § 12 oz of beer.  § 5 oz of wine.  § 1½ oz of hard liquor.  General instructions  · Take over-the-counter and  prescription medicines only as told by your doctor.  · Eat a healthy diet.  · Get plenty of sleep.  · Find activities that you enjoy. Make time to do them.  · Think about joining a support group. Your doctor may be able to suggest a group for you.  · Keep all follow-up visits as told by your doctor. This is important.  Where to find more information:  · National [place] on Mental Illness:  ? www.jorge.org  · [place] National [place] of Mental Health:  ? www.[facility].nih.gov  · National Suicide Prevention Lifeline:  ? 1-160.347.1375. This is free, 24-hour help.  Contact a doctor if:  · Your symptoms get worse.  · You have new symptoms.  Get help right away if:  · You self-harm.  · You see, hear, taste, smell, or feel things that are not present (hallucinate).  If you ever feel like you may hurt yourself or others, or have thoughts about taking your own life, get help right away. You can go to your nearest emergency department or call:  · Your local emergency services (911 in the [place]  · A suicide crisis helpline, such as the National Suicide Prevention Lifeline:  ? 1-902.303.4522. This is open 24 hours a day.  This information is not intended to replace advice given to you by your health care provider. Make sure you discuss any questions you have with your health care provider.  Document Released: 11/28/2016 Document Revised: 11/30/2018 Document Reviewed: 09/03/2017  [employer] Patient Education © 2020 [employer] Inc.      Plan of care reviewed with patient at the conclusion of today's visit. Education was provided regarding diagnosis, management and any prescribed or recommended OTC medications.  Patient verbalizes understanding of and agreement with management plan.    Return in about 3 months (around 10/21/2020).    *Note that portions of this note were completed with a voice recognition program.  Efforts were made to edit the dictation but occasionally words are transcribed.    CORINNE Javier          Answers  for HPI/ROS submitted by the patient on 7/14/2020   Hypertension  What is the primary reason for your visit?: High Blood Pressure  Chronicity: recurrent  Onset: more than 1 year ago  Progression since onset: waxing and waning  Condition status: controlled  anxiety: Yes  headaches: No  malaise/fatigue: No  orthopnea: No  peripheral edema: No  PND: No  sweats: No  Agents associated with hypertension: thyroid hormones  CAD risks: dyslipidemia, family history, stress  Compliance problems: diet

## 2020-07-22 ENCOUNTER — LAB (OUTPATIENT)
Dept: LAB | Facility: HOSPITAL | Age: 40
End: 2020-07-22

## 2020-07-22 DIAGNOSIS — I10 ESSENTIAL HYPERTENSION: ICD-10-CM

## 2020-07-22 DIAGNOSIS — Z79.899 LONG-TERM USE OF HIGH-RISK MEDICATION: ICD-10-CM

## 2020-07-22 DIAGNOSIS — E55.9 VITAMIN D DEFICIENCY: ICD-10-CM

## 2020-07-22 DIAGNOSIS — R53.83 FATIGUE DUE TO DEPRESSION: ICD-10-CM

## 2020-07-22 DIAGNOSIS — F32.A FATIGUE DUE TO DEPRESSION: ICD-10-CM

## 2020-07-22 DIAGNOSIS — M25.50 ARTHRALGIA, UNSPECIFIED JOINT: ICD-10-CM

## 2020-07-22 DIAGNOSIS — E78.2 MIXED HYPERLIPIDEMIA: ICD-10-CM

## 2020-07-22 LAB
25(OH)D3 SERPL-MCNC: 43 NG/ML (ref 30–100)
ALBUMIN SERPL-MCNC: 4.8 G/DL (ref 3.5–5.2)
ALBUMIN/GLOB SERPL: 1.8 G/DL
ALP SERPL-CCNC: 90 U/L (ref 39–117)
ALT SERPL W P-5'-P-CCNC: 67 U/L (ref 1–41)
AMPHET+METHAMPHET UR QL: NEGATIVE
AMPHETAMINES UR QL: NEGATIVE
ANION GAP SERPL CALCULATED.3IONS-SCNC: 10.4 MMOL/L (ref 5–15)
AST SERPL-CCNC: 32 U/L (ref 1–40)
BARBITURATES UR QL SCN: NEGATIVE
BASOPHILS # BLD AUTO: 0.02 10*3/MM3 (ref 0–0.2)
BASOPHILS NFR BLD AUTO: 0.3 % (ref 0–1.5)
BENZODIAZ UR QL SCN: POSITIVE
BILIRUB SERPL-MCNC: 0.7 MG/DL (ref 0–1.2)
BUN SERPL-MCNC: 14 MG/DL (ref 6–20)
BUN/CREAT SERPL: 15.2 (ref 7–25)
BUPRENORPHINE SERPL-MCNC: NEGATIVE NG/ML
CALCIUM SPEC-SCNC: 9.6 MG/DL (ref 8.6–10.5)
CANNABINOIDS SERPL QL: NEGATIVE
CHLORIDE SERPL-SCNC: 100 MMOL/L (ref 98–107)
CHOLEST SERPL-MCNC: 151 MG/DL (ref 0–200)
CO2 SERPL-SCNC: 28.6 MMOL/L (ref 22–29)
COCAINE UR QL: NEGATIVE
CREAT SERPL-MCNC: 0.92 MG/DL (ref 0.76–1.27)
DEPRECATED RDW RBC AUTO: 43.6 FL (ref 37–54)
EOSINOPHIL # BLD AUTO: 0.14 10*3/MM3 (ref 0–0.4)
EOSINOPHIL NFR BLD AUTO: 2.4 % (ref 0.3–6.2)
ERYTHROCYTE [DISTWIDTH] IN BLOOD BY AUTOMATED COUNT: 13.1 % (ref 12.3–15.4)
GFR SERPL CREATININE-BSD FRML MDRD: 91 ML/MIN/1.73
GLOBULIN UR ELPH-MCNC: 2.6 GM/DL
GLUCOSE SERPL-MCNC: 92 MG/DL (ref 65–99)
HCT VFR BLD AUTO: 46.2 % (ref 37.5–51)
HDLC SERPL-MCNC: 51 MG/DL (ref 40–60)
HGB BLD-MCNC: 15.9 G/DL (ref 13–17.7)
IMM GRANULOCYTES # BLD AUTO: 0.04 10*3/MM3 (ref 0–0.05)
IMM GRANULOCYTES NFR BLD AUTO: 0.7 % (ref 0–0.5)
LDLC SERPL CALC-MCNC: 87 MG/DL (ref 0–100)
LDLC/HDLC SERPL: 1.71 {RATIO}
LYMPHOCYTES # BLD AUTO: 1.13 10*3/MM3 (ref 0.7–3.1)
LYMPHOCYTES NFR BLD AUTO: 19.2 % (ref 19.6–45.3)
MCH RBC QN AUTO: 31 PG (ref 26.6–33)
MCHC RBC AUTO-ENTMCNC: 34.4 G/DL (ref 31.5–35.7)
MCV RBC AUTO: 90.1 FL (ref 79–97)
METHADONE UR QL SCN: NEGATIVE
MONOCYTES # BLD AUTO: 0.51 10*3/MM3 (ref 0.1–0.9)
MONOCYTES NFR BLD AUTO: 8.7 % (ref 5–12)
NEUTROPHILS NFR BLD AUTO: 4.05 10*3/MM3 (ref 1.7–7)
NEUTROPHILS NFR BLD AUTO: 68.7 % (ref 42.7–76)
NRBC BLD AUTO-RTO: 0.2 /100 WBC (ref 0–0.2)
OPIATES UR QL: NEGATIVE
OXYCODONE UR QL SCN: NEGATIVE
PCP UR QL SCN: NEGATIVE
PLATELET # BLD AUTO: 248 10*3/MM3 (ref 140–450)
PMV BLD AUTO: 10.5 FL (ref 6–12)
POTASSIUM SERPL-SCNC: 4.1 MMOL/L (ref 3.5–5.2)
PROPOXYPH UR QL: NEGATIVE
PROT SERPL-MCNC: 7.4 G/DL (ref 6–8.5)
RBC # BLD AUTO: 5.13 10*6/MM3 (ref 4.14–5.8)
SODIUM SERPL-SCNC: 139 MMOL/L (ref 136–145)
TRICYCLICS UR QL SCN: NEGATIVE
TRIGL SERPL-MCNC: 65 MG/DL (ref 0–150)
TSH SERPL DL<=0.05 MIU/L-ACNC: 2.51 UIU/ML (ref 0.27–4.2)
VIT B12 BLD-MCNC: 591 PG/ML (ref 211–946)
VLDLC SERPL-MCNC: 13 MG/DL (ref 5–40)
WBC # BLD AUTO: 5.89 10*3/MM3 (ref 3.4–10.8)

## 2020-07-22 PROCEDURE — 80306 DRUG TEST PRSMV INSTRMNT: CPT

## 2020-07-22 PROCEDURE — 85025 COMPLETE CBC W/AUTO DIFF WBC: CPT

## 2020-07-22 PROCEDURE — 80053 COMPREHEN METABOLIC PANEL: CPT

## 2020-07-22 PROCEDURE — 82607 VITAMIN B-12: CPT

## 2020-07-22 PROCEDURE — 80061 LIPID PANEL: CPT

## 2020-07-22 PROCEDURE — 84443 ASSAY THYROID STIM HORMONE: CPT

## 2020-07-22 PROCEDURE — 82306 VITAMIN D 25 HYDROXY: CPT

## 2020-07-22 NOTE — PATIENT INSTRUCTIONS
Major Depressive Disorder, Adult  Major depressive disorder (MDD) is a mental health condition. MDD often makes you feel sad, hopeless, or helpless. MDD can also cause symptoms in your body. MDD can affect your:  · Work.  · School.  · Relationships.  · Other normal activities.  MDD can range from mild to very bad. It may occur once (single episode MDD). It can also occur many times (recurrent MDD).  The main symptoms of MDD often include:  · Feeling sad, depressed, or irritable most of the time.  · Loss of interest.  MDD symptoms also include:  · Sleeping too much or too little.  · Eating too much or too little.  · A change in your weight.  · Feeling tired (fatigue) or having low energy.  · Feeling worthless.  · Feeling guilty.  · Trouble making decisions.  · Trouble thinking clearly.  · Thoughts of suicide or harming others.  · Feeling weak.  · Feeling agitated.  · Keeping yourself from being around other people (isolation).  Follow these instructions at home:  Activity  · Do these things as told by your doctor:  ? Go back to your normal activities.  ? Exercise regularly.  ? Spend time outdoors.  Alcohol  · Talk with your doctor about how alcohol can affect your antidepressant medicines.  · Do not drink alcohol. Or, limit how much alcohol you drink.  ? This means no more than 1 drink a day for nonpregnant women and 2 drinks a day for men. One drink equals one of these:  § 12 oz of beer.  § 5 oz of wine.  § 1½ oz of hard liquor.  General instructions  · Take over-the-counter and prescription medicines only as told by your doctor.  · Eat a healthy diet.  · Get plenty of sleep.  · Find activities that you enjoy. Make time to do them.  · Think about joining a support group. Your doctor may be able to suggest a group for you.  · Keep all follow-up visits as told by your doctor. This is important.  Where to find more information:  · National [place] on Mental Illness:  ? www.jorge.org  · [place] National [place] of Mental  Health:  ? www.[facility].gov  · National Suicide Prevention Lifeline:  ? 3-434-180-1084. This is free, 24-hour help.  Contact a doctor if:  · Your symptoms get worse.  · You have new symptoms.  Get help right away if:  · You self-harm.  · You see, hear, taste, smell, or feel things that are not present (hallucinate).  If you ever feel like you may hurt yourself or others, or have thoughts about taking your own life, get help right away. You can go to your nearest emergency department or call:  · Your local emergency services (911 in the [place]  · A suicide crisis helpline, such as the National Suicide Prevention Lifeline:  ? 3-525-019-2582. This is open 24 hours a day.  This information is not intended to replace advice given to you by your health care provider. Make sure you discuss any questions you have with your health care provider.  Document Released: 11/28/2016 Document Revised: 11/30/2018 Document Reviewed: 09/03/2017  Elsevier Patient Education © 2020 [employer]vier Inc.

## 2020-07-27 ENCOUNTER — LAB (OUTPATIENT)
Dept: LAB | Facility: HOSPITAL | Age: 40
End: 2020-07-27

## 2020-07-27 DIAGNOSIS — M25.50 PAIN IN JOINT, MULTIPLE SITES: Primary | ICD-10-CM

## 2020-07-27 PROCEDURE — 36415 COLL VENOUS BLD VENIPUNCTURE: CPT

## 2020-07-27 PROCEDURE — 86038 ANTINUCLEAR ANTIBODIES: CPT

## 2020-07-28 LAB — ANA SER QL: NEGATIVE

## 2020-08-10 RX ORDER — BUPROPION HYDROCHLORIDE 150 MG/1
150 TABLET ORAL DAILY
Qty: 30 TABLET | Refills: 2 | Status: SHIPPED | OUTPATIENT
Start: 2020-08-10 | End: 2020-09-03 | Stop reason: SDUPTHER

## 2020-08-25 ENCOUNTER — PATIENT MESSAGE (OUTPATIENT)
Dept: INTERNAL MEDICINE | Facility: CLINIC | Age: 40
End: 2020-08-25

## 2020-08-25 DIAGNOSIS — I10 ESSENTIAL HYPERTENSION: ICD-10-CM

## 2020-08-25 DIAGNOSIS — E03.9 HYPOTHYROIDISM (ACQUIRED): ICD-10-CM

## 2020-08-25 RX ORDER — METOPROLOL SUCCINATE 100 MG/1
100 TABLET, EXTENDED RELEASE ORAL DAILY
Qty: 90 TABLET | Refills: 1 | Status: SHIPPED | OUTPATIENT
Start: 2020-08-25 | End: 2021-02-26 | Stop reason: SDUPTHER

## 2020-08-25 RX ORDER — OMEPRAZOLE 40 MG/1
40 CAPSULE, DELAYED RELEASE ORAL DAILY
Qty: 90 CAPSULE | Refills: 1 | Status: SHIPPED | OUTPATIENT
Start: 2020-08-25 | End: 2021-02-26 | Stop reason: SDUPTHER

## 2020-08-25 RX ORDER — FLUOXETINE HYDROCHLORIDE 40 MG/1
40 CAPSULE ORAL DAILY
Qty: 90 CAPSULE | Refills: 1 | Status: SHIPPED | OUTPATIENT
Start: 2020-08-25 | End: 2020-10-09 | Stop reason: SDUPTHER

## 2020-08-25 RX ORDER — BUSPIRONE HYDROCHLORIDE 7.5 MG/1
7.5 TABLET ORAL 2 TIMES DAILY
Qty: 180 TABLET | Refills: 1 | Status: SHIPPED | OUTPATIENT
Start: 2020-08-25 | End: 2020-10-21

## 2020-08-25 RX ORDER — LEVOTHYROXINE SODIUM 0.07 MG/1
75 TABLET ORAL EVERY MORNING
Qty: 90 TABLET | Refills: 1 | Status: SHIPPED | OUTPATIENT
Start: 2020-08-25 | End: 2021-02-26 | Stop reason: SDUPTHER

## 2020-08-25 NOTE — TELEPHONE ENCOUNTER
From: Eduardo Luna  To: Marlen Reza APRN  Sent: 8/25/2020 5:20 AM EDT  Subject: Prescription Question    Desmond Gee,    May I have refills for these meds? (90 day supply)    Buspirone 7.5mg  Fluoxetine 40mg  Metoprolol succinate XL 100mg  Omeprazole 40mg  Levothyroxine 75 mg    Thanks so much! Enjoy your day!    Eduardo Luna

## 2020-09-03 ENCOUNTER — PATIENT MESSAGE (OUTPATIENT)
Dept: INTERNAL MEDICINE | Facility: CLINIC | Age: 40
End: 2020-09-03

## 2020-09-03 NOTE — TELEPHONE ENCOUNTER
From: Eduardo Luna  To: Marlen Reza APRN  Sent: 9/3/2020 3:30 PM EDT  Subject: Prescription Question    Desmond Gee,    May I have a prescription for a 90 day supply of Wellbutrin XL 150mg?    Thank you,    Eduardo Luna

## 2020-09-04 RX ORDER — BUPROPION HYDROCHLORIDE 150 MG/1
150 TABLET ORAL DAILY
Qty: 90 TABLET | Refills: 1 | Status: SHIPPED | OUTPATIENT
Start: 2020-09-04 | End: 2021-02-26 | Stop reason: SDUPTHER

## 2020-09-11 RX ORDER — LEVOCETIRIZINE DIHYDROCHLORIDE 5 MG/1
5 TABLET, FILM COATED ORAL EVERY EVENING
Qty: 90 TABLET | Refills: 1 | Status: SHIPPED | OUTPATIENT
Start: 2020-09-11 | End: 2021-02-26 | Stop reason: SDUPTHER

## 2020-09-14 ENCOUNTER — OFFICE VISIT (OUTPATIENT)
Dept: PSYCHIATRY | Facility: CLINIC | Age: 40
End: 2020-09-14

## 2020-09-14 DIAGNOSIS — G47.9 SLEEP DISTURBANCE: ICD-10-CM

## 2020-09-14 DIAGNOSIS — F33.1 MODERATE EPISODE OF RECURRENT MAJOR DEPRESSIVE DISORDER (HCC): ICD-10-CM

## 2020-09-14 DIAGNOSIS — F41.1 GENERALIZED ANXIETY DISORDER: Primary | ICD-10-CM

## 2020-09-14 PROCEDURE — 90792 PSYCH DIAG EVAL W/MED SRVCS: CPT | Performed by: NURSE PRACTITIONER

## 2020-09-14 RX ORDER — ESZOPICLONE 3 MG/1
3 TABLET, FILM COATED ORAL NIGHTLY
Qty: 30 TABLET | Refills: 0 | Status: SHIPPED | OUTPATIENT
Start: 2020-09-14 | End: 2020-10-12 | Stop reason: SDUPTHER

## 2020-09-14 NOTE — PROGRESS NOTES
Subjective   Eduardo Luna is a , no children  39 y.o. male who is here today for initial appointment. Patient was referred by: his PCP Marlen SUN for depression and anxiety, med management and therapy. 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 He lives in [place] in his own house shared by his fiance Teresa Meek. Patient drives his car but has to take back roads because of anxiety and panic on busier roads. He has one year of college.     Chief Complaint:  Anxiety, depression       History of Present Illness Patient is here face to face with Covid 19 precautions. The patient reports the following symptoms of generalized anxiety: constant anxiety/worry, restlessness/on edge, difficulty concentrating, mind goes blank, muscle tension and sleep disturbance. The symptoms have been increasing over the past year but has been with him he states his whole life and have caused impairment in important areas of functioning such as not being able to drive on busy streets, staying at home if not at work. He can grocery shop. He reports depression has also been a difficult problem for him since after high school. He reports his parents  each from cancer about two years apart. His mother had soft tissue sarcoma and his father had colon cancer. He states between their diagnoses he was diagnosed with Hodgkin's Lymphoma. He was treated with radiation and chemotherapy and deemed cancer free after 18 months. He worries about effects from treatment and if it will come back. He reports he feels a resilience in himself because he works and owns his own home. He has half siblings that were much older than he. His mother was 21yo and his dad 44yo when they . So feels almost like an only child in that family. He reports his parents argued a lot yelling and that made him feel insecure and scared as a child. He reports panic attacks. The patient  "reports the following panic symptoms: palpitations/pounding heart, sweating, trembling, sensation of shortness of breath, feelings of choking, chest discomfort, dizzy/light headedness, paresthesias, fear of losing control or \"going crazy\", fear of dying, persistent worry about future panic attacks and avoidance of triggers which have collectively caused impairment in important areas of functioning. Panic symptoms usually last about 30-60 minutes at a time. Panic attacks are reported approximately 4 times per month(s) by avoiding triggers, I.e. avoiding busy streets when driving. He 'love my job\" and states that was the best thing in his life having steady income, work he enjoys people who are kind around him. He reports living alone for 10 years then met someone on line from [facility]. They have lived together for 3 years and \"good to have someone in my home\". However she has mental health issues with depression, anxiety and just plays video games all day. Doesn't contribute to the house cleaning or cooking. They talk briefly about his day when he gets home and how bad she is feeling and then they are on their own. He reports going to bed at 6pm most nights. Then awakens about midnight and can't go back to sleep. He'll watch TV then. He has to be at work by 6am and works until 2:30 or 4 depending on schedule.  The patient reports depressive symptoms including depressed mood, decreased appetite, anhedonia, feelings of guilt, feelings of hopelessness, feelings of worthlessness, low energy, difficulty concentrating and sleep disturbance, disturbance of schedule, present on most days for the past 12+ month(s)  and have caused impairment in important areas of functioning. Depression rated 8/10 with 10 being the worst.     The following portions of the patient's history were reviewed and updated as appropriate: allergies, current medications, past family history, past medical history, past social history, past surgical " "history and problem list.      Past Psych History: denies suicidal attempts, denies SI/HI or AVH. Denies self harming. Denies inpatient admissions. Worked with [facility] with psych NP and a therapist. He has been trialed on Paxil (sexual side effects), sertraline (felt like in coma), alprazolam, valium, klonipin, trazodone, doxepin. He states he was placed on fluoxetine 20mg and about a month ago increased to 40mg. He feels it has helped some with mood. He was on Ambien 10mg nightly for sleep which helped some but was reduced to 5mg. He reports he has \"White Coat\" syndrome and gets hypertensive with coming to healthcare. He has history of PAT and ablation \"which took care of it\". .   Substance Abuse: denies      ABUSE HX: denies, witnessed a lot of tension yelling between his biological parents  LEGAL HX: denies       Family Psychiatric History:  family history includes Arthritis in his maternal grandmother; Asthma in his maternal grandmother; Breast cancer in his sister; Cancer in his father and mother; Colon cancer in his father; Colon polyps in his father; Depression in his mother; Heart disease in his paternal uncle; Hyperlipidemia in his brother, father, maternal grandmother, and mother; Hypertension in his brother, father, maternal grandfather, maternal grandmother, and mother.      Social History: raised by his biological parents.  Patient graduated from high school. He reports a lot of anxiety and social anxiety in elementary and middle school. High school was much better making friends. He attend one year of college. He met a girl online and they .  after three years \"too early, didn't work, she was from [place]\".  Mother and dad  both from cancer about 2 years apart and patient was treated for Hodgkin's Lymphoma at that time. He worked in his parents  business. After they  his much older 1/2 siblings and he sold the business. Patient found work with [employer] [address] as a pharmacy tech and reports it brought stability, good coworkers and has been here for 14 years. He again met someone online from [place], [place] and they have been together three years. They live together in his house he owns. She has partial disability because of mental health issues. They can't afford to . He has no children and had a vasectomy. He likes watching TV. He used to bowl with his dad but stopped that after his dad became ill with colon cancer.       Medical/Surgical History:  Past Medical History:   Diagnosis Date   • Allergic     Since childhood   • Anxiety     Since childhood   • Cancer (CMS/HCC)     hodgkins lymphoma   • Colon polyp    • Depression    • GERD (gastroesophageal reflux disease)    • Hyperlipidemia    • Hypertension    • Hypothyroidism     Acquired from radiation therapy   • Kidney stone     diagnosed with C.T scan in [facility]   • Migraine    • Sleep apnea    • SVT (supraventricular tachycardia) (CMS/HCC)      Past Surgical History:   Procedure Laterality Date   • CARDIAC ABLATION  2008    SVT   • COLONOSCOPY      Dr. Yovani Aden   • LYMPH NODE BIOPSY  11/2001    @ [facility] with Dr. Daniel Marinelli   • MOLE REMOVAL  11/2016    Dr Loyd at [facility] dermatology.    • PORTACATH PLACEMENT  01/2002    [facility] Placement @ [facility] with Dr. Daniel Marinelli   • SEPTOPLASTY  10/18/2018    Dr. Lexa Castillo   • SEPTOPLASTY, RESECTION INFERIOR TURBINATES Bilateral 10/18/2018    Procedure: SEPTOPLASTY, BILATERAL RESECTION INFERIOR TURBINATES;  Surgeon: Lexa Castillo MD;  Location: [facility];  Service: ENT   • TONSILLECTOMY  09/2013    @ [facility] with Lexa Castillo   • VASECTOMY  01/2012    @ [facility] with Dr. Aidan Castellano   • WISDOM TOOTH EXTRACTION  10/1997    @ [facility] with Dr. Momo Nunn       Allergies   Allergen Reactions   • Lisinopril Other (See Comments)     Throat tickle   • Amoxicillin Hives, Itching and Rash       Current Medications:   Current Outpatient Medications    Medication Sig Dispense Refill   • amLODIPine (NORVASC) 5 MG tablet Take 1 tablet by mouth Daily. 90 tablet 1   • atorvastatin (LIPITOR) 10 MG tablet Take 1 tablet by mouth Daily. 90 tablet 1   • buPROPion XL (Wellbutrin XL) 150 MG 24 hr tablet Take 1 tablet by mouth Daily. 90 tablet 1   • busPIRone (BUSPAR) 7.5 MG tablet Take 1 tablet by mouth 2 (Two) Times a Day. 180 tablet 1   • Cholecalciferol 4000 units capsule Take 1 capsule by mouth Daily.     • cloNIDine (Catapres) 0.1 MG tablet Take 1 tablet by mouth 2 (Two) Times a Day As Needed for High Blood Pressure. 20 tablet 0   • desonide (DESOWEN) 0.05 % cream Apply to rash twice a day, tapering as improves. Not for long term use. 60 g 0   • eszopiclone (LUNESTA) 3 MG tablet Take 1 tablet by mouth Every Night. Take immediately before bedtime 30 tablet 0   • FLUoxetine (PROzac) 40 MG capsule Take 1 capsule by mouth Daily. 90 capsule 1   • ketoconazole (NIZORAL) 2 % cream Apply to flaky areas on face twice daily as needed. 60 g 2   • levocetirizine (Xyzal) 5 MG tablet Take 1 tablet by mouth Every Evening. 90 tablet 1   • levothyroxine (SYNTHROID, LEVOTHROID) 75 MCG tablet Take 1 tablet by mouth Every Morning 1 hour prior to food or other medications 90 tablet 1   • metoprolol succinate XL (TOPROL-XL) 100 MG 24 hr tablet Take 1 tablet by mouth Daily. 90 tablet 1   • omeprazole (priLOSEC) 40 MG capsule Take 1 capsule by mouth Daily. 90 capsule 1   • zolpidem (Ambien) 5 MG tablet Take 1 tablet by mouth At Night As Needed for Sleep. 30 tablet 2     No current facility-administered medications for this visit.        Lab Results:  Lab on 07/27/2020   Component Date Value Ref Range Status   • PATRICIA Direct 07/27/2020 Negative  Negative Final   Lab on 07/22/2020   Component Date Value Ref Range Status   • Glucose 07/22/2020 92  65 - 99 mg/dL Final   • BUN 07/22/2020 14  6 - 20 mg/dL Final   • Creatinine 07/22/2020 0.92  0.76 - 1.27 mg/dL Final   • Sodium 07/22/2020 139  136 -  145 mmol/L Final   • Potassium 07/22/2020 4.1  3.5 - 5.2 mmol/L Final   • Chloride 07/22/2020 100  98 - 107 mmol/L Final   • CO2 07/22/2020 28.6  22.0 - 29.0 mmol/L Final   • Calcium 07/22/2020 9.6  8.6 - 10.5 mg/dL Final   • Total Protein 07/22/2020 7.4  6.0 - 8.5 g/dL Final   • Albumin 07/22/2020 4.80  3.50 - 5.20 g/dL Final   • ALT (SGPT) 07/22/2020 67* 1 - 41 U/L Final   • AST (SGOT) 07/22/2020 32  1 - 40 U/L Final   • Alkaline Phosphatase 07/22/2020 90  39 - 117 U/L Final   • Total Bilirubin 07/22/2020 0.7  0.0 - 1.2 mg/dL Final   • eGFR Non African Amer 07/22/2020 91  >60 mL/min/1.73 Final   • Globulin 07/22/2020 2.6  gm/dL Final   • A/G Ratio 07/22/2020 1.8  g/dL Final   • BUN/Creatinine Ratio 07/22/2020 15.2  7.0 - 25.0 Final   • Anion Gap 07/22/2020 10.4  5.0 - 15.0 mmol/L Final   • Total Cholesterol 07/22/2020 151  0 - 200 mg/dL Final   • Triglycerides 07/22/2020 65  0 - 150 mg/dL Final   • HDL Cholesterol 07/22/2020 51  40 - 60 mg/dL Final   • LDL Cholesterol  07/22/2020 87  0 - 100 mg/dL Final   • VLDL Cholesterol 07/22/2020 13  5 - 40 mg/dL Final   • LDL/HDL Ratio 07/22/2020 1.71   Final   • TSH 07/22/2020 2.510  0.270 - 4.200 uIU/mL Final   • Vitamin B-12 07/22/2020 591  211 - 946 pg/mL Final   • THC, Screen, Urine 07/22/2020 Negative  Negative Final   • Phencyclidine (PCP), Urine 07/22/2020 Negative  Negative Final   • Cocaine Screen, Urine 07/22/2020 Negative  Negative Final   • Methamphetamine, Ur 07/22/2020 Negative  Negative Final   • Opiate Screen 07/22/2020 Negative  Negative Final   • Amphetamine Screen, Urine 07/22/2020 Negative  Negative Final   • Benzodiazepine Screen, Urine 07/22/2020 Positive* Negative Final   • Tricyclic Antidepressants Screen 07/22/2020 Negative  Negative Final   • Methadone Screen, Urine 07/22/2020 Negative  Negative Final   • Barbiturates Screen, Urine 07/22/2020 Negative  Negative Final   • Oxycodone Screen, Urine 07/22/2020 Negative  Negative Final   • Propoxyphene  Screen 07/22/2020 Negative  Negative Final   • Buprenorphine, Screen, Urine 07/22/2020 Negative  Negative Final   • 25 Hydroxy, Vitamin D 07/22/2020 43.0  30.0 - 100.0 ng/ml Final   • WBC 07/22/2020 5.89  3.40 - 10.80 10*3/mm3 Final   • RBC 07/22/2020 5.13  4.14 - 5.80 10*6/mm3 Final   • Hemoglobin 07/22/2020 15.9  13.0 - 17.7 g/dL Final   • Hematocrit 07/22/2020 46.2  37.5 - 51.0 % Final   • MCV 07/22/2020 90.1  79.0 - 97.0 fL Final   • MCH 07/22/2020 31.0  26.6 - 33.0 pg Final   • MCHC 07/22/2020 34.4  31.5 - 35.7 g/dL Final   • RDW 07/22/2020 13.1  12.3 - 15.4 % Final   • RDW-SD 07/22/2020 43.6  37.0 - 54.0 fl Final   • MPV 07/22/2020 10.5  6.0 - 12.0 fL Final   • Platelets 07/22/2020 248  140 - 450 10*3/mm3 Final   • Neutrophil % 07/22/2020 68.7  42.7 - 76.0 % Final   • Lymphocyte % 07/22/2020 19.2* 19.6 - 45.3 % Final   • Monocyte % 07/22/2020 8.7  5.0 - 12.0 % Final   • Eosinophil % 07/22/2020 2.4  0.3 - 6.2 % Final   • Basophil % 07/22/2020 0.3  0.0 - 1.5 % Final   • Immature Grans % 07/22/2020 0.7* 0.0 - 0.5 % Final   • Neutrophils, Absolute 07/22/2020 4.05  1.70 - 7.00 10*3/mm3 Final   • Lymphocytes, Absolute 07/22/2020 1.13  0.70 - 3.10 10*3/mm3 Final   • Monocytes, Absolute 07/22/2020 0.51  0.10 - 0.90 10*3/mm3 Final   • Eosinophils, Absolute 07/22/2020 0.14  0.00 - 0.40 10*3/mm3 Final   • Basophils, Absolute 07/22/2020 0.02  0.00 - 0.20 10*3/mm3 Final   • Immature Grans, Absolute 07/22/2020 0.04  0.00 - 0.05 10*3/mm3 Final   • nRBC 07/22/2020 0.2  0.0 - 0.2 /100 WBC Final   Lab Requisition on 05/15/2020   Component Date Value Ref Range Status   • Case Report 05/15/2020    Final                    Value:Surgical Pathology Report                         Case: [id number]                                  Authorizing Provider:  Yovani Aden MD   Collected:           05/15/2020 09:44 AM          Ordering Location:     [facility]   Received:            05/18/2020 08:17 AM                                  LABORATORY                                                                   Pathologist:           Heber Linares MD                                                            Specimen:    Large Intestine, Transverse Colon, Transverse colon polyp                                 • Clinical Information 05/15/2020    Final                    Value:This result contains rich text formatting which cannot be displayed here.   • Final Diagnosis 05/15/2020    Final                    Value:This result contains rich text formatting which cannot be displayed here.   • Gross Description 05/15/2020    Final                    Value:This result contains rich text formatting which cannot be displayed here.   • Microscopic Description 05/15/2020    Final                    Value:This result contains rich text formatting which cannot be displayed here.   Office Visit on 05/12/2020   Component Date Value Ref Range Status   • Reference Lab Report 05/12/2020    Final    See scanned report     • COVID19 05/12/2020 Not Detected  Not Detected - Ref. Range Final   Lab on 04/20/2020   Component Date Value Ref Range Status   • Testosterone, Total 04/20/2020 474  264 - 916 ng/dL Final    Adult male reference interval is based on a population of  healthy nonobese males (BMI <30) between 19 and 39 years old.  Ana et.al. JCEM 2017,102;5448-8364. PMID: 52966872.   • Testosterone, Free 04/20/2020 8.1  6.8 - 21.5 pg/mL Final         Review of Systems Constitutional: Negative for appetite change, chills, diaphoresis, fatigue, fever and unexpected weight change.   HENT: Negative for hearing loss, sore throat, trouble swallowing and voice change.    Eyes: Negative for photophobia and visual disturbance.   Respiratory: Negative for cough, chest tightness and shortness of breath.    Cardiovascular: Negative for chest pain and palpitations.   Gastrointestinal: Negative for abdominal pain, constipation, nausea and vomiting.    Endocrine: Negative for cold intolerance and heat intolerance.   Genitourinary: Negative for dysuria and frequency.   Musculoskeletal: Negative for arthralgia, back pain, joint swelling and neck stiffness.   Skin: Negative for color change and wound.   Allergic/Immunologic: Negative for environmental allergies and immunocompromised state.   Neurological: Negative for dizziness, tremors, seizures, syncope, weakness, light-headedness and headaches.   Hematological: Negative for adenopathy. Does not bruise/bleed easily.    Objective   Physical Exam  There were no vitals taken for this visit.    ANTONIA-7:    Over the last two weeks, how often have you been bothered by the following problems?  Feeling nervous, anxious or on edge: Nearly every day  Not being able to stop or control worrying: More than half the days  Worrying too much about different things: Nearly every day  Trouble Relaxing: Nearly every day  Being so restless that it is hard to sit still: Several days  Becoming easily annoyed or irritable: Several days  Feeling afraid as if something awful might happen: More than half the days  ANTONIA 7 Total Score: 15  If you checked any problems, how difficult have these problems made it for you to do your work, take care of things at home, or get along with other people: Very difficult  0-4: Minimal anxiety  5-9: Mild anxiety  10-14: Moderate anxiety  15-21: Severe anxiety    PHQ-9:  PHQ-2/PHQ-9 Depression Screening 9/14/2020   Little interest or pleasure in doing things 2   Feeling down, depressed, or hopeless 3   Trouble falling or staying asleep, or sleeping too much 3   Feeling tired or having little energy 3   Poor appetite or overeating 1   Feeling bad about yourself - or that you are a failure or have let yourself or your family down 2   Trouble concentrating on things, such as reading the newspaper or watching television 1   Moving or speaking so slowly that other people could have noticed. Or the opposite -  being so fidgety or restless that you have been moving around a lot more than usual 0   Thoughts that you would be better off dead, or of hurting yourself in some way 0   Total Score 15   If you checked off any problems, how difficult have these problems made it for you to do your work, take care of things at home, or get along with other people? Very difficult      5-9: Minimal symptoms  10-14: Major depression mild  15-19: Major depression moderate  Greater then 20: Major depression severe    Mental Status Exam:   Appearance: appropriate  Hygiene:   good  Cooperation:  Cooperative  Eye Contact:  Good  Psychomotor Behavior:  Appropriate  Mood:  anxious and depressed  Affect:  Appropriate  Hopelessness: 4  Speech:  Normal  Thought Process:  Linear  Thought Content:  Normal  Suicidal:  None  Homicidal:  None  Hallucinations:  None  Delusion:  None  Memory:  Intact  Orientation:  Person, Place, Time and Situation  Reliability:  fair  Insight:  Fair  Judgement:  Good  Impulse Control:  Fair  Physical/Medical Issues:  No       Short-term goals: Patient will be compliant with clinic appointments.  Patient will be engaged in therapy, medication compliant with minimal side effects. Patient  will report decrease of symptoms and frequency.    Long-term goals: Patient will have minimal symptoms of mental health disorder with continued treatment. Patient will be compliant with treatment and appointments.       Problem list: ANTONIA, social anxiety, MDD recurrent moderate, sleep disturbance  Strengths: patient appears motivated for treatment          Assessment/Plan   Diagnoses and all orders for this visit:    Generalized anxiety disorder    Moderate episode of recurrent major depressive disorder (CMS/HCC)    Sleep disturbance  -     eszopiclone (LUNESTA) 3 MG tablet; Take 1 tablet by mouth Every Night. Take immediately before bedtime      A psychological evaluation was conducted in order to assess past and current level of  functioning. Areas assessed included, but were not limited to: perception of social support, perception of ability to face and deal with challenges in life (positive functioning), anxiety symptoms, depressive symptoms, perspective on beliefs/belief system, coping skills for stress, intelligence level,  Therapeutic rapport was established. Interventions conducted today were geared towards incorporating medication management along with support for continued therapy. Education was also provided as to the med management with this provider and what to expect in subsequent sessions.    Assisted patient in processing above session content; acknowledged and normalized patient’s thoughts, feelings, and concerns.  Applied  positive coping skills and behavior management in session.  Allowed patient to freely discuss issues without interruption or judgment. Provided safe, confidential environment to facilitate the development of positive therapeutic relationship and encourage open, honest communication. Assisted patient in identifying risk factors which would indicate the need for higher level of care including thoughts to harm self or others and/or self-harming behavior and encouraged patient to contact this office, call 911, or present to the nearest emergency room should any of these events occur. Discussed crisis intervention services and means to access.  Patient adamantly and convincingly denies current suicidal or homicidal ideation or perceptual disturbance.    Discussed diagnosis and recommendations for treatment:    PROVIDE: Cognitive Behavioral Therapy and Solution Focused Therapy to improve functioning, maintain stability, and avoid decompensation and the need for higher level of care.  Encouraged pt to journal daily about his day early in the evening  After journaling, relaxation techniques with breathing exercises practiced in office today  Begin walking daily or at least 5 days a week   Begin monitoring what he  is eating, healthy choices or processed foods  Read Your Body in Balance book by dr. Flaquito Melchor MD    MEDICATION MANAGEMENT RECOMMENDATIONS:  Cont fluoxetine 40mg po one qd for depression and anxiety  Increase buspar to 7.5mg po TID for anxiety   DC ambien  Begin Luesta 3mg at HS  Sleep hygiene reviewed  Move going to bed from 6pm to 8pm for several nights then 9pm several nights then 10pm so he will sleep from 10p to 5am or 9pm to 5pm whichever works    We discussed risks, benefits,goals and side effects of the above medication and the patient was agreeable with the plan.Patient was educated on the importance of compliance with treatment and follow-up appointments.To call for questions or concerns and return early if necessary. Crisis plan reviewed including going to the Emergency department.       Treatment Plan: stabilize mood,  patient will stay out of the hospital and be at optimal level of functioning, take all medication as prescribed. Patient verbalized  understanding and agreement to plan.      Return in about 16 days (around 9/30/2020). at 3pm

## 2020-10-01 ENCOUNTER — OFFICE VISIT (OUTPATIENT)
Dept: PSYCHIATRY | Facility: CLINIC | Age: 40
End: 2020-10-01

## 2020-10-01 DIAGNOSIS — G47.9 SLEEP DISTURBANCE: ICD-10-CM

## 2020-10-01 DIAGNOSIS — F33.1 MODERATE EPISODE OF RECURRENT MAJOR DEPRESSIVE DISORDER (HCC): ICD-10-CM

## 2020-10-01 DIAGNOSIS — F41.1 GENERALIZED ANXIETY DISORDER: Primary | ICD-10-CM

## 2020-10-01 DIAGNOSIS — F41.0 PANIC DISORDER: ICD-10-CM

## 2020-10-01 PROCEDURE — 99213 OFFICE O/P EST LOW 20 MIN: CPT | Performed by: NURSE PRACTITIONER

## 2020-10-01 PROCEDURE — 90836 PSYTX W PT W E/M 45 MIN: CPT | Performed by: NURSE PRACTITIONER

## 2020-10-01 NOTE — PROGRESS NOTES
"  Subjective   Eduardo Luna is a 39 y.o. male who is here today for medication management follow up. and therapy in person.     TIME IN:322  TIME OUT: 4:25pm     Chief Complaint: ANTONIA, MDD, sleep disturbance, panic    History of Present Illness Patient presents by himself. He reports tolerating the fluoxetine 40mg, now for a month and does feel less depressed reporting it to be a 3. Anxiety rates it a 4 most days but still can't drive on busy streets or has panic and avoids them. Work going well. Reports lexus Moore still suffering from depression and agoraphobia, doesn't do anything around the house, doesn't work, sleeps all day and watches TV or plays games on [place] all night. They don't share a bed and she has no libido. He does have a libido but antidepressants have sexual side effects. He is on bupropion XL to have helped with that but not really helping for that. Buspar can be of benefit but again he can't tolerate more than 7.5mg BID, when he took 10mg gave him 'weird thing in my head\". Had encouraged him to take 7.5mg TID but he forgot to take in middle of day. When told him it may help sexually he was more interested. Sleeping about the same on Lunesta 3mg nightly as he did with Ambien 5mg. He tries to go to bed later at 8pm instead of 6pm sleeps for about 4 hours and then restless night. He likes to be up by 4am and get ready for work, likes to be at work at 5:30am.  Denies adverse effects from medications.   (Scales based on 0 - 10 with 10 being the worst)    Therapy:  Start Time:3:22    Stop Time:4:10     (45 ) minutes was spent for psychotherapy. Assisted patient in processing patient's ANTONIA, MDD, . Acknowledged and normalized patient's thoughts, feelings, and concerns. Utilized cognitive behavioral therapy to assist the patient in recognizing more appropriate coping mechanisms when he becomes agitated/anxious which are proven effective in reducing the severity of frequency of symptoms.     CLINICAL " MANUEVERING/INTERVENTION:   Patient talked about current stressors, primarily having a difficult time dealing with his fivishnu's mental health. Venting of concerns was conducted. Feelings were processed and validated, both negative and positive. Flushing out worries and concerns was conducted in order to diminish his emotional tension.  Ways in which patient may take stress off himself in a purposeful manner was discussed. Patient was assisted in 'talking out' what he may do if his fivishnu's mental health continues to be a challenge as they have been together since 2016 and in early 2017 she has been depressed and anxious (had history of this prior to them together) . Venting of concerns continued what he can do to help her and what he can't and they aren't , should he stay in relationship. The patient expressed gratitude for today's session and said that counseling helps him get his worries and concerns out, validates why he feels this way. Discussed driving concerns on busy streets. Talked about exposure therapy, going a [employer] [address] for only one minute at a time and turn off. Park and think about how successful that was and congratulate himself on success. Try to do daily or any street he normally won't go on.      The following portions of the patient's history were reviewed and updated as appropriate: allergies, current medications, past family history, past medical history, past social history, past surgical history and problem list.    Review of Systems  A 14 point review of systems was performed and is negative except as noted above.    Objective   Physical Exam  There were no vitals taken for this visit.    Allergies   Allergen Reactions   • Lisinopril Other (See Comments)     Throat tickle   • Amoxicillin Hives, Itching and Rash       Current Medications:   Current Outpatient Medications   Medication Sig Dispense Refill   • amLODIPine (NORVASC) 5 MG tablet Take 1 tablet by mouth Daily. 90 tablet 1    • atorvastatin (LIPITOR) 10 MG tablet Take 1 tablet by mouth Daily. 90 tablet 1   • buPROPion XL (Wellbutrin XL) 150 MG 24 hr tablet Take 1 tablet by mouth Daily. 90 tablet 1   • busPIRone (BUSPAR) 7.5 MG tablet Take 1 tablet by mouth 2 (Two) Times a Day. 180 tablet 1   • Cholecalciferol 4000 units capsule Take 1 capsule by mouth Daily.     • cloNIDine (Catapres) 0.1 MG tablet Take 1 tablet by mouth 2 (Two) Times a Day As Needed for High Blood Pressure. 20 tablet 0   • desonide (DESOWEN) 0.05 % cream Apply to rash twice a day, tapering as improves. Not for long term use. 60 g 0   • eszopiclone (LUNESTA) 3 MG tablet Take 1 tablet by mouth Every Night. Take immediately before bedtime 30 tablet 0   • FLUoxetine (PROzac) 40 MG capsule Take 1 capsule by mouth Daily. 90 capsule 1   • ketoconazole (NIZORAL) 2 % cream Apply to flaky areas on face twice daily as needed. 60 g 2   • levocetirizine (Xyzal) 5 MG tablet Take 1 tablet by mouth Every Evening. 90 tablet 1   • levothyroxine (SYNTHROID, LEVOTHROID) 75 MCG tablet Take 1 tablet by mouth Every Morning 1 hour prior to food or other medications 90 tablet 1   • metoprolol succinate XL (TOPROL-XL) 100 MG 24 hr tablet Take 1 tablet by mouth Daily. 90 tablet 1   • omeprazole (priLOSEC) 40 MG capsule Take 1 capsule by mouth Daily. 90 capsule 1   • zolpidem (Ambien) 5 MG tablet Take 1 tablet by mouth At Night As Needed for Sleep. 30 tablet 2     No current facility-administered medications for this visit.      Appearance: WNL  Hygiene:   good  Cooperation:  Cooperative  Eye Contact:  good  Psychomotor Behavior:  denies psychomotor agitation/retardation, No EPS, No motor tics  Mood:  Less anxious, less depressed   Affect:  WNL, congruent with mood  Hopelessness: Denies  Speech:  Normal  Thought Process:  Linear  Thought Content:  Normal  Concentration: Normal   Suicidal: denies  Homicidal:  None  Hallucinations:  None  Delusion:  None  Memory:  Intact  Orientation:  Person,  Place, Time and Situation  Reliability:  good  Insight:  Fair  Judgement: good  Impulse Control: good  Estimated Intelligence: average range    KENNY REVIEWED NO RED FLAGS    Assessment/Plan   Diagnoses and all orders for this visit:    Generalized anxiety disorder    Moderate episode of recurrent major depressive disorder (CMS/HCC)    Panic disorder    Sleep disturbance          IMPRESSION: on fluoxetine 40mg x 4 weeks depressive symptoms lessened, residual anxiety but improved. Still has panic feelings thought of going on busy roads, won't go takes back roads     PLAN:   Discussed exposure therapy, going on busy road for only one minute or less, turn on and take next turn off. Try to do daily when going into work at 5:15am no real traffic.  Increase fluoxetine to 60mg total daily for anxiety   Try to take the buspirone 7.5mg tid to help with anxiety and sexual side effects from antidepressant  Take bupropion xl 150mg in morning not when he gets home from work maybe too activating and disruptive for sleep    We discussed risks, benefits, and side effects of the above medications and the patient was agreeable with the plan. Patient was educated on the importance of compliance with treatment and follow-up appointments.     Provide Cognitive Behavioral Therapy and Solution Focused Therapy to improve functioning, maintain stability, and avoid decompensation and the need for higher level of care.    Counseled patient regarding multimodal approach with encouragement of healthy nutrition, healthy sleep, regular physical mobility, social involvement, counseling, and medication compliance.     Assisted patient in identifying risk factors which would indicate the need for higher level of care including thoughts to harm self or others and/or self-harming behavior and encouraged patient to contact this office, call 911, or present to the nearest emergency room should any of these events occur. Discussed crisis intervention  services and means to access.  Patient adamantly and convincingly denies current suicidal or homicidal ideation or perceptual disturbance.    Treatment Plan: stabilize mood, patient will stay out of psychiatric hospital and be at optimal level of functioning with therapy and take all medication as prescribed. Patient verbalized  understanding and agreement to plan.    Instructed to call for questions or concerns and return early if necessary.     Greater than 50% time was spent in coordination of care, and counseling the patient regarding current assessment, symptoms, plan of care going forward, supportive therapy.  Answered any questions patient had regarding medications and plan of care.    Return in about 4 weeks (around 10/29/2020). at 3pm for one hour med check and therapy            Answers for HPI/ROS submitted by the patient on 9/30/2020   What is the primary reason for your visit?: Other  Please describe your symptoms.: Depression, anxiety, panic, insomnia  Have you had these symptoms before?: Yes  How long have you been having these symptoms?: Greater than 2 weeks  Please list any medications you are currently taking for this condition.: Buspar, prozac, wellbutrin XL, lunesta

## 2020-10-05 ENCOUNTER — LAB REQUISITION (OUTPATIENT)
Dept: LAB | Facility: HOSPITAL | Age: 40
End: 2020-10-05

## 2020-10-05 DIAGNOSIS — Z13.79 ENCOUNTER FOR OTHER SCREENING FOR GENETIC AND CHROMOSOMAL ANOMALIES: ICD-10-CM

## 2020-10-09 ENCOUNTER — PHARMACOGENOMICS (OUTPATIENT)
Dept: PHARMACY | Facility: HOSPITAL | Age: 40
End: 2020-10-09

## 2020-10-09 RX ORDER — FLUOXETINE HYDROCHLORIDE 60 MG/1
60 TABLET, FILM COATED ORAL; ORAL DAILY
COMMUNITY
End: 2020-10-29 | Stop reason: DRUGHIGH

## 2020-10-12 DIAGNOSIS — G47.9 SLEEP DISTURBANCE: ICD-10-CM

## 2020-10-12 RX ORDER — ESZOPICLONE 3 MG/1
3 TABLET, FILM COATED ORAL NIGHTLY
Qty: 30 TABLET | Refills: 0 | Status: SHIPPED | OUTPATIENT
Start: 2020-10-12 | End: 2020-10-29 | Stop reason: SDUPTHER

## 2020-10-21 ENCOUNTER — OFFICE VISIT (OUTPATIENT)
Dept: INTERNAL MEDICINE | Facility: CLINIC | Age: 40
End: 2020-10-21

## 2020-10-21 VITALS
WEIGHT: 197.4 LBS | BODY MASS INDEX: 27.64 KG/M2 | DIASTOLIC BLOOD PRESSURE: 92 MMHG | SYSTOLIC BLOOD PRESSURE: 122 MMHG | HEIGHT: 71 IN | OXYGEN SATURATION: 97 % | HEART RATE: 73 BPM | TEMPERATURE: 98 F

## 2020-10-21 DIAGNOSIS — F41.8 DEPRESSION WITH ANXIETY: ICD-10-CM

## 2020-10-21 DIAGNOSIS — F41.1 GAD (GENERALIZED ANXIETY DISORDER): ICD-10-CM

## 2020-10-21 DIAGNOSIS — I10 ESSENTIAL HYPERTENSION: Primary | ICD-10-CM

## 2020-10-21 PROCEDURE — 99214 OFFICE O/P EST MOD 30 MIN: CPT | Performed by: NURSE PRACTITIONER

## 2020-10-21 RX ORDER — BUSPIRONE HYDROCHLORIDE 7.5 MG/1
7.5 TABLET ORAL 3 TIMES DAILY
Qty: 180 TABLET | Refills: 1
Start: 2020-10-21 | End: 2020-10-29

## 2020-10-21 NOTE — PROGRESS NOTES
Eduardo Luna  1980  [id number]  Patient Care Team:  Marlen Reza APRN as PCP - General (Internal Medicine)    Eduardo Luna is a pleasant 40 y.o. male who presents for evaluation of Depression (3 mo. f/u ) and Anxiety    Chief Complaint   Patient presents with   • Depression     3 mo. f/u    • Anxiety       HPI:   MDD: Still with bouts of depression, has had 2 visits with Clare Cox.  She increased prozac to 60mg about 3 wks ago, increased buspar to TID from BID.  thinks he is having more sexual side affects as a result. Trouble with ejaculation.  Testosterone was normal in April 2020.  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    Clare changed his Ambien to Lunesta.  Sleeping well, about 5 hr night avg without freq waking.  No am groggy sx    HTN:  Home readings better, avg 109/70, no dizziness or lightheaded after adding amlodipine 5 and contien metoprolol 100mg.  Uses clonidine prn before medical appts only secondary to whitecoat hypertension.  He did not take that prior to this appointment  Past Medical History:   Diagnosis Date   • Allergic     Since childhood   • Anxiety     Since childhood   • Cancer (CMS/HCC)     hodgkins lymphoma   • Colon polyp    • Depression    • GERD (gastroesophageal reflux disease)    • Hyperlipidemia    • Hypertension    • Hypothyroidism     Acquired from radiation therapy   • Kidney stone     diagnosed with C.T scan in [place]   • Migraine    • Sleep apnea    • SVT (supraventricular tachycardia) (CMS/HCC)      Past Surgical History:   Procedure Laterality Date   • CARDIAC ABLATION  2008    SVT   • COLONOSCOPY      Dr. Yovani Aden   • LYMPH NODE BIOPSY  11/2001    @ [facility] with Dr. Daniel Marinelli   • MOLE REMOVAL  11/2016    Dr Loyd at [facility] dermatology.    • PORTACATH PLACEMENT  01/2002    [facility] Placement @ [facility] with Dr. Daniel Marinelli   • SEPTOPLASTY  10/18/2018    Dr. Lexa Castillo   • SEPTOPLASTY, RESECTION INFERIOR TURBINATES Bilateral 10/18/2018     Procedure: SEPTOPLASTY, BILATERAL RESECTION INFERIOR TURBINATES;  Surgeon: Lexa Castillo MD;  Location: [facility] OR;  Service: ENT   • TONSILLECTOMY  09/2013    @ [facility] with Lexa Castillo   • VASECTOMY  01/2012    @ [facility] with Dr. Aidan Castellano   • WISDOM TOOTH EXTRACTION  10/1997    @ [facility] with Dr. Momo Nunn     Family History   Problem Relation Age of Onset   • Hypertension Mother    • Depression Mother    • Hyperlipidemia Mother    • Cancer Mother         soft tissue sarcoma   • Colon cancer Father    • Colon polyps Father    • Hypertension Father    • Hyperlipidemia Father    • Cancer Father         Colon cancer   • Breast cancer Sister    • Arthritis Maternal Grandmother    • Asthma Maternal Grandmother    • Hyperlipidemia Maternal Grandmother    • Hypertension Maternal Grandmother    • Heart disease Paternal Uncle    • Hyperlipidemia Brother    • Hypertension Brother    • Hypertension Maternal Grandfather      Social History     Tobacco Use   Smoking Status Never Smoker   Smokeless Tobacco Never Used     Allergies   Allergen Reactions   • Lisinopril Other (See Comments)     Throat tickle   • Amoxicillin Hives, Itching and Rash       Current Outpatient Medications:   •  amLODIPine (NORVASC) 5 MG tablet, Take 1 tablet by mouth Daily., Disp: 90 tablet, Rfl: 1  •  atorvastatin (LIPITOR) 10 MG tablet, Take 1 tablet by mouth Daily., Disp: 90 tablet, Rfl: 1  •  buPROPion XL (Wellbutrin XL) 150 MG 24 hr tablet, Take 1 tablet by mouth Daily., Disp: 90 tablet, Rfl: 1  •  busPIRone (BUSPAR) 7.5 MG tablet, Take 1 tablet by mouth 3 (Three) Times a Day., Disp: 180 tablet, Rfl: 1  •  Cholecalciferol 4000 units capsule, Take 1 capsule by mouth Daily., Disp: , Rfl:   •  cloNIDine (Catapres) 0.1 MG tablet, Take 1 tablet by mouth 2 (Two) Times a Day As Needed for High Blood Pressure., Disp: 20 tablet, Rfl: 0  •  desonide (DESOWEN) 0.05 % cream, Apply to rash twice a day, tapering as improves. Not for  "long term use. (Patient taking differently: Apply  topically to the appropriate area as directed. PRN), Disp: 60 g, Rfl: 0  •  eszopiclone (LUNESTA) 3 MG tablet, Take 1 tablet by mouth Every Night. Take immediately before bedtime, Disp: 30 tablet, Rfl: 0  •  FLUoxetine (PROzac) 60 MG tablet, Take 60 mg by mouth Daily. Takes 1 40 mg and 1 20 mg tablet daily, Disp: , Rfl:   •  ketoconazole (NIZORAL) 2 % cream, Apply to flaky areas on face twice daily as needed., Disp: 60 g, Rfl: 2  •  levocetirizine (Xyzal) 5 MG tablet, Take 1 tablet by mouth Every Evening., Disp: 90 tablet, Rfl: 1  •  levothyroxine (SYNTHROID, LEVOTHROID) 75 MCG tablet, Take 1 tablet by mouth Every Morning 1 hour prior to food or other medications, Disp: 90 tablet, Rfl: 1  •  metoprolol succinate XL (TOPROL-XL) 100 MG 24 hr tablet, Take 1 tablet by mouth Daily., Disp: 90 tablet, Rfl: 1  •  omeprazole (priLOSEC) 40 MG capsule, Take 1 capsule by mouth Daily., Disp: 90 capsule, Rfl: 1    Review of Systems   Constitutional: Negative for chills, fatigue and fever.   HENT: Negative for congestion, ear pain and sinus pressure.    Respiratory: Negative for cough, chest tightness, shortness of breath and wheezing.    Cardiovascular: Negative for chest pain and palpitations.   Gastrointestinal: Negative for abdominal pain, blood in stool and constipation.   Skin: Negative for color change.   Allergic/Immunologic: Negative for environmental allergies.   Neurological: Negative for dizziness, speech difficulty and headache.   Psychiatric/Behavioral: Negative for decreased concentration. The patient is nervous/anxious.      /92 (BP Location: Left arm, Patient Position: Sitting, Cuff Size: Adult)   Pulse 73   Temp 98 °F (36.7 °C) (Temporal)   Ht 181.5 cm (71.46\")   Wt 89.5 kg (197 lb 6.4 oz)   SpO2 97%   BMI 27.18 kg/m²     Physical Exam  Constitutional:       Appearance: He is well-developed.   HENT:      Head: Normocephalic and atraumatic.      " Comments: *wearing mask  Eyes:      Conjunctiva/sclera: Conjunctivae normal.      Pupils: Pupils are equal, round, and reactive to light.   Neck:      Musculoskeletal: Normal range of motion and neck supple.   Cardiovascular:      Rate and Rhythm: Normal rate and regular rhythm.      Pulses: Normal pulses.      Heart sounds: Normal heart sounds.   Pulmonary:      Effort: Pulmonary effort is normal.      Breath sounds: Normal breath sounds.   Musculoskeletal: Normal range of motion.      Right lower leg: No edema.      Left lower leg: No edema.   Skin:     General: Skin is warm and dry.   Neurological:      Mental Status: He is alert and oriented to person, place, and time.   Psychiatric:         Mood and Affect: Mood normal.         Behavior: Behavior normal.         Thought Content: Thought content normal.         Judgment: Judgment normal.         Procedures    Results Review:  I reviewed the patient's new clinical results.    PHQ-9 Total Score:      Assessment/Plan:  Diagnoses and all orders for this visit:    1. Essential hypertension (Primary)  Comments:  Stable on current medications    2. ANTONIA (generalized anxiety disorder)  Comments:  Continue Prozac 60 mg and Wellbutrin 150 mg, upcoming appointment with Clare Cox    3. Depression with anxiety  Comments:  Continue Prozac 60 mg and Wellbutrin 150 mg, upcoming appointment with Clare Cox    Other orders  -     busPIRone (BUSPAR) 7.5 MG tablet; Take 1 tablet by mouth 3 (Three) Times a Day.  Dispense: 180 tablet; Refill: 1       There are no Patient Instructions on file for this visit.  Plan of care reviewed with patient at the conclusion of today's visit. Education was provided regarding diagnosis, management and any prescribed or recommended OTC medications.  Patient verbalizes understanding of and agreement with management plan.    Return in about 3 months (around 1/21/2021) for Recheck.    *Note that portions of this note were completed with a voice  recognition program.  Efforts were made to edit the dictation but occasionally words are transcribed.    CORINNE Javier          Answers for HPI/ROS submitted by the patient on 10/14/2020   What is the primary reason for your visit?: Other  Please describe your symptoms.: Follow up for depression & anxiety, high blood pressure  Have you had these symptoms before?: Yes  How long have you been having these symptoms?: Greater than 2 weeks  Please list any medications you are currently taking for this condition.: Prozac 60mg, Buspar 7.5mg, Norvasc 5mg, Toprol XL 100mg, Clonidine 0.1 mg PRN  Please describe any probable cause for these symptoms. : Diet

## 2020-10-23 ENCOUNTER — PHARMACOGENOMICS (OUTPATIENT)
Dept: PHARMACY | Facility: HOSPITAL | Age: 40
End: 2020-10-23

## 2020-10-26 ENCOUNTER — PATIENT MESSAGE (OUTPATIENT)
Dept: INTERNAL MEDICINE | Facility: CLINIC | Age: 40
End: 2020-10-26

## 2020-10-26 RX ORDER — AMLODIPINE BESYLATE 5 MG/1
5 TABLET ORAL DAILY
Qty: 90 TABLET | Refills: 1 | Status: SHIPPED | OUTPATIENT
Start: 2020-10-26 | End: 2021-04-12 | Stop reason: SDUPTHER

## 2020-10-26 NOTE — TELEPHONE ENCOUNTER
From: Eduardo Luna  To: CORINNE Javier  Sent: 10/26/2020 4:53 AM EDT  Subject: Prescription Question    Desmond Gee,    May I have a refill (90 day supply) for amlodipine 5mg tabs?    Thank you! Enjoy your day.    Eduardo Luna

## 2020-10-29 ENCOUNTER — OFFICE VISIT (OUTPATIENT)
Dept: PSYCHIATRY | Facility: CLINIC | Age: 40
End: 2020-10-29

## 2020-10-29 DIAGNOSIS — F41.1 GENERALIZED ANXIETY DISORDER: ICD-10-CM

## 2020-10-29 DIAGNOSIS — F41.0 PANIC DISORDER: ICD-10-CM

## 2020-10-29 DIAGNOSIS — F33.1 MODERATE EPISODE OF RECURRENT MAJOR DEPRESSIVE DISORDER (HCC): Primary | ICD-10-CM

## 2020-10-29 DIAGNOSIS — G47.9 SLEEP DISTURBANCE: ICD-10-CM

## 2020-10-29 PROCEDURE — 99214 OFFICE O/P EST MOD 30 MIN: CPT | Performed by: NURSE PRACTITIONER

## 2020-10-29 RX ORDER — ESZOPICLONE 3 MG/1
3 TABLET, FILM COATED ORAL NIGHTLY
Qty: 30 TABLET | Refills: 0 | Status: SHIPPED | OUTPATIENT
Start: 2020-11-12 | End: 2020-12-10 | Stop reason: SDUPTHER

## 2020-10-29 RX ORDER — BUSPIRONE HYDROCHLORIDE 10 MG/1
10 TABLET ORAL 2 TIMES DAILY
Qty: 60 TABLET | Refills: 0
Start: 2020-10-29 | End: 2020-11-16 | Stop reason: SDUPTHER

## 2020-10-29 RX ORDER — LAMOTRIGINE 25 MG/1
25 TABLET ORAL DAILY
Qty: 30 TABLET | Refills: 0 | Status: SHIPPED | OUTPATIENT
Start: 2020-10-29 | End: 2020-11-16 | Stop reason: SDUPTHER

## 2020-10-29 RX ORDER — FLUOXETINE HYDROCHLORIDE 40 MG/1
40 CAPSULE ORAL DAILY
Qty: 90 CAPSULE | Refills: 0
Start: 2020-10-29 | End: 2020-11-16 | Stop reason: SDUPTHER

## 2020-10-29 NOTE — PROGRESS NOTES
"  Subjective   Eduardo Luna is a 40 y.o. male who is here today for medication management follow up. face to face with Covid precautions taken.     TIME IN:2:50p  TIME OUT: 3:40pm    Chief Complaint: MDD, ANTONIA, panic, sleep disturbance    History of Present Illness Patient presents reporting he feels the Prozac may be too much, he went back to 40 mg daily from 60 mg yesterday. He reports elevated mood, \"on top of the world feeling for several days more energy and really good, then my mood crashes and I'm depressed and down for several days\". Patient states he still has sexual side effects even with increase in Buspar. He is on Wellbutrin  mg for sexual side effect, didn't help. He has had sexual side effects from all SSRI's, SNRI's ineffective for depression in him he states.  Sleep about the same getting sleep from 8p until 3am. Denies panic, denies increase in anxiety, about the same. His girl friend was diagnosed with narcolepsy and being treated. He also found her a mental health group who takes her insurance so that has allowed him some relief.  Denies adverse effects from medications.   (Scales based on 0 - 10 with 10 being the worst)      The following portions of the patient's history were reviewed and updated as appropriate: allergies, current medications, past family history, past medical history, past social history, past surgical history and problem list.    Review of Systems  A 14 point review of systems was performed and is negative except as noted above.    Objective   Physical Exam  There were no vitals taken for this visit.    Allergies   Allergen Reactions   • Lisinopril Other (See Comments)     Throat tickle   • Amoxicillin Hives, Itching and Rash       Current Medications:   Current Outpatient Medications   Medication Sig Dispense Refill   • amLODIPine (NORVASC) 5 MG tablet Take 1 tablet by mouth Daily. 90 tablet 1   • atorvastatin (LIPITOR) 10 MG tablet Take 1 tablet by mouth Daily. 90 tablet " 1   • buPROPion XL (Wellbutrin XL) 150 MG 24 hr tablet Take 1 tablet by mouth Daily. 90 tablet 1   • busPIRone (BUSPAR) 10 MG tablet Take 1 tablet by mouth 2 (two) times a day. 60 tablet 0   • Cholecalciferol 4000 units capsule Take 1 capsule by mouth Daily.     • cloNIDine (Catapres) 0.1 MG tablet Take 1 tablet by mouth 2 (Two) Times a Day As Needed for High Blood Pressure. 20 tablet 0   • desonide (DESOWEN) 0.05 % cream Apply to rash twice a day, tapering as improves. Not for long term use. (Patient taking differently: Apply  topically to the appropriate area as directed. PRN) 60 g 0   • [START ON 11/12/2020] eszopiclone (LUNESTA) 3 MG tablet Take 1 tablet by mouth Every Night. Take immediately before bedtime 30 tablet 0   • FLUoxetine (PROzac) 40 MG capsule Take 1 capsule by mouth Daily. 90 capsule 0   • ketoconazole (NIZORAL) 2 % cream Apply to flaky areas on face twice daily as needed. 60 g 2   • lamoTRIgine (LaMICtal) 25 MG tablet Take 1 tablet by mouth Daily. 30 tablet 0   • levocetirizine (Xyzal) 5 MG tablet Take 1 tablet by mouth Every Evening. 90 tablet 1   • levothyroxine (SYNTHROID, LEVOTHROID) 75 MCG tablet Take 1 tablet by mouth Every Morning 1 hour prior to food or other medications 90 tablet 1   • metoprolol succinate XL (TOPROL-XL) 100 MG 24 hr tablet Take 1 tablet by mouth Daily. 90 tablet 1   • omeprazole (priLOSEC) 40 MG capsule Take 1 capsule by mouth Daily. 90 capsule 1     No current facility-administered medications for this visit.          Appearance: WNL  Hygiene:  REPORTS good  Cooperation:  Cooperative  Eye Contact:  good  Psychomotor Behavior:  denies psychomotor agitation/retardation, No EPS, No motor tics  Mood:  within normal limits  Affect:  Congruent   Hopelessness: Denies  Speech:  Normal  Thought Process:  Linear  Thought Content:  Normal  Concentration: Normal   Suicidal: denies  Homicidal:  None  Hallucinations:  None  Delusion:  None  Memory:  Intact  Orientation:  Person,  "Place, Time and Situation  Reliability:  good  Insight:  Fair  Judgement: good  Impulse Control: good  Estimated Intelligence: average range    KENNY REVIEWED NO RED FLAGS    Assessment/Plan   Diagnoses and all orders for this visit:    1. Moderate episode of recurrent major depressive disorder (CMS/HCC) (Primary)    2. Generalized anxiety disorder    3. Sleep disturbance  -     eszopiclone (LUNESTA) 3 MG tablet; Take 1 tablet by mouth Every Night. Take immediately before bedtime  Dispense: 30 tablet; Refill: 0    4. Panic disorder    Other orders  -     lamoTRIgine (LaMICtal) 25 MG tablet; Take 1 tablet by mouth Daily.  Dispense: 30 tablet; Refill: 0  -     FLUoxetine (PROzac) 40 MG capsule; Take 1 capsule by mouth Daily.  Dispense: 90 capsule; Refill: 0  -     busPIRone (BUSPAR) 10 MG tablet; Take 1 tablet by mouth 2 (two) times a day.  Dispense: 60 tablet; Refill: 0          IMPRESSION: patient having lability in mood on fluoxetine 60mg daily, with hypomanic like episodes that can last 3 days then mood \"crashes\" depression for several days  PLAN:   Decrease fluoxetine to 40mg daily for depression and anxiety  ADD lamotrigine 25mg po one QD for mood stabilizer  Change buspar to 10mg BID instead of 7.5mg TID, see if tolerates higher dose bid, as he used to not be able to because of 'strange fogginess in head\" will trial again now that he has been taking 7.5mg and tolerating but is difficult to do consistently   Refill Lunesta 3 mg for sleep disturbance  Cont wellbutrin xl 150mg daily (this had been prescribed through his PCP for sexual side effect, able to have erection but delayed or no orgasm) has not helped, don't want to increase as he had hypomanic like episodes on increasing fluoxetine     Pt agreeable to plan       We discussed risks, benefits, and side effects and RASH on lamotrigine and what to do if pruritis or skin rash begins, stop taking, take a benadryl 25mg and let this provider know, if significant " go to ED, and RBSE of the above medications and the patient was agreeable with the plan. Patient was educated on the importance of compliance with treatment and follow-up appointments.     Cont Provide Cognitive Behavioral Therapy and Solution Focused Therapy to improve functioning, maintain stability, and avoid decompensation and the need for higher level of care.      Assisted patient in identifying risk factors which would indicate the need for higher level of care including thoughts to harm self or others and/or self-harming behavior and encouraged patient to contact this office, call 911, or present to the nearest emergency room should any of these events occur. Discussed crisis intervention services and means to access.  Patient adamantly and convincingly denies current suicidal or homicidal ideation or perceptual disturbance.    Treatment Plan: stabilize mood, patient will stay out of psychiatric hospital and be at optimal level of functioning with therapy and take all medication as prescribed. Patient verbalized  understanding and agreement to plan.    Instructed to call for questions or concerns and return early if necessary.     Greater than 50% time was spent in coordination of care, and counseling the patient regarding current assessment, symptoms, plan of care going forward, supportive therapy.  Answered any questions patient had regarding medications and plan of care.    Return in about 18 days (around 11/16/2020).

## 2020-11-16 ENCOUNTER — OFFICE VISIT (OUTPATIENT)
Dept: PSYCHIATRY | Facility: CLINIC | Age: 40
End: 2020-11-16

## 2020-11-16 DIAGNOSIS — F41.1 GENERALIZED ANXIETY DISORDER: ICD-10-CM

## 2020-11-16 DIAGNOSIS — F33.1 MODERATE EPISODE OF RECURRENT MAJOR DEPRESSIVE DISORDER (HCC): Primary | ICD-10-CM

## 2020-11-16 DIAGNOSIS — G47.9 SLEEP DISTURBANCE: ICD-10-CM

## 2020-11-16 DIAGNOSIS — F41.0 PANIC DISORDER: ICD-10-CM

## 2020-11-16 PROCEDURE — 99214 OFFICE O/P EST MOD 30 MIN: CPT | Performed by: NURSE PRACTITIONER

## 2020-11-16 RX ORDER — BUSPIRONE HYDROCHLORIDE 10 MG/1
10 TABLET ORAL 2 TIMES DAILY
Qty: 180 TABLET | Refills: 1 | Status: SHIPPED | OUTPATIENT
Start: 2020-11-16 | End: 2021-05-18 | Stop reason: SDUPTHER

## 2020-11-16 RX ORDER — LAMOTRIGINE 25 MG/1
25 TABLET ORAL DAILY
Qty: 30 TABLET | Refills: 1 | Status: SHIPPED | OUTPATIENT
Start: 2020-11-16 | End: 2020-12-28 | Stop reason: SDUPTHER

## 2020-11-16 RX ORDER — FLUOXETINE HYDROCHLORIDE 40 MG/1
40 CAPSULE ORAL DAILY
Qty: 90 CAPSULE | Refills: 1 | Status: SHIPPED | OUTPATIENT
Start: 2020-11-16 | End: 2021-05-18 | Stop reason: SDUPTHER

## 2020-11-16 NOTE — PROGRESS NOTES
"  Subjective   Eduardo Luna is a 40 y.o. male who is here today for medication management follow up. in person face to face with covid precautions, screened, masked, distance and handwashing.     TIME IN: 235pm  TIME OUT:304pm     Chief Complaint: MDD, ANTONIA, panic, sleep disturbance    History of Present Illness Patient presents reporting feels much more stable on current med management. Rates mood as \"good\". \"I'd like to stay this way\". Denies panic since doesn't drive on busy roads. Takes side streets.  He states is tolerating the lamotrigine 25mg daily  And not having any mood crashes like he had with deep depression for days. He is tolerating buspirone 10mg bid without \"dizziness\" as he has in past. Is taking fluoxetine 40mg daily and bupropion XL 150mg daily for depression and sexual side effects. He reports taking the Lunesta 3mg nightly and sleeping well. \"Work always goes well, I really like my work\". 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 Girlfriend on treatment for narcolepsy with Provigil, not seeing a lot of difference yet  . Discussed fear and panic driving on busier roads or highways. Discussed going on road only during quiet times ie early Sunday mornings and go on road for 5 minutes at a time and see if de- sensitizing himself with more successes will help. Feelings were processed and validated, both negative and positive.  Denies adverse effects from medications.   (Scales based on 0 - 10 with 10 being the worst)        The following portions of the patient's history were reviewed and updated as appropriate: allergies, current medications, past family history, past medical history, past social history, past surgical history and problem list.    Review of Systems  A 14 point review of systems was performed and is negative except as noted above.    Objective   Physical Exam  There were no vitals taken for this " visit.    Allergies   Allergen Reactions   • Lisinopril Other (See Comments)     Throat tickle   • Amoxicillin Hives, Itching and Rash       Current Medications:   Current Outpatient Medications   Medication Sig Dispense Refill   • amLODIPine (NORVASC) 5 MG tablet Take 1 tablet by mouth Daily. 90 tablet 1   • atorvastatin (LIPITOR) 10 MG tablet Take 1 tablet by mouth Daily. 90 tablet 1   • buPROPion XL (Wellbutrin XL) 150 MG 24 hr tablet Take 1 tablet by mouth Daily. 90 tablet 1   • busPIRone (BUSPAR) 10 MG tablet Take 1 tablet by mouth 2 (two) times a day. 180 tablet 1   • Cholecalciferol 4000 units capsule Take 1 capsule by mouth Daily.     • cloNIDine (Catapres) 0.1 MG tablet Take 1 tablet by mouth 2 (Two) Times a Day As Needed for High Blood Pressure. 20 tablet 0   • desonide (DESOWEN) 0.05 % cream Apply to rash twice a day, tapering as improves. Not for long term use. (Patient taking differently: Apply  topically to the appropriate area as directed. PRN) 60 g 0   • eszopiclone (LUNESTA) 3 MG tablet Take 1 tablet by mouth Every Night. Take immediately before bedtime 30 tablet 0   • FLUoxetine (PROzac) 40 MG capsule Take 1 capsule by mouth Daily. 90 capsule 1   • ketoconazole (NIZORAL) 2 % cream Apply to flaky areas on face twice daily as needed. 60 g 2   • lamoTRIgine (LaMICtal) 25 MG tablet Take 1 tablet by mouth Daily. 30 tablet 1   • levocetirizine (Xyzal) 5 MG tablet Take 1 tablet by mouth Every Evening. 90 tablet 1   • levothyroxine (SYNTHROID, LEVOTHROID) 75 MCG tablet Take 1 tablet by mouth Every Morning 1 hour prior to food or other medications 90 tablet 1   • metoprolol succinate XL (TOPROL-XL) 100 MG 24 hr tablet Take 1 tablet by mouth Daily. 90 tablet 1   • omeprazole (priLOSEC) 40 MG capsule Take 1 capsule by mouth Daily. 90 capsule 1     No current facility-administered medications for this visit.          PERFORMED:   ANTONIA-7:  11/16/2020  Over the last two weeks, how often have you been bothered by  the following problems?  Feeling nervous, anxious or on edge: Several days  Not being able to stop or control worrying: Not at all  Worrying too much about different things: Not at all  Trouble Relaxing: Not at all  Being so restless that it is hard to sit still: Not at all  Becoming easily annoyed or irritable: Not at all  Feeling afraid as if something awful might happen: Not at all  ANTONIA 7 Total Score: 1  If you checked any problems, how difficult have these problems made it for you to do your work, take care of things at home, or get along with other people: Not difficult at all  0-4: Minimal anxiety  5-9: Mild anxiety  10-14: Moderate anxiety  15-21: Severe anxiety    PHQ-9:  PHQ-2/PHQ-9 Depression Screening 11/16/2020   Little interest or pleasure in doing things 0   Feeling down, depressed, or hopeless 1   Trouble falling or staying asleep, or sleeping too much 1   Feeling tired or having little energy 0   Poor appetite or overeating 0   Feeling bad about yourself - or that you are a failure or have let yourself or your family down 0   Trouble concentrating on things, such as reading the newspaper or watching television 0   Moving or speaking so slowly that other people could have noticed. Or the opposite - being so fidgety or restless that you have been moving around a lot more than usual 0   Thoughts that you would be better off dead, or of hurting yourself in some way 0   Total Score 2   If you checked off any problems, how difficult have these problems made it for you to do your work, take care of things at home, or get along with other people? Somewhat difficult      5-9: Minimal symptoms  10-14: Major depression mild  15-19: Major depression moderate  Greater then 20: Major depression severe    Appearance: appropriate  Hygiene: good  Cooperation:  Cooperative  Eye Contact:  good  Psychomotor Behavior:  no psychomotor agitation/retardation, No EPS, No motor tics  Mood:  within normal limits  Affect:   Congruent with mood  Hopelessness: Denies  Speech:  Normal  Thought Process:  Linear  Thought Content:  Normal  Concentration: Normal   Suicidal: denies  Homicidal:  None  Hallucinations:  None  Delusion:  None  Memory:  Intact  Orientation:  Person, Place, Time and Situation  Reliability:  good  Insight:  Fair  Judgement: good  Impulse Control: good  Estimated Intelligence: average range    KENNY REVIEWED NO RED FLAGS    Assessment/Plan   Diagnoses and all orders for this visit:    1. Moderate episode of recurrent major depressive disorder (CMS/HCC) (Primary)    2. Generalized anxiety disorder    3. Sleep disturbance    4. Panic disorder    Other orders  -     busPIRone (BUSPAR) 10 MG tablet; Take 1 tablet by mouth 2 (two) times a day.  Dispense: 180 tablet; Refill: 1  -     lamoTRIgine (LaMICtal) 25 MG tablet; Take 1 tablet by mouth Daily.  Dispense: 30 tablet; Refill: 1  -     FLUoxetine (PROzac) 40 MG capsule; Take 1 capsule by mouth Daily.  Dispense: 90 capsule; Refill: 1          IMPRESSION: improved mood and stability in mood , tolerating medications well    PLAN:   Refill     -     busPIRone (BUSPAR) 10 MG tablet; Take 1 tablet by mouth 2 (two) times a day.  Dispense: 180 tablet; Refill: 1 for ANTONIA  -     lamoTRIgine (LaMICtal) 25 MG tablet; Take 1 tablet by mouth Daily.  Dispense: 30 tablet; Refill: 1   For mood stabilizer  -     FLUoxetine (PROzac) 40 MG capsule; Take 1 capsule by mouth Daily.  Dispense: 90 capsule; Refill: 1  For MDD   Cont bupropion XL 150mg daily for depression and sexual side effects with delayed or absence of orgasm     We discussed risks, benefits, and side effects of the above medications and the patient was agreeable with the plan. Patient was educated on the importance of compliance with treatment and follow-up appointments.     Provide Cognitive Behavioral Therapy and Solution Focused Therapy to improve functioning, maintain stability, and avoid decompensation and the need for  higher level of care.    Counseled patient regarding multimodal approach with encouragement of healthy nutrition, healthy sleep, regular physical mobility, social involvement, counseling, and medication compliance.     Assisted patient in identifying risk factors which would indicate the need for higher level of care including thoughts to harm self or others and/or self-harming behavior and encouraged patient to contact this office, call 911, or present to the nearest emergency room should any of these events occur. Discussed crisis intervention services and means to access.  Patient adamantly and convincingly denies current suicidal or homicidal ideation or perceptual disturbance.    Treatment Plan: stabilize mood, patient will stay out of psychiatric hospital and be at optimal level of functioning with therapy and take all medication as prescribed. Patient verbalized  understanding and agreement to plan.    Instructed to call for questions or concerns and return early if necessary.     Greater than 50% time was spent in coordination of care, and counseling the patient regarding current assessment, symptoms, plan of care going forward, supportive therapy.  Answered any questions patient had regarding medications and plan of care.    Return in about 8 weeks (around 1/11/2021).

## 2020-12-10 DIAGNOSIS — G47.9 SLEEP DISTURBANCE: ICD-10-CM

## 2020-12-10 RX ORDER — ESZOPICLONE 3 MG/1
3 TABLET, FILM COATED ORAL NIGHTLY
Qty: 30 TABLET | Refills: 0 | Status: SHIPPED | OUTPATIENT
Start: 2020-12-10 | End: 2021-01-14 | Stop reason: SDUPTHER

## 2020-12-10 NOTE — TELEPHONE ENCOUNTER
PT NEEDS REFILL  eszopiclone (LUNESTA) 3 MG tablet    CONFIRMED [facility]

## 2020-12-28 RX ORDER — LAMOTRIGINE 25 MG/1
25 TABLET ORAL DAILY
Qty: 90 TABLET | Refills: 0 | Status: SHIPPED | OUTPATIENT
Start: 2020-12-28 | End: 2021-02-26 | Stop reason: SDUPTHER

## 2021-01-04 ENCOUNTER — PATIENT MESSAGE (OUTPATIENT)
Dept: INTERNAL MEDICINE | Facility: CLINIC | Age: 41
End: 2021-01-04

## 2021-01-04 DIAGNOSIS — E78.00 PURE HYPERCHOLESTEROLEMIA: ICD-10-CM

## 2021-01-04 RX ORDER — ATORVASTATIN CALCIUM 10 MG/1
10 TABLET, FILM COATED ORAL DAILY
Qty: 90 TABLET | Refills: 1 | Status: SHIPPED | OUTPATIENT
Start: 2021-01-04 | End: 2021-05-18 | Stop reason: SDUPTHER

## 2021-01-04 NOTE — TELEPHONE ENCOUNTER
From: Eduardo Luna  To: CORINNE Javier  Sent: 1/4/2021 8:27 AM EST  Subject: Prescription Question    Desmond Gee,    May I have refills for Lipitor 10mg tabs? (90 day supply)    Thank you! Enjoy your day.    Eduardo Luna

## 2021-01-11 ENCOUNTER — IMMUNIZATION (OUTPATIENT)
Dept: VACCINE CLINIC | Facility: HOSPITAL | Age: 41
End: 2021-01-11

## 2021-01-11 PROCEDURE — 91300 HC SARSCOV02 VAC 30MCG/0.3ML IM: CPT | Performed by: INTERNAL MEDICINE

## 2021-01-11 PROCEDURE — 0001A: CPT | Performed by: INTERNAL MEDICINE

## 2021-01-12 ENCOUNTER — OFFICE VISIT (OUTPATIENT)
Dept: INTERNAL MEDICINE | Facility: CLINIC | Age: 41
End: 2021-01-12

## 2021-01-12 VITALS
DIASTOLIC BLOOD PRESSURE: 84 MMHG | TEMPERATURE: 98 F | HEIGHT: 71 IN | BODY MASS INDEX: 29.12 KG/M2 | HEART RATE: 76 BPM | WEIGHT: 208 LBS | SYSTOLIC BLOOD PRESSURE: 118 MMHG

## 2021-01-12 DIAGNOSIS — I10 ESSENTIAL HYPERTENSION: ICD-10-CM

## 2021-01-12 DIAGNOSIS — F41.1 GAD (GENERALIZED ANXIETY DISORDER): Primary | ICD-10-CM

## 2021-01-12 DIAGNOSIS — F51.01 PRIMARY INSOMNIA: ICD-10-CM

## 2021-01-12 DIAGNOSIS — G47.33 OSA (OBSTRUCTIVE SLEEP APNEA): ICD-10-CM

## 2021-01-12 DIAGNOSIS — F41.8 DEPRESSION WITH ANXIETY: ICD-10-CM

## 2021-01-12 PROBLEM — Z00.00 PREVENTATIVE HEALTH CARE: Status: RESOLVED | Noted: 2020-02-13 | Resolved: 2021-01-12

## 2021-01-12 PROBLEM — R79.89 LOW TESTOSTERONE: Status: RESOLVED | Noted: 2017-02-28 | Resolved: 2021-01-12

## 2021-01-12 PROCEDURE — 99214 OFFICE O/P EST MOD 30 MIN: CPT | Performed by: NURSE PRACTITIONER

## 2021-01-12 NOTE — PROGRESS NOTES
Eduardo Luna  1980  [id number]  Patient Care Team:  Marlen Reza APRN as PCP - General (Internal Medicine)    Eduardo Luna is a pleasant 40 y.o. male who presents for evaluation of Hypertension and Hyperlipidemia    Chief Complaint   Patient presents with   • Hypertension   • Hyperlipidemia       HPI:   ANTONIA reported as 110% better after visits and meds with Lili Cox.  Palpitations controlled on metoprolol. BP at home is normal. Did not take clonidine before this visit or last visit)  GERD:  Daily PPI with decent control, if he has not eaten in a while then eats a large meal  JAVON history:  Not wearing cpcp for 1 yr, cannot tolerate mask, sleeping ok, trouble falling and staying asleep, uses Lunesta nightly which helps.  Past Medical History:   Diagnosis Date   • Allergic     Since childhood   • Anxiety     Since childhood   • Cancer (CMS/HCC)     hodgkins lymphoma   • Colon polyp    • Depression    • GERD (gastroesophageal reflux disease)    • Hyperlipidemia    • Hypertension    • Hypothyroidism     Acquired from radiation therapy   • Kidney stone     diagnosed with C.T scan in [place]   • Migraine    • Sleep apnea    • SVT (supraventricular tachycardia) (CMS/HCC)      Past Surgical History:   Procedure Laterality Date   • CARDIAC ABLATION  2008    SVT   • COLONOSCOPY      Dr. Yovani Aden   • LYMPH NODE BIOPSY  11/2001    @ [facility] with Dr. Daniel Marinelli   • MOLE REMOVAL  11/2016    Dr Loyd at [facility] dermatology.    • PORTACATH PLACEMENT  01/2002    Groshong Placement @ [facility] with Dr. Daniel Marinelli   • SEPTOPLASTY  10/18/2018    Dr. Lexa Castillo   • SEPTOPLASTY, RESECTION INFERIOR TURBINATES Bilateral 10/18/2018    Procedure: SEPTOPLASTY, BILATERAL RESECTION INFERIOR TURBINATES;  Surgeon: Lexa Castillo MD;  Location: [facility];  Service: ENT   • TONSILLECTOMY  09/2013    @ [facility] with Lexa Castillo   • VASECTOMY  01/2012    @ [facility] with Dr. Aidan Castellano   • WISDOM TOOTH EXTRACTION  10/1997    @ [facility] with Dr. Momo Nunn     Family History   Problem Relation Age of Onset   • Hypertension Mother    • Depression Mother    • Hyperlipidemia Mother    • Cancer Mother         soft tissue sarcoma   • Colon cancer Father    • Colon polyps Father    • Hypertension Father    • Hyperlipidemia Father    • Cancer Father         Colon cancer   • Breast cancer Sister    • Arthritis Maternal Grandmother    • Asthma Maternal Grandmother    • Hyperlipidemia Maternal Grandmother    • Hypertension Maternal Grandmother    • Heart disease Paternal Uncle    • Hyperlipidemia Brother    • Hypertension Brother    • Hypertension Maternal Grandfather      Social History     Tobacco Use   Smoking Status Never Smoker   Smokeless Tobacco Never Used     Allergies   Allergen Reactions   • Lisinopril Other (See Comments)     Throat tickle   • Amoxicillin Hives, Itching and Rash       Current Outpatient Medications:   •  amLODIPine (NORVASC) 5 MG tablet, Take 1 tablet by mouth Daily., Disp: 90 tablet, Rfl: 1  •  atorvastatin (LIPITOR) 10 MG tablet, Take 1 tablet by mouth Daily., Disp: 90 tablet, Rfl: 1  •  buPROPion XL (Wellbutrin XL) 150 MG 24 hr tablet, Take 1 tablet by mouth Daily., Disp: 90 tablet, Rfl: 1  •  busPIRone (BUSPAR) 10 MG tablet, Take 1 tablet by mouth 2 (two) times a day., Disp: 180 tablet, Rfl: 1  •  Cholecalciferol 4000 units capsule, Take 1 capsule by mouth Daily., Disp: , Rfl:   •  cloNIDine (Catapres) 0.1 MG tablet, Take 1 tablet by mouth 2 (Two) Times a Day As Needed for High Blood Pressure., Disp: 20 tablet, Rfl: 0  •  desonide (DESOWEN) 0.05 % cream, Apply to rash twice a day, tapering as improves. Not for long term use. (Patient taking differently: Apply  topically to the appropriate area as directed. PRN), Disp: 60 g, Rfl: 0  •  eszopiclone (LUNESTA) 3 MG tablet, Take 1 tablet by mouth Every Night. Take immediately before bedtime, Disp: 30 tablet, Rfl: 0  •  FLUoxetine (PROzac) 40 MG capsule, Take 1  "capsule by mouth Daily., Disp: 90 capsule, Rfl: 1  •  ketoconazole (NIZORAL) 2 % cream, Apply to flaky areas on face twice daily as needed., Disp: 60 g, Rfl: 2  •  lamoTRIgine (LaMICtal) 25 MG tablet, Take 1 tablet by mouth Daily., Disp: 90 tablet, Rfl: 0  •  levocetirizine (Xyzal) 5 MG tablet, Take 1 tablet by mouth Every Evening., Disp: 90 tablet, Rfl: 1  •  levothyroxine (SYNTHROID, LEVOTHROID) 75 MCG tablet, Take 1 tablet by mouth Every Morning 1 hour prior to food or other medications, Disp: 90 tablet, Rfl: 1  •  metoprolol succinate XL (TOPROL-XL) 100 MG 24 hr tablet, Take 1 tablet by mouth Daily., Disp: 90 tablet, Rfl: 1  •  omeprazole (priLOSEC) 40 MG capsule, Take 1 capsule by mouth Daily., Disp: 90 capsule, Rfl: 1    Review of Systems   Constitutional: Negative for chills, fatigue and fever.   HENT: Negative for congestion, ear pain and sinus pressure.    Respiratory: Negative for cough, chest tightness, shortness of breath and wheezing.    Cardiovascular: Negative for chest pain and palpitations.   Gastrointestinal: Negative for abdominal pain, blood in stool and constipation.   Skin: Negative for color change.   Allergic/Immunologic: Negative for environmental allergies.   Neurological: Negative for dizziness, speech difficulty and headache.   Psychiatric/Behavioral: Negative for decreased concentration. The patient is not nervous/anxious.      /84 (BP Location: Left arm, Patient Position: Sitting, Cuff Size: Adult)   Pulse 76   Temp 98 °F (36.7 °C) (Temporal)   Ht 181.5 cm (71.46\")   Wt 94.3 kg (208 lb)   BMI 28.64 kg/m²     Physical Exam  Constitutional:       Appearance: He is well-developed.   HENT:      Head: Normocephalic and atraumatic.      Comments: *wearing mask  Eyes:      Conjunctiva/sclera: Conjunctivae normal.      Pupils: Pupils are equal, round, and reactive to light.   Neck:      Musculoskeletal: Normal range of motion and neck supple.   Cardiovascular:      Rate and Rhythm: " Normal rate and regular rhythm.      Pulses: Normal pulses.      Heart sounds: Normal heart sounds.   Pulmonary:      Effort: Pulmonary effort is normal.      Breath sounds: Normal breath sounds.   Musculoskeletal: Normal range of motion.      Right lower leg: No edema.      Left lower leg: No edema.   Skin:     General: Skin is warm and dry.   Neurological:      Mental Status: He is alert and oriented to person, place, and time.   Psychiatric:         Mood and Affect: Mood normal.         Behavior: Behavior normal.         Thought Content: Thought content normal.         Judgment: Judgment normal.         Procedures    Results Review:  None    PHQ-9 Total Score: 0    Assessment/Plan:  Diagnoses and all orders for this visit:    1. ANTONIA (generalized anxiety disorder) (Primary)  Comments:  doing well on current meds, will see Lili Cox prn now    2. Depression with anxiety  Comments:  doing well on current meds, will see Lili vazn now    3. JAVON (obstructive sleep apnea)  Comments:  not wearing cpap    4. Primary insomnia  Comments:  continue Lunesta nightly    5. Essential hypertension  Comments:  good control on current meds       Patient Instructions   Continue medications as prescribed.  Check BP at home and keep a log for your next visit.    •In general, adults should engage in aerobic physical activity 3-4 times a week with each session lasting an average of 40 minutes.  Goal for 150 minutes per week total.  •Moderate (brisk walking or jogging) to vigorous (running or biking) physical activity is recommended.  •There are many helpful strategies for heart-healthy eating, including the DASH diet and the [employer]'s Choose My Plate.   •Patients with high blood pressure should consume no more than 2,400 mg of sodium a day, ideally reducing sodium intake to 1,500 mg a day. However, even reducing sodium intake in one's current diet by 1,000 mg each day can help lower blood pressure.    Limit alcohol  consumption.    Information obtained from the American College of Cardiology and American Heart Association.      Plan of care reviewed with patient at the conclusion of today's visit. Education was provided regarding diagnosis, management and any prescribed or recommended OTC medications.  Patient verbalizes understanding of and agreement with management plan.    Return in about 6 months (around 7/12/2021) for Annual physical, Labs next visit.    *Note that portions of this note were completed with a voice recognition program.  Efforts were made to edit the dictation but occasionally words are transcribed.    CORINNE Javier          Answers for HPI/ROS submitted by the patient on 1/5/2021   What is the primary reason for your visit?: Other  Please describe your symptoms.: Follow up for high blood pressure (white coat syndrome) depression, anxiety, panic, and insomnia  Have you had these symptoms before?: Yes  How long have you been having these symptoms?: Greater than 2 weeks  Please list any medications you are currently taking for this condition.: Buspar 10mg, Prozac 40mg, welbutrin xl 150mg, clonidine 0.1mg prn, lunesta 3mg

## 2021-01-12 NOTE — PATIENT INSTRUCTIONS
Continue medications as prescribed.  Check BP at home and keep a log for your next visit.    •In general, adults should engage in aerobic physical activity 3-4 times a week with each session lasting an average of 40 minutes.  Goal for 150 minutes per week total.  •Moderate (brisk walking or jogging) to vigorous (running or biking) physical activity is recommended.  •There are many helpful strategies for heart-healthy eating, including the DASH diet and the [employer] learning's Choose My Plate.   •Patients with high blood pressure should consume no more than 2,400 mg of sodium a day, ideally reducing sodium intake to 1,500 mg a day. However, even reducing sodium intake in one's current diet by 1,000 mg each day can help lower blood pressure.    Limit alcohol consumption.    Information obtained from the American College of Cardiology and American Heart Association.

## 2021-01-14 ENCOUNTER — APPOINTMENT (OUTPATIENT)
Dept: VACCINE CLINIC | Facility: HOSPITAL | Age: 41
End: 2021-01-14

## 2021-01-14 DIAGNOSIS — G47.9 SLEEP DISTURBANCE: ICD-10-CM

## 2021-01-14 RX ORDER — ESZOPICLONE 3 MG/1
3 TABLET, FILM COATED ORAL NIGHTLY
Qty: 30 TABLET | Refills: 0 | Status: SHIPPED | OUTPATIENT
Start: 2021-01-14 | End: 2021-02-10 | Stop reason: SDUPTHER

## 2021-01-19 ENCOUNTER — APPOINTMENT (OUTPATIENT)
Dept: VACCINE CLINIC | Facility: HOSPITAL | Age: 41
End: 2021-01-19

## 2021-02-01 ENCOUNTER — IMMUNIZATION (OUTPATIENT)
Dept: VACCINE CLINIC | Facility: HOSPITAL | Age: 41
End: 2021-02-01

## 2021-02-01 PROCEDURE — 0002A: CPT | Performed by: INTERNAL MEDICINE

## 2021-02-01 PROCEDURE — 91300 HC SARSCOV02 VAC 30MCG/0.3ML IM: CPT | Performed by: INTERNAL MEDICINE

## 2021-02-10 DIAGNOSIS — G47.9 SLEEP DISTURBANCE: ICD-10-CM

## 2021-02-10 RX ORDER — ESZOPICLONE 3 MG/1
3 TABLET, FILM COATED ORAL NIGHTLY
Qty: 30 TABLET | Refills: 2 | Status: SHIPPED | OUTPATIENT
Start: 2021-02-10 | End: 2021-05-10 | Stop reason: SDUPTHER

## 2021-02-26 ENCOUNTER — PATIENT MESSAGE (OUTPATIENT)
Dept: INTERNAL MEDICINE | Facility: CLINIC | Age: 41
End: 2021-02-26

## 2021-02-26 DIAGNOSIS — E03.9 HYPOTHYROIDISM (ACQUIRED): ICD-10-CM

## 2021-02-26 DIAGNOSIS — I10 ESSENTIAL HYPERTENSION: ICD-10-CM

## 2021-02-26 RX ORDER — OMEPRAZOLE 40 MG/1
40 CAPSULE, DELAYED RELEASE ORAL DAILY
Qty: 90 CAPSULE | Refills: 1 | Status: SHIPPED | OUTPATIENT
Start: 2021-02-26 | End: 2021-09-03

## 2021-02-26 RX ORDER — BUPROPION HYDROCHLORIDE 150 MG/1
150 TABLET ORAL DAILY
Qty: 90 TABLET | Refills: 1 | Status: SHIPPED | OUTPATIENT
Start: 2021-02-26 | End: 2021-08-11 | Stop reason: SDUPTHER

## 2021-02-26 RX ORDER — LAMOTRIGINE 25 MG/1
50 TABLET ORAL DAILY
Qty: 180 TABLET | Refills: 0 | Status: SHIPPED | OUTPATIENT
Start: 2021-02-26 | End: 2021-05-18 | Stop reason: SDUPTHER

## 2021-02-26 RX ORDER — LEVOTHYROXINE SODIUM 0.07 MG/1
75 TABLET ORAL EVERY MORNING
Qty: 90 TABLET | Refills: 1 | Status: SHIPPED | OUTPATIENT
Start: 2021-02-26 | End: 2021-09-07 | Stop reason: SDUPTHER

## 2021-02-26 RX ORDER — LEVOCETIRIZINE DIHYDROCHLORIDE 5 MG/1
5 TABLET, FILM COATED ORAL EVERY EVENING
Qty: 90 TABLET | Refills: 1 | Status: SHIPPED | OUTPATIENT
Start: 2021-02-26 | End: 2021-09-22 | Stop reason: SDUPTHER

## 2021-02-26 RX ORDER — METOPROLOL SUCCINATE 100 MG/1
100 TABLET, EXTENDED RELEASE ORAL DAILY
Qty: 90 TABLET | Refills: 1 | Status: SHIPPED | OUTPATIENT
Start: 2021-02-26 | End: 2021-09-07 | Stop reason: SDUPTHER

## 2021-02-26 NOTE — TELEPHONE ENCOUNTER
From: Eduardo Luna  To: CORINNE Javier  Sent: 2/26/2021 5:55 AM EST  Subject: Prescription Question    Desmond Gee,    May I have refills for these medications? (90 day supply)    Lamotrigine 50mg- this dose is working with no side effects. (Been taking 2 of the 25 mg tabs)  Levocetirizine 5mg  Wellbutrin XL 150mg  Metoprolol succinate XL 100mg  Omeprazole 40mg  Levothyroxine 75mcg    Thank you. I'll see you at my next appointment.    Eduardo Luna

## 2021-03-18 RX ORDER — HYDROCORTISONE 25 MG/G
CREAM TOPICAL
Qty: 28.35 G | Refills: 1 | Status: SHIPPED | OUTPATIENT
Start: 2021-03-18 | End: 2023-01-18

## 2021-03-30 ENCOUNTER — OFFICE VISIT (OUTPATIENT)
Dept: INTERNAL MEDICINE | Facility: CLINIC | Age: 41
End: 2021-03-30

## 2021-03-30 ENCOUNTER — LAB (OUTPATIENT)
Dept: LAB | Facility: HOSPITAL | Age: 41
End: 2021-03-30

## 2021-03-30 VITALS
SYSTOLIC BLOOD PRESSURE: 120 MMHG | BODY MASS INDEX: 28.98 KG/M2 | HEART RATE: 60 BPM | TEMPERATURE: 98 F | DIASTOLIC BLOOD PRESSURE: 76 MMHG | WEIGHT: 207 LBS | HEIGHT: 71 IN

## 2021-03-30 DIAGNOSIS — I10 ESSENTIAL HYPERTENSION: ICD-10-CM

## 2021-03-30 DIAGNOSIS — R00.2 PALPITATIONS: ICD-10-CM

## 2021-03-30 DIAGNOSIS — R00.2 PALPITATIONS: Primary | ICD-10-CM

## 2021-03-30 LAB
ALBUMIN SERPL-MCNC: 4.4 G/DL (ref 3.5–5.2)
ALBUMIN/GLOB SERPL: 1.8 G/DL
ALP SERPL-CCNC: 97 U/L (ref 39–117)
ALT SERPL W P-5'-P-CCNC: 34 U/L (ref 1–41)
ANION GAP SERPL CALCULATED.3IONS-SCNC: 11.1 MMOL/L (ref 5–15)
AST SERPL-CCNC: 17 U/L (ref 1–40)
BASOPHILS # BLD AUTO: 0.02 10*3/MM3 (ref 0–0.2)
BASOPHILS NFR BLD AUTO: 0.4 % (ref 0–1.5)
BILIRUB SERPL-MCNC: 0.3 MG/DL (ref 0–1.2)
BUN SERPL-MCNC: 11 MG/DL (ref 6–20)
BUN/CREAT SERPL: 12.6 (ref 7–25)
CALCIUM SPEC-SCNC: 9.2 MG/DL (ref 8.6–10.5)
CHLORIDE SERPL-SCNC: 106 MMOL/L (ref 98–107)
CO2 SERPL-SCNC: 27.9 MMOL/L (ref 22–29)
CREAT SERPL-MCNC: 0.87 MG/DL (ref 0.76–1.27)
DEPRECATED RDW RBC AUTO: 42.3 FL (ref 37–54)
EOSINOPHIL # BLD AUTO: 0.12 10*3/MM3 (ref 0–0.4)
EOSINOPHIL NFR BLD AUTO: 2.4 % (ref 0.3–6.2)
ERYTHROCYTE [DISTWIDTH] IN BLOOD BY AUTOMATED COUNT: 13 % (ref 12.3–15.4)
GFR SERPL CREATININE-BSD FRML MDRD: 97 ML/MIN/1.73
GLOBULIN UR ELPH-MCNC: 2.4 GM/DL
GLUCOSE SERPL-MCNC: 95 MG/DL (ref 65–99)
HCT VFR BLD AUTO: 46.3 % (ref 37.5–51)
HGB BLD-MCNC: 15.9 G/DL (ref 13–17.7)
IMM GRANULOCYTES # BLD AUTO: 0.01 10*3/MM3 (ref 0–0.05)
IMM GRANULOCYTES NFR BLD AUTO: 0.2 % (ref 0–0.5)
LYMPHOCYTES # BLD AUTO: 1.05 10*3/MM3 (ref 0.7–3.1)
LYMPHOCYTES NFR BLD AUTO: 21.3 % (ref 19.6–45.3)
MCH RBC QN AUTO: 30.8 PG (ref 26.6–33)
MCHC RBC AUTO-ENTMCNC: 34.3 G/DL (ref 31.5–35.7)
MCV RBC AUTO: 89.7 FL (ref 79–97)
MONOCYTES # BLD AUTO: 0.44 10*3/MM3 (ref 0.1–0.9)
MONOCYTES NFR BLD AUTO: 8.9 % (ref 5–12)
NEUTROPHILS NFR BLD AUTO: 3.3 10*3/MM3 (ref 1.7–7)
NEUTROPHILS NFR BLD AUTO: 66.8 % (ref 42.7–76)
NRBC BLD AUTO-RTO: 0 /100 WBC (ref 0–0.2)
PLATELET # BLD AUTO: 257 10*3/MM3 (ref 140–450)
PMV BLD AUTO: 10.8 FL (ref 6–12)
POTASSIUM SERPL-SCNC: 4.3 MMOL/L (ref 3.5–5.2)
PROT SERPL-MCNC: 6.8 G/DL (ref 6–8.5)
RBC # BLD AUTO: 5.16 10*6/MM3 (ref 4.14–5.8)
SODIUM SERPL-SCNC: 145 MMOL/L (ref 136–145)
TSH SERPL DL<=0.05 MIU/L-ACNC: 2.68 UIU/ML (ref 0.27–4.2)
WBC # BLD AUTO: 4.94 10*3/MM3 (ref 3.4–10.8)

## 2021-03-30 PROCEDURE — 93000 ELECTROCARDIOGRAM COMPLETE: CPT | Performed by: NURSE PRACTITIONER

## 2021-03-30 PROCEDURE — 84443 ASSAY THYROID STIM HORMONE: CPT

## 2021-03-30 PROCEDURE — 85025 COMPLETE CBC W/AUTO DIFF WBC: CPT

## 2021-03-30 PROCEDURE — 99214 OFFICE O/P EST MOD 30 MIN: CPT | Performed by: NURSE PRACTITIONER

## 2021-03-30 PROCEDURE — 80053 COMPREHEN METABOLIC PANEL: CPT

## 2021-03-30 NOTE — PROGRESS NOTES
Eduardo Luna  1980  [id number]  Patient Care Team:  Marlen Reza APRN as PCP - General (Internal Medicine)    Eduardo Luna is a pleasant 40 y.o. male who presents for evaluation of Palpitations    Chief Complaint   Patient presents with   • Palpitations       HPI:   Palpiatations and fluttering without sob, not new.  Chemo and radiation in past for Hodgkin's lymphoma ([id number]) and discussed cardiovascular eval with Dr. Vazquez who agreed it was a good idea.  Palpitations are often at work or when laying down, has always attributed them to anxiety.  Had ablation for SVT in 2008 with ablation per [facility].  No subsequent SVT.  HTN:  Controlled now after addnig norvasc 5mg last year.  Also on metoprolol 100mg daily.  Uses clonidine before medical appts sometimes but not the last 2 visits here.  Home BP readings are within good range.    Past Medical History:   Diagnosis Date   • Allergic     Since childhood   • Anxiety     Since childhood   • Cancer (CMS/HCC)     hodgkins lymphoma   • Colon polyp    • Depression    • GERD (gastroesophageal reflux disease)    • Hyperlipidemia    • Hypertension    • Hypothyroidism     Acquired from radiation therapy   • Kidney stone     diagnosed with C.T scan in [place]   • Migraine    • Sleep apnea    • SVT (supraventricular tachycardia) (CMS/HCC)      Past Surgical History:   Procedure Laterality Date   • CARDIAC ABLATION  2008    SVT   • COLONOSCOPY      Dr. Yovani Aden   • LYMPH NODE BIOPSY  11/2001    @ [facility] with Dr. Daniel Marinelli   • MOLE REMOVAL  11/2016    Dr Loyd at [facility] dermatology.    • PORTACATH PLACEMENT  01/2002    Groshong Placement @ [facility] with Dr. Daniel Marinelli   • SEPTOPLASTY  10/18/2018    Dr. Lexa Castillo   • SEPTOPLASTY, RESECTION INFERIOR TURBINATES Bilateral 10/18/2018    Procedure: SEPTOPLASTY, BILATERAL RESECTION INFERIOR TURBINATES;  Surgeon: Lexa Castillo MD;  Location: [facility];  Service: ENT   • TONSILLECTOMY  09/2013    @ [facility] with  Lexa Castillo   • VASECTOMY  01/2012    @ [facility] with Dr. Aidan Castellano   • WISDOM TOOTH EXTRACTION  10/1997    @ [facility] for Oral Surgery with Dr. Momo Nunn     Family History   Problem Relation Age of Onset   • Hypertension Mother    • Depression Mother    • Hyperlipidemia Mother    • Cancer Mother         soft tissue sarcoma   • Colon cancer Father    • Colon polyps Father    • Hypertension Father    • Hyperlipidemia Father    • Cancer Father         Colon cancer   • Breast cancer Sister    • Arthritis Maternal Grandmother    • Asthma Maternal Grandmother    • Hyperlipidemia Maternal Grandmother    • Hypertension Maternal Grandmother    • Heart disease Paternal Uncle    • Hyperlipidemia Brother    • Hypertension Brother    • Hypertension Maternal Grandfather      Social History     Tobacco Use   Smoking Status Never Smoker   Smokeless Tobacco Never Used     Allergies   Allergen Reactions   • Lisinopril Other (See Comments)     Throat tickle   • Amoxicillin Hives, Itching and Rash       Current Outpatient Medications:   •  amLODIPine (NORVASC) 5 MG tablet, Take 1 tablet by mouth Daily., Disp: 90 tablet, Rfl: 1  •  atorvastatin (LIPITOR) 10 MG tablet, Take 1 tablet by mouth Daily., Disp: 90 tablet, Rfl: 1  •  buPROPion XL (Wellbutrin XL) 150 MG 24 hr tablet, Take 1 tablet by mouth Daily., Disp: 90 tablet, Rfl: 1  •  busPIRone (BUSPAR) 10 MG tablet, Take 1 tablet by mouth 2 (two) times a day., Disp: 180 tablet, Rfl: 1  •  cloNIDine (Catapres) 0.1 MG tablet, Take 1 tablet by mouth 2 (Two) Times a Day As Needed for High Blood Pressure., Disp: 20 tablet, Rfl: 0  •  desonide (DESOWEN) 0.05 % cream, Apply to rash twice a day, tapering as improves. Not for long term use. (Patient taking differently: Apply  topically to the appropriate area as directed. PRN), Disp: 60 g, Rfl: 0  •  eszopiclone (LUNESTA) 3 MG tablet, Take 1 tablet by mouth Every Night. Take immediately before bedtime, Disp: 30 tablet, Rfl: 2  •   "FLUoxetine (PROzac) 40 MG capsule, Take 1 capsule by mouth Daily., Disp: 90 capsule, Rfl: 1  •  Hydrocortisone, Perianal, (ANUSOL-HC) 2.5 % rectal cream, Apply twice daily to hemorrhoids as needed, Disp: 28.35 g, Rfl: 1  •  ketoconazole (NIZORAL) 2 % cream, Apply to flaky areas on face twice daily as needed., Disp: 60 g, Rfl: 2  •  lamoTRIgine (LaMICtal) 25 MG tablet, Take 2 tablets by mouth Daily., Disp: 180 tablet, Rfl: 0  •  levocetirizine (Xyzal) 5 MG tablet, Take 1 tablet by mouth Every Evening., Disp: 90 tablet, Rfl: 1  •  levothyroxine (SYNTHROID, LEVOTHROID) 75 MCG tablet, Take 1 tablet by mouth Every Morning 1 hour prior to food or other medications, Disp: 90 tablet, Rfl: 1  •  metoprolol succinate XL (TOPROL-XL) 100 MG 24 hr tablet, Take 1 tablet by mouth Daily., Disp: 90 tablet, Rfl: 1  •  omeprazole (priLOSEC) 40 MG capsule, Take 1 capsule by mouth Daily., Disp: 90 capsule, Rfl: 1  •  vitamin D3 (vitamin d) 125 MCG (5000 UT) capsule capsule, Take 1 capsule by mouth Daily., Disp: , Rfl:     Review of Systems   Constitutional: Negative for chills, fatigue and fever.   HENT: Negative for congestion, ear pain and sinus pressure.    Respiratory: Negative for cough, chest tightness, shortness of breath and wheezing.    Cardiovascular: Positive for palpitations. Negative for chest pain.   Gastrointestinal: Negative for abdominal pain, blood in stool and constipation.   Genitourinary: Negative for penile pain.   Neurological: Negative for dizziness, speech difficulty and headache.   Psychiatric/Behavioral: Negative for decreased concentration. The patient is not nervous/anxious.      /76   Pulse 60   Temp 98 °F (36.7 °C) (Temporal)   Ht 181.5 cm (71.46\")   Wt 93.9 kg (207 lb)   BMI 28.50 kg/m²     Physical Exam  Constitutional:       Appearance: He is well-developed.   HENT:      Head: Normocephalic and atraumatic.      Comments: *wearing mask  Eyes:      Conjunctiva/sclera: Conjunctivae normal.      " Pupils: Pupils are equal, round, and reactive to light.   Cardiovascular:      Rate and Rhythm: Normal rate and regular rhythm.      Pulses: Normal pulses.      Heart sounds: Normal heart sounds.   Pulmonary:      Effort: Pulmonary effort is normal.      Breath sounds: Normal breath sounds.   Musculoskeletal:         General: Normal range of motion.      Cervical back: Normal range of motion and neck supple.      Right lower leg: No edema.      Left lower leg: No edema.   Skin:     General: Skin is warm and dry.   Neurological:      Mental Status: He is alert and oriented to person, place, and time.   Psychiatric:         Mood and Affect: Mood normal.         Behavior: Behavior normal.         Thought Content: Thought content normal.         Judgment: Judgment normal.           ECG 12 Lead    Date/Time: 3/30/2021 10:08 AM  Performed by: Marlen Reza APRN  Authorized by: Marlen Reza APRN   Comparison: compared with previous ECG from 10/18/2018  Similar to previous ECG  Rhythm: sinus rhythm  BPM: 74  Conduction: non-specific intraventricular conduction delay  ST Segments: ST segments normal  T Waves: T waves normal    Clinical impression: abnormal EKG            Results Review:  I reviewed the patient's new clinical results.    PHQ-9 Total Score:      Assessment/Plan:  Diagnoses and all orders for this visit:    1. Palpitations (Primary)  Comments:  labs and EKG today  Orders:  -     Comprehensive Metabolic Panel; Future  -     CBC & Differential; Future  -     TSH Rfx On Abnormal To Free T4; Future  -     Holter Monitor - 72 Hour Up To 15 Days; Future  -     Adult Transthoracic Echo Complete W/ Cont if Necessary Per Protocol; Future    2. Essential hypertension  Comments:  Continue same medications    Other orders  -     ECG 12 Lead       There are no Patient Instructions on file for this visit.  Plan of care reviewed with patient at the conclusion of today's visit. Education was provided regarding  diagnosis, management and any prescribed or recommended OTC medications.  Patient verbalizes understanding of and agreement with management plan.    No follow-ups on file.    *Note that portions of this note were completed with a voice recognition program.  Efforts were made to edit the dictation but occasionally words are transcribed.    CORINNE Javier          Answers for HPI/ROS submitted by the patient on 3/24/2021  What is the primary reason for your visit?: Other  Please describe your symptoms.: heart palpitations-skipping beat  Have you had these symptoms before?: Yes  How long have you been having these symptoms?: Greater than 2 weeks  Please list any medications you are currently taking for this condition.: metoprolol XL Succinate 100mg  Please describe any probable cause for these symptoms. : possibly lifestyle-coffee, adrenaline surge

## 2021-04-11 ENCOUNTER — PATIENT MESSAGE (OUTPATIENT)
Dept: INTERNAL MEDICINE | Facility: CLINIC | Age: 41
End: 2021-04-11

## 2021-04-12 RX ORDER — AMLODIPINE BESYLATE 5 MG/1
5 TABLET ORAL DAILY
Qty: 90 TABLET | Refills: 1 | Status: SHIPPED | OUTPATIENT
Start: 2021-04-12 | End: 2021-09-07 | Stop reason: SDUPTHER

## 2021-04-12 NOTE — TELEPHONE ENCOUNTER
From: Eduardo Luna  To: CORINNE Javier  Sent: 4/11/2021 1:19 PM EDT  Subject: Prescription Question    Desmond Gee,    May I have refills for amlodipine 5mg tabs? (90 day supply)    Thank you,    Eduardo Luna

## 2021-04-14 DIAGNOSIS — G47.33 OSA (OBSTRUCTIVE SLEEP APNEA): Primary | ICD-10-CM

## 2021-05-02 ENCOUNTER — HOSPITAL ENCOUNTER (OUTPATIENT)
Dept: CARDIOLOGY | Facility: HOSPITAL | Age: 41
Discharge: HOME | End: 2021-05-02
Admitting: NURSE PRACTITIONER

## 2021-05-02 VITALS — WEIGHT: 207 LBS | HEIGHT: 71 IN | BODY MASS INDEX: 28.98 KG/M2

## 2021-05-02 DIAGNOSIS — R00.2 PALPITATIONS: ICD-10-CM

## 2021-05-02 LAB
BH CV ECHO MEAS - AO MAX PG (FULL): 3.6 MMHG
BH CV ECHO MEAS - AO MAX PG: 7 MMHG
BH CV ECHO MEAS - AO MEAN PG (FULL): 3 MMHG
BH CV ECHO MEAS - AO MEAN PG: 5.1 MMHG
BH CV ECHO MEAS - AO ROOT AREA (BSA CORRECTED): 1.7
BH CV ECHO MEAS - AO ROOT AREA: 10.5 CM^2
BH CV ECHO MEAS - AO ROOT DIAM: 3.7 CM
BH CV ECHO MEAS - AO V2 MAX: 136.4 CM/SEC
BH CV ECHO MEAS - AO V2 MEAN: 110 CM/SEC
BH CV ECHO MEAS - AO V2 VTI: 31.8 CM
BH CV ECHO MEAS - ASC AORTA: 3.5 CM
BH CV ECHO MEAS - AVA(I,A): 2.1 CM^2
BH CV ECHO MEAS - AVA(I,D): 2.1 CM^2
BH CV ECHO MEAS - AVA(V,A): 2.2 CM^2
BH CV ECHO MEAS - AVA(V,D): 2.2 CM^2
BH CV ECHO MEAS - BSA(HAYCOCK): 2.2 M^2
BH CV ECHO MEAS - BSA: 2.1 M^2
BH CV ECHO MEAS - BZI_BMI: 28.9 KILOGRAMS/M^2
BH CV ECHO MEAS - BZI_METRIC_HEIGHT: 180.3 CM
BH CV ECHO MEAS - BZI_METRIC_WEIGHT: 93.9 KG
BH CV ECHO MEAS - EDV(CUBED): 176.1 ML
BH CV ECHO MEAS - EDV(MOD-SP2): 77.9 ML
BH CV ECHO MEAS - EDV(MOD-SP4): 82 ML
BH CV ECHO MEAS - EDV(TEICH): 154 ML
BH CV ECHO MEAS - EF(CUBED): 53.9 %
BH CV ECHO MEAS - EF(MOD-BP): 59.9 %
BH CV ECHO MEAS - EF(MOD-SP2): 59.3 %
BH CV ECHO MEAS - EF(MOD-SP4): 62.4 %
BH CV ECHO MEAS - EF(TEICH): 45.2 %
BH CV ECHO MEAS - ESV(CUBED): 81.1 ML
BH CV ECHO MEAS - ESV(MOD-SP2): 31.7 ML
BH CV ECHO MEAS - ESV(MOD-SP4): 30.8 ML
BH CV ECHO MEAS - ESV(TEICH): 84.4 ML
BH CV ECHO MEAS - FS: 22.8 %
BH CV ECHO MEAS - IVS/LVPW: 1.1
BH CV ECHO MEAS - IVSD: 0.72 CM
BH CV ECHO MEAS - LA DIMENSION: 3.9 CM
BH CV ECHO MEAS - LA/AO: 1.1
BH CV ECHO MEAS - LAD MAJOR: 4.6 CM
BH CV ECHO MEAS - LAT PEAK E' VEL: 10.8 CM/SEC
BH CV ECHO MEAS - LATERAL E/E' RATIO: 8.1
BH CV ECHO MEAS - LV DIASTOLIC VOL/BSA (35-75): 38.3 ML/M^2
BH CV ECHO MEAS - LV IVRT: 0.09 SEC
BH CV ECHO MEAS - LV MASS(C)D: 136.1 GRAMS
BH CV ECHO MEAS - LV MASS(C)DI: 63.6 GRAMS/M^2
BH CV ECHO MEAS - LV MAX PG: 3.4 MMHG
BH CV ECHO MEAS - LV MEAN PG: 2.1 MMHG
BH CV ECHO MEAS - LV SYSTOLIC VOL/BSA (12-30): 14.4 ML/M^2
BH CV ECHO MEAS - LV V1 MAX: 92.1 CM/SEC
BH CV ECHO MEAS - LV V1 MEAN: 67.7 CM/SEC
BH CV ECHO MEAS - LV V1 VTI: 20.5 CM
BH CV ECHO MEAS - LVIDD: 5.6 CM
BH CV ECHO MEAS - LVIDS: 4.3 CM
BH CV ECHO MEAS - LVLD AP2: 7.6 CM
BH CV ECHO MEAS - LVLD AP4: 7.8 CM
BH CV ECHO MEAS - LVLS AP2: 6.1 CM
BH CV ECHO MEAS - LVLS AP4: 6.6 CM
BH CV ECHO MEAS - LVOT AREA (M): 3.1 CM^2
BH CV ECHO MEAS - LVOT AREA: 3.2 CM^2
BH CV ECHO MEAS - LVOT DIAM: 2 CM
BH CV ECHO MEAS - LVPWD: 0.65 CM
BH CV ECHO MEAS - MED PEAK E' VEL: 7.9 CM/SEC
BH CV ECHO MEAS - MEDIAL E/E' RATIO: 11.1
BH CV ECHO MEAS - MR MAX PG: 26 MMHG
BH CV ECHO MEAS - MR MAX VEL: 256 CM/SEC
BH CV ECHO MEAS - MV A MAX VEL: 94.3 CM/SEC
BH CV ECHO MEAS - MV DEC SLOPE: 279 CM/SEC^2
BH CV ECHO MEAS - MV DEC TIME: 0.22 SEC
BH CV ECHO MEAS - MV E MAX VEL: 87.8 CM/SEC
BH CV ECHO MEAS - MV E/A: 0.93
BH CV ECHO MEAS - MV P1/2T MAX VEL: 83 CM/SEC
BH CV ECHO MEAS - MV P1/2T: 87.1 MSEC
BH CV ECHO MEAS - MVA P1/2T LCG: 2.7 CM^2
BH CV ECHO MEAS - MVA(P1/2T): 2.5 CM^2
BH CV ECHO MEAS - PA ACC TIME: 0.13 SEC
BH CV ECHO MEAS - PA MAX PG: 5.7 MMHG
BH CV ECHO MEAS - PA PR(ACCEL): 20.4 MMHG
BH CV ECHO MEAS - PA V2 MAX: 119.4 CM/SEC
BH CV ECHO MEAS - PI END-D VEL: 85.5 CM/SEC
BH CV ECHO MEAS - RAP SYSTOLE: 3 MMHG
BH CV ECHO MEAS - RVSP: 27 MMHG
BH CV ECHO MEAS - SI(AO): 155.4 ML/M^2
BH CV ECHO MEAS - SI(CUBED): 44.4 ML/M^2
BH CV ECHO MEAS - SI(LVOT): 30.8 ML/M^2
BH CV ECHO MEAS - SI(MOD-SP2): 21.6 ML/M^2
BH CV ECHO MEAS - SI(MOD-SP4): 23.9 ML/M^2
BH CV ECHO MEAS - SI(TEICH): 32.5 ML/M^2
BH CV ECHO MEAS - SV(AO): 332.4 ML
BH CV ECHO MEAS - SV(CUBED): 94.9 ML
BH CV ECHO MEAS - SV(LVOT): 65.8 ML
BH CV ECHO MEAS - SV(MOD-SP2): 46.2 ML
BH CV ECHO MEAS - SV(MOD-SP4): 51.2 ML
BH CV ECHO MEAS - SV(TEICH): 69.6 ML
BH CV ECHO MEAS - TAPSE (>1.6): 2.4 CM
BH CV ECHO MEAS - TR MAX PG: 24 MMHG
BH CV ECHO MEAS - TR MAX VEL: 243.9 CM/SEC
BH CV ECHO MEASUREMENTS AVERAGE E/E' RATIO: 9.39
BH CV XLRA - RV BASE: 3 CM
BH CV XLRA - RV LENGTH: 7.5 CM
BH CV XLRA - RV MID: 2.6 CM
BH CV XLRA - TDI S': 18.8 CM/SEC
LEFT ATRIUM VOLUME INDEX: 15.8 ML/M^2
LEFT ATRIUM VOLUME: 33.8 ML
LV EF 2D ECHO EST: 60 %
MAXIMAL PREDICTED HEART RATE: 179 BPM
STRESS TARGET HR: 152 BPM

## 2021-05-02 PROCEDURE — 93306 TTE W/DOPPLER COMPLETE: CPT | Performed by: INTERNAL MEDICINE

## 2021-05-02 PROCEDURE — 93306 TTE W/DOPPLER COMPLETE: CPT

## 2021-05-08 ENCOUNTER — PATIENT MESSAGE (OUTPATIENT)
Dept: INTERNAL MEDICINE | Facility: CLINIC | Age: 41
End: 2021-05-08

## 2021-05-08 DIAGNOSIS — G47.9 SLEEP DISTURBANCE: ICD-10-CM

## 2021-05-10 RX ORDER — ESZOPICLONE 3 MG/1
3 TABLET, FILM COATED ORAL NIGHTLY
Qty: 30 TABLET | Refills: 2 | Status: SHIPPED | OUTPATIENT
Start: 2021-05-10 | End: 2021-07-12 | Stop reason: SDUPTHER

## 2021-05-10 NOTE — TELEPHONE ENCOUNTER
From: Eduardo Luna  To: CORINNE Javier  Sent: 5/8/2021 8:40 AM EDT  Subject: Prescription Question    Hi Marlen,    May I have refills for Lunesta 3mg tabs?    Thank you,    Eduardo Luna

## 2021-05-18 ENCOUNTER — PATIENT MESSAGE (OUTPATIENT)
Dept: INTERNAL MEDICINE | Facility: CLINIC | Age: 41
End: 2021-05-18

## 2021-05-18 DIAGNOSIS — E78.00 PURE HYPERCHOLESTEROLEMIA: ICD-10-CM

## 2021-05-18 RX ORDER — CLONIDINE HYDROCHLORIDE 0.1 MG/1
0.1 TABLET ORAL 2 TIMES DAILY PRN
Qty: 20 TABLET | Refills: 0 | Status: SHIPPED | OUTPATIENT
Start: 2021-05-18 | End: 2022-05-16 | Stop reason: SDUPTHER

## 2021-05-18 RX ORDER — LAMOTRIGINE 25 MG/1
50 TABLET ORAL DAILY
Qty: 180 TABLET | Refills: 0 | Status: SHIPPED | OUTPATIENT
Start: 2021-05-18 | End: 2021-07-21 | Stop reason: SDUPTHER

## 2021-05-18 RX ORDER — FLUOXETINE HYDROCHLORIDE 40 MG/1
40 CAPSULE ORAL DAILY
Qty: 90 CAPSULE | Refills: 1 | Status: SHIPPED | OUTPATIENT
Start: 2021-05-18 | End: 2021-11-23 | Stop reason: SDUPTHER

## 2021-05-18 RX ORDER — BUSPIRONE HYDROCHLORIDE 10 MG/1
10 TABLET ORAL 2 TIMES DAILY
Qty: 180 TABLET | Refills: 1 | Status: SHIPPED | OUTPATIENT
Start: 2021-05-18 | End: 2021-11-23 | Stop reason: SDUPTHER

## 2021-05-18 RX ORDER — ATORVASTATIN CALCIUM 10 MG/1
10 TABLET, FILM COATED ORAL DAILY
Qty: 90 TABLET | Refills: 1 | Status: SHIPPED | OUTPATIENT
Start: 2021-05-18 | End: 2021-12-23 | Stop reason: SDUPTHER

## 2021-05-18 NOTE — TELEPHONE ENCOUNTER
From: Eduardo Luna  To: CORINNE Javier  Sent: 2021 3:35 PM EDT  Subject: Prescription Question    Desmond Gee,     May I have refills for these medications? (90 day supply)    Buspirone 10mg tabs  Lamotrigine 25mg tabs  Fluoxetine 40mg capsules  Atorvastatin 10mg tabs    Also, May I have a new prescription for clonidine 0.1 mg tabs? The tablets I have are .    Thanks so much! See you soon.    Eduardo Luna

## 2021-06-08 ENCOUNTER — APPOINTMENT (OUTPATIENT)
Dept: PREADMISSION TESTING | Facility: HOSPITAL | Age: 41
End: 2021-06-08

## 2021-06-08 LAB — SARS-COV-2 RNA PNL SPEC NAA+PROBE: NOT DETECTED

## 2021-06-08 PROCEDURE — U0004 COV-19 TEST NON-CDC HGH THRU: HCPCS

## 2021-06-08 PROCEDURE — C9803 HOPD COVID-19 SPEC COLLECT: HCPCS

## 2021-06-10 ENCOUNTER — LAB REQUISITION (OUTPATIENT)
Dept: LAB | Facility: HOSPITAL | Age: 41
End: 2021-06-10

## 2021-06-10 DIAGNOSIS — G47.33 OBSTRUCTIVE SLEEP APNEA (ADULT) (PEDIATRIC): ICD-10-CM

## 2021-07-12 ENCOUNTER — LAB (OUTPATIENT)
Dept: LAB | Facility: HOSPITAL | Age: 41
End: 2021-07-12

## 2021-07-12 ENCOUNTER — OFFICE VISIT (OUTPATIENT)
Dept: INTERNAL MEDICINE | Facility: CLINIC | Age: 41
End: 2021-07-12

## 2021-07-12 VITALS
OXYGEN SATURATION: 96 % | HEIGHT: 72 IN | HEART RATE: 78 BPM | BODY MASS INDEX: 27.14 KG/M2 | WEIGHT: 200.4 LBS | DIASTOLIC BLOOD PRESSURE: 100 MMHG | SYSTOLIC BLOOD PRESSURE: 114 MMHG | TEMPERATURE: 97.7 F

## 2021-07-12 DIAGNOSIS — F41.8 DEPRESSION WITH ANXIETY: ICD-10-CM

## 2021-07-12 DIAGNOSIS — I10 ESSENTIAL HYPERTENSION: ICD-10-CM

## 2021-07-12 DIAGNOSIS — Z79.899 LONG-TERM USE OF HIGH-RISK MEDICATION: ICD-10-CM

## 2021-07-12 DIAGNOSIS — E03.9 HYPOTHYROIDISM (ACQUIRED): ICD-10-CM

## 2021-07-12 DIAGNOSIS — Z00.00 ROUTINE MEDICAL EXAM: Primary | ICD-10-CM

## 2021-07-12 DIAGNOSIS — E78.2 MIXED HYPERLIPIDEMIA: ICD-10-CM

## 2021-07-12 DIAGNOSIS — K21.9 GASTROESOPHAGEAL REFLUX DISEASE WITHOUT ESOPHAGITIS: ICD-10-CM

## 2021-07-12 DIAGNOSIS — R79.89 LOW TESTOSTERONE: ICD-10-CM

## 2021-07-12 DIAGNOSIS — G47.9 SLEEP DISTURBANCE: ICD-10-CM

## 2021-07-12 DIAGNOSIS — E55.9 VITAMIN D DEFICIENCY: ICD-10-CM

## 2021-07-12 LAB
ALBUMIN SERPL-MCNC: 4.8 G/DL (ref 3.5–5.2)
ALBUMIN UR-MCNC: 2.5 MG/DL
ALBUMIN/GLOB SERPL: 2 G/DL
ALP SERPL-CCNC: 98 U/L (ref 39–117)
ALT SERPL W P-5'-P-CCNC: 50 U/L (ref 1–41)
AMPHET+METHAMPHET UR QL: NEGATIVE
AMPHETAMINES UR QL: NEGATIVE
ANION GAP SERPL CALCULATED.3IONS-SCNC: 16 MMOL/L (ref 5–15)
AST SERPL-CCNC: 22 U/L (ref 1–40)
BARBITURATES UR QL SCN: NEGATIVE
BENZODIAZ UR QL SCN: POSITIVE
BILIRUB SERPL-MCNC: 0.5 MG/DL (ref 0–1.2)
BUN SERPL-MCNC: 12 MG/DL (ref 6–20)
BUN/CREAT SERPL: 11.9 (ref 7–25)
BUPRENORPHINE SERPL-MCNC: NEGATIVE NG/ML
CALCIUM SPEC-SCNC: 9.8 MG/DL (ref 8.6–10.5)
CANNABINOIDS SERPL QL: NEGATIVE
CHLORIDE SERPL-SCNC: 100 MMOL/L (ref 98–107)
CHOLEST SERPL-MCNC: 161 MG/DL (ref 0–200)
CO2 SERPL-SCNC: 29 MMOL/L (ref 22–29)
COCAINE UR QL: NEGATIVE
CREAT SERPL-MCNC: 1.01 MG/DL (ref 0.76–1.27)
CREAT UR-MCNC: 475.1 MG/DL
GFR SERPL CREATININE-BSD FRML MDRD: 81 ML/MIN/1.73
GLOBULIN UR ELPH-MCNC: 2.4 GM/DL
GLUCOSE SERPL-MCNC: 89 MG/DL (ref 65–99)
HDLC SERPL-MCNC: 51 MG/DL (ref 40–60)
LDLC SERPL CALC-MCNC: 88 MG/DL (ref 0–100)
LDLC/HDLC SERPL: 1.68 {RATIO}
METHADONE UR QL SCN: NEGATIVE
MICROALBUMIN/CREAT UR: 5.3 MG/G
OPIATES UR QL: NEGATIVE
OXYCODONE UR QL SCN: NEGATIVE
PCP UR QL SCN: NEGATIVE
POTASSIUM SERPL-SCNC: 4.2 MMOL/L (ref 3.5–5.2)
PROPOXYPH UR QL: NEGATIVE
PROT SERPL-MCNC: 7.2 G/DL (ref 6–8.5)
SODIUM SERPL-SCNC: 145 MMOL/L (ref 136–145)
TRICYCLICS UR QL SCN: NEGATIVE
TRIGL SERPL-MCNC: 121 MG/DL (ref 0–150)
TSH SERPL DL<=0.05 MIU/L-ACNC: 3.43 UIU/ML (ref 0.27–4.2)
VLDLC SERPL-MCNC: 22 MG/DL (ref 5–40)

## 2021-07-12 PROCEDURE — 80061 LIPID PANEL: CPT

## 2021-07-12 PROCEDURE — 84402 ASSAY OF FREE TESTOSTERONE: CPT

## 2021-07-12 PROCEDURE — 82043 UR ALBUMIN QUANTITATIVE: CPT

## 2021-07-12 PROCEDURE — 82570 ASSAY OF URINE CREATININE: CPT

## 2021-07-12 PROCEDURE — 84443 ASSAY THYROID STIM HORMONE: CPT

## 2021-07-12 PROCEDURE — 80306 DRUG TEST PRSMV INSTRMNT: CPT

## 2021-07-12 PROCEDURE — 84403 ASSAY OF TOTAL TESTOSTERONE: CPT

## 2021-07-12 PROCEDURE — 80053 COMPREHEN METABOLIC PANEL: CPT

## 2021-07-12 PROCEDURE — 99396 PREV VISIT EST AGE 40-64: CPT | Performed by: NURSE PRACTITIONER

## 2021-07-12 RX ORDER — FAMOTIDINE 20 MG/1
20 TABLET, FILM COATED ORAL NIGHTLY PRN
COMMUNITY
End: 2022-01-21

## 2021-07-12 RX ORDER — ESZOPICLONE 3 MG/1
3 TABLET, FILM COATED ORAL NIGHTLY
Qty: 30 TABLET | Refills: 2 | Status: SHIPPED | OUTPATIENT
Start: 2021-07-12 | End: 2021-10-05 | Stop reason: SDUPTHER

## 2021-07-12 NOTE — PROGRESS NOTES
Eduardo Luna  1980  [id number]  Patient Care Team:  Marlen Reza APRN as PCP - General (Internal Medicine)  River Bradley MD as Consulting Physician (Otolaryngology)  Yovani Aden MD as Consulting Physician (Gastroenterology)    Eduardo Luna is a 40 y.o. pleasant male here today for annual physical.  Chief Complaint   Patient presents with   • Annual Exam     fasting       HPI:   Eduardo is here for his routine physical.  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  He has a pertinent medical history of Hodgkin's lymphoma (2001 with chemo and radiation), anxiety, insomnia, whitecoat hypertension, HLD (statin since 2019), hypothyroidism, GERD and JAVON.  Family history of colon cancer (father).     HTN:  Home bp avg  120-130/80-84, no leg swelling, sob, leg swelling, palpitations are about the same and tolerable.  He did not take a clonidine before this appt. had negative cardiac work-up earlier this year including echo and Holter.  JAVON:  Could not tolerate cpap, had uvuela removed 6/10/21.  Hoping this will help.  Taking lunesta and falls asleep within 20 min, sleeps 6-7 hrs before waking, stays up a while and may go back to sleep a few hours.  MDD: Still suffereing with depressive spells,  On fluoxetine 40 and lamotrigine 50mg. Anxiety controlled with buspar 10mg BID.  Not seeing therapist currently but agreeable to reach out to Clare Cox again.  GERD: symptoms were worse at last visit we continued omeprazole 40 mg and added famotidine 20 mg which has helped.  He takes the famotidine average 3 days a week.    Past Medical History:   Diagnosis Date   • Allergic     Since childhood   • Anxiety     Since childhood   • Cancer (CMS/HCC)     hodgkins lymphoma   • Colon polyp    • Depression    • GERD (gastroesophageal reflux disease)    • Hyperlipidemia    • Hypertension    • Hypothyroidism     Acquired from radiation therapy   • Kidney stone     diagnosed with C.T scan in  [place]   • Migraine    • Sleep apnea    • SVT (supraventricular tachycardia) (CMS/HCC)      Past Surgical History:   Procedure Laterality Date   • CARDIAC ABLATION  2008    SVT   • COLONOSCOPY      Dr. Yovani Aden   • LYMPH NODE BIOPSY  11/2001    @ [facility] with Dr. Daniel Marinelli   • MOLE REMOVAL  11/2016    Dr Loyd at [facility] dermatology.    • PORTACATH PLACEMENT  01/2002    [facility] Placement @ [facility] with Dr. Daniel Marinelli   • SEPTOPLASTY  10/18/2018    Dr. Lexa Castillo   • SEPTOPLASTY, RESECTION INFERIOR TURBINATES Bilateral 10/18/2018    Procedure: SEPTOPLASTY, BILATERAL RESECTION INFERIOR TURBINATES;  Surgeon: Lexa Castillo MD;  Location: [facility] OR;  Service: ENT   • TONSILLECTOMY  09/2013    @ [facility] with Lexa Castillo   • VASECTOMY  01/2012    @ [facility] with Dr. Aidan Castellano   • WISDOM TOOTH EXTRACTION  10/1997    @ [facility] with Dr. Momo Nunn     Family History   Problem Relation Age of Onset   • Hypertension Mother    • Depression Mother    • Hyperlipidemia Mother    • Cancer Mother         soft tissue sarcoma   • Colon cancer Father    • Colon polyps Father    • Hypertension Father    • Hyperlipidemia Father    • Cancer Father         Colon cancer   • Breast cancer Sister    • Arthritis Maternal Grandmother    • Asthma Maternal Grandmother    • Hyperlipidemia Maternal Grandmother    • Hypertension Maternal Grandmother    • Heart disease Paternal Uncle    • Hyperlipidemia Brother    • Hypertension Brother    • Hypertension Maternal Grandfather      Social History     Tobacco Use   Smoking Status Never Smoker   Smokeless Tobacco Never Used     Allergies   Allergen Reactions   • Lisinopril Other (See Comments)     Throat tickle   • Amoxicillin Hives, Itching and Rash       Current Outpatient Medications:   •  amLODIPine (NORVASC) 5 MG tablet, Take 1 tablet by mouth Daily., Disp: 90 tablet, Rfl: 1  •  atorvastatin (LIPITOR) 10 MG tablet, Take 1 tablet by mouth Daily., Disp: 90 tablet, Rfl:  1  •  buPROPion XL (Wellbutrin XL) 150 MG 24 hr tablet, Take 1 tablet by mouth Daily., Disp: 90 tablet, Rfl: 1  •  busPIRone (BUSPAR) 10 MG tablet, Take 1 tablet by mouth 2 (two) times a day., Disp: 180 tablet, Rfl: 1  •  cloNIDine (Catapres) 0.1 MG tablet, Take 1 tablet by mouth 2 (Two) Times a Day As Needed for High Blood Pressure., Disp: 20 tablet, Rfl: 0  •  desonide (DESOWEN) 0.05 % cream, Apply to rash twice a day, tapering as improves. Not for long term use. (Patient taking differently: Apply  topically to the appropriate area as directed. PRN), Disp: 60 g, Rfl: 0  •  eszopiclone (LUNESTA) 3 MG tablet, Take 1 tablet by mouth Every Night. Take immediately before bedtime, Disp: 30 tablet, Rfl: 2  •  famotidine (PEPCID) 20 MG tablet, Take 20 mg by mouth At Night As Needed for Heartburn., Disp: , Rfl:   •  FLUoxetine (PROzac) 40 MG capsule, Take 1 capsule by mouth Daily., Disp: 90 capsule, Rfl: 1  •  Hydrocortisone, Perianal, (ANUSOL-HC) 2.5 % rectal cream, Apply twice daily to hemorrhoids as needed, Disp: 28.35 g, Rfl: 1  •  ketoconazole (NIZORAL) 2 % cream, Apply to flaky areas on face twice daily as needed., Disp: 60 g, Rfl: 2  •  lamoTRIgine (LaMICtal) 25 MG tablet, Take 2 tablets by mouth Daily., Disp: 180 tablet, Rfl: 0  •  levocetirizine (Xyzal) 5 MG tablet, Take 1 tablet by mouth Every Evening., Disp: 90 tablet, Rfl: 1  •  levothyroxine (SYNTHROID, LEVOTHROID) 75 MCG tablet, Take 1 tablet by mouth Every Morning 1 hour prior to food or other medications, Disp: 90 tablet, Rfl: 1  •  metoprolol succinate XL (TOPROL-XL) 100 MG 24 hr tablet, Take 1 tablet by mouth Daily., Disp: 90 tablet, Rfl: 1  •  omeprazole (priLOSEC) 40 MG capsule, Take 1 capsule by mouth Daily., Disp: 90 capsule, Rfl: 1  •  vitamin D3 (vitamin d) 125 MCG (5000 UT) capsule capsule, Take 1 capsule by mouth Daily., Disp: , Rfl:     Review of Systems    /100 (BP Location: Left arm, Patient Position: Sitting, Cuff Size: Adult)   Pulse  "78   Temp 97.7 °F (36.5 °C) (Temporal)   Ht 182.5 cm (71.85\")   Wt 90.9 kg (200 lb 6.4 oz)   SpO2 96%   BMI 27.29 kg/m²     Physical Exam  Vitals reviewed.   Constitutional:       Appearance: Normal appearance. He is well-developed and overweight.   HENT:      Head: Normocephalic.      Comments: *wearing mask     Right Ear: External ear normal.      Left Ear: External ear normal.   Eyes:      Conjunctiva/sclera: Conjunctivae normal.      Pupils: Pupils are equal, round, and reactive to light.   Cardiovascular:      Rate and Rhythm: Normal rate and regular rhythm.      Pulses: Normal pulses.      Heart sounds: Normal heart sounds.   Pulmonary:      Effort: Pulmonary effort is normal.      Breath sounds: Normal breath sounds.   Abdominal:      General: Abdomen is flat. Bowel sounds are normal.      Palpations: Abdomen is soft.   Musculoskeletal:         General: Normal range of motion.      Cervical back: Normal range of motion and neck supple.   Skin:     General: Skin is warm and dry.   Neurological:      Mental Status: He is alert and oriented to person, place, and time.   Psychiatric:         Mood and Affect: Mood normal.         Behavior: Behavior normal.         Thought Content: Thought content normal.         Judgment: Judgment normal.         Results Review:  None    PHQ-9 Total Score: 12    Procedures     Assessment/Plan:  Diagnoses and all orders for this visit:    1. Routine medical exam (Primary)    2. Depression with anxiety  Comments:  Continue current medications and make an appointment to see Clare Cox as discussed    3. Essential hypertension  Comments:  Well-controlled on current medications outside of office visits, has clonidine to use as needed  Orders:  -     Comprehensive Metabolic Panel; Future  -     Microalbumin / Creatinine Urine Ratio - Urine, Clean Catch; Future    4. Mixed hyperlipidemia  Comments:  Continue statin, fasting labs today  Orders:  -     Lipid Panel; Future    5. " Hypothyroidism (acquired)  Comments:  Continue levothyroxine, labs today  Orders:  -     TSH Rfx On Abnormal To Free T4; Future    6. Long-term use of high-risk medication  -     Urine Drug Screen - Urine, Clean Catch; Future    7. Low testosterone  Comments:  No current replacement, labs today  Orders:  -     Testosterone, Free, Total; Future    8. Sleep disturbance  Comments:  Continue Lunesta nightly  Orders:  -     eszopiclone (LUNESTA) 3 MG tablet; Take 1 tablet by mouth Every Night. Take immediately before bedtime  Dispense: 30 tablet; Refill: 2    9. Vitamin D deficiency  Comments:  Continue supplement  Orders:  -     Vitamin D 25 Hydroxy; Future    10. Gastroesophageal reflux disease without esophagitis  Comments:  Continue daily PPI and famotidine as needed         Patient Instructions   Preventive Care 40-64 Years Old, Male  Preventive care refers to lifestyle choices and visits with your health care provider that can promote health and wellness. This includes:  · A yearly physical exam. This is also called an annual well check.  · Regular dental and eye exams.  · Immunizations.  · Screening for certain conditions.  · Healthy lifestyle choices, such as eating a healthy diet, getting regular exercise, not using drugs or products that contain nicotine and tobacco, and limiting alcohol use.  What can I expect for my preventive care visit?  Physical exam  Your health care provider will check:  · Height and weight. These may be used to calculate body mass index (BMI), which is a measurement that tells if you are at a healthy weight.  · Heart rate and blood pressure.  · Your skin for abnormal spots.  Counseling  Your health care provider may ask you questions about:  · Alcohol, tobacco, and drug use.  · Emotional well-being.  · Home and relationship well-being.  · Sexual activity.  · Eating habits.  · Work and work environment.  What immunizations do I need?    Influenza (flu) vaccine  · This is recommended  every year.  Tetanus, diphtheria, and pertussis (Tdap) vaccine  · You may need a Td booster every 10 years.  Varicella (chickenpox) vaccine  · You may need this vaccine if you have not already been vaccinated.  Zoster (shingles) vaccine  · You may need this after age 60.  Measles, mumps, and rubella (MMR) vaccine  · You may need at least one dose of MMR if you were born in 1957 or later. You may also need a second dose.  Pneumococcal conjugate (PCV13) vaccine  · You may need this if you have certain conditions and were not previously vaccinated.  Pneumococcal polysaccharide (PPSV23) vaccine  · You may need one or two doses if you smoke cigarettes or if you have certain conditions.  Meningococcal conjugate (MenACWY) vaccine  · You may need this if you have certain conditions.  Hepatitis A vaccine  · You may need this if you have certain conditions or if you travel or work in places where you may be exposed to hepatitis A.  Hepatitis B vaccine  · You may need this if you have certain conditions or if you travel or work in places where you may be exposed to hepatitis B.  Haemophilus influenzae type b (Hib) vaccine  · You may need this if you have certain risk factors.  Human papillomavirus (HPV) vaccine  · If recommended by your health care provider, you may need three doses over 6 months.  You may receive vaccines as individual doses or as more than one vaccine together in one shot (combination vaccines). Talk with your health care provider about the risks and benefits of combination vaccines.  What tests do I need?  Blood tests  · Lipid and cholesterol levels. These may be checked every 5 years, or more frequently if you are over 50 years old.  · Hepatitis C test.  · Hepatitis B test.  Screening  · Lung cancer screening. You may have this screening every year starting at age 55 if you have a 30-pack-year history of smoking and currently smoke or have quit within the past 15 years.  · Prostate cancer screening.  Recommendations will vary depending on your family history and other risks.  · Colorectal cancer screening. All adults should have this screening starting at age 50 and continuing until age 75. Your health care provider may recommend screening at age 45 if you are at increased risk. You will have tests every 1-10 years, depending on your results and the type of screening test.  · Diabetes screening. This is done by checking your blood sugar (glucose) after you have not eaten for a while (fasting). You may have this done every 1-3 years.  · Sexually transmitted disease (STD) testing.  Follow these instructions at home:  Eating and drinking  · Eat a diet that includes fresh fruits and vegetables, whole grains, lean protein, and low-fat dairy products.  · Take vitamin and mineral supplements as recommended by your health care provider.  · Do not drink alcohol if your health care provider tells you not to drink.  · If you drink alcohol:  ? Limit how much you have to 0-2 drinks a day.  ? Be aware of how much alcohol is in your drink. In the [place] one drink equals one 12 oz bottle of beer (355 mL), one 5 oz glass of wine (148 mL), or one 1½ oz glass of hard liquor (44 mL).  Lifestyle  · Take daily care of your teeth and gums.  · Stay active. Exercise for at least 30 minutes on 5 or more days each week.  · Do not use any products that contain nicotine or tobacco, such as cigarettes, e-cigarettes, and chewing tobacco. If you need help quitting, ask your health care provider.  · If you are sexually active, practice safe sex. Use a condom or other form of protection to prevent STIs (sexually transmitted infections).  · Talk with your health care provider about taking a low-dose aspirin every day starting at age 50.  What's next?  · Go to your health care provider once a year for a well check visit.  · Ask your health care provider how often you should have your eyes and teeth checked.  · Stay up to date on all vaccines.  This  information is not intended to replace advice given to you by your health care provider. Make sure you discuss any questions you have with your health care provider.  Document Revised: 12/12/2019 Document Reviewed: 12/12/2019  Elsevier Patient Education © 2020 [employer]        Counseling/Anticipatory Guidance:   Plan of care reviewed with patient at the conclusion of today's visit. Education was provided in regards to diagnosis, diet and exercise, prostate cancer screening discussed including benefit of early detection, potential need for follow-up, and harms associated with additional management. Patient agrees to screening.    Nutrition, physical activity, healthy weight,ways to reduce stress, adequate sleep, injury prevention, misuse of tobacco, alcohol and drugs, sexual behavior and STD's, dental health, mental health, and immunizations.    Plan of care reviewed with patient at the conclusion of today's visit. Education was provided regarding diagnosis, management and any prescribed or recommended OTC medications.  Patient verbalizes understanding of and agreement with management plan.    Return in about 3 months (around 10/12/2021) for Labs this visit, Next scheduled follow up.    * Please note that portions of this note were completed with a voice recognition program. Efforts were made to edit the dictation but occasionally words are transcribed.     CORINNE Javier

## 2021-07-14 ENCOUNTER — TELEPHONE (OUTPATIENT)
Dept: INTERNAL MEDICINE | Facility: CLINIC | Age: 41
End: 2021-07-14

## 2021-07-14 ENCOUNTER — LAB (OUTPATIENT)
Dept: LAB | Facility: HOSPITAL | Age: 41
End: 2021-07-14

## 2021-07-14 DIAGNOSIS — E55.9 VITAMIN D DEFICIENCY: ICD-10-CM

## 2021-07-14 PROBLEM — K21.9 GASTROESOPHAGEAL REFLUX DISEASE WITHOUT ESOPHAGITIS: Status: ACTIVE | Noted: 2021-07-14

## 2021-07-14 LAB — 25(OH)D3 SERPL-MCNC: 57.5 NG/ML (ref 30–100)

## 2021-07-14 PROCEDURE — 82306 VITAMIN D 25 HYDROXY: CPT

## 2021-07-14 NOTE — PROGRESS NOTES
Called and spoke with Fili at [facility] in chemistry and he will add on Vit D level

## 2021-07-14 NOTE — TELEPHONE ENCOUNTER
PAULA FROM [facility] ([phone]) CALLED AND STATED THAT SOMEONE FROM OUR OFFICE CALLED TO ADD ON A VITAMIN D TEST.    UNABLE TO DO THIS BECAUSE WRONG TUBE COLLECTED.  IT NEEDS TO BE DRAWN IN A GOLD TUBE.  PATIENT NEEDS TO BE RE-DRAWN.

## 2021-07-16 LAB
TESTOST FREE SERPL-MCNC: 12.5 PG/ML (ref 6.8–21.5)
TESTOST SERPL-MCNC: 570 NG/DL (ref 264–916)

## 2021-07-21 ENCOUNTER — OFFICE VISIT (OUTPATIENT)
Dept: PSYCHIATRY | Facility: CLINIC | Age: 41
End: 2021-07-21

## 2021-07-21 DIAGNOSIS — F41.0 PANIC DISORDER: ICD-10-CM

## 2021-07-21 DIAGNOSIS — F41.1 GENERALIZED ANXIETY DISORDER: ICD-10-CM

## 2021-07-21 DIAGNOSIS — F33.1 MODERATE EPISODE OF RECURRENT MAJOR DEPRESSIVE DISORDER (HCC): Primary | ICD-10-CM

## 2021-07-21 PROCEDURE — 99213 OFFICE O/P EST LOW 20 MIN: CPT | Performed by: NURSE PRACTITIONER

## 2021-07-21 PROCEDURE — 90833 PSYTX W PT W E/M 30 MIN: CPT | Performed by: NURSE PRACTITIONER

## 2021-07-21 RX ORDER — LAMOTRIGINE 100 MG/1
100 TABLET ORAL DAILY
Qty: 90 TABLET | Refills: 1 | Status: SHIPPED | OUTPATIENT
Start: 2021-07-21 | End: 2021-11-23 | Stop reason: SDUPTHER

## 2021-07-21 NOTE — PROGRESS NOTES
Subjective   Eduardo Luna is a 40 y.o. male who is here today for medication management follow up. and therapy in person face to face with covid precautions taken. Haven't seen patient since November 2020. He has increased depressive symptoms. Patient had been receiving medication refills through his PCP who now with destabilization with mood encouraged patient to return for f/u.     TIME IN:234  TIME OUT:3:30    Chief Complaint: MDD recurrent mod, sleep disturbance, ANTONIA, panic    History of Present Illness Patient presents by himself reporting he had been doing really well for a long time but about two months ago he had escalation in depressive symptoms. He states he has good days about then will have days he wakes up feeling low motivation, low interest, worthlessness and hopeless. He denies SI/HI or AVH. He states though some days he feels if he didn't wake up that would be ok with him.  He reports recent surgery last month for JAVON with soft tissue removal. He reports even with Lunesta he has some nights he only gets 3 hours of sleep. Patient denies illicit drug use or alcohol use. He and his long term girlfriend live together still. Teresa is very isolated from everyone but patient. She has chronic migraines, phobia of vomiting, chronic pain. She only sleeps, watches TV or plays video games. He reports he trusts her and is nice to have someone to come home to. Otherwise they don't do anything outside of living together. She doesn't cook or clean up, doesn't work in the house or out of the house. She is on disability  .  Denies adverse effects from medications.   (Scales based on 0 - 10 with 10 being the worst)    Therapy:  Start Time: 3   Stop Time: 3:30    (30 ) minutes was spent for psychotherapy. Assisted patient in processing patient's depression anxiety/panic. Acknowledged and normalized patient's thoughts, feelings, and concerns. Utilized cognitive behavioral therapy to assist the patient in recognizing  more appropriate coping mechanisms when he becomes agitated/anxious/depressed which are proven effective in reducing the severity of frequency of symptoms.     CLINICAL MANUEVERING/INTERVENTION:   Patient talked about current stressors, primarily having a difficult time dealing with depressive symptoms. Explored patient's relationship with girlfriend. Feelings were processed and validated, both negative and positive. Flushing out worries and concerns was conducted in order to diminish emotional tension. Processing why he is with girlfriend. Ways in which patient may explore life outside of isolation with girlfriend. He works, but declines invitations from others to do things after work. He hasn't been walking or going out with Covid and fears of shootings. Explored safety, he prefers being at work or home. . The patient expressed gratitude for today's session and said that counseling helps him feel better.      The following portions of the patient's history were reviewed and updated as appropriate: allergies, current medications, past family history, past medical history, past social history, past surgical history and problem list.    Review of Systems  A 14 point review of systems was performed and is negative except as noted above.    Objective   Physical Exam  There were no vitals taken for this visit.    Allergies   Allergen Reactions   • Lisinopril Other (See Comments)     Throat tickle   • Amoxicillin Hives, Itching and Rash       Current Medications:   Current Outpatient Medications   Medication Sig Dispense Refill   • amLODIPine (NORVASC) 5 MG tablet Take 1 tablet by mouth Daily. 90 tablet 1   • atorvastatin (LIPITOR) 10 MG tablet Take 1 tablet by mouth Daily. 90 tablet 1   • buPROPion XL (Wellbutrin XL) 150 MG 24 hr tablet Take 1 tablet by mouth Daily. 90 tablet 1   • busPIRone (BUSPAR) 10 MG tablet Take 1 tablet by mouth 2 (two) times a day. 180 tablet 1   • cloNIDine (Catapres) 0.1 MG tablet Take 1  tablet by mouth 2 (Two) Times a Day As Needed for High Blood Pressure. 20 tablet 0   • desonide (DESOWEN) 0.05 % cream Apply to rash twice a day, tapering as improves. Not for long term use. (Patient taking differently: Apply  topically to the appropriate area as directed. PRN) 60 g 0   • eszopiclone (LUNESTA) 3 MG tablet Take 1 tablet by mouth Every Night. Take immediately before bedtime 30 tablet 2   • famotidine (PEPCID) 20 MG tablet Take 20 mg by mouth At Night As Needed for Heartburn.     • FLUoxetine (PROzac) 40 MG capsule Take 1 capsule by mouth Daily. 90 capsule 1   • Hydrocortisone, Perianal, (ANUSOL-HC) 2.5 % rectal cream Apply twice daily to hemorrhoids as needed 28.35 g 1   • ketoconazole (NIZORAL) 2 % cream Apply to flaky areas on face twice daily as needed. 60 g 2   • lamoTRIgine (LaMICtal) 100 MG tablet Take 1 tablet by mouth Daily. 90 tablet 1   • levocetirizine (Xyzal) 5 MG tablet Take 1 tablet by mouth Every Evening. 90 tablet 1   • levothyroxine (SYNTHROID, LEVOTHROID) 75 MCG tablet Take 1 tablet by mouth Every Morning 1 hour prior to food or other medications 90 tablet 1   • metoprolol succinate XL (TOPROL-XL) 100 MG 24 hr tablet Take 1 tablet by mouth Daily. 90 tablet 1   • omeprazole (priLOSEC) 40 MG capsule Take 1 capsule by mouth Daily. 90 capsule 1   • vitamin D3 (vitamin d) 125 MCG (5000 UT) capsule capsule Take 1 capsule by mouth Daily.       No current facility-administered medications for this visit.       Lab Results: Reviewed echo and most recent lab work this year      ANTONIA-7:    Over the last two weeks, how often have you been bothered by the following problems?  Feeling nervous, anxious or on edge: More than half the days  Not being able to stop or control worrying: Several days  Worrying too much about different things: Several days  Trouble Relaxing: Several days  Being so restless that it is hard to sit still: Not at all  Becoming easily annoyed or irritable: Not at all  Feeling  afraid as if something awful might happen: Several days  ANTONIA 7 Total Score: 6  0-4: Minimal anxiety  5-9: Mild anxiety  10-14: Moderate anxiety  15-21: Severe anxiety  PHQ-9:  PHQ-2/PHQ-9 Depression Screening 7/12/2021   Little interest or pleasure in doing things 0   Feeling down, depressed, or hopeless 1   Trouble falling or staying asleep, or sleeping too much 3   Feeling tired or having little energy 1   Poor appetite or overeating 3   Feeling bad about yourself - or that you are a failure or have let yourself or your family down 1   Trouble concentrating on things, such as reading the newspaper or watching television 1   Moving or speaking so slowly that other people could have noticed. Or the opposite - being so fidgety or restless that you have been moving around a lot more than usual 1   Thoughts that you would be better off dead, or of hurting yourself in some way 1   Total Score 12   If you checked off any problems, how difficult have these problems made it for you to do your work, take care of things at home, or get along with other people? Somewhat difficult      5-9: Minimal symptoms  10-14: Major depression mild  15-19: Major depression moderate  Greater then 20: Major depression severe    Appearance: Within normal limits  Hygiene: good  Cooperation:  Cooperative  Eye Contact: Direct  Psychomotor Behavior:   psychomotor agitation/retardation, No EPS, No motor tics  Mood: Depressed  Affect: Within normal limits  Hopelessness: Denies  Speech:  Normal  Thought Process:  Linear  Thought Content:  Normal  Concentration: Normal   Suicidal: denies  Homicidal:  None  Hallucinations:  None  Delusion:  None  Memory:  Intact  Orientation:  Person, Place, Time and Situation  Reliability:  good  Insight:  Fair  Judgement: good  Impulse Control: good  Estimated Intelligence: average range    KENNY REVIEWED NO RED FLAGS  Assessment  ANTONIA  Recurrent major depressive disorder moderate  Panic  Sleep  disturbance      IMPRESSION:  recurrence of depressive symptoms, still has great difficulty with driving on main streets, sleep disturbance    PLAN: We will give another month of recovery from surgery for obstructive sleep apnea to see how his sleep improves or not  Increase lamotrigine to 100 mg daily for mood stability  Continue fluoxetine 40 mg 1 p.o. daily for depression and anxiety  Continue BuSpar 10 mg 1 p.o. twice daily for anxiety    We discussed risks, benefits, and side effects of the above medications and the patient was agreeable with the plan. Patient was educated on the importance of compliance with treatment and follow-up appointments.     Provide Cognitive Behavioral Therapy and Solution Focused Therapy to improve functioning, maintain stability, and avoid decompensation and the need for higher level of care.    Counseled patient regarding importance of multimodal approach with encouragement of healthy nutrition, healthy sleep, regular physical mobility, social involvement, counseling, and medication compliance.     Assisted patient in identifying risk factors which would indicate the need for higher level of care including thoughts to harm self or others and/or self-harming behavior and encouraged patient to contact this office, call 911, or present to the nearest emergency room should any of these events occur. Discussed crisis intervention services and means to access.  Patient adamantly and convincingly denies current suicidal or homicidal ideation or perceptual disturbance.    Treatment Plan: stabilize mood, patient will stay out of psychiatric hospital and be at optimal level of functioning with therapy and take all medication as prescribed. Patient verbalized  understanding and agreement to plan.    Instructed to call for questions or concerns and return early if necessary.     Greater than 50% time was spent in coordination of care, and counseling the patient regarding current assessment,  symptoms, plan of care going forward, supportive therapy.  Answered any questions patient had regarding medications and plan of care.    Return in about 3 weeks (around 8/11/2021).           Answers for HPI/ROS submitted by the patient on 7/14/2021  What is the primary reason for your visit?: Other  Please describe your symptoms.: Depression  Have you had these symptoms before?: Yes  How long have you been having these symptoms?: Greater than 2 weeks  Please list any medications you are currently taking for this condition.: Fluoxetine, buspirone, lamotrigine  Please describe any probable cause for these symptoms. : Brain chemicals, life circumstances

## 2021-08-11 ENCOUNTER — OFFICE VISIT (OUTPATIENT)
Dept: PSYCHIATRY | Facility: CLINIC | Age: 41
End: 2021-08-11

## 2021-08-11 DIAGNOSIS — F41.1 GENERALIZED ANXIETY DISORDER: ICD-10-CM

## 2021-08-11 DIAGNOSIS — G47.9 SLEEP DISTURBANCE: ICD-10-CM

## 2021-08-11 DIAGNOSIS — F33.1 MODERATE EPISODE OF RECURRENT MAJOR DEPRESSIVE DISORDER (HCC): Primary | ICD-10-CM

## 2021-08-11 DIAGNOSIS — F41.0 PANIC DISORDER: ICD-10-CM

## 2021-08-11 PROCEDURE — 99212 OFFICE O/P EST SF 10 MIN: CPT | Performed by: NURSE PRACTITIONER

## 2021-08-11 RX ORDER — BUPROPION HYDROCHLORIDE 150 MG/1
150 TABLET ORAL DAILY
Qty: 90 TABLET | Refills: 3 | Status: SHIPPED | OUTPATIENT
Start: 2021-08-11 | End: 2022-08-16 | Stop reason: SDUPTHER

## 2021-08-11 NOTE — PROGRESS NOTES
"  Subjective   Eduardo Luna is a 40 y.o. male who is here today for medication management follow up. in person face to face with covid precautions taken, screenings, masked, distance and good handwashing.     TIME IN:1500  TIME OUT:1520    Chief Complaint: ANTONIA, MDD recurrent mod,     History of Present Illness Patient reports the increase in lamotrigine to 100mg daily has \"really helped stabilize my mood\". Anxiety low if not driving on busy roads. Sleeping about 7 hours a night on Lunesta, goes to bed at 8p and awakens around 3-3:30am, likes his routine before going to work. Depression denies. Denies panic. Has motivation and enjoyment .  Denies adverse effects from medications.   (Scales based on 0 - 10 with 10 being the worst)      The following portions of the patient's history were reviewed and updated as appropriate: allergies, current medications, past family history, past medical history, past social history, past surgical history and problem list.    Review of Systems  A 14 point review of systems was performed and is negative except as noted above.    Objective   Physical Exam  There were no vitals taken for this visit.    Allergies   Allergen Reactions   • Lisinopril Other (See Comments)     Throat tickle   • Amoxicillin Hives, Itching and Rash       Current Medications:   Current Outpatient Medications   Medication Sig Dispense Refill   • amLODIPine (NORVASC) 5 MG tablet Take 1 tablet by mouth Daily. 90 tablet 1   • atorvastatin (LIPITOR) 10 MG tablet Take 1 tablet by mouth Daily. 90 tablet 1   • buPROPion XL (Wellbutrin XL) 150 MG 24 hr tablet Take 1 tablet by mouth Daily. 90 tablet 1   • busPIRone (BUSPAR) 10 MG tablet Take 1 tablet by mouth 2 (two) times a day. 180 tablet 1   • cloNIDine (Catapres) 0.1 MG tablet Take 1 tablet by mouth 2 (Two) Times a Day As Needed for High Blood Pressure. 20 tablet 0   • desonide (DESOWEN) 0.05 % cream Apply to rash twice a day, tapering as improves. Not for long term " use. (Patient taking differently: Apply  topically to the appropriate area as directed. PRN) 60 g 0   • eszopiclone (LUNESTA) 3 MG tablet Take 1 tablet by mouth Every Night. Take immediately before bedtime 30 tablet 2   • famotidine (PEPCID) 20 MG tablet Take 20 mg by mouth At Night As Needed for Heartburn.     • FLUoxetine (PROzac) 40 MG capsule Take 1 capsule by mouth Daily. 90 capsule 1   • Hydrocortisone, Perianal, (ANUSOL-HC) 2.5 % rectal cream Apply twice daily to hemorrhoids as needed 28.35 g 1   • ketoconazole (NIZORAL) 2 % cream Apply to flaky areas on face twice daily as needed. 60 g 2   • lamoTRIgine (LaMICtal) 100 MG tablet Take 1 tablet by mouth Daily. 90 tablet 1   • levocetirizine (Xyzal) 5 MG tablet Take 1 tablet by mouth Every Evening. 90 tablet 1   • levothyroxine (SYNTHROID, LEVOTHROID) 75 MCG tablet Take 1 tablet by mouth Every Morning 1 hour prior to food or other medications 90 tablet 1   • metoprolol succinate XL (TOPROL-XL) 100 MG 24 hr tablet Take 1 tablet by mouth Daily. 90 tablet 1   • omeprazole (priLOSEC) 40 MG capsule Take 1 capsule by mouth Daily. 90 capsule 1   • vitamin D3 (vitamin d) 125 MCG (5000 UT) capsule capsule Take 1 capsule by mouth Daily.       No current facility-administered medications for this visit.       Lab Results: reviewed most recent in epic      Appearance: wnl   Hygiene: good  Cooperation:  Cooperative  Eye Contact:  Direct to fair  Psychomotor Behavior:  None observed psychomotor agitation/retardation, No EPS, No motor tics  Mood:  within normal limits  Affect:  Congruent   Hopelessness: Denies  Speech:  Normal  Thought Process:  Linear  Thought Content:  Normal  Concentration: Normal   Suicidal: denies  Homicidal:  None  Hallucinations:  None  Delusion:  None  Memory:  Intact  Orientation:  Person, Place, Time and Situation  Reliability:  good  Insight:  Fair  Judgement: good  Impulse Control: good  Estimated Intelligence: average range    KENNY REVIEWED NO  RED FLAGS    Assessment/Plan   Diagnoses and all orders for this visit:    1. Moderate episode of recurrent major depressive disorder (CMS/HCC) (Primary)    2. Generalized anxiety disorder    3. Panic disorder    4. Sleep disturbance          IMPRESSION: improved stability in mood on increase in lamotrigine, sleeping well on Lunesta 3mg nightly,     PLAN:   Continue bupropion  mg 1 p.o. every morning for depression  Continue Prozac 40 mg 1 p.o. daily for anxiety and depression  Refill lamotrigine 100 mg 1 p.o. daily for mood stabilization  Refill Lunesta 3 mg 1 p.o. nightly as needed for sleeplessness  Continue buspirone 10 mg twice daily for anxiety    We discussed risks, benefits, and side effects of the above medications and the patient was agreeable with the plan. Patient was educated on the importance of compliance with treatment and follow-up appointments.     Counseled patient regarding multimodal approach with encouragement of healthy nutrition, healthy sleep, regular physical mobility, social involvement, counseling, and medication compliance.     Assisted patient in identifying risk factors which would indicate the need for higher level of care including thoughts to harm self or others and/or self-harming behavior and encouraged patient to contact this office, call 911, or present to the nearest emergency room should any of these events occur. Discussed crisis intervention services and means to access.  Patient adamantly and convincingly denies current suicidal or homicidal ideation or perceptual disturbance.    Treatment Plan: stabilize mood, patient will stay out of psychiatric hospital and be at optimal level of functioning with therapy and take all medication as prescribed. Patient verbalized  understanding and agreement to plan.    Instructed to call for questions or concerns and return early if necessary.     Greater than 50% time was spent in coordination of care, and counseling the patient  regarding current assessment, symptoms, plan of care going forward, supportive therapy.  Answered any questions patient had regarding medications and plan of care.    Return in about 3 months (around 11/11/2021). med check

## 2021-09-02 ENCOUNTER — PATIENT MESSAGE (OUTPATIENT)
Dept: INTERNAL MEDICINE | Facility: CLINIC | Age: 41
End: 2021-09-02

## 2021-09-03 ENCOUNTER — PATIENT MESSAGE (OUTPATIENT)
Dept: INTERNAL MEDICINE | Facility: CLINIC | Age: 41
End: 2021-09-03

## 2021-09-03 DIAGNOSIS — E03.9 HYPOTHYROIDISM (ACQUIRED): ICD-10-CM

## 2021-09-03 DIAGNOSIS — I10 ESSENTIAL HYPERTENSION: ICD-10-CM

## 2021-09-03 RX ORDER — PANTOPRAZOLE SODIUM 40 MG/1
40 TABLET, DELAYED RELEASE ORAL DAILY
Qty: 90 TABLET | Refills: 1 | Status: SHIPPED | OUTPATIENT
Start: 2021-09-03 | End: 2021-11-12

## 2021-09-07 RX ORDER — METOPROLOL SUCCINATE 100 MG/1
100 TABLET, EXTENDED RELEASE ORAL DAILY
Qty: 90 TABLET | Refills: 1 | Status: SHIPPED | OUTPATIENT
Start: 2021-09-07 | End: 2022-02-28 | Stop reason: SDUPTHER

## 2021-09-07 RX ORDER — AMLODIPINE BESYLATE 5 MG/1
5 TABLET ORAL DAILY
Qty: 90 TABLET | Refills: 1 | Status: SHIPPED | OUTPATIENT
Start: 2021-09-07 | End: 2022-02-28 | Stop reason: SDUPTHER

## 2021-09-07 RX ORDER — LEVOTHYROXINE SODIUM 0.07 MG/1
75 TABLET ORAL EVERY MORNING
Qty: 90 TABLET | Refills: 1 | Status: SHIPPED | OUTPATIENT
Start: 2021-09-07 | End: 2022-02-28 | Stop reason: SDUPTHER

## 2021-09-07 NOTE — TELEPHONE ENCOUNTER
From: Eduardo Luna  To: CORINNE Javier  Sent: 9/3/2021 7:24 PM EDT  Subject: Prescription Question    Hi Marlen    May I have refills (90 day supply) for:    Metoprolol XL Succinate 100 mg tabs  Amlodipine 5 mg tabs  Levothyroxine 75 mcg tabs    Thank you,    Eduardo Luna

## 2021-09-22 RX ORDER — LEVOCETIRIZINE DIHYDROCHLORIDE 5 MG/1
5 TABLET, FILM COATED ORAL EVERY EVENING
Qty: 90 TABLET | Refills: 3 | Status: SHIPPED | OUTPATIENT
Start: 2021-09-22 | End: 2022-03-16

## 2021-09-22 NOTE — TELEPHONE ENCOUNTER
Rx Refill Note  Requested Prescriptions     Pending Prescriptions Disp Refills   • levocetirizine (Xyzal) 5 MG tablet 90 tablet 1     Sig: Take 1 tablet by mouth Every Evening.      Last office visit with prescribing clinician: 7/12/2021      Next office visit with prescribing clinician: 1/18/2022            Kelin Turner RN  09/22/21, 17:05 EDT

## 2021-10-01 ENCOUNTER — PATIENT MESSAGE (OUTPATIENT)
Dept: INTERNAL MEDICINE | Facility: CLINIC | Age: 41
End: 2021-10-01

## 2021-10-01 DIAGNOSIS — K44.9 HIATAL HERNIA: ICD-10-CM

## 2021-10-01 DIAGNOSIS — K21.9 GASTROESOPHAGEAL REFLUX DISEASE, UNSPECIFIED WHETHER ESOPHAGITIS PRESENT: Primary | ICD-10-CM

## 2021-10-05 DIAGNOSIS — G47.9 SLEEP DISTURBANCE: ICD-10-CM

## 2021-10-05 RX ORDER — ESZOPICLONE 3 MG/1
3 TABLET, FILM COATED ORAL NIGHTLY
Qty: 30 TABLET | Refills: 2 | Status: SHIPPED | OUTPATIENT
Start: 2021-10-05 | End: 2021-11-23 | Stop reason: SDUPTHER

## 2021-10-06 ENCOUNTER — IMMUNIZATION (OUTPATIENT)
Dept: VACCINE CLINIC | Facility: HOSPITAL | Age: 41
End: 2021-10-06

## 2021-10-06 PROCEDURE — 0004A ADM SARSCOV2 30MCG/0.3ML BOOSTER: CPT | Performed by: INTERNAL MEDICINE

## 2021-10-06 PROCEDURE — 91300 HC SARSCOV02 VAC 30MCG/0.3ML IM: CPT | Performed by: INTERNAL MEDICINE

## 2021-10-06 PROCEDURE — 0003A: CPT | Performed by: INTERNAL MEDICINE

## 2021-10-14 ENCOUNTER — PATIENT MESSAGE (OUTPATIENT)
Dept: INTERNAL MEDICINE | Facility: CLINIC | Age: 41
End: 2021-10-14

## 2021-10-14 DIAGNOSIS — Z20.822 ENCOUNTER FOR PREPROCEDURE SCREENING LABORATORY TESTING FOR COVID-19: Primary | ICD-10-CM

## 2021-10-14 DIAGNOSIS — Z01.812 ENCOUNTER FOR PREPROCEDURE SCREENING LABORATORY TESTING FOR COVID-19: Primary | ICD-10-CM

## 2021-10-14 NOTE — TELEPHONE ENCOUNTER
From: Eduardo Luna  To: CORINNE Javier  Sent: 10/14/2021 5:01 PM EDT  Subject: Non-Urgent Medical Question    Desmond Gee,    I received my flu shot today at [facility]. Attached is a copy of documentation for my chart.    Thank you! Enjoy your day!    Eduardo Luna

## 2021-11-01 ENCOUNTER — APPOINTMENT (OUTPATIENT)
Dept: PREADMISSION TESTING | Facility: HOSPITAL | Age: 41
End: 2021-11-01

## 2021-11-01 DIAGNOSIS — Z20.822 ENCOUNTER FOR PREPROCEDURE SCREENING LABORATORY TESTING FOR COVID-19: ICD-10-CM

## 2021-11-01 DIAGNOSIS — Z01.812 ENCOUNTER FOR PREPROCEDURE SCREENING LABORATORY TESTING FOR COVID-19: ICD-10-CM

## 2021-11-01 LAB — SARS-COV-2 RNA PNL SPEC NAA+PROBE: NOT DETECTED

## 2021-11-01 PROCEDURE — C9803 HOPD COVID-19 SPEC COLLECT: HCPCS

## 2021-11-01 PROCEDURE — U0004 COV-19 TEST NON-CDC HGH THRU: HCPCS

## 2021-11-04 ENCOUNTER — OUTSIDE FACILITY SERVICE (OUTPATIENT)
Dept: GASTROENTEROLOGY | Facility: CLINIC | Age: 41
End: 2021-11-04

## 2021-11-04 PROCEDURE — 43239 EGD BIOPSY SINGLE/MULTIPLE: CPT | Performed by: INTERNAL MEDICINE

## 2021-11-10 ENCOUNTER — PATIENT MESSAGE (OUTPATIENT)
Dept: INTERNAL MEDICINE | Facility: CLINIC | Age: 41
End: 2021-11-10

## 2021-11-23 ENCOUNTER — OFFICE VISIT (OUTPATIENT)
Dept: PSYCHIATRY | Facility: CLINIC | Age: 41
End: 2021-11-23

## 2021-11-23 DIAGNOSIS — F41.0 PANIC DISORDER: ICD-10-CM

## 2021-11-23 DIAGNOSIS — F33.1 MODERATE EPISODE OF RECURRENT MAJOR DEPRESSIVE DISORDER (HCC): Primary | ICD-10-CM

## 2021-11-23 DIAGNOSIS — F41.1 GENERALIZED ANXIETY DISORDER: ICD-10-CM

## 2021-11-23 DIAGNOSIS — G47.9 SLEEP DISTURBANCE: ICD-10-CM

## 2021-11-23 PROCEDURE — 99212 OFFICE O/P EST SF 10 MIN: CPT | Performed by: NURSE PRACTITIONER

## 2021-11-23 RX ORDER — BUSPIRONE HYDROCHLORIDE 10 MG/1
10 TABLET ORAL 2 TIMES DAILY
Qty: 180 TABLET | Refills: 3 | Status: SHIPPED | OUTPATIENT
Start: 2021-11-23 | End: 2022-02-28 | Stop reason: SDUPTHER

## 2021-11-23 RX ORDER — FLUOXETINE HYDROCHLORIDE 40 MG/1
40 CAPSULE ORAL DAILY
Qty: 90 CAPSULE | Refills: 3 | Status: SHIPPED | OUTPATIENT
Start: 2021-11-23 | End: 2022-08-16 | Stop reason: SDUPTHER

## 2021-11-23 RX ORDER — ESZOPICLONE 3 MG/1
3 TABLET, FILM COATED ORAL NIGHTLY
Qty: 30 TABLET | Refills: 2 | Status: SHIPPED | OUTPATIENT
Start: 2021-11-23 | End: 2022-03-04 | Stop reason: SDUPTHER

## 2021-11-23 RX ORDER — LAMOTRIGINE 100 MG/1
100 TABLET ORAL DAILY
Qty: 90 TABLET | Refills: 3 | Status: SHIPPED | OUTPATIENT
Start: 2021-11-23 | End: 2022-11-29 | Stop reason: SDUPTHER

## 2021-11-23 NOTE — PROGRESS NOTES
Subjective   Eduardo Luna is a 41 y.o. male who is here today for medication management follow up. in person face to face.  COVID precautions taken with screening, mask, distance, good handwashing    TIME IN: 0242  TIME OUT:3p    Chief Complaint: ANTONIA, panic, MDD, sleep disturbance    History of Present Illness Patient presents alone reporting that he has been doing very well since the increase in August on lamotrigine.  He states he has not had any depressive symptoms.  He is motivated and interested in doing things with work.  Patient stays home with his girlfriend most of the time when not working.  He denies panic attacks as long as he stays off main roads to drive.  He states his general anxiety is well managed.  His sleep is doing well with Lunesta 3 mg at bedtime..  Denies adverse effects from medications.   (Scales based on 0 - 10 with 10 being the worst)    The following portions of the patient's history were reviewed and updated as appropriate: allergies, current medications, past family history, past medical history, past social history, past surgical history and problem list.    Review of Systems  A 14 point review of systems was performed and is negative except as noted above.    Objective   Physical Exam  There were no vitals taken for this visit.    Allergies   Allergen Reactions   • Lisinopril Other (See Comments)     Throat tickle   • Amoxicillin Hives, Itching and Rash       Current Medications:   Current Outpatient Medications   Medication Sig Dispense Refill   • amLODIPine (NORVASC) 5 MG tablet Take 1 tablet by mouth Daily. 90 tablet 1   • atorvastatin (LIPITOR) 10 MG tablet Take 1 tablet by mouth Daily. 90 tablet 1   • buPROPion XL (Wellbutrin XL) 150 MG 24 hr tablet Take 1 tablet by mouth Daily. 90 tablet 3   • busPIRone (BUSPAR) 10 MG tablet Take 1 tablet by mouth 2 (Two) Times a Day. 180 tablet 3   • cloNIDine (Catapres) 0.1 MG tablet Take 1 tablet by mouth 2 (Two) Times a Day As Needed for  High Blood Pressure. 20 tablet 0   • desonide (DESOWEN) 0.05 % cream Apply to rash twice a day, tapering as improves. Not for long term use. (Patient taking differently: Apply  topically to the appropriate area as directed. PRN) 60 g 0   • eszopiclone (LUNESTA) 3 MG tablet Take 1 tablet by mouth Every Night. Take immediately before bedtime 30 tablet 2   • famotidine (PEPCID) 20 MG tablet Take 20 mg by mouth At Night As Needed for Heartburn.     • FLUoxetine (PROzac) 40 MG capsule Take 1 capsule by mouth Daily. 90 capsule 3   • Hydrocortisone, Perianal, (ANUSOL-HC) 2.5 % rectal cream Apply twice daily to hemorrhoids as needed 28.35 g 1   • ketoconazole (NIZORAL) 2 % cream Apply to flaky areas on face twice daily as needed. 60 g 2   • lamoTRIgine (LaMICtal) 100 MG tablet Take 1 tablet by mouth Daily. 90 tablet 3   • levocetirizine (Xyzal) 5 MG tablet Take 1 tablet by mouth Every Evening. 90 tablet 3   • levothyroxine (SYNTHROID, LEVOTHROID) 75 MCG tablet Take 1 tablet by mouth Every Morning 1 hour prior to food or other medications 90 tablet 1   • metoprolol succinate XL (TOPROL-XL) 100 MG 24 hr tablet Take 1 tablet by mouth Daily. 90 tablet 1   • pantoprazole (PROTONIX) 40 MG EC tablet Take 1 tablet by mouth 2 (Two) Times a Day. 60 tablet 5   • vitamin D3 (vitamin d) 125 MCG (5000 UT) capsule capsule Take 1 capsule by mouth Daily.       No current facility-administered medications for this visit.       Appearance: Within normal limits  Hygiene:   good  Cooperation:  Cooperative  Eye Contact: Direct  Psychomotor Behavior:  denies psychomotor agitation/retardation, No EPS, No motor tics  Mood:  within normal limits  Affect: Congruent  Hopelessness: Denies  Speech:  Normal  Thought Process:  Linear  Thought Content:  Normal  Concentration: Normal   Suicidal: denies  Homicidal:  None  Hallucinations:  None  Delusion:  None  Memory:  Intact  Orientation:  Person, Place, Time and Situation  Reliability:  good  Insight:  Good  Judgement: good  Impulse Control: good  Estimated Intelligence: average range    KENNY REVIEWED NO RED FLAGS    Assessment/Plan   Diagnoses and all orders for this visit:    1. Moderate episode of recurrent major depressive disorder (HCC) (Primary)    2. Generalized anxiety disorder    3. Panic disorder    4. Sleep disturbance  Comments:  Continue Lunesta nightly  Orders:  -     eszopiclone (LUNESTA) 3 MG tablet; Take 1 tablet by mouth Every Night. Take immediately before bedtime  Dispense: 30 tablet; Refill: 2    Other orders  -     busPIRone (BUSPAR) 10 MG tablet; Take 1 tablet by mouth 2 (Two) Times a Day.  Dispense: 180 tablet; Refill: 3  -     FLUoxetine (PROzac) 40 MG capsule; Take 1 capsule by mouth Daily.  Dispense: 90 capsule; Refill: 3  -     lamoTRIgine (LaMICtal) 100 MG tablet; Take 1 tablet by mouth Daily.  Dispense: 90 tablet; Refill: 3          IMPRESSION: Stable on current medication management and utilizing healthy coping measures    PLAN: No change in current medication management  Refilled Lunesta 3 mg 1 p.o. nightly as needed for sleeplessness  Refilled BuSpar 10 mg p.o. twice daily for anxiety  Refilled fluoxetine 40 mg 1 p.o. daily for anxiety  Refilled lamotrigine 100 mg 1 p.o. daily mood stabilizer    We discussed risks, benefits, and side effects of the above medications and the patient was agreeable with the plan. Patient was educated on the importance of compliance with treatment and follow-up appointments.     On as-needed basis provide Cognitive Behavioral Therapy and Solution Focused Therapy to improve functioning, maintain stability, and avoid decompensation and the need for higher level of care.    Counseled patient regarding multimodal approach with encouragement of healthy nutrition, healthy sleep, regular physical mobility, social involvement, counseling, and medication compliance.     Assisted patient in identifying risk factors which would indicate the need for higher  level of care including thoughts to harm self or others and/or self-harming behavior and encouraged patient to contact this office, call 911, or present to the nearest emergency room should any of these events occur. Discussed crisis intervention services and means to access.  Patient adamantly and convincingly denies current suicidal or homicidal ideation or perceptual disturbance.    Treatment Plan: stabilize mood, patient will stay out of psychiatric hospital and be at optimal level of functioning with therapy and take all medication as prescribed. Patient verbalized  understanding and agreement to plan.    Instructed to call for questions or concerns and return early if necessary.     Greater than 50% time was spent in coordination of care, and counseling the patient regarding current assessment, symptoms, plan of care going forward, supportive therapy.  Answered any questions patient had regarding medications and plan of care.    Return in about 6 months (around 5/23/2022).

## 2021-11-24 NOTE — H&P
Pre-Op H&P  Eduardo Luna  [id number]  1980    Chief complaint: JAVON    HPI:    Patient is a 38 y.o.male who presents with obstructive sleep apnea, deviated nasal septum and hypertrophy of nasal turbinates with intolerance to CPAP.  Here today for septoplasty, bilateral resection inferior turbinates.    Review of Systems:  General ROS: negative for chills, fever or skin lesions;  No changes since last office visit  Cardiovascular ROS: no chest pain or dyspnea on exertion.  History of SVT s/p RFA 2008 and well controlled on Propanolol  Respiratory ROS: no cough, shortness of breath, or wheezing    Allergies:   Allergies   Allergen Reactions   • Amoxicillin Hives, Itching and Rash       Home Meds:    No current facility-administered medications on file prior to encounter.      Current Outpatient Prescriptions on File Prior to Encounter   Medication Sig Dispense Refill   • ANDROGEL PUMP 20.25 MG/ACT (1.62%) gel Apply 4 pumps daily 75 g 5   • atorvastatin (LIPITOR) 10 MG tablet Take 1 tablet by mouth Daily. 90 tablet 1   • buPROPion XL (WELLBUTRIN XL) 300 MG 24 hr tablet Take 1 tablet by mouth Every Morning. 90 tablet 1   • Cholecalciferol (VITAMIN D3) 2000 units capsule      • FLUoxetine (PROzac) 20 MG capsule Take 1 capsule by mouth Daily. 90 capsule 1   • propranolol LA (INDERAL LA) 60 MG 24 hr capsule Take 1 capsule by mouth Daily. 90 capsule 1   • buPROPion XL (WELLBUTRIN XL) 300 MG 24 hr tablet TAKE 1 TABLET BY MOUTH EVERY MORNING. 90 tablet 1   • levothyroxine (SYNTHROID, LEVOTHROID) 75 MCG tablet TAKE 1 TAB BY MOUTH IN THE AM FASTING AND WAIT 1HR FOR FOOD OR OTHER MEDS 90 tablet 1   • Suvorexant (BELSOMRA) 10 MG tablet Take 1 tablet by mouth Every Night. 10 tablet 0   • Suvorexant (BELSOMRA) 15 MG tablet Take 1 tablet by mouth Every Night. 10 tablet 0   • Suvorexant (BELSOMRA) 20 MG tablet Take 1 tablet by mouth Every Night. 10 tablet 0       PMH:   Past Medical History:   Diagnosis Date   • Cancer (CMS/HCC)   Subjective   Patient ID: Joey is a 18 year old male.    Chief Complaint   Patient presents with   • Neck     woke up with right sided neck pain 3 weeks ago. pain is getting worse.     Neck Pain   This is a new problem. The current episode started 1 to 4 weeks ago. The problem occurs daily. The problem has been waxing and waning. The pain is associated with nothing. The quality of the pain is described as cramping. The pain is moderate. Exacerbated by: movement. The pain is same all the time. Stiffness is present all day. Pertinent negatives include no chest pain, fever, headaches, leg pain, numbness, pain with swallowing, paresis, photophobia, syncope, tingling, trouble swallowing, visual change, weakness or weight loss. He has tried NSAIDs for the symptoms. The treatment provided mild relief.         Past Medical History:   Diagnosis Date   • No known problems        MEDICATIONS:  No current outpatient medications on file.     Current Facility-Administered Medications   Medication   • ketorolac (TORADOL) injection 60 mg   • methylPREDNISolone (SOLU-Medrol) PF injection 60 mg       ALLERGIES:  ALLERGIES:  No Known Allergies    PAST SURGICAL HISTORY:  Past Surgical History:   Procedure Laterality Date   • No past surgeries         FAMILY HISTORY:  History reviewed. No pertinent family history.    SOCIAL HISTORY:  Social History     Tobacco Use   • Smoking status: Never Smoker   • Smokeless tobacco: Never Used   Substance Use Topics   • Alcohol use: Not Currently   • Drug use: Not Currently         Patient's medications, allergies, past medical, surgical, and social history  were reviewed and updated as appropriate.    Review of Systems   Constitutional: Negative for fever and weight loss.   HENT: Negative for trouble swallowing.    Eyes: Negative for photophobia and visual disturbance.   Cardiovascular: Negative for chest pain and syncope.   Musculoskeletal: Positive for neck pain and neck stiffness.   Neurological:     hodgkins lymphoma   • Colon polyp    • Depression    • GERD (gastroesophageal reflux disease)    • Hyperlipidemia    • Hypertension    • Migraine    • Sleep apnea    • SVT (supraventricular tachycardia) (CMS/HCC)      PSH:    Past Surgical History:   Procedure Laterality Date   • CARDIAC ABLATION  2008    SVT   • LYMPH NODE BIOPSY  11/2001    @ [facility] with Dr. Daniel Marinelli   • MOLE REMOVAL  11/2016    Dr Loyd at [facility].    • PORTACATH PLACEMENT  01/2002    [facility] @ [facility] with Dr. Daniel Marinelli   • TONSILLECTOMY  09/2013    @ [facility] with Lexa Castillo   • VASECTOMY  01/2012    @ [facility] with Dr. Aidan Castellano   • WISDOM TOOTH EXTRACTION  10/1997    @ [facility] with Dr. Momo Nunn       Social History:   Tobacco:   History   Smoking Status   • Never Smoker   Smokeless Tobacco   • Never Used      Alcohol:     History   Alcohol Use No       Vitals:           BP (!) 156/114 (BP Location: Right arm, Patient Position: Lying)   Pulse 81   Temp 97.7 °F (36.5 °C) (Temporal Artery )   Resp 18   SpO2 98%     Physical Exam:  General Appearance:    Alert, cooperative, no distress, appears stated age   Head:    Normocephalic, without obvious abnormality, atraumatic   Lungs:     Clear to auscultation bilaterally, respirations unlabored    Heart:   Regular rate and rhythm, S1 and S2 normal, no murmur, rub    or gallop    Abdomen:    Soft, non-tender.  +bowel sounds   Breast Exam:    deferred   Genitalia:    deferred   Extremities:   Extremities normal, atraumatic, no cyanosis or edema   Skin:   Skin color, texture, turgor normal, no rashes or lesions   Neurologic:   Grossly intact   Results Review  I reviewed the patient's new clinical results.    Cancer Staging (if applicable)  Cancer Patient: __ yes _x_no __unknown; If yes, clinical stage T:__ N:__M:__, stage group or __N/A    Impression: Obstructive sleep apnea, deviated nasal septum and hypertrophy of nasal turbinates with  Negative for tingling, weakness, numbness and headaches.   All other systems reviewed and are negative.      Objective   Physical Exam  Vitals and nursing note reviewed.   Constitutional:       General: He is not in acute distress.     Appearance: Normal appearance. He is not ill-appearing, toxic-appearing or diaphoretic.   Neck:      Vascular: No carotid bruit.   Cardiovascular:      Rate and Rhythm: Normal rate and regular rhythm.      Pulses: Normal pulses.      Heart sounds: Normal heart sounds.   Pulmonary:      Effort: Pulmonary effort is normal.      Breath sounds: Normal breath sounds.   Musculoskeletal:      Cervical back: Normal range of motion and neck supple. No edema, erythema, signs of trauma, rigidity, torticollis, tenderness or crepitus. Pain with movement and muscular tenderness present. No spinous process tenderness. Normal range of motion.   Lymphadenopathy:      Cervical: No cervical adenopathy.   Skin:     General: Skin is warm and dry.      Capillary Refill: Capillary refill takes less than 2 seconds.   Neurological:      General: No focal deficit present.      Mental Status: He is alert and oriented to person, place, and time.      Sensory: No sensory deficit.      Motor: No weakness.      Coordination: Coordination normal.      Gait: Gait normal.   Psychiatric:         Mood and Affect: Mood normal.         Behavior: Behavior normal.         Thought Content: Thought content normal.         Judgment: Judgment normal.       Visit Vitals  /63 (BP Location: LUE - Left upper extremity, Patient Position: Sitting, Cuff Size: Regular)   Pulse 66   Temp 98.2 °F (36.8 °C) (Oral)   Resp 18   Ht 6' (1.829 m)   Wt 77.1 kg (170 lb)   SpO2 98%   BMI 23.06 kg/m²       Assessment   Problem List Items Addressed This Visit     None      Visit Diagnoses     Cervical paraspinal muscle spasm    -  Primary    Relevant Medications    ketorolac (TORADOL) injection 60 mg (Start on 11/23/2021  8:00 PM)     intolerance to CPAP    Plan: Septoplasty, bilateral resection of nasal turbinates    Patricia Campbell, APRN   10/18/2018   1:02 PM   methylPREDNISolone (SOLU-Medrol) PF injection 60 mg (Start on 11/23/2021  8:00 PM)    Neck pain        Relevant Orders    XR CERVICAL SPINE 2 OR 3 VIEWS (Completed)          This is a 18 year old year-old male who presents with no stated pmhx with c/o neck pain x a few weeks . He describes stiffness and painful movement, denies injury. Discussed x-rays, diff on exam torticollis.  Advised f/u with PCP.    Instructions provided as documented in the AVS.    Thank you for visiting [facility].  Please follow up with your PCP 1 week.       I have formulated a discharge action plan for this patient:     1) I have given the patient comprehensive discharge instructions and instructed them on the use of any OTC or RX medications involved in their discharge care.   2) I have carefully and comprehensively answered any questions and addressed any concerns that the patient had regarding their medical illness today and their discharge care and follow up.   3) I have carefully and repeatedly discussed with the patient that the patient needs follow up with a primary care provider or specialist, and as necessary I have given this information to the patient.   4)the patient feels comfortable going home at this time, the patient verbally expresses that the understand the discharge action plan and clearly understands to return, proceed to the ER or call 911 if symptoms worsen, and to absolutely follow up as described and directed above.     The provider verified that the discharge instructions and medications documented on this After Visit Summary report were reviewed with patient and/or caregiver.     The patient has appropriate transport home and has verbalized understanding of instructions given.

## 2021-12-22 ENCOUNTER — PATIENT MESSAGE (OUTPATIENT)
Dept: INTERNAL MEDICINE | Facility: CLINIC | Age: 41
End: 2021-12-22

## 2021-12-22 DIAGNOSIS — E78.00 PURE HYPERCHOLESTEROLEMIA: ICD-10-CM

## 2021-12-23 RX ORDER — ATORVASTATIN CALCIUM 10 MG/1
10 TABLET, FILM COATED ORAL DAILY
Qty: 90 TABLET | Refills: 1 | Status: SHIPPED | OUTPATIENT
Start: 2021-12-23 | End: 2022-05-23 | Stop reason: SDUPTHER

## 2021-12-23 NOTE — TELEPHONE ENCOUNTER
From: Eduardo Luna  To: CORINNE Javier  Sent: 12/22/2021 5:13 PM EST  Subject: Lipitor 10mg tabs    Hi Marlen,    May I have refills for atorvastatin 10mg tabs? I have about 10 tabs currently, so there is no rush. I'm just planning ahead.     Karyn Ferraro to you and your family!    Thanks,    Eduardo Luna

## 2022-01-19 ENCOUNTER — TRANSCRIBE ORDERS (OUTPATIENT)
Dept: ADMINISTRATIVE | Facility: HOSPITAL | Age: 42
End: 2022-01-19

## 2022-01-19 DIAGNOSIS — N20.0 KIDNEY STONE: Primary | ICD-10-CM

## 2022-01-21 ENCOUNTER — OFFICE VISIT (OUTPATIENT)
Dept: INTERNAL MEDICINE | Facility: CLINIC | Age: 42
End: 2022-01-21

## 2022-01-21 VITALS
TEMPERATURE: 98.7 F | BODY MASS INDEX: 27.5 KG/M2 | HEIGHT: 72 IN | HEART RATE: 76 BPM | SYSTOLIC BLOOD PRESSURE: 112 MMHG | DIASTOLIC BLOOD PRESSURE: 78 MMHG | WEIGHT: 203 LBS

## 2022-01-21 DIAGNOSIS — Z12.5 SCREENING PSA (PROSTATE SPECIFIC ANTIGEN): ICD-10-CM

## 2022-01-21 DIAGNOSIS — I10 ESSENTIAL HYPERTENSION: Primary | ICD-10-CM

## 2022-01-21 DIAGNOSIS — F41.8 DEPRESSION WITH ANXIETY: ICD-10-CM

## 2022-01-21 DIAGNOSIS — K21.9 GASTROESOPHAGEAL REFLUX DISEASE WITHOUT ESOPHAGITIS: ICD-10-CM

## 2022-01-21 DIAGNOSIS — E03.9 HYPOTHYROIDISM (ACQUIRED): ICD-10-CM

## 2022-01-21 DIAGNOSIS — E78.2 MIXED HYPERLIPIDEMIA: ICD-10-CM

## 2022-01-21 PROCEDURE — 99214 OFFICE O/P EST MOD 30 MIN: CPT | Performed by: NURSE PRACTITIONER

## 2022-01-21 NOTE — PROGRESS NOTES
Eduardo Luna  1980  [id number]  Patient Care Team:  Marlen Reza APRN as PCP - General (Internal Medicine)  River Bradley MD as Consulting Physician (Otolaryngology)  Yovani Aden MD as Consulting Physician (Gastroenterology)    Eduardo Luna is a pleasant 41 y.o. male who presents for evaluation of Hypothyroidism, Hypertension, and Hyperlipidemia    Chief Complaint   Patient presents with   • Hypothyroidism   • Hypertension   • Hyperlipidemia       HPI:   Eduardo is here for his routine 6 mo follow up.  He has a pertinent medical history of Hodgkin's lymphoma (2001 with chemo and radiation), anxiety, insomnia, whitecoat hypertension, HLD (statin since 2019), hypothyroidism, GERD and JAVON.  Family history of colon cancer (father).    Saw Dr. Castellano for presumed kidney stone last week.  Was having acute left sided pelvic pain. CT denied by insurance. Pain has resolved.  No blood in urine. Has not needed the percocet.  Started flomax today    Doing well from anxiety and depression standpoint, sees Clare Cox. Better with addition of Lamictal. Sleeping well on Lunesta    Reflux and gastritis:  Now on protonix BID per GI after EGD 11/8/22 and sx are controlled at this time.  Past Medical History:   Diagnosis Date   • Allergic     Since childhood   • Anxiety     Since childhood   • Cancer (HCC)     hodgkins lymphoma   • Colon polyp    • Depression    • GERD (gastroesophageal reflux disease)    • Hyperlipidemia    • Hypertension    • Hypothyroidism     Acquired from radiation therapy   • Kidney stone     diagnosed with C.T scan in E.R.   • Migraine    • Sleep apnea    • SVT (supraventricular tachycardia) (HCC)      Past Surgical History:   Procedure Laterality Date   • CARDIAC ABLATION  2008    SVT   • COLONOSCOPY      Dr. Yovani Aden   • LYMPH NODE BIOPSY  11/2001    @ [facility] with Dr. Daniel Marinelli   • MOLE REMOVAL  11/2016    Dr Loyd at [facility] dermatology.    • PORTACATH PLACEMENT  01/2002    Anne  Placement @ [facility] with Dr. Daniel Marinelli   • SEPTOPLASTY  10/18/2018    Dr. Lexa Castillo   • SEPTOPLASTY, RESECTION INFERIOR TURBINATES Bilateral 10/18/2018    Procedure: SEPTOPLASTY, BILATERAL RESECTION INFERIOR TURBINATES;  Surgeon: Lexa Castillo MD;  Location: [facility];  Service: ENT   • TONSILLECTOMY  09/2013    @ [facility] with Lexa Castillo   • VASECTOMY  01/2012    @ [facility] with Dr. Aidan Castellano   • WISDOM TOOTH EXTRACTION  10/1997    @ [facility] with Dr. Momo uNnn     Family History   Problem Relation Age of Onset   • Hypertension Mother    • Depression Mother    • Hyperlipidemia Mother    • Cancer Mother         soft tissue sarcoma   • Colon cancer Father    • Colon polyps Father    • Hypertension Father    • Hyperlipidemia Father    • Cancer Father         Colon cancer   • Breast cancer Sister    • Arthritis Maternal Grandmother    • Asthma Maternal Grandmother    • Hyperlipidemia Maternal Grandmother    • Hypertension Maternal Grandmother    • Heart disease Paternal Uncle    • Hyperlipidemia Brother    • Hypertension Brother    • Hypertension Maternal Grandfather      Social History     Tobacco Use   Smoking Status Never Smoker   Smokeless Tobacco Never Used     Allergies   Allergen Reactions   • Lisinopril Other (See Comments)     Throat tickle   • Amoxicillin Hives, Itching and Rash       Current Outpatient Medications:   •  amLODIPine (NORVASC) 5 MG tablet, Take 1 tablet by mouth Daily., Disp: 90 tablet, Rfl: 1  •  atorvastatin (LIPITOR) 10 MG tablet, Take 1 tablet by mouth Daily., Disp: 90 tablet, Rfl: 1  •  buPROPion XL (Wellbutrin XL) 150 MG 24 hr tablet, Take 1 tablet by mouth Daily., Disp: 90 tablet, Rfl: 3  •  busPIRone (BUSPAR) 10 MG tablet, Take 1 tablet by mouth 2 (Two) Times a Day., Disp: 180 tablet, Rfl: 3  •  cloNIDine (Catapres) 0.1 MG tablet, Take 1 tablet by mouth 2 (Two) Times a Day As Needed for High Blood Pressure., Disp: 20 tablet, Rfl: 0  •  desonide (DESOWEN)  0.05 % cream, Apply to rash twice a day, tapering as improves. Not for long term use. (Patient taking differently: Apply  topically to the appropriate area as directed. PRN), Disp: 60 g, Rfl: 0  •  eszopiclone (LUNESTA) 3 MG tablet, Take 1 tablet by mouth Every Night. Take immediately before bedtime, Disp: 30 tablet, Rfl: 2  •  FLUoxetine (PROzac) 40 MG capsule, Take 1 capsule by mouth Daily., Disp: 90 capsule, Rfl: 3  •  Hydrocortisone, Perianal, (ANUSOL-HC) 2.5 % rectal cream, Apply twice daily to hemorrhoids as needed, Disp: 28.35 g, Rfl: 1  •  ketoconazole (NIZORAL) 2 % cream, Apply to flaky areas on face twice daily as needed, Disp: 60 g, Rfl: 2  •  lamoTRIgine (LaMICtal) 100 MG tablet, Take 1 tablet by mouth Daily., Disp: 90 tablet, Rfl: 3  •  levocetirizine (Xyzal) 5 MG tablet, Take 1 tablet by mouth Every Evening., Disp: 90 tablet, Rfl: 3  •  levothyroxine (SYNTHROID, LEVOTHROID) 75 MCG tablet, Take 1 tablet by mouth Every Morning 1 hour prior to food or other medications, Disp: 90 tablet, Rfl: 1  •  metoprolol succinate XL (TOPROL-XL) 100 MG 24 hr tablet, Take 1 tablet by mouth Daily., Disp: 90 tablet, Rfl: 1  •  oxyCODONE-acetaminophen (PERCOCET) 7.5-325 MG per tablet, Take 1-2 tablets by mouth Every 6 (Six) Hours As Needed., Disp: 16 tablet, Rfl: 0  •  pantoprazole (PROTONIX) 40 MG EC tablet, Take 1 tablet by mouth 2 (Two) Times a Day., Disp: 60 tablet, Rfl: 5  •  tamsulosin (FLOMAX) 0.4 MG capsule 24 hr capsule, Take 1 capsule by mouth Daily., Disp: 14 capsule, Rfl: 1  •  vitamin D3 (vitamin d) 125 MCG (5000 UT) capsule capsule, Take 1 capsule by mouth Daily., Disp: , Rfl:     Review of Systems   Eyes: Negative for blurred vision.   Respiratory: Negative for shortness of breath.    Cardiovascular: Negative for chest pain and palpitations.   Musculoskeletal: Negative for neck pain.     /78 (BP Location: Left arm, Patient Position: Sitting, Cuff Size: Adult)   Pulse 76   Temp 98.7 °F (37.1 °C)  "(Temporal)   Ht 182.5 cm (71.85\")   Wt 92.1 kg (203 lb)   BMI 27.65 kg/m²     Physical Exam  Constitutional:       Appearance: He is well-developed.   HENT:      Head: Normocephalic and atraumatic.      Comments: *wearing mask  Eyes:      Conjunctiva/sclera: Conjunctivae normal.      Pupils: Pupils are equal, round, and reactive to light.   Cardiovascular:      Rate and Rhythm: Normal rate and regular rhythm.      Pulses: Normal pulses.      Heart sounds: Normal heart sounds.   Pulmonary:      Effort: Pulmonary effort is normal.      Breath sounds: Normal breath sounds.   Musculoskeletal:         General: Normal range of motion.      Cervical back: Normal range of motion and neck supple.   Skin:     General: Skin is warm and dry.   Neurological:      Mental Status: He is alert and oriented to person, place, and time.   Psychiatric:         Mood and Affect: Mood normal.         Behavior: Behavior normal.         Thought Content: Thought content normal.         Judgment: Judgment normal.         Procedures    Results Review:  None    PHQ-9 Total Score: 0    Assessment/Plan:  Diagnoses and all orders for this visit:    1. Essential hypertension (Primary)  Comments:  controlled on current meds  Orders:  -     CBC & Differential; Future  -     Comprehensive Metabolic Panel; Future  -     Microalbumin / Creatinine Urine Ratio - Urine, Clean Catch; Future    2. Mixed hyperlipidemia  Comments:  return for fasting labs, continue daily statin  Orders:  -     Lipid Panel; Future    3. Hypothyroidism (acquired)  Comments:  continue levothyroxine    4. Screening PSA (prostate specific antigen)  Comments:  initial PSA screening  Orders:  -     PSA Screen; Future    5. Gastroesophageal reflux disease without esophagitis  Comments:  continue PPI BID, avoid food triggers    6. Depression with anxiety  Comments:  continue current meds, followed by Clare Cox       Patient Instructions   Continue medications as prescribed.  Check " BP at home and keep a log for your next visit.    •In general, adults should engage in aerobic physical activity 3-4 times a week with each session lasting an average of 40 minutes.  Goal for 150 minutes per week total.  •Moderate (brisk walking or jogging) to vigorous (running or biking) physical activity is recommended.  •There are many helpful strategies for heart-healthy eating, including the DASH diet and the [employer] Choose My Plate.   •Patients with high blood pressure should consume no more than 2,400 mg of sodium a day, ideally reducing sodium intake to 1,500 mg a day. However, even reducing sodium intake in one's current diet by 1,000 mg each day can help lower blood pressure.    Limit alcohol consumption.    Information obtained from the American College of Cardiology and American Heart Association.  Eat small meals, avoid eating 2-3 hours prior to lying down. Avoid foods that trigger your symptoms.  Common food triggers include chocolate, peppermint, citrus, onions, spicy foods, and foods high in fat.  Elevate your bed to a minimum of 6-8 inches using wooden blocks below the feet of the head of the bed.  Lose weight.  Avoid caffeine and alcohol.      Plan of care reviewed with patient at the conclusion of today's visit. Education was provided regarding diagnosis, management and any prescribed or recommended OTC medications.  Patient verbalizes understanding of and agreement with management plan.    No follow-ups on file.    Dictated Utilizing Dragon Dictation.    CORINNE Javier          Answers for HPI/ROS submitted by the patient on 1/17/2022  What is the primary reason for your visit?: High Blood Pressure  Chronicity: recurrent  Onset: more than 1 year ago  Progression since onset: waxing and waning  Condition status: controlled  anxiety: Yes  headaches: No  malaise/fatigue: Yes  orthopnea: No  peripheral edema: No  PND: No  sweats: No  Agents associated with hypertension: NSAIDs, thyroid  hormones  CAD risks: dyslipidemia, family history, stress  Compliance problems: diet, psychosocial issues

## 2022-01-22 NOTE — PATIENT INSTRUCTIONS
Continue medications as prescribed.  Check BP at home and keep a log for your next visit.    •In general, adults should engage in aerobic physical activity 3-4 times a week with each session lasting an average of 40 minutes.  Goal for 150 minutes per week total.  •Moderate (brisk walking or jogging) to vigorous (running or biking) physical activity is recommended.  •There are many helpful strategies for heart-healthy eating, including the DASH diet and the [employer] Choose My Plate.   •Patients with high blood pressure should consume no more than 2,400 mg of sodium a day, ideally reducing sodium intake to 1,500 mg a day. However, even reducing sodium intake in one's current diet by 1,000 mg each day can help lower blood pressure.    Limit alcohol consumption.    Information obtained from the American College of Cardiology and American Heart Association.  Eat small meals, avoid eating 2-3 hours prior to lying down. Avoid foods that trigger your symptoms.  Common food triggers include chocolate, peppermint, citrus, onions, spicy foods, and foods high in fat.  Elevate your bed to a minimum of 6-8 inches using wooden blocks below the feet of the head of the bed.  Lose weight.  Avoid caffeine and alcohol.

## 2022-01-31 ENCOUNTER — LAB (OUTPATIENT)
Dept: LAB | Facility: HOSPITAL | Age: 42
End: 2022-01-31

## 2022-01-31 DIAGNOSIS — E78.2 MIXED HYPERLIPIDEMIA: ICD-10-CM

## 2022-01-31 DIAGNOSIS — I10 ESSENTIAL HYPERTENSION: ICD-10-CM

## 2022-01-31 DIAGNOSIS — Z12.5 SCREENING PSA (PROSTATE SPECIFIC ANTIGEN): ICD-10-CM

## 2022-01-31 LAB
ALBUMIN SERPL-MCNC: 4.9 G/DL (ref 3.5–5.2)
ALBUMIN UR-MCNC: <1.2 MG/DL
ALBUMIN/GLOB SERPL: 2 G/DL
ALP SERPL-CCNC: 92 U/L (ref 39–117)
ALT SERPL W P-5'-P-CCNC: 46 U/L (ref 1–41)
ANION GAP SERPL CALCULATED.3IONS-SCNC: 9.1 MMOL/L (ref 5–15)
AST SERPL-CCNC: 32 U/L (ref 1–40)
BASOPHILS # BLD AUTO: 0.03 10*3/MM3 (ref 0–0.2)
BASOPHILS NFR BLD AUTO: 0.6 % (ref 0–1.5)
BILIRUB SERPL-MCNC: 0.6 MG/DL (ref 0–1.2)
BUN SERPL-MCNC: 15 MG/DL (ref 6–20)
BUN/CREAT SERPL: 15 (ref 7–25)
CALCIUM SPEC-SCNC: 9.6 MG/DL (ref 8.6–10.5)
CHLORIDE SERPL-SCNC: 100 MMOL/L (ref 98–107)
CHOLEST SERPL-MCNC: 151 MG/DL (ref 0–200)
CO2 SERPL-SCNC: 29.9 MMOL/L (ref 22–29)
CREAT SERPL-MCNC: 1 MG/DL (ref 0.76–1.27)
CREAT UR-MCNC: 233.3 MG/DL
DEPRECATED RDW RBC AUTO: 42.7 FL (ref 37–54)
EOSINOPHIL # BLD AUTO: 0.11 10*3/MM3 (ref 0–0.4)
EOSINOPHIL NFR BLD AUTO: 2.1 % (ref 0.3–6.2)
ERYTHROCYTE [DISTWIDTH] IN BLOOD BY AUTOMATED COUNT: 13.1 % (ref 12.3–15.4)
GFR SERPL CREATININE-BSD FRML MDRD: 82 ML/MIN/1.73
GLOBULIN UR ELPH-MCNC: 2.4 GM/DL
GLUCOSE SERPL-MCNC: 92 MG/DL (ref 65–99)
HCT VFR BLD AUTO: 49.5 % (ref 37.5–51)
HDLC SERPL-MCNC: 49 MG/DL (ref 40–60)
HGB BLD-MCNC: 17 G/DL (ref 13–17.7)
IMM GRANULOCYTES # BLD AUTO: 0.02 10*3/MM3 (ref 0–0.05)
IMM GRANULOCYTES NFR BLD AUTO: 0.4 % (ref 0–0.5)
LDLC SERPL CALC-MCNC: 84 MG/DL (ref 0–100)
LDLC/HDLC SERPL: 1.68 {RATIO}
LYMPHOCYTES # BLD AUTO: 1.1 10*3/MM3 (ref 0.7–3.1)
LYMPHOCYTES NFR BLD AUTO: 21.4 % (ref 19.6–45.3)
MCH RBC QN AUTO: 30.7 PG (ref 26.6–33)
MCHC RBC AUTO-ENTMCNC: 34.3 G/DL (ref 31.5–35.7)
MCV RBC AUTO: 89.5 FL (ref 79–97)
MICROALBUMIN/CREAT UR: NORMAL MG/G{CREAT}
MONOCYTES # BLD AUTO: 0.46 10*3/MM3 (ref 0.1–0.9)
MONOCYTES NFR BLD AUTO: 8.9 % (ref 5–12)
NEUTROPHILS NFR BLD AUTO: 3.43 10*3/MM3 (ref 1.7–7)
NEUTROPHILS NFR BLD AUTO: 66.6 % (ref 42.7–76)
NRBC BLD AUTO-RTO: 0 /100 WBC (ref 0–0.2)
PLATELET # BLD AUTO: 241 10*3/MM3 (ref 140–450)
PMV BLD AUTO: 11.5 FL (ref 6–12)
POTASSIUM SERPL-SCNC: 4.1 MMOL/L (ref 3.5–5.2)
PROT SERPL-MCNC: 7.3 G/DL (ref 6–8.5)
PSA SERPL-MCNC: 1.09 NG/ML (ref 0–4)
RBC # BLD AUTO: 5.53 10*6/MM3 (ref 4.14–5.8)
SODIUM SERPL-SCNC: 139 MMOL/L (ref 136–145)
TRIGL SERPL-MCNC: 99 MG/DL (ref 0–150)
VLDLC SERPL-MCNC: 18 MG/DL (ref 5–40)
WBC NRBC COR # BLD: 5.15 10*3/MM3 (ref 3.4–10.8)

## 2022-01-31 PROCEDURE — 82043 UR ALBUMIN QUANTITATIVE: CPT

## 2022-01-31 PROCEDURE — 85025 COMPLETE CBC W/AUTO DIFF WBC: CPT

## 2022-01-31 PROCEDURE — 82570 ASSAY OF URINE CREATININE: CPT

## 2022-01-31 PROCEDURE — 80053 COMPREHEN METABOLIC PANEL: CPT

## 2022-01-31 PROCEDURE — G0103 PSA SCREENING: HCPCS

## 2022-01-31 PROCEDURE — 80061 LIPID PANEL: CPT

## 2022-02-01 ENCOUNTER — APPOINTMENT (OUTPATIENT)
Dept: CT IMAGING | Facility: HOSPITAL | Age: 42
End: 2022-02-01

## 2022-02-08 ENCOUNTER — TRANSCRIBE ORDERS (OUTPATIENT)
Dept: ADMINISTRATIVE | Facility: HOSPITAL | Age: 42
End: 2022-02-08

## 2022-02-08 DIAGNOSIS — N20.0 KIDNEY STONES: Primary | ICD-10-CM

## 2022-02-08 DIAGNOSIS — N23 RENAL COLIC: ICD-10-CM

## 2022-02-14 ENCOUNTER — HOSPITAL ENCOUNTER (OUTPATIENT)
Dept: ULTRASOUND IMAGING | Facility: HOSPITAL | Age: 42
Discharge: HOME | End: 2022-02-14
Admitting: UROLOGY

## 2022-02-14 DIAGNOSIS — N20.0 KIDNEY STONES: ICD-10-CM

## 2022-02-14 DIAGNOSIS — N23 RENAL COLIC: ICD-10-CM

## 2022-02-14 PROCEDURE — 76775 US EXAM ABDO BACK WALL LIM: CPT

## 2022-02-25 ENCOUNTER — TELEPHONE (OUTPATIENT)
Dept: ONCOLOGY | Facility: CLINIC | Age: 42
End: 2022-02-25

## 2022-02-25 NOTE — TELEPHONE ENCOUNTER
PATIENT CALLED TO REQUEST HIS BUSPAR BE 15 MG TWICE DAILY FOR 90 DAY REFILL AS HE'S BEEN HAVING SOME ISSUES WITH ANXIETY LATELY. PLEASE ADVISE.

## 2022-02-27 ENCOUNTER — PATIENT MESSAGE (OUTPATIENT)
Dept: INTERNAL MEDICINE | Facility: CLINIC | Age: 42
End: 2022-02-27

## 2022-02-27 DIAGNOSIS — E03.9 HYPOTHYROIDISM (ACQUIRED): ICD-10-CM

## 2022-02-27 DIAGNOSIS — I10 ESSENTIAL HYPERTENSION: ICD-10-CM

## 2022-02-28 RX ORDER — BUSPIRONE HYDROCHLORIDE 15 MG/1
15 TABLET ORAL 2 TIMES DAILY
Qty: 180 TABLET | Refills: 3 | Status: SHIPPED | OUTPATIENT
Start: 2022-02-28 | End: 2022-07-11 | Stop reason: SDUPTHER

## 2022-02-28 RX ORDER — AMLODIPINE BESYLATE 5 MG/1
5 TABLET ORAL DAILY
Qty: 90 TABLET | Refills: 1 | Status: SHIPPED | OUTPATIENT
Start: 2022-02-28 | End: 2022-07-11

## 2022-02-28 RX ORDER — LEVOTHYROXINE SODIUM 0.07 MG/1
75 TABLET ORAL EVERY MORNING
Qty: 90 TABLET | Refills: 1 | Status: SHIPPED | OUTPATIENT
Start: 2022-02-28 | End: 2022-08-02 | Stop reason: SDUPTHER

## 2022-02-28 RX ORDER — METOPROLOL SUCCINATE 100 MG/1
100 TABLET, EXTENDED RELEASE ORAL DAILY
Qty: 90 TABLET | Refills: 1 | Status: SHIPPED | OUTPATIENT
Start: 2022-02-28 | End: 2022-08-02 | Stop reason: SDUPTHER

## 2022-02-28 NOTE — TELEPHONE ENCOUNTER
From: Eduardo Luna  To: CORINNE Javier  Sent: 2/27/2022 7:36 AM EST  Subject: Refills    Hi Marlen    May I have refills for:    levothyroxine 75mcg tabs  Metoprolol XL 100mg tabs  Amlodipine 5mg tabs    Thank you,    Eduardo Luna

## 2022-02-28 NOTE — TELEPHONE ENCOUNTER
I have sent increase in Buspar to 15mg twice daily and we'll wait and see if this helps decrease anxiety and weather improvement. If not he may make an appt to be seen, give it two weeks at least.

## 2022-03-03 ENCOUNTER — PHARMACOGENOMICS (OUTPATIENT)
Dept: PHARMACY | Facility: HOSPITAL | Age: 42
End: 2022-03-03

## 2022-03-04 DIAGNOSIS — G47.9 SLEEP DISTURBANCE: ICD-10-CM

## 2022-03-07 RX ORDER — ESZOPICLONE 3 MG/1
3 TABLET, FILM COATED ORAL NIGHTLY
Qty: 30 TABLET | Refills: 2 | Status: SHIPPED | OUTPATIENT
Start: 2022-03-07 | End: 2022-05-24 | Stop reason: SDUPTHER

## 2022-03-16 ENCOUNTER — PATIENT MESSAGE (OUTPATIENT)
Dept: INTERNAL MEDICINE | Facility: CLINIC | Age: 42
End: 2022-03-16

## 2022-03-16 RX ORDER — HYDROXYZINE HCL 10 MG/5 ML
25-50 SOLUTION, ORAL ORAL DAILY PRN
Qty: 750 ML | Refills: 1 | Status: SHIPPED | OUTPATIENT
Start: 2022-03-16 | End: 2022-05-23 | Stop reason: SDUPTHER

## 2022-04-16 ENCOUNTER — PATIENT MESSAGE (OUTPATIENT)
Dept: GASTROENTEROLOGY | Facility: CLINIC | Age: 42
End: 2022-04-16

## 2022-04-18 RX ORDER — PANTOPRAZOLE SODIUM 40 MG/1
40 TABLET, DELAYED RELEASE ORAL 2 TIMES DAILY
Qty: 180 TABLET | Refills: 3 | Status: SHIPPED | OUTPATIENT
Start: 2022-04-18

## 2022-05-16 ENCOUNTER — PATIENT MESSAGE (OUTPATIENT)
Dept: INTERNAL MEDICINE | Facility: CLINIC | Age: 42
End: 2022-05-16

## 2022-05-16 RX ORDER — CLONIDINE HYDROCHLORIDE 0.1 MG/1
0.1 TABLET ORAL 2 TIMES DAILY PRN
Qty: 20 TABLET | Refills: 0 | Status: SHIPPED | OUTPATIENT
Start: 2022-05-16

## 2022-05-16 NOTE — TELEPHONE ENCOUNTER
From: Eduardo Luna  To: CORINNE Javier  Sent: 2022 3:44 PM EDT  Subject: Clonidine 0.1 mg tabs    Desmond Gee,    The clonidine tablets that I have are about to . May I have a new prescription for it?    Thank you!    Eduardo Luna

## 2022-05-22 ENCOUNTER — PATIENT MESSAGE (OUTPATIENT)
Dept: INTERNAL MEDICINE | Facility: CLINIC | Age: 42
End: 2022-05-22

## 2022-05-22 DIAGNOSIS — E78.00 PURE HYPERCHOLESTEROLEMIA: ICD-10-CM

## 2022-05-23 RX ORDER — ATORVASTATIN CALCIUM 10 MG/1
10 TABLET, FILM COATED ORAL DAILY
Qty: 90 TABLET | Refills: 1 | Status: SHIPPED | OUTPATIENT
Start: 2022-05-23 | End: 2022-11-29 | Stop reason: SDUPTHER

## 2022-05-23 RX ORDER — HYDROXYZINE HCL 10 MG/5 ML
25-50 SOLUTION, ORAL ORAL DAILY PRN
Qty: 750 ML | Refills: 1 | Status: SHIPPED | OUTPATIENT
Start: 2022-05-23 | End: 2022-08-02 | Stop reason: SDUPTHER

## 2022-05-23 NOTE — TELEPHONE ENCOUNTER
From: Eduardo Luna  To: CORINNE Javier  Sent: 5/22/2022 11:14 AM EDT  Subject: Med. refills    Hi Marlen,    May I have refills for atorvastatin 10 mg tabs and hydroxyzine liquid?    Thank you! Have a good day.    Eduardo Luna

## 2022-05-24 ENCOUNTER — OFFICE VISIT (OUTPATIENT)
Dept: PSYCHIATRY | Facility: CLINIC | Age: 42
End: 2022-05-24

## 2022-05-24 DIAGNOSIS — G47.9 SLEEP DISTURBANCE: ICD-10-CM

## 2022-05-24 DIAGNOSIS — F33.1 MODERATE EPISODE OF RECURRENT MAJOR DEPRESSIVE DISORDER: Primary | ICD-10-CM

## 2022-05-24 DIAGNOSIS — F41.1 GENERALIZED ANXIETY DISORDER: ICD-10-CM

## 2022-05-24 DIAGNOSIS — F41.0 PANIC DISORDER: ICD-10-CM

## 2022-05-24 PROCEDURE — 99212 OFFICE O/P EST SF 10 MIN: CPT | Performed by: NURSE PRACTITIONER

## 2022-05-24 RX ORDER — ESZOPICLONE 3 MG/1
3 TABLET, FILM COATED ORAL NIGHTLY
Qty: 30 TABLET | Refills: 2 | Status: SHIPPED | OUTPATIENT
Start: 2022-05-24 | End: 2022-08-29 | Stop reason: SDUPTHER

## 2022-05-24 NOTE — PROGRESS NOTES
Subjective   Eduardo Luna is a 41 y.o. male who is here today for medication management follow up. in person face to face with Covid precautions taken. Last seen by this provider 11/2021    TIME IN: 252  TIME OUT: 311    I spent 15  minutes in patient care: reviewing records prior to the visit, assessing the patient, entering orders and documentation.      Chief Complaint: MDD, recurrent mod/mild, ANTONIA, panic, sleep disturbance    History of Present Illness Patient presents by himself reporting he has being doing well and mood stable. He has a day once a month or maybe twice where he feels down but doesn't stay stuck in it and denies SI. 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 denies panic unless he drives on major streets which he avoids. He reports sleeping well with Lunesta.  .  Denies adverse effects from medications. Denies new issues with live in girl friend. He reports his maternal grandmother is ill and 81yo. He is concerned about her, she lives in [place], [place]. Encouraged telephone calls even if they are brief. Discussed health behaviors with unprocessed, plant based meals, walking, finding fun such as bowling, pt agrees.     The following portions of the patient's history were reviewed and updated as appropriate: allergies, current medications, past family history, past medical history, past social history, past surgical history and problem list.    Review of Systems  A 14 point review of systems was performed and is negative except as noted above.    Objective   Physical Exam  There were no vitals taken for this visit.    Allergies   Allergen Reactions   • Lisinopril Other (See Comments)     Throat tickle   • Amoxicillin Hives, Itching and Rash       Current Medications:   Current Outpatient Medications   Medication Sig Dispense Refill   • eszopiclone (LUNESTA) 3 MG tablet Take 1 tablet by mouth Every Night. Take immediately before bedtime 30 tablet 2   • amLODIPine (NORVASC)  5 MG tablet Take 1 tablet by mouth Daily. 90 tablet 1   • atorvastatin (LIPITOR) 10 MG tablet Take 1 tablet by mouth Daily. 90 tablet 1   • buPROPion XL (Wellbutrin XL) 150 MG 24 hr tablet Take 1 tablet by mouth Daily. 90 tablet 3   • busPIRone (BUSPAR) 15 MG tablet Take 1 tablet by mouth 2 (Two) Times a Day. 180 tablet 3   • cloNIDine (Catapres) 0.1 MG tablet Take 1 tablet by mouth 2 (Two) Times a Day As Needed for High Blood Pressure. 20 tablet 0   • desonide (DESOWEN) 0.05 % cream Apply to rash twice a day, tapering as improves. Not for long term use. (Patient taking differently: Apply  topically to the appropriate area as directed. PRN) 60 g 0   • FLUoxetine (PROzac) 40 MG capsule Take 1 capsule by mouth Daily. 90 capsule 3   • Hydrocortisone, Perianal, (ANUSOL-HC) 2.5 % rectal cream Apply twice daily to hemorrhoids as needed 28.35 g 1   • hydrOXYzine (ATARAX) 10 MG/5ML syrup Take 12.5-25 mL by mouth Daily As Needed for allergies 750 mL 1   • ketoconazole (NIZORAL) 2 % cream Apply to flaky areas on face twice daily as needed 60 g 2   • lamoTRIgine (LaMICtal) 100 MG tablet Take 1 tablet by mouth Daily. 90 tablet 3   • levothyroxine (SYNTHROID, LEVOTHROID) 75 MCG tablet Take 1 tablet by mouth Every Morning 1 hour prior to food or other medications 90 tablet 1   • metoprolol succinate XL (TOPROL-XL) 100 MG 24 hr tablet Take 1 tablet by mouth Daily. 90 tablet 1   • pantoprazole (PROTONIX) 40 MG EC tablet Take 1 tablet by mouth 2 (Two) Times a Day. 180 tablet 3   • vitamin D3 (vitamin d) 125 MCG (5000 UT) capsule capsule Take 1 capsule by mouth Daily.       No current facility-administered medications for this visit.       Lab Results:  Lab Requisition on 03/10/2022   Component Date Value Ref Range Status   • Varicella IgG 03/10/2022 1350  Immune >165 index Final                                   Negative          <135                                 Equivocal    135 - 165                                 Positive           >165  A positive result generally indicates exposure to the  pathogen or administration of specific immunoglobulins,  but it is not indication of active infection or stage  of disease.   Lab on 01/31/2022   Component Date Value Ref Range Status   • Glucose 01/31/2022 92  65 - 99 mg/dL Final   • BUN 01/31/2022 15  6 - 20 mg/dL Final   • Creatinine 01/31/2022 1.00  0.76 - 1.27 mg/dL Final   • Sodium 01/31/2022 139  136 - 145 mmol/L Final   • Potassium 01/31/2022 4.1  3.5 - 5.2 mmol/L Final   • Chloride 01/31/2022 100  98 - 107 mmol/L Final   • CO2 01/31/2022 29.9 (A) 22.0 - 29.0 mmol/L Final   • Calcium 01/31/2022 9.6  8.6 - 10.5 mg/dL Final   • Total Protein 01/31/2022 7.3  6.0 - 8.5 g/dL Final   • Albumin 01/31/2022 4.90  3.50 - 5.20 g/dL Final   • ALT (SGPT) 01/31/2022 46 (A) 1 - 41 U/L Final   • AST (SGOT) 01/31/2022 32  1 - 40 U/L Final   • Alkaline Phosphatase 01/31/2022 92  39 - 117 U/L Final   • Total Bilirubin 01/31/2022 0.6  0.0 - 1.2 mg/dL Final   • eGFR Non  Amer 01/31/2022 82  >60 mL/min/1.73 Final   • Globulin 01/31/2022 2.4  gm/dL Final   • A/G Ratio 01/31/2022 2.0  g/dL Final   • BUN/Creatinine Ratio 01/31/2022 15.0  7.0 - 25.0 Final   • Anion Gap 01/31/2022 9.1  5.0 - 15.0 mmol/L Final   • Total Cholesterol 01/31/2022 151  0 - 200 mg/dL Final   • Triglycerides 01/31/2022 99  0 - 150 mg/dL Final   • HDL Cholesterol 01/31/2022 49  40 - 60 mg/dL Final   • LDL Cholesterol  01/31/2022 84  0 - 100 mg/dL Final   • VLDL Cholesterol 01/31/2022 18  5 - 40 mg/dL Final   • LDL/HDL Ratio 01/31/2022 1.68   Final   • PSA 01/31/2022 1.090  0.000 - 4.000 ng/mL Final   • Microalbumin/Creatinine Ratio 01/31/2022    Final    Unable to calculate   • Creatinine, Urine 01/31/2022 233.3  mg/dL Final   • Microalbumin, Urine 01/31/2022 <1.2  mg/dL Final   • WBC 01/31/2022 5.15  3.40 - 10.80 10*3/mm3 Final   • RBC 01/31/2022 5.53  4.14 - 5.80 10*6/mm3 Final   • Hemoglobin 01/31/2022 17.0  13.0 - 17.7 g/dL Final   •  Hematocrit 01/31/2022 49.5  37.5 - 51.0 % Final   • MCV 01/31/2022 89.5  79.0 - 97.0 fL Final   • MCH 01/31/2022 30.7  26.6 - 33.0 pg Final   • MCHC 01/31/2022 34.3  31.5 - 35.7 g/dL Final   • RDW 01/31/2022 13.1  12.3 - 15.4 % Final   • RDW-SD 01/31/2022 42.7  37.0 - 54.0 fl Final   • MPV 01/31/2022 11.5  6.0 - 12.0 fL Final   • Platelets 01/31/2022 241  140 - 450 10*3/mm3 Final   • Neutrophil % 01/31/2022 66.6  42.7 - 76.0 % Final   • Lymphocyte % 01/31/2022 21.4  19.6 - 45.3 % Final   • Monocyte % 01/31/2022 8.9  5.0 - 12.0 % Final   • Eosinophil % 01/31/2022 2.1  0.3 - 6.2 % Final   • Basophil % 01/31/2022 0.6  0.0 - 1.5 % Final   • Immature Grans % 01/31/2022 0.4  0.0 - 0.5 % Final   • Neutrophils, Absolute 01/31/2022 3.43  1.70 - 7.00 10*3/mm3 Final   • Lymphocytes, Absolute 01/31/2022 1.10  0.70 - 3.10 10*3/mm3 Final   • Monocytes, Absolute 01/31/2022 0.46  0.10 - 0.90 10*3/mm3 Final   • Eosinophils, Absolute 01/31/2022 0.11  0.00 - 0.40 10*3/mm3 Final   • Basophils, Absolute 01/31/2022 0.03  0.00 - 0.20 10*3/mm3 Final   • Immature Grans, Absolute 01/31/2022 0.02  0.00 - 0.05 10*3/mm3 Final   • nRBC 01/31/2022 0.0  0.0 - 0.2 /100 WBC Final     Appearance: wnl  Hygiene:   good  Cooperation:  Cooperative  Eye Contact:  direct  Psychomotor Behavior:  denies psychomotor agitation/retardation, No EPS, No motor tics  Mood:  within normal limits, somewhat anxious   Affect:  Congruent   Hopelessness: Denies  Speech:  Normal  Thought Process:  Linear  Thought Content:  Normal  Concentration: Normal   Suicidal: denies  Homicidal:  None  Hallucinations:  None  Delusion:  None  Memory:  Intact  Orientation:  Person, Place, Time and Situation  Reliability:  good  Insight:  Fair  Judgement: good  Impulse Control: good  Estimated Intelligence: average range    KENNY REVIEWED NO RED FLAGS    Assessment & Plan   Diagnoses and all orders for this visit:    1. Moderate episode of recurrent major depressive disorder (HCC)  (Primary)    2. Generalized anxiety disorder    3. Panic disorder    4. Sleep disturbance  -     eszopiclone (LUNESTA) 3 MG tablet; Take 1 tablet by mouth Every Night. Take immediately before bedtime  Dispense: 30 tablet; Refill: 2    5. Sleep disturbance  Comments:  Continue Lunesta nightly  Orders:  -     eszopiclone (LUNESTA) 3 MG tablet; Take 1 tablet by mouth Every Night. Take immediately before bedtime  Dispense: 30 tablet; Refill: 2          IMPRESSION: stable mood for past 6 months    PLAN: no change in current med management  lunesta 3 mg for sleep disturbance   Bupropion  mg daily for depression   Buspirone 15 mg BID for anxiety  Fluoxetine 40 mg daily for anxiety / depression   Hydroxyzine 10 mg as needed for higher anxiety  ( usually needed he states about twice a week))  Lamotrigine 100 mg daily for mood stabilizer     We discussed risks, benefits, and side effects of the above medications and the patient was agreeable with the plan. Patient was educated on the importance of compliance with treatment and follow-up appointments.     Counseled patient regarding multimodal approach with encouragement of healthy nutrition, healthy sleep, regular physical mobility, social involvement, counseling, and medication compliance.     Assisted patient in identifying risk factors which would indicate the need for higher level of care including thoughts to harm self or others and/or self-harming behavior and encouraged patient to contact this office, call 911, or present to the nearest emergency room should any of these events occur. Discussed crisis intervention services and means to access.  Patient adamantly and convincingly denies current suicidal or homicidal ideation or perceptual disturbance.    Treatment Plan: stabilize mood, patient will stay out of psychiatric hospital and be at optimal level of functioning with therapy and take all medication as prescribed. Patient verbalized  understanding and  agreement to plan.    Instructed to call for questions or concerns and return early if necessary.     Greater than 50% time was spent in coordination of care, and counseling the patient regarding current assessment, symptoms, plan of care going forward, supportive therapy.  Answered any questions patient had regarding medications and plan of care.    Return in about 6 months (around 11/24/2022).

## 2022-06-22 ENCOUNTER — HOSPITAL ENCOUNTER (EMERGENCY)
Facility: HOSPITAL | Age: 42
Discharge: HOME | End: 2022-06-22
Attending: EMERGENCY MEDICINE | Admitting: EMERGENCY MEDICINE

## 2022-06-22 ENCOUNTER — TRANSCRIBE ORDERS (OUTPATIENT)
Dept: ADMINISTRATIVE | Facility: HOSPITAL | Age: 42
End: 2022-06-22

## 2022-06-22 ENCOUNTER — APPOINTMENT (OUTPATIENT)
Dept: CT IMAGING | Facility: HOSPITAL | Age: 42
End: 2022-06-22

## 2022-06-22 VITALS
RESPIRATION RATE: 22 BRPM | TEMPERATURE: 98.6 F | BODY MASS INDEX: 27.63 KG/M2 | WEIGHT: 204 LBS | SYSTOLIC BLOOD PRESSURE: 137 MMHG | HEART RATE: 75 BPM | OXYGEN SATURATION: 97 % | HEIGHT: 72 IN | DIASTOLIC BLOOD PRESSURE: 95 MMHG

## 2022-06-22 DIAGNOSIS — N20.0 KIDNEY STONE: Primary | ICD-10-CM

## 2022-06-22 DIAGNOSIS — N20.1 RIGHT URETERAL STONE: ICD-10-CM

## 2022-06-22 DIAGNOSIS — R10.9 FLANK PAIN: ICD-10-CM

## 2022-06-22 DIAGNOSIS — N23 RENAL COLIC ON RIGHT SIDE: Primary | ICD-10-CM

## 2022-06-22 LAB
ALBUMIN SERPL-MCNC: 4.9 G/DL (ref 3.5–5.2)
ALBUMIN/GLOB SERPL: 2.3 G/DL
ALP SERPL-CCNC: 94 U/L (ref 39–117)
ALT SERPL W P-5'-P-CCNC: 28 U/L (ref 1–41)
ANION GAP SERPL CALCULATED.3IONS-SCNC: 12 MMOL/L (ref 5–15)
AST SERPL-CCNC: 20 U/L (ref 1–40)
BACTERIA UR QL AUTO: ABNORMAL /HPF
BASOPHILS # BLD AUTO: 0.03 10*3/MM3 (ref 0–0.2)
BASOPHILS NFR BLD AUTO: 0.3 % (ref 0–1.5)
BILIRUB SERPL-MCNC: 0.4 MG/DL (ref 0–1.2)
BILIRUB UR QL STRIP: NEGATIVE
BUN SERPL-MCNC: 15 MG/DL (ref 6–20)
BUN/CREAT SERPL: 14.2 (ref 7–25)
CALCIUM SPEC-SCNC: 9.8 MG/DL (ref 8.6–10.5)
CHLORIDE SERPL-SCNC: 107 MMOL/L (ref 98–107)
CLARITY UR: ABNORMAL
CO2 SERPL-SCNC: 24 MMOL/L (ref 22–29)
COLOR UR: YELLOW
CREAT SERPL-MCNC: 1.06 MG/DL (ref 0.76–1.27)
DEPRECATED RDW RBC AUTO: 40.1 FL (ref 37–54)
EGFRCR SERPLBLD CKD-EPI 2021: 89.9 ML/MIN/1.73
EOSINOPHIL # BLD AUTO: 0.07 10*3/MM3 (ref 0–0.4)
EOSINOPHIL NFR BLD AUTO: 0.7 % (ref 0.3–6.2)
ERYTHROCYTE [DISTWIDTH] IN BLOOD BY AUTOMATED COUNT: 12.8 % (ref 12.3–15.4)
GLOBULIN UR ELPH-MCNC: 2.1 GM/DL
GLUCOSE SERPL-MCNC: 132 MG/DL (ref 65–99)
GLUCOSE UR STRIP-MCNC: NEGATIVE MG/DL
HCT VFR BLD AUTO: 46.2 % (ref 37.5–51)
HGB BLD-MCNC: 16.2 G/DL (ref 13–17.7)
HGB UR QL STRIP.AUTO: ABNORMAL
HOLD SPECIMEN: NORMAL
HYALINE CASTS UR QL AUTO: ABNORMAL /LPF
IMM GRANULOCYTES # BLD AUTO: 0.07 10*3/MM3 (ref 0–0.05)
IMM GRANULOCYTES NFR BLD AUTO: 0.7 % (ref 0–0.5)
KETONES UR QL STRIP: ABNORMAL
LEUKOCYTE ESTERASE UR QL STRIP.AUTO: ABNORMAL
LIPASE SERPL-CCNC: 49 U/L (ref 13–60)
LYMPHOCYTES # BLD AUTO: 1.11 10*3/MM3 (ref 0.7–3.1)
LYMPHOCYTES NFR BLD AUTO: 10.5 % (ref 19.6–45.3)
MCH RBC QN AUTO: 30.5 PG (ref 26.6–33)
MCHC RBC AUTO-ENTMCNC: 35.1 G/DL (ref 31.5–35.7)
MCV RBC AUTO: 87 FL (ref 79–97)
MONOCYTES # BLD AUTO: 0.59 10*3/MM3 (ref 0.1–0.9)
MONOCYTES NFR BLD AUTO: 5.6 % (ref 5–12)
NEUTROPHILS NFR BLD AUTO: 8.73 10*3/MM3 (ref 1.7–7)
NEUTROPHILS NFR BLD AUTO: 82.2 % (ref 42.7–76)
NITRITE UR QL STRIP: NEGATIVE
NRBC BLD AUTO-RTO: 0 /100 WBC (ref 0–0.2)
PH UR STRIP.AUTO: <=5 [PH] (ref 5–8)
PLATELET # BLD AUTO: 271 10*3/MM3 (ref 140–450)
PMV BLD AUTO: 10.1 FL (ref 6–12)
POTASSIUM SERPL-SCNC: 4.7 MMOL/L (ref 3.5–5.2)
PROT SERPL-MCNC: 7 G/DL (ref 6–8.5)
PROT UR QL STRIP: ABNORMAL
RBC # BLD AUTO: 5.31 10*6/MM3 (ref 4.14–5.8)
RBC # UR STRIP: ABNORMAL /HPF
REF LAB TEST METHOD: ABNORMAL
SODIUM SERPL-SCNC: 143 MMOL/L (ref 136–145)
SP GR UR STRIP: 1.03 (ref 1–1.03)
SQUAMOUS #/AREA URNS HPF: ABNORMAL /HPF
UROBILINOGEN UR QL STRIP: ABNORMAL
WBC # UR STRIP: ABNORMAL /HPF
WBC NRBC COR # BLD: 10.6 10*3/MM3 (ref 3.4–10.8)
WHOLE BLOOD HOLD COAG: NORMAL
WHOLE BLOOD HOLD SPECIMEN: NORMAL

## 2022-06-22 PROCEDURE — 85025 COMPLETE CBC W/AUTO DIFF WBC: CPT

## 2022-06-22 PROCEDURE — 25010000002 ONDANSETRON PER 1 MG: Performed by: EMERGENCY MEDICINE

## 2022-06-22 PROCEDURE — 25010000002 HYDROMORPHONE PER 4 MG: Performed by: EMERGENCY MEDICINE

## 2022-06-22 PROCEDURE — 80053 COMPREHEN METABOLIC PANEL: CPT | Performed by: EMERGENCY MEDICINE

## 2022-06-22 PROCEDURE — 99283 EMERGENCY DEPT VISIT LOW MDM: CPT

## 2022-06-22 PROCEDURE — 74176 CT ABD & PELVIS W/O CONTRAST: CPT

## 2022-06-22 PROCEDURE — 83690 ASSAY OF LIPASE: CPT | Performed by: EMERGENCY MEDICINE

## 2022-06-22 PROCEDURE — 36415 COLL VENOUS BLD VENIPUNCTURE: CPT

## 2022-06-22 PROCEDURE — 81001 URINALYSIS AUTO W/SCOPE: CPT | Performed by: EMERGENCY MEDICINE

## 2022-06-22 PROCEDURE — 96376 TX/PRO/DX INJ SAME DRUG ADON: CPT

## 2022-06-22 PROCEDURE — 96375 TX/PRO/DX INJ NEW DRUG ADDON: CPT

## 2022-06-22 PROCEDURE — 96374 THER/PROPH/DIAG INJ IV PUSH: CPT

## 2022-06-22 PROCEDURE — 25010000002 KETOROLAC TROMETHAMINE PER 15 MG: Performed by: EMERGENCY MEDICINE

## 2022-06-22 RX ORDER — HYDROMORPHONE HYDROCHLORIDE 1 MG/ML
0.5 INJECTION, SOLUTION INTRAMUSCULAR; INTRAVENOUS; SUBCUTANEOUS ONCE
Status: COMPLETED | OUTPATIENT
Start: 2022-06-22 | End: 2022-06-22

## 2022-06-22 RX ORDER — OXYCODONE AND ACETAMINOPHEN 7.5; 325 MG/1; MG/1
1 TABLET ORAL EVERY 6 HOURS PRN
Qty: 12 TABLET | Refills: 0 | Status: SHIPPED | OUTPATIENT
Start: 2022-06-22 | End: 2022-07-11

## 2022-06-22 RX ORDER — KETOROLAC TROMETHAMINE 10 MG/1
10 TABLET, FILM COATED ORAL EVERY 6 HOURS PRN
Qty: 15 TABLET | Refills: 0 | Status: SHIPPED | OUTPATIENT
Start: 2022-06-22 | End: 2022-07-11

## 2022-06-22 RX ORDER — ONDANSETRON 4 MG/1
4 TABLET, ORALLY DISINTEGRATING ORAL EVERY 8 HOURS PRN
Qty: 12 TABLET | Refills: 0 | Status: SHIPPED | OUTPATIENT
Start: 2022-06-22 | End: 2022-07-11

## 2022-06-22 RX ORDER — SODIUM CHLORIDE 9 MG/ML
10 INJECTION INTRAVENOUS AS NEEDED
Status: DISCONTINUED | OUTPATIENT
Start: 2022-06-22 | End: 2022-06-22 | Stop reason: HOSPADM

## 2022-06-22 RX ORDER — KETOROLAC TROMETHAMINE 30 MG/ML
30 INJECTION, SOLUTION INTRAMUSCULAR; INTRAVENOUS ONCE
Status: COMPLETED | OUTPATIENT
Start: 2022-06-22 | End: 2022-06-22

## 2022-06-22 RX ORDER — ONDANSETRON 2 MG/ML
4 INJECTION INTRAMUSCULAR; INTRAVENOUS ONCE
Status: COMPLETED | OUTPATIENT
Start: 2022-06-22 | End: 2022-06-22

## 2022-06-22 RX ADMIN — HYDROMORPHONE HYDROCHLORIDE 0.5 MG: 1 INJECTION, SOLUTION INTRAMUSCULAR; INTRAVENOUS; SUBCUTANEOUS at 17:40

## 2022-06-22 RX ADMIN — HYDROMORPHONE HYDROCHLORIDE 0.5 MG: 1 INJECTION, SOLUTION INTRAMUSCULAR; INTRAVENOUS; SUBCUTANEOUS at 18:24

## 2022-06-22 RX ADMIN — KETOROLAC TROMETHAMINE 30 MG: 30 INJECTION, SOLUTION INTRAMUSCULAR; INTRAVENOUS at 17:39

## 2022-06-22 RX ADMIN — SODIUM CHLORIDE 1000 ML: 9 INJECTION, SOLUTION INTRAVENOUS at 17:38

## 2022-06-22 RX ADMIN — ONDANSETRON 4 MG: 2 INJECTION INTRAMUSCULAR; INTRAVENOUS at 17:38

## 2022-06-22 NOTE — DISCHARGE INSTRUCTIONS
CONTROLLED SUBSTANCE(S) EDUCATION  Controlled Substances have been prescribed by your provider to treat your medical condition and associated symptoms. Although Controlled Substances can be effective in relieving your pain or other symptoms, they may also cause serious adverse effects. It is important that you understand how to safely and appropriately take these medications.  Proper Use  1. Carefully following instructions for use, including timing of doses, whether to take the  medication with or without food, and any foods or other medications to avoid while taking the medication;  2. If you have low or impaired vision you should wear glasses when taking the medication and not take the medication in the dark;  3. You should read the prescription container label each time to confirm the dosage;  4. You should never use the medication after the expiration date;  5. You must never share the medication with others;  6. You must not take the medication with alcohol or other sedatives;  7. You should not take the medication to help you sleep;  8. You should never break, crush or chew the medication;  9. If you have been prescribed a skin patch (transdermal), external heat, fever and exertion can increase the absorption of these products, leading to potentially fatal overdose;  10. You should immediately contact the physician’s office to report any adverse reaction and,  11. It is illegal to share, sell or give away Controlled Substances.  Driving and Work Safety  1. Controlled Substances may cause sleepiness, clouded thinking, decreased concentration, slower reflexes, or incoordination, all of which may create a danger to you and others when driving or operating certain type of machinery;  2. Avoid, if possible, driving or engaging in other potentially dangerous work or other activities, for a specific period of time until the initial effects of the Controlled Substances no longer create such dangers; and,  3.  Ingesting other substances, such as alcohol, benzodiazepines or some cold remedies, at the same time you are taking the Controlled Substances prescribed or dispensed may increase cognitive and motor impairment.  Pregnancy  If you are pregnant or nursing a baby, avoid using Controlled Substances, or use them on a minimal basis in strict accordance with your provider’s instructions.  Potential for Overdose and Response  1. The use of Controlled Substances creates a risk of respiratory depression, which may result in serious harm or death. You and others should be watchful for the following warning signs of overmedication:  ? intoxicated behavior, such as confusion, slurred speech, or stumbling;  ? feeling dizzy or faint;  ? acting very drowsy or groggy;  ? unusual snoring, gasping, or snorting during sleep;  ? and/or difficulty waking up from sleep or difficulty in staying awake.  2. Immediately call “911” or an emergency service upon you or your caregivers observing or experiencing any of the following conditions:  ? you cannot be aroused or waken, or are unable to talk after being awakened;  ? you have shortness of breath, slow or light breathing, or stopped breathing;  ? gurgling noises coming from your mouth or throat;  ? your body is limp, seems lifeless;  ? your face is pale or clammy;  ? your fingernails or lips are turning purple or blue; and/or  ? your heartbeat is slow, unusual or stopped  Safe Storage of Controlled Substances  1. If your Controlled Substances are not stored in a safe manner there is a potential that  partners, family members or others may improperly obtain your Controlled Substances;  2. Always keep the Controlled Substances in the original container;  3. Store Controlled Substances in a locked cabinet or other secure storage unit, that is cool, dry and out of direct sunlight, such as:  ? an existing safe;  ? a cut-proof travel bag;  ? a portable lock box designed for travel; or,  ? a  locking medical box.  4. Do not store Controlled Substances in:  ? an unlocked medicine cabinet;  ? in your car; or,  ? in a refrigerator or freezer unless specifically recommended by the prescriber or  pharmacist; and  5. Immediately notify your provider if any Controlled Substances prescribed or dispensed by the provider are stolen or improperly taken by another individual.  Proper Disposal  1. It is important to safely and appropriately dispose of unused Controlled Substances that had been prescribed or dispensed by your provider;  2. Promptly dispose of unused Controlled Substances after the expiration date of the  prescription or after you no longer require the Controlled Substances to treat your medical condition;  3. In order to safely dispose of Controlled Substances, you should turn in the unused Controlled Substances as part of an approved governmental drug take-back program. The [place] Office of Drug Control Policy has a listing of [place] Permanent Drug Disposal Locations at http://www.odcp.[place].gov - click on the [place] Prescriptions Drug Drop Map and Location on the left side of the page.  4. You should not flush Controlled Substances down the toilet; and,  5. You should personally remove any identifying information, including the prescription number, from an empty Controlled Substance container and then properly dispose of the empty container.  CONSENT FOR TREATMENT WITH CONTROLLED SUBSTANCE(S)  (This is for an initial prescription)  1. Controlled Substances  Controlled Substances are prescribed to treat a variety of conditions, including the relief  of chronic pain, to provide stimulation, promote weight loss, and treat mood disorders.  Pain relief is an important medical reason to take Controlled Substances.  Controlled Substances are drugs or chemical substances whose possession and use are  regulated under the Controlled Substances Act. The law requires that patient are informed of the risks,  benefits, and alternatives of taking Controlled Substances.  2. Adverse Effects  As with any medication, there are risks and adverse effects associated with the use of  Controlled Substances. Common adverse effects of pain medicines could include, but are not limited to: sedation or sleepiness, nausea, vomiting, constipation, pruritus (itching), confusion, respiratory depression, and urinary retention. Some of these effects may make it unsafe for you to drive a vehicle, operate heavy machinery, or perform other tasks that require concentration and coordination. Excessive use of these Controlled Substances can lead to profound sedation, respiratory depression, coma, and/or death. Regarding stimulants, adverse effects could include, but are not limited to: drug dependency, neuropsychiatric symptoms such as psychosis and elfego, weight loss, cardiovascular events such as heart attack and stroke, insomnia, hypertension, and agitation. Any questions you have regarding the Controlled Substance(s) should be discussed with the prescribing provider.  3. Physical Dependence, Tolerance, and Addiction  Although uncommon when used for their clinical indications, both pain relievers and  stimulants can cause physical dependence, tolerance, and/or addiction when used for a  prolonged period. Maintenance therapy with these Controlled Substances can cause  physical dependence. This means that if these medications are abruptly stopped, or  decreased significantly over a short period of time, a patient may experience withdrawal  symptoms such as: nervousness, irritability, insomnia, sweating, abdominal cramping,  nausea, vomiting, and diarrhea. Tolerance occurs when the effects of these Controlled  Substances are decreased over a period of prolonged use making it necessary to increase the dosage. Physical dependence and tolerance are different than addiction. Addiction is a complex disease characterized by compulsive craving or seeking  and use of a substance despite its extreme negatives on a person. The risk of addiction may be increased in a patient with a history of alcoholism or other addiction.  4. Alternatives  Controlled Substances are routinely prescribed to treat moderate to severe pain or other  medical conditions. Other medicines are available to treat these conditions that are not  associated with tolerance or addiction, however, are associated with a lower level of pain  relief or stimulation. It may also be an alternative to not take any medicine to treat these  conditions, or to use alternative modalities, other than medicine to treat these conditions.  I voluntarily consent to the receipt of the above-named Controlled Substance(s) as prescribed by my provider. I have been informed of the benefits, risks, and alternatives to taking these medications. I acknowledge that I have read and understood all of the information above and I have had the opportunity to ask questions and have them answered to my satisfaction.

## 2022-06-22 NOTE — ED PROVIDER NOTES
Subjective   41-year-old male presents emergency room today with a severe pain in the right flank and right lower quadrant that began abruptly about 3 hours ago.  Nothing seems to exacerbate or alleviate the pain.  Has had nausea but no vomiting.  He had kidney stones in the past he thinks this may be similar.  He states that he has had no fevers no chills.  He had no gross hematuria.  Denies any frequency or urgency.      History provided by:  Patient and significant other   used: No    Flank Pain  Pain location:  R flank and RLQ  Pain quality: aching, gnawing and heavy    Pain radiates to:  Does not radiate  Pain severity:  Severe  Onset quality:  Sudden  Duration:  3 hours  Timing:  Constant  Progression:  Waxing and waning  Chronicity:  New  Context comment:  History of kidney stones this is similar.  Relieved by:  Nothing  Worsened by:  Nothing  Ineffective treatments:  None tried  Associated symptoms: nausea    Associated symptoms: no anorexia, no chest pain, no chills, no constipation, no cough, no diarrhea, no fever, no hematemesis, no hematochezia, no hematuria, no sore throat and no vomiting    Risk factors: no alcohol abuse, has not had multiple surgeries, no NSAID use, not obese and no recent hospitalization        Review of Systems   Constitutional: Negative for chills and fever.   HENT: Negative for sore throat.    Respiratory: Negative for cough, chest tightness and wheezing.    Cardiovascular: Negative for chest pain and palpitations.   Gastrointestinal: Positive for nausea. Negative for anorexia, constipation, diarrhea, hematemesis, hematochezia and vomiting.   Genitourinary: Positive for flank pain. Negative for hematuria and urgency.   Musculoskeletal: Negative for back pain and neck pain.   Skin: Negative for pallor.   Psychiatric/Behavioral: Negative.    All other systems reviewed and are negative.      Past Medical History:   Diagnosis Date   • Allergic     Since childhood    • Anxiety     Since childhood   • Cancer (HCC)     hodgkins lymphoma   • Colon polyp    • Depression    • GERD (gastroesophageal reflux disease)    • Hyperlipidemia    • Hypertension    • Hypothyroidism     Acquired from radiation therapy   • Kidney stone     diagnosed with C.T scan in [facility]   • Migraine    • Sleep apnea    • SVT (supraventricular tachycardia) (HCC)        Allergies   Allergen Reactions   • Lisinopril Other (See Comments)     Throat tickle   • Amoxicillin Hives, Itching and Rash       Past Surgical History:   Procedure Laterality Date   • CARDIAC ABLATION  2008    SVT   • COLONOSCOPY      Dr. Yovani Aden   • LYMPH NODE BIOPSY  11/2001    @ [facility] with Dr. Daniel Marinelli   • MOLE REMOVAL  11/2016    Dr Loyd at [facility] dermatology.    • PORTACATH PLACEMENT  01/2002    [place] Placement @ [facility] with Dr. Daniel Marinelli   • SEPTOPLASTY  10/18/2018    Dr. Lexa Castillo   • SEPTOPLASTY, RESECTION INFERIOR TURBINATES Bilateral 10/18/2018    Procedure: SEPTOPLASTY, BILATERAL RESECTION INFERIOR TURBINATES;  Surgeon: Lexa Castillo MD;  Location: [facility];  Service: ENT   • TONSILLECTOMY  09/2013    @ [facility] with Lexa Castillo   • VASECTOMY  01/2012    @ [facility] with Dr. Aidan Castellano   • WISDOM TOOTH EXTRACTION  10/1997    @ [facility] with Dr. Momo Nunn       Family History   Problem Relation Age of Onset   • Hypertension Mother    • Depression Mother    • Hyperlipidemia Mother    • Cancer Mother         soft tissue sarcoma   • Colon cancer Father    • Colon polyps Father    • Hypertension Father    • Hyperlipidemia Father    • Cancer Father         Colon cancer   • Breast cancer Sister    • Arthritis Maternal Grandmother    • Asthma Maternal Grandmother    • Hyperlipidemia Maternal Grandmother    • Hypertension Maternal Grandmother    • Heart disease Paternal Uncle    • Hyperlipidemia Brother    • Hypertension Brother    • Hypertension Maternal Grandfather        Social History      Socioeconomic History   • Marital status: Single   Tobacco Use   • Smoking status: Never Smoker   • Smokeless tobacco: Never Used   Vaping Use   • Vaping Use: Never used   Substance and Sexual Activity   • Alcohol use: No   • Drug use: Never   • Sexual activity: Yes     Partners: Female     Birth control/protection: Surgical     Comment: Vasectomy           Objective   Physical Exam  Vitals and nursing note reviewed.   Constitutional:       Appearance: He is well-developed.      Comments: Warm pink dry pacing around the room appears very uncomfortable.   HENT:      Head: Normocephalic and atraumatic.      Right Ear: External ear normal.      Left Ear: External ear normal.      Nose: Nose normal.   Eyes:      General: No scleral icterus.     Conjunctiva/sclera: Conjunctivae normal.      Pupils: Pupils are equal, round, and reactive to light.   Neck:      Thyroid: No thyromegaly.   Cardiovascular:      Rate and Rhythm: Normal rate and regular rhythm.      Heart sounds: Normal heart sounds.   Pulmonary:      Effort: Pulmonary effort is normal. No respiratory distress.      Breath sounds: Normal breath sounds. No wheezing or rales.   Chest:      Chest wall: No tenderness.   Abdominal:      General: Bowel sounds are normal. There is no distension.      Palpations: Abdomen is soft.      Tenderness: There is no abdominal tenderness. There is no right CVA tenderness, left CVA tenderness, guarding or rebound. Negative signs include Wells's sign, Rovsing's sign, McBurney's sign, psoas sign and obturator sign.      Hernia: No hernia is present.   Musculoskeletal:         General: Normal range of motion.      Cervical back: Normal range of motion.   Lymphadenopathy:      Cervical: No cervical adenopathy.   Skin:     General: Skin is warm and dry.   Neurological:      Mental Status: He is alert and oriented to person, place, and time.      Cranial Nerves: No cranial nerve deficit.      Coordination: Coordination normal.       Deep Tendon Reflexes: Reflexes are normal and symmetric. Reflexes normal.   Psychiatric:         Behavior: Behavior normal.         Thought Content: Thought content normal.         Judgment: Judgment normal.         Procedures           ED Course  ED Course as of 06/22/22 2021 Wed Jun 22, 2022 1936 Patient's pain down to about a 3 out of 10.  He has had kidney stones in the past sees Dr. Castellano. [MEIR]      ED Course User Index  [MEIR] Jason Soni PA KASPER reviewed by Omar Marrero MD     Recent Results (from the past 24 hour(s))   Comprehensive Metabolic Panel    Collection Time: 06/22/22  5:00 PM    Specimen: Blood   Result Value Ref Range    Glucose 132 (H) 65 - 99 mg/dL    BUN 15 6 - 20 mg/dL    Creatinine 1.06 0.76 - 1.27 mg/dL    Sodium 143 136 - 145 mmol/L    Potassium 4.7 3.5 - 5.2 mmol/L    Chloride 107 98 - 107 mmol/L    CO2 24.0 22.0 - 29.0 mmol/L    Calcium 9.8 8.6 - 10.5 mg/dL    Total Protein 7.0 6.0 - 8.5 g/dL    Albumin 4.90 3.50 - 5.20 g/dL    ALT (SGPT) 28 1 - 41 U/L    AST (SGOT) 20 1 - 40 U/L    Alkaline Phosphatase 94 39 - 117 U/L    Total Bilirubin 0.4 0.0 - 1.2 mg/dL    Globulin 2.1 gm/dL    A/G Ratio 2.3 g/dL    BUN/Creatinine Ratio 14.2 7.0 - 25.0    Anion Gap 12.0 5.0 - 15.0 mmol/L    eGFR 89.9 >60.0 mL/min/1.73   Lipase    Collection Time: 06/22/22  5:00 PM    Specimen: Blood   Result Value Ref Range    Lipase 49 13 - 60 U/L   Green Top (Gel)    Collection Time: 06/22/22  5:00 PM   Result Value Ref Range    Extra Tube Hold for add-ons.    Lavender Top    Collection Time: 06/22/22  5:00 PM   Result Value Ref Range    Extra Tube hold for add-on    Gold Top - SST    Collection Time: 06/22/22  5:00 PM   Result Value Ref Range    Extra Tube Hold for add-ons.    Light Blue Top    Collection Time: 06/22/22  5:00 PM   Result Value Ref Range    Extra Tube Hold for add-ons.    CBC Auto Differential    Collection Time: 06/22/22  5:00 PM    Specimen: Blood    Result Value Ref Range    WBC 10.60 3.40 - 10.80 10*3/mm3    RBC 5.31 4.14 - 5.80 10*6/mm3    Hemoglobin 16.2 13.0 - 17.7 g/dL    Hematocrit 46.2 37.5 - 51.0 %    MCV 87.0 79.0 - 97.0 fL    MCH 30.5 26.6 - 33.0 pg    MCHC 35.1 31.5 - 35.7 g/dL    RDW 12.8 12.3 - 15.4 %    RDW-SD 40.1 37.0 - 54.0 fl    MPV 10.1 6.0 - 12.0 fL    Platelets 271 140 - 450 10*3/mm3    Neutrophil % 82.2 (H) 42.7 - 76.0 %    Lymphocyte % 10.5 (L) 19.6 - 45.3 %    Monocyte % 5.6 5.0 - 12.0 %    Eosinophil % 0.7 0.3 - 6.2 %    Basophil % 0.3 0.0 - 1.5 %    Immature Grans % 0.7 (H) 0.0 - 0.5 %    Neutrophils, Absolute 8.73 (H) 1.70 - 7.00 10*3/mm3    Lymphocytes, Absolute 1.11 0.70 - 3.10 10*3/mm3    Monocytes, Absolute 0.59 0.10 - 0.90 10*3/mm3    Eosinophils, Absolute 0.07 0.00 - 0.40 10*3/mm3    Basophils, Absolute 0.03 0.00 - 0.20 10*3/mm3    Immature Grans, Absolute 0.07 (H) 0.00 - 0.05 10*3/mm3    nRBC 0.0 0.0 - 0.2 /100 WBC   Urinalysis With Microscopic If Indicated (No Culture) - Urine, Clean Catch    Collection Time: 06/22/22  5:11 PM    Specimen: Urine, Clean Catch   Result Value Ref Range    Color, UA Yellow Yellow, Straw    Appearance, UA Turbid (A) Clear    pH, UA <=5.0 5.0 - 8.0    Specific Gravity, UA 1.029 1.001 - 1.030    Glucose, UA Negative Negative    Ketones, UA Trace (A) Negative    Bilirubin, UA Negative Negative    Blood, UA Large (3+) (A) Negative    Protein, UA 30 mg/dL (1+) (A) Negative    Leuk Esterase, UA Trace (A) Negative    Nitrite, UA Negative Negative    Urobilinogen, UA 1.0 E.U./dL 0.2 - 1.0 E.U./dL   Urinalysis, Microscopic Only - Urine, Clean Catch    Collection Time: 06/22/22  5:11 PM    Specimen: Urine, Clean Catch   Result Value Ref Range    RBC, UA Too Numerous to Count (A) None Seen, 0-2 /HPF    WBC, UA 0-2 None Seen, 0-2 /HPF    Bacteria, UA None Seen None Seen, Trace /HPF    Squamous Epithelial Cells, UA 0-2 None Seen, 0-2 /HPF    Hyaline Casts, UA 0-6 0 - 6 /LPF    Methodology Manual Light  "Microscopy      Note: In addition to lab results from this visit, the labs listed above may include labs taken at another facility or during a different encounter within the last 24 hours. Please correlate lab times with ED admission and discharge times for further clarification of the services performed during this visit.    CT Abdomen Pelvis Without Contrast   Final Result       1. 3 mm proximal right ureteral calculus with moderate obstructive   uropathy.   2. Nonobstructing 1 mm left renal calculus.   3. No other evidence of acute intra-abdominal or intrapelvic disease.       This report was finalized on 6/22/2022 6:45 PM by Dr. Rico Cardenas MD.            Vitals:    06/22/22 1552 06/22/22 1825 06/22/22 1830   BP: (!) 127/117 139/96 137/95   BP Location: Left arm     Patient Position: Sitting     Pulse: 75     Resp: 22     Temp: 98.6 °F (37 °C)     TempSrc: Oral     SpO2: 97% 97% 97%   Weight: 92.5 kg (204 lb)     Height: 182.9 cm (72\")       Medications   Sodium Chloride (PF) 0.9 % 10 mL (has no administration in time range)   ketorolac (TORADOL) injection 30 mg (30 mg Intravenous Given 6/22/22 1739)   HYDROmorphone (DILAUDID) injection 0.5 mg (0.5 mg Intravenous Given 6/22/22 1740)   ondansetron (ZOFRAN) injection 4 mg (4 mg Intravenous Given 6/22/22 1738)   sodium chloride 0.9 % bolus 1,000 mL (0 mL Intravenous Stopped 6/22/22 1901)   HYDROmorphone (DILAUDID) injection 0.5 mg (0.5 mg Intravenous Given 6/22/22 1824)     ECG/EMG Results (last 24 hours)     ** No results found for the last 24 hours. **        No orders to display         MDM  Number of Diagnoses or Management Options  Flank pain: new and requires workup  Renal colic on right side: new and requires workup  Right ureteral stone: new and requires workup     Amount and/or Complexity of Data Reviewed  Clinical lab tests: reviewed and ordered  Tests in the radiology section of CPT®: reviewed and ordered  Tests in the medicine section of CPT®: ordered " and reviewed  Discuss the patient with other providers: yes    Patient Progress  Patient progress: stable      Final diagnoses:   Renal colic on right side   Right ureteral stone   Flank pain       ED Disposition  ED Disposition     ED Disposition   Discharge    Condition   Stable    Comment   --             Aidan Castellano MD  [address]  [facility] [address] [phone]      Call for appointment         Medication List      New Prescriptions    ketorolac 10 MG tablet  Commonly known as: TORADOL  Take 1 tablet by mouth Every 6 (Six) Hours As Needed for Moderate Pain . did receive IV Toradol today in the emergency department.     ondansetron ODT 4 MG disintegrating tablet  Commonly known as: ZOFRAN-ODT  Place 1 tablet on the tongue Every 8 (Eight) Hours As Needed for Nausea or Vomiting.     oxyCODONE-acetaminophen 7.5-325 MG per tablet  Commonly known as: PERCOCET  Take 1 tablet by mouth Every 6 (Six) Hours As Needed for Severe Pain .        Changed    desonide 0.05 % cream  Commonly known as: DESOWEN  Apply to rash twice a day, tapering as improves. Not for long term use.  What changed: additional instructions           Where to Get Your Medications      These medications were sent to [id number] - [place], [place] - [address] AT [facility] [address] - [phone]  - [phone] FX  [address], [facility] [address]    Phone: [phone]   · ketorolac 10 MG tablet  · ondansetron ODT 4 MG disintegrating tablet  · oxyCODONE-acetaminophen 7.5-325 MG per tablet          Jason Soni PA  06/22/22 2021

## 2022-07-06 ENCOUNTER — APPOINTMENT (OUTPATIENT)
Dept: CT IMAGING | Facility: HOSPITAL | Age: 42
End: 2022-07-06

## 2022-07-11 ENCOUNTER — LAB (OUTPATIENT)
Dept: LAB | Facility: HOSPITAL | Age: 42
End: 2022-07-11

## 2022-07-11 ENCOUNTER — OFFICE VISIT (OUTPATIENT)
Dept: INTERNAL MEDICINE | Facility: CLINIC | Age: 42
End: 2022-07-11

## 2022-07-11 ENCOUNTER — TELEPHONE (OUTPATIENT)
Dept: ONCOLOGY | Facility: CLINIC | Age: 42
End: 2022-07-11

## 2022-07-11 VITALS
DIASTOLIC BLOOD PRESSURE: 100 MMHG | HEIGHT: 72 IN | BODY MASS INDEX: 27.87 KG/M2 | TEMPERATURE: 98.6 F | WEIGHT: 205.8 LBS | SYSTOLIC BLOOD PRESSURE: 132 MMHG | HEART RATE: 60 BPM

## 2022-07-11 DIAGNOSIS — E78.2 MIXED HYPERLIPIDEMIA: ICD-10-CM

## 2022-07-11 DIAGNOSIS — R79.89 LOW TESTOSTERONE: ICD-10-CM

## 2022-07-11 DIAGNOSIS — E03.9 HYPOTHYROIDISM (ACQUIRED): ICD-10-CM

## 2022-07-11 DIAGNOSIS — Z11.3 ROUTINE SCREENING FOR STI (SEXUALLY TRANSMITTED INFECTION): ICD-10-CM

## 2022-07-11 DIAGNOSIS — F41.1 GAD (GENERALIZED ANXIETY DISORDER): ICD-10-CM

## 2022-07-11 DIAGNOSIS — I10 ESSENTIAL HYPERTENSION: Primary | ICD-10-CM

## 2022-07-11 DIAGNOSIS — E55.9 VITAMIN D DEFICIENCY: ICD-10-CM

## 2022-07-11 LAB
25(OH)D3 SERPL-MCNC: 63.8 NG/ML (ref 30–100)
BIOTAP/ACTX MEDICAL LINK: NORMAL
CHOLEST SERPL-MCNC: 154 MG/DL (ref 0–200)
HDLC SERPL-MCNC: 52 MG/DL (ref 40–60)
HIV1+2 AB SER QL: NORMAL
LDLC SERPL CALC-MCNC: 82 MG/DL (ref 0–100)
LDLC/HDLC SERPL: 1.53 {RATIO}
RPR SER QL: NORMAL
TESTOST SERPL-MCNC: 373 NG/DL (ref 249–836)
TRIGL SERPL-MCNC: 113 MG/DL (ref 0–150)
TSH SERPL DL<=0.05 MIU/L-ACNC: 3.2 UIU/ML (ref 0.27–4.2)
VIT B12 BLD-MCNC: 438 PG/ML (ref 211–946)
VLDLC SERPL-MCNC: 20 MG/DL (ref 5–40)

## 2022-07-11 PROCEDURE — 86592 SYPHILIS TEST NON-TREP QUAL: CPT

## 2022-07-11 PROCEDURE — 84443 ASSAY THYROID STIM HORMONE: CPT

## 2022-07-11 PROCEDURE — 86695 HERPES SIMPLEX TYPE 1 TEST: CPT

## 2022-07-11 PROCEDURE — 82306 VITAMIN D 25 HYDROXY: CPT

## 2022-07-11 PROCEDURE — 80061 LIPID PANEL: CPT

## 2022-07-11 PROCEDURE — G0432 EIA HIV-1/HIV-2 SCREEN: HCPCS

## 2022-07-11 PROCEDURE — 99214 OFFICE O/P EST MOD 30 MIN: CPT | Performed by: NURSE PRACTITIONER

## 2022-07-11 PROCEDURE — 82607 VITAMIN B-12: CPT

## 2022-07-11 PROCEDURE — 87491 CHLMYD TRACH DNA AMP PROBE: CPT

## 2022-07-11 PROCEDURE — 86696 HERPES SIMPLEX TYPE 2 TEST: CPT

## 2022-07-11 PROCEDURE — 84403 ASSAY OF TOTAL TESTOSTERONE: CPT

## 2022-07-11 PROCEDURE — 87591 N.GONORRHOEAE DNA AMP PROB: CPT

## 2022-07-11 PROCEDURE — 87661 TRICHOMONAS VAGINALIS AMPLIF: CPT

## 2022-07-11 RX ORDER — BUSPIRONE HYDROCHLORIDE 30 MG/1
30 TABLET ORAL 2 TIMES DAILY
Qty: 180 TABLET | Refills: 1 | Status: SHIPPED | OUTPATIENT
Start: 2022-07-11 | End: 2022-12-01 | Stop reason: SDUPTHER

## 2022-07-11 RX ORDER — AMLODIPINE BESYLATE 10 MG/1
10 TABLET ORAL DAILY
Qty: 90 TABLET | Refills: 1 | Status: SHIPPED | OUTPATIENT
Start: 2022-07-11 | End: 2022-08-30 | Stop reason: SDUPTHER

## 2022-07-11 NOTE — TELEPHONE ENCOUNTER
PATIENT CALLED AND STATED THAT DUE TO RECENT ISSUES WITH ANXIETY HE'S INCREASED THE MG ON HIS BUSPAR FROM 15MG TO 30 MG 2 TIMES DAILY AND STATED THAT IT'S BEEN WORKING FOR HIM. PATIENT WANTED TO REQUEST THAT A NEW SCRIPT BE SENT IN FOR 30 MG 2 TIMES DAILY. PLEASE ADVISE.

## 2022-07-11 NOTE — PROGRESS NOTES
Eduardo Luna  1980  [id number]  Patient Care Team:  Marlen Reza APRN as PCP - General (Internal Medicine)  River Bradley MD as Consulting Physician (Otolaryngology)  Yovani Aden MD as Consulting Physician (Gastroenterology)    Eduardo Luna is a pleasant 41 y.o. male who presents for evaluation of Hypertension (6 mo. F/u ) and Hyperlipidemia    Chief Complaint   Patient presents with   • Hypertension     6 mo. F/u    • Hyperlipidemia       HPI:   Eduardo is here for his routine 6 mo follow up.  He has a pertinent medical history of Hodgkin's lymphoma (2001 with chemo and radiation), anxiety, insomnia, whitecoat hypertension, HLD (statin since 2019), hypothyroidism, GERD and JAVON.  Family history of colon cancer (father).    He checks his blood pressure on occasion and states that  his blood pressure has been higher,  it runs around 140/100.  He has not been watching his salt intake.  He describes his diet is poor.  He does not exercise regularly.    Would like to add testosterone level and routine sti screening with labs.  No sx of concern.    Past Medical History:   Diagnosis Date   • Allergic     Since childhood   • Anxiety     Since childhood   • Cancer (HCC)     hodgkins lymphoma   • Colon polyp    • Depression    • GERD (gastroesophageal reflux disease)    • Hyperlipidemia    • Hypertension    • Hypothyroidism     Acquired from radiation therapy   • Kidney stone     diagnosed with C.T scan in [facility]   • Migraine    • Sleep apnea    • SVT (supraventricular tachycardia) (HCC)      Past Surgical History:   Procedure Laterality Date   • CARDIAC ABLATION  2008    SVT   • COLONOSCOPY      Dr. Yovani Aden   • LYMPH NODE BIOPSY  11/2001    @ [facility] with Dr. Daniel Marinelli   • MOLE REMOVAL  11/2016    Dr Loyd at [facility] dermatology.    • PORTACATH PLACEMENT  01/2002    [facility] Placement @ [facility] with Dr. Daniel Marinelli   • SEPTOPLASTY  10/18/2018    Dr. Lexa Castillo   • SEPTOPLASTY, RESECTION INFERIOR  TURBINATES Bilateral 10/18/2018    Procedure: SEPTOPLASTY, BILATERAL RESECTION INFERIOR TURBINATES;  Surgeon: Lexa Castillo MD;  Location: [facility] OR;  Service: ENT   • TONSILLECTOMY  09/2013    @ [facility] with Lexa Castillo   • VASECTOMY  01/2012    @ [facility] with Dr. Aidan Castellano   • WISDOM TOOTH EXTRACTION  10/1997    @ [facility] with Dr. Momo Nunn     Family History   Problem Relation Age of Onset   • Hypertension Mother    • Depression Mother    • Hyperlipidemia Mother    • Cancer Mother         soft tissue sarcoma   • Colon cancer Father    • Colon polyps Father    • Hypertension Father    • Hyperlipidemia Father    • Cancer Father         Colon cancer   • Breast cancer Sister    • Arthritis Maternal Grandmother    • Asthma Maternal Grandmother    • Hyperlipidemia Maternal Grandmother    • Hypertension Maternal Grandmother    • Heart disease Paternal Uncle    • Hyperlipidemia Brother    • Hypertension Brother    • Hypertension Maternal Grandfather      Social History     Tobacco Use   Smoking Status Never Smoker   Smokeless Tobacco Never Used     Allergies   Allergen Reactions   • Lisinopril Other (See Comments)     Throat tickle   • Amoxicillin Hives, Itching and Rash       Current Outpatient Medications:   •  amLODIPine (NORVASC) 10 MG tablet, Take 1 tablet by mouth Daily., Disp: 90 tablet, Rfl: 1  •  atorvastatin (LIPITOR) 10 MG tablet, Take 1 tablet by mouth Daily., Disp: 90 tablet, Rfl: 1  •  buPROPion XL (Wellbutrin XL) 150 MG 24 hr tablet, Take 1 tablet by mouth Daily., Disp: 90 tablet, Rfl: 3  •  cloNIDine (Catapres) 0.1 MG tablet, Take 1 tablet by mouth 2 (Two) Times a Day As Needed for High Blood Pressure., Disp: 20 tablet, Rfl: 0  •  desonide (DESOWEN) 0.05 % cream, Apply to rash twice a day, tapering as improves. Not for long term use. (Patient taking differently: Apply  topically to the appropriate area as directed. PRN), Disp: 60 g, Rfl: 0  •  eszopiclone (LUNESTA) 3 MG  "tablet, Take 1 tablet by mouth Every Night. Take immediately before bedtime, Disp: 30 tablet, Rfl: 2  •  FLUoxetine (PROzac) 40 MG capsule, Take 1 capsule by mouth Daily., Disp: 90 capsule, Rfl: 3  •  Hydrocortisone, Perianal, (ANUSOL-HC) 2.5 % rectal cream, Apply twice daily to hemorrhoids as needed, Disp: 28.35 g, Rfl: 1  •  hydrOXYzine (ATARAX) 10 MG/5ML syrup, Take 12.5-25 mL by mouth Daily As Needed for allergies, Disp: 750 mL, Rfl: 1  •  ketoconazole (NIZORAL) 2 % cream, Apply to flaky areas on face twice daily as needed, Disp: 60 g, Rfl: 2  •  lamoTRIgine (LaMICtal) 100 MG tablet, Take 1 tablet by mouth Daily., Disp: 90 tablet, Rfl: 3  •  levothyroxine (SYNTHROID, LEVOTHROID) 75 MCG tablet, Take 1 tablet by mouth Every Morning 1 hour prior to food or other medications, Disp: 90 tablet, Rfl: 1  •  metoprolol succinate XL (TOPROL-XL) 100 MG 24 hr tablet, Take 1 tablet by mouth Daily., Disp: 90 tablet, Rfl: 1  •  pantoprazole (PROTONIX) 40 MG EC tablet, Take 1 tablet by mouth 2 (Two) Times a Day., Disp: 180 tablet, Rfl: 3  •  vitamin D3 125 MCG (5000 UT) capsule capsule, Take 1 capsule by mouth Daily., Disp: , Rfl:   •  busPIRone (BUSPAR) 30 MG tablet, Take 1 tablet by mouth 2 (Two) Times a Day., Disp: 180 tablet, Rfl: 1    Review of Systems   Constitutional: Negative for chills, fatigue and fever.   Respiratory: Negative for apnea, cough, chest tightness, shortness of breath and wheezing.    Cardiovascular: Negative for chest pain, palpitations and leg swelling.   Neurological: Negative for syncope, weakness, light-headedness and headache.   Psychiatric/Behavioral: Negative for depressed mood and stress. The patient is nervous/anxious.      Review of systems was completed, and pertinent findings are noted in the HPI.  /100   Pulse 60   Temp 98.6 °F (37 °C) (Temporal)   Ht 182.9 cm (72.01\")   Wt 93.4 kg (205 lb 12.8 oz)   BMI 27.91 kg/m²     Physical Exam  Constitutional:       Appearance: He is " well-developed and overweight.   HENT:      Head: Normocephalic and atraumatic.      Comments: *wearing mask  Eyes:      Conjunctiva/sclera: Conjunctivae normal.      Pupils: Pupils are equal, round, and reactive to light.   Cardiovascular:      Rate and Rhythm: Normal rate and regular rhythm.      Pulses: Normal pulses.      Heart sounds: Normal heart sounds.   Pulmonary:      Effort: Pulmonary effort is normal.      Breath sounds: Normal breath sounds.   Musculoskeletal:         General: Normal range of motion.      Cervical back: Normal range of motion and neck supple.      Right lower leg: No edema.      Left lower leg: No edema.   Skin:     General: Skin is warm and dry.   Neurological:      Mental Status: He is alert and oriented to person, place, and time.   Psychiatric:         Mood and Affect: Mood normal.         Behavior: Behavior normal.         Thought Content: Thought content normal.         Judgment: Judgment normal.         Procedures    PHQ-9 Total Score:      Assessment/Plan:  Diagnoses and all orders for this visit:    1. Essential hypertension (Primary)  Comments:  Continue metoprolol 100 mg daily.  Increase amlodipine to 10 mg daily.  Monitor blood pressures daily and record, email in 2 wks with update.    2. ANTONIA (generalized anxiety disorder)    3. Routine screening for STI (sexually transmitted infection)  -     Chlamydia trachomatis, Neisseria gonorrhoeae, Trichomonas vaginalis, PCR - Swab, Urine, Clean Catch; Future  -     HIV-1 / O / 2 Ag / Antibody 4th Generation; Future  -     HSV 1 & 2 - Specific Antibody, IgG; Future  -     RPR; Future    4. Low testosterone  -     Testosterone; Future    5. Vitamin D deficiency  -     Vitamin D 25 Hydroxy; Future    6. Hypothyroidism (acquired)  -     TSH Rfx On Abnormal To Free T4; Future  -     Vitamin B12; Future    7. Mixed hyperlipidemia  -     Lipid Panel; Future    Other orders  -     amLODIPine (NORVASC) 10 MG tablet; Take 1 tablet by mouth  Daily.  Dispense: 90 tablet; Refill: 1       There are no Patient Instructions on file for this visit.  Plan of care reviewed with patient at the conclusion of today's visit. Education was provided regarding diagnosis, management and any prescribed or recommended OTC medications.  Patient verbalizes understanding of and agreement with management plan.    Return in about 6 months (around 1/11/2023) for Next scheduled follow up.    Dictated Utilizing Dragon Dictation.    CORINNE Javier          Answers for HPI/ROS submitted by the patient on 7/5/2022  What is the primary reason for your visit?: Physical

## 2022-07-12 LAB
HSV1 IGG SER IA-ACNC: <0.91 INDEX (ref 0–0.9)
HSV2 IGG SER IA-ACNC: <0.91 INDEX (ref 0–0.9)

## 2022-07-14 LAB
C TRACH RRNA SPEC QL NAA+PROBE: NORMAL
N GONORRHOEA RRNA SPEC QL NAA+PROBE: NORMAL
T VAGINALIS RRNA SPEC QL NAA+PROBE: NEGATIVE

## 2022-07-15 ENCOUNTER — LAB (OUTPATIENT)
Dept: LAB | Facility: HOSPITAL | Age: 42
End: 2022-07-15

## 2022-07-15 DIAGNOSIS — Z11.3 ROUTINE SCREENING FOR STI (SEXUALLY TRANSMITTED INFECTION): Primary | ICD-10-CM

## 2022-07-15 DIAGNOSIS — Z11.3 ROUTINE SCREENING FOR STI (SEXUALLY TRANSMITTED INFECTION): ICD-10-CM

## 2022-07-15 PROCEDURE — 87591 N.GONORRHOEAE DNA AMP PROB: CPT

## 2022-07-15 PROCEDURE — 87491 CHLMYD TRACH DNA AMP PROBE: CPT

## 2022-07-15 PROCEDURE — 87661 TRICHOMONAS VAGINALIS AMPLIF: CPT

## 2022-07-18 LAB
C TRACH RRNA SPEC QL NAA+PROBE: NEGATIVE
N GONORRHOEA RRNA SPEC QL NAA+PROBE: NEGATIVE
T VAGINALIS RRNA SPEC QL NAA+PROBE: NEGATIVE

## 2022-07-23 ENCOUNTER — PATIENT MESSAGE (OUTPATIENT)
Dept: INTERNAL MEDICINE | Facility: CLINIC | Age: 42
End: 2022-07-23

## 2022-07-23 DIAGNOSIS — R00.2 PALPITATIONS: ICD-10-CM

## 2022-07-23 DIAGNOSIS — I10 ESSENTIAL HYPERTENSION: Primary | ICD-10-CM

## 2022-07-25 NOTE — TELEPHONE ENCOUNTER
From: Eduardo Luna  To: CORINNE Javier  Sent: 7/23/2022 4:58 AM EDT  Subject: cardiology    Desmond Gee,    I'd like to establish care with a cardiologist due to my medical history & recent troubles with palpitations, blood pressure, etc. May I have a referral to Dr. Marcos Gonsalez at   [facility]  [address], [address]  [place] [address]    Thank you,    Eduardo Luna

## 2022-07-27 ENCOUNTER — PATIENT MESSAGE (OUTPATIENT)
Dept: GASTROENTEROLOGY | Facility: CLINIC | Age: 42
End: 2022-07-27

## 2022-08-02 DIAGNOSIS — I10 ESSENTIAL HYPERTENSION: ICD-10-CM

## 2022-08-02 DIAGNOSIS — E03.9 HYPOTHYROIDISM (ACQUIRED): ICD-10-CM

## 2022-08-02 RX ORDER — LEVOTHYROXINE SODIUM 0.07 MG/1
75 TABLET ORAL
Qty: 90 TABLET | Refills: 1 | Status: SHIPPED | OUTPATIENT
Start: 2022-08-02 | End: 2023-02-12 | Stop reason: SDUPTHER

## 2022-08-02 RX ORDER — HYDROXYZINE HCL 10 MG/5 ML
25-50 SOLUTION, ORAL ORAL DAILY PRN
Qty: 750 ML | Refills: 1 | Status: SHIPPED | OUTPATIENT
Start: 2022-08-02 | End: 2022-09-20 | Stop reason: ALTCHOICE

## 2022-08-02 RX ORDER — METOPROLOL SUCCINATE 100 MG/1
100 TABLET, EXTENDED RELEASE ORAL DAILY
Qty: 90 TABLET | Refills: 1 | Status: SHIPPED | OUTPATIENT
Start: 2022-08-02 | End: 2023-02-12 | Stop reason: SDUPTHER

## 2022-08-15 PROCEDURE — U0004 COV-19 TEST NON-CDC HGH THRU: HCPCS | Performed by: FAMILY MEDICINE

## 2022-08-16 RX ORDER — BUPROPION HYDROCHLORIDE 150 MG/1
150 TABLET ORAL DAILY
Qty: 90 TABLET | Refills: 3 | Status: SHIPPED | OUTPATIENT
Start: 2022-08-16

## 2022-08-16 RX ORDER — FLUOXETINE HYDROCHLORIDE 40 MG/1
40 CAPSULE ORAL DAILY
Qty: 90 CAPSULE | Refills: 3 | Status: SHIPPED | OUTPATIENT
Start: 2022-08-16

## 2022-08-18 ENCOUNTER — TELEPHONE (OUTPATIENT)
Dept: INTERNAL MEDICINE | Facility: CLINIC | Age: 42
End: 2022-08-18

## 2022-08-18 ENCOUNTER — OFFICE VISIT (OUTPATIENT)
Dept: INTERNAL MEDICINE | Facility: CLINIC | Age: 42
End: 2022-08-18

## 2022-08-18 VITALS
TEMPERATURE: 98 F | DIASTOLIC BLOOD PRESSURE: 88 MMHG | BODY MASS INDEX: 28.17 KG/M2 | HEIGHT: 72 IN | HEART RATE: 68 BPM | WEIGHT: 208 LBS | SYSTOLIC BLOOD PRESSURE: 138 MMHG

## 2022-08-18 DIAGNOSIS — E66.3 OVERWEIGHT (BMI 25.0-29.9): ICD-10-CM

## 2022-08-18 DIAGNOSIS — I10 ESSENTIAL HYPERTENSION: Chronic | ICD-10-CM

## 2022-08-18 DIAGNOSIS — J98.8 RESPIRATORY INFECTION: Primary | ICD-10-CM

## 2022-08-18 PROCEDURE — 99214 OFFICE O/P EST MOD 30 MIN: CPT | Performed by: STUDENT IN AN ORGANIZED HEALTH CARE EDUCATION/TRAINING PROGRAM

## 2022-08-18 NOTE — PROGRESS NOTES
"Chief Complaint  Eduardo Luna is a 41 y.o. male presenting for Sore Throat (Sinus drainage  went to  on 8/15 and 8/17 ).     From [place]. Engaged, in long term relationship with Teresa since 2016, living together.  No children. 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    History of Present Illness  Patient is here to establish care with me and transition from previous PCP with reducing her hours.    Patient's fiancé got sick on Thursday, 8/11/2022, she tested positive for COVID.  Patient started developing symptoms on Saturday, 8/13/2022, with sore throat, difficulty swallowing, also some drainage.  Mild cough.  No shortness of air or wheezing.  Body aches and chills, mild fever.  No change in taste or smell.  No nausea or vomiting.  No fatigue.  He was seen at urgent care 3 times, most recently yesterday he was started on steroids and Keflex.  Today he is starting to feel better.  He has tested negative for COVID several times.    Patient also has been treated for high blood pressure, currently on metoprolol 100 mg daily and was increased amlodipine from 5 to 10 mg.  Since the increase he has noticed occasional ankle swelling, especially when he is standing up for longer time.  At this point it is not too bad.    The following portions of the patient's history were reviewed and updated as appropriate: allergies, current medications, past family history, past medical history, past social history, past surgical history and problem list.    Objective  /88 (BP Location: Left arm, Patient Position: Sitting, Cuff Size: Adult)   Pulse 68   Temp 98 °F (36.7 °C) (Temporal)   Ht 182.9 cm (72.01\")   Wt 94.3 kg (208 lb)   BMI 28.20 " kg/m²     Physical Exam  Vitals reviewed.   Constitutional:       Appearance: Normal appearance.   HENT:      Head: Normocephalic and atraumatic.      Right Ear: Tympanic membrane, ear canal and external ear normal. There is no impacted cerumen.      Left Ear: Tympanic membrane, ear canal and external ear normal. There is no impacted cerumen.      Nose: Nose normal. No congestion.      Mouth/Throat:      Mouth: Mucous membranes are moist.      Pharynx: Oropharynx is clear. No oropharyngeal exudate.      Comments: Minimal redness on oropharynx, no enlarged or inflamed tonsils  Eyes:      Extraocular Movements: Extraocular movements intact.      Conjunctiva/sclera: Conjunctivae normal.   Cardiovascular:      Rate and Rhythm: Normal rate and regular rhythm.      Heart sounds: Normal heart sounds. No murmur heard.  Pulmonary:      Effort: Pulmonary effort is normal.      Breath sounds: Normal breath sounds.   Abdominal:      General: There is no distension.      Palpations: Abdomen is soft. There is no mass.      Tenderness: There is no abdominal tenderness.   Musculoskeletal:      Cervical back: Neck supple.      Right lower leg: No edema.      Left lower leg: No edema.   Lymphadenopathy:      Cervical: No cervical adenopathy.   Skin:     General: Skin is warm and dry.   Neurological:      Mental Status: He is alert and oriented to person, place, and time. Mental status is at baseline.   Psychiatric:         Behavior: Behavior normal.         Thought Content: Thought content normal.         Assessment/Plan   1. Respiratory infection  Symptoms suggest viral infection, but he is feeling better at this point, and he happens to be feeling better after starting on antibiotics and steroids, so I will continue on these medications.  Since he has not tested positive for COVID he should be able to return to work early next week.  I have filled out form for him to return to work on Monday, 8/22/2022.  If any worsening symptoms  he will get back in touch.    2. Essential hypertension  BP Readings from Last 3 Encounters:   08/18/22 138/88   08/17/22 124/86   08/15/22 133/90   Blood pressure is borderline in the office, however repeat is below 140/90.  There is a component of whitecoat phenomena.  I recommend continuing on current medications, however if he starts to experience more swelling of his ankles I would consider reducing the dose and potentially looking at other medication in addition.    3. Overweight (BMI 25.0-29.9)  BMI is >= 25 and <30. (Overweight) The following options were offered after discussion;: exercise counseling/recommendations and nutrition counseling/recommendations      Return if symptoms worsen or fail to improve, for Next scheduled follow up.    Future Appointments       Provider Department Center    9/22/2022 9:00 AM Marcos Gonsalez MD [facility]    11/22/2022 3:00 PM Clare Cox APRN [facility]    1/18/2023 9:45 AM Dallas Ugarte MD [facility] INTER[facility] MEDICINE [facility]          Dallas Ugarte MD  Family Medicine  08/18/2022    Answers for HPI/ROS submitted by the patient on 8/18/2022  What is the primary reason for your visit?: Sore Throat  Chronicity: new  Onset: in the past 7 days  Progression since onset: waxing and waning  Pain worse on: neither  Fever: no fever  Fever duration: 1 to 2 days  Pain - numeric: 8/10  abdominal pain: No  congestion: Yes  cough: Yes  diarrhea: No  drooling: No  ear discharge: No  ear pain: No  headaches: No  hoarse voice: No  neck pain: No  plugged ear sensation: No  shortness of breath: No  stridor: No  swollen glands: Yes  trouble swallowing: No  vomiting: No  strep: No  mono: No

## 2022-08-29 ENCOUNTER — PATIENT MESSAGE (OUTPATIENT)
Dept: INTERNAL MEDICINE | Facility: CLINIC | Age: 42
End: 2022-08-29

## 2022-08-29 DIAGNOSIS — I10 ESSENTIAL HYPERTENSION: Primary | ICD-10-CM

## 2022-08-29 DIAGNOSIS — G47.9 SLEEP DISTURBANCE: ICD-10-CM

## 2022-08-29 RX ORDER — ESZOPICLONE 3 MG/1
3 TABLET, FILM COATED ORAL NIGHTLY
Qty: 30 TABLET | Refills: 2 | Status: SHIPPED | OUTPATIENT
Start: 2022-08-29 | End: 2022-11-30 | Stop reason: SDUPTHER

## 2022-08-30 RX ORDER — SPIRONOLACTONE 25 MG/1
25 TABLET ORAL DAILY
Qty: 30 TABLET | Refills: 0 | Status: SHIPPED | OUTPATIENT
Start: 2022-08-30 | End: 2022-09-26 | Stop reason: SDUPTHER

## 2022-08-30 RX ORDER — AMLODIPINE BESYLATE 10 MG/1
5 TABLET ORAL DAILY
Qty: 90 TABLET | Refills: 1 | Status: SHIPPED | OUTPATIENT
Start: 2022-08-30 | End: 2022-08-30

## 2022-08-30 RX ORDER — AMLODIPINE BESYLATE 5 MG/1
5 TABLET ORAL DAILY
Qty: 90 TABLET | Refills: 1 | Status: SHIPPED | OUTPATIENT
Start: 2022-08-30 | End: 2023-02-12 | Stop reason: SDUPTHER

## 2022-09-04 ENCOUNTER — PATIENT MESSAGE (OUTPATIENT)
Dept: GASTROENTEROLOGY | Facility: CLINIC | Age: 42
End: 2022-09-04

## 2022-09-13 ENCOUNTER — LAB (OUTPATIENT)
Dept: LAB | Facility: HOSPITAL | Age: 42
End: 2022-09-13

## 2022-09-13 DIAGNOSIS — I10 ESSENTIAL HYPERTENSION: ICD-10-CM

## 2022-09-13 PROCEDURE — 80048 BASIC METABOLIC PNL TOTAL CA: CPT

## 2022-09-14 ENCOUNTER — TRANSCRIBE ORDERS (OUTPATIENT)
Dept: OTOLARYNGOLOGY | Facility: CLINIC | Age: 42
End: 2022-09-14

## 2022-09-14 DIAGNOSIS — G47.33 OBSTRUCTIVE SLEEP APNEA (ADULT) (PEDIATRIC): Primary | ICD-10-CM

## 2022-09-14 LAB
ANION GAP SERPL CALCULATED.3IONS-SCNC: 11 MMOL/L (ref 5–15)
BUN SERPL-MCNC: 19 MG/DL (ref 6–20)
BUN/CREAT SERPL: 17.1 (ref 7–25)
CALCIUM SPEC-SCNC: 9.2 MG/DL (ref 8.6–10.5)
CHLORIDE SERPL-SCNC: 103 MMOL/L (ref 98–107)
CO2 SERPL-SCNC: 26 MMOL/L (ref 22–29)
CREAT SERPL-MCNC: 1.11 MG/DL (ref 0.76–1.27)
EGFRCR SERPLBLD CKD-EPI 2021: 85 ML/MIN/1.73
GLUCOSE SERPL-MCNC: 73 MG/DL (ref 65–99)
POTASSIUM SERPL-SCNC: 4.5 MMOL/L (ref 3.5–5.2)
SODIUM SERPL-SCNC: 140 MMOL/L (ref 136–145)

## 2022-09-20 ENCOUNTER — PATIENT MESSAGE (OUTPATIENT)
Dept: INTERNAL MEDICINE | Facility: CLINIC | Age: 42
End: 2022-09-20

## 2022-09-20 DIAGNOSIS — T78.40XD ALLERGY, SUBSEQUENT ENCOUNTER: Primary | ICD-10-CM

## 2022-09-20 PROBLEM — T78.40XA ALLERGIES: Status: ACTIVE | Noted: 2022-09-20

## 2022-09-20 RX ORDER — HYDROXYZINE HYDROCHLORIDE 25 MG/1
25 TABLET, FILM COATED ORAL 3 TIMES DAILY PRN
Qty: 90 TABLET | Refills: 3 | Status: SHIPPED | OUTPATIENT
Start: 2022-09-20 | End: 2023-02-26 | Stop reason: SDUPTHER

## 2022-09-20 NOTE — TELEPHONE ENCOUNTER
From: Eduardo Luna  To: Dallas Ugarte MD  Sent: 9/20/2022 4:53 AM EDT  Subject: hydroxyzine    Good morning Dr. Ugarte,    I currently take hydroxyzine 10mg/5ml syrup as needed for allergies. Though, liquid antihistamines have worked better for me in the past, I think I'd like to switch to tablets. I usually take 50mg/25ml of the liquid when I need a dose and I don't have much drowsiness.    Eduardo Luna

## 2022-09-21 NOTE — PROGRESS NOTES
OFFICE VISIT  NOTE  [facility] CARDIOLOGY MAIN CAMPUS      Name: Eduardo Luna    Date: 2022  MRN:  [id number]  :  1980      REFERRING/PRIMARY PROVIDER:  Dallas Ugarte MD    Chief Complaint   Patient presents with   • Hypertension   • Palpitations       HPI: Eduardo Luna is a 41 y.o. male who presents today for new consultation for hypertension and palpitations.  Reports chest pain and shortness of breath with exercise.  He is a pharmacy technician at [employer].  Ongoing symptoms for the past year.  Concerned that may be cardiac related.  Chest pain is dull substernal, radiates to left arm, with exercise goes away with rest.  History of Hodgkin's lymphoma with chemotherapy and chest radiation in .  Also history of SVT status post ablation with Dr. Daly in .  Still gets palpitations they are somewhat bothersome but overall tolerable.    Past Medical History:   Diagnosis Date   • Allergic     Since childhood   • Anxiety     Since childhood   • Cancer (HCC)     hodgkins lymphoma   • Colon polyp    • Depression    • GERD (gastroesophageal reflux disease)    • Hyperlipidemia    • Hypertension    • Hypothyroidism     Acquired from radiation therapy   • Kidney stone     diagnosed with C.T scan in [place]   • Migraine    • Sleep apnea    • SVT (supraventricular tachycardia) (HCC)        Past Surgical History:   Procedure Laterality Date   • ABLATION OF DYSRHYTHMIC FOCUS      Dr. George Daly-EP study   • CARDIAC ABLATION      SVT   • COLONOSCOPY      Dr. Yovani Aden   • LYMPH NODE BIOPSY  2001    @ [facility] with Dr. Daniel Marinelli   • MOLE REMOVAL  2016    Dr Loyd at [facility].    • PORTACATH PLACEMENT  2002    [facility] Placement @ [facility] with Dr. Daniel Marinelli   • SEPTOPLASTY  10/18/2018    Dr. Lexa Castillo   • SEPTOPLASTY, RESECTION INFERIOR TURBINATES Bilateral 10/18/2018    Procedure: SEPTOPLASTY, BILATERAL RESECTION INFERIOR TURBINATES;   Surgeon: Lexa Castillo MD;  Location: [facility] OR;  Service: ENT   • TONSILLECTOMY  2013    @ [facility] with Lexa Castillo   • VASECTOMY  2012    @ [facility] with Dr. Aidan Castellano   • WISDOM TOOTH EXTRACTION  10/1997    @ [facility] with Dr. Momo Nunn       Social History     Socioeconomic History   • Marital status: Significant Other   Tobacco Use   • Smoking status: Never Smoker   • Smokeless tobacco: Never Used   Vaping Use   • Vaping Use: Never used   Substance and Sexual Activity   • Alcohol use: No   • Drug use: Never   • Sexual activity: Yes     Partners: Female     Birth control/protection: Surgical     Comment: [id number]. [employer] Ray       Family History   Problem Relation Age of Onset   • Hypertension Mother    • Depression Mother    • Hyperlipidemia Mother    • Cancer Mother         soft tissue sarcoma   • Arrhythmia Mother         Tachycardia   • Colon cancer Father    • Colon polyps Father    • Hypertension Father    • Hyperlipidemia Father    • Cancer Father         Colon cancer   • Breast cancer Sister    • Arthritis Maternal Grandmother    • Asthma Maternal Grandmother    • Hyperlipidemia Maternal Grandmother    • Hypertension Maternal Grandmother    • Heart disease Paternal Uncle         CABG   • Heart attack Paternal Uncle            • Hyperlipidemia Brother    • Hypertension Brother    • Hypertension Maternal Grandfather         ROS:   Constitutional no fever,  no weight loss   Skin no rash, no subcutaneous nodules   Otolaryngeal no difficulty swallowing   Cardiovascular See HPI   Pulmonary no cough, no sputum production   Gastrointestinal no constipation, no diarrhea   Genitourinary no dysuria, no hematuria   Hematologic no easy bruisability, no abnormal bleeding   Musculoskeletal no muscle pain   Neurologic no dizziness, no falls         Allergies   Allergen Reactions   • Lisinopril Other (See Comments)     Throat tickle   • Amoxicillin Hives, Itching and  Rash         Current Outpatient Medications:   •  amLODIPine (NORVASC) 5 MG tablet, Take 1 tablet by mouth Daily., Disp: 90 tablet, Rfl: 1  •  atorvastatin (LIPITOR) 10 MG tablet, Take 1 tablet by mouth Daily., Disp: 90 tablet, Rfl: 1  •  buPROPion XL (Wellbutrin XL) 150 MG 24 hr tablet, Take 1 tablet by mouth Daily., Disp: 90 tablet, Rfl: 3  •  busPIRone (BUSPAR) 30 MG tablet, Take 1 tablet by mouth 2 (Two) Times a Day., Disp: 180 tablet, Rfl: 1  •  cloNIDine (Catapres) 0.1 MG tablet, Take 1 tablet by mouth 2 (Two) Times a Day As Needed for High Blood Pressure., Disp: 20 tablet, Rfl: 0  •  desonide (DESOWEN) 0.05 % cream, Apply to rash twice a day, tapering as improves. Not for long term use. (Patient taking differently: Apply  topically to the appropriate area as directed. PRN), Disp: 60 g, Rfl: 0  •  eszopiclone (LUNESTA) 3 MG tablet, Take 1 tablet by mouth Every Night. Take immediately before bedtime, Disp: 30 tablet, Rfl: 2  •  FLUoxetine (PROzac) 40 MG capsule, Take 1 capsule by mouth Daily., Disp: 90 capsule, Rfl: 3  •  Hydrocortisone, Perianal, (ANUSOL-HC) 2.5 % rectal cream, Apply twice daily to hemorrhoids as needed, Disp: 28.35 g, Rfl: 1  •  HYDROmorphone (Dilaudid) 2 MG tablet, Take 1-3 (one-to-three) tablets by mouth every 6 (six) hours as needed, Disp: 16 tablet, Rfl: 0  •  hydrOXYzine (ATARAX) 25 MG tablet, Take 1 tablet by mouth 3 (Three) Times a Day As Needed for Itching or Allergies., Disp: 90 tablet, Rfl: 3  •  ketoconazole (NIZORAL) 2 % cream, Apply to flaky areas on face twice daily as needed, Disp: 60 g, Rfl: 2  •  lamoTRIgine (LaMICtal) 100 MG tablet, Take 1 tablet by mouth Daily., Disp: 90 tablet, Rfl: 3  •  levothyroxine (SYNTHROID, LEVOTHROID) 75 MCG tablet, Take 1 tablet by mouth Every Morning 1 hour prior to food or other medications, Disp: 90 tablet, Rfl: 1  •  metoprolol succinate XL (TOPROL-XL) 100 MG 24 hr tablet, Take 1 tablet by mouth Daily., Disp: 90 tablet, Rfl: 1  •  pantoprazole  "(PROTONIX) 40 MG EC tablet, Take 1 tablet by mouth 2 (Two) Times a Day., Disp: 180 tablet, Rfl: 3  •  spironolactone (Aldactone) 25 MG tablet, Take 1 tablet by mouth Daily., Disp: 30 tablet, Rfl: 0  •  vitamin D3 125 MCG (5000 UT) capsule capsule, Take 1 capsule by mouth Daily., Disp: , Rfl:     Vitals:    09/22/22 0858   BP: 118/80   BP Location: Right arm   Patient Position: Sitting   Pulse: 80   SpO2: 99%   Weight: 96.4 kg (212 lb 9.6 oz)   Height: 182.9 cm (72\")     Body mass index is 28.83 kg/m².    PHYSICAL EXAM:    General Appearance:   · well developed  · well nourished  HENT:   · oropharynx moist  · lips not cyanotic  Neck:  · thyroid not enlarged  · supple  Respiratory:  · no respiratory distress  · normal breath sounds  · no rales  Cardiovascular:  · no jugular venous distention  · regular rhythm  · apical impulse normal  · S1 normal, S2 normal  · no S3, no S4   · no murmur  · no rub, no thrill  · carotid pulses normal; no bruit  · lower extremity edema: none      Musculoskeletal:  · no clubbing of fingers.   · normocephalic, head atraumatic  Skin:   · warm, dry  Psychiatric:  · judgement and insight appropriate  · normal mood and affect    RESULTS:     ECG 12 Lead    Date/Time: 9/22/2022 9:28 AM  Performed by: Marcos Gonsalez MD  Authorized by: Marcos Gonsalez MD   Comparison: compared with previous ECG from 3/30/2021  Similar to previous ECG  Rhythm: sinus rhythm  Rate: normal  BPM: 80  QRS axis: normal    Clinical impression: normal ECG            Results for orders placed during the hospital encounter of 05/02/21    Adult Transthoracic Echo Complete W/ Cont if Necessary Per Protocol    Interpretation Summary  · Estimated left ventricular EF = 60%  · The cardiac valves are anatomically and functionally normal.        Labs:  Lab Results   Component Value Date    CHOL 154 07/11/2022    TRIG 113 07/11/2022    HDL 52 07/11/2022    LDL 82 07/11/2022    AST 20 06/22/2022    ALT 28 06/22/2022     No " results found for: HGBA1C  No components found for: CREATINININE  eGFR Non  Amer   Date Value Ref Range Status   01/31/2022 82 >60 mL/min/1.73 Final   07/12/2021 81 >60 mL/min/1.73 Final   03/30/2021 97 >60 mL/min/1.73 Final       Most recent PCP note, imaging tests, and labs reviewed.    ASSESSMENT:  Problem List Items Addressed This Visit        Cardiac and Vasculature    Essential hypertension (Chronic)    Overview     WHITECOAT         PAT (paroxysmal atrial tachycardia) (HCC)    Overview     Post ablation 2008         Mixed hyperlipidemia - Primary       Endocrine and Metabolic    Hypothyroidism (acquired)       Sleep    JAVON (obstructive sleep apnea)    Overview     Cannot tolerate CPAP               PLAN:    1.  Chest pain:  Proceed exercise nuclear stress test given typical symptoms and risk factors including hypertension hyperlipidemia and history of chest radiation    2.  Dyspnea on exertion:  Check echocardiogram    3.  Hypertension:  Long-term goal blood pressure is 130/80, well-controlled currently, continue current medical therapy.    4.  Hyperlipidemia:  Lipid panel reviewed, goal DL less than 100, currently controlled on atorvastatin.    Advance Care Planning   ACP discussion was held with the patient during this visit. Patient does not have an advance directive, declines further assistance.         Return to clinic in 12 months, or sooner as needed.    Thank you for the opportunity to share in the care of your patient; please do not hesitate to call me with any questions.     Marcos Gonsalez MD, FACC  Office: [phone] [address] [place] [address]    09/22/22

## 2022-09-22 ENCOUNTER — OFFICE VISIT (OUTPATIENT)
Dept: CARDIOLOGY | Facility: CLINIC | Age: 42
End: 2022-09-22

## 2022-09-22 VITALS
HEART RATE: 80 BPM | SYSTOLIC BLOOD PRESSURE: 118 MMHG | OXYGEN SATURATION: 99 % | HEIGHT: 72 IN | BODY MASS INDEX: 28.79 KG/M2 | DIASTOLIC BLOOD PRESSURE: 80 MMHG | WEIGHT: 212.6 LBS

## 2022-09-22 DIAGNOSIS — E78.2 MIXED HYPERLIPIDEMIA: Primary | ICD-10-CM

## 2022-09-22 DIAGNOSIS — G47.33 OSA (OBSTRUCTIVE SLEEP APNEA): ICD-10-CM

## 2022-09-22 DIAGNOSIS — I10 ESSENTIAL HYPERTENSION: Chronic | ICD-10-CM

## 2022-09-22 DIAGNOSIS — I47.1 PAT (PAROXYSMAL ATRIAL TACHYCARDIA): ICD-10-CM

## 2022-09-22 DIAGNOSIS — R07.9 CHEST PAIN, UNSPECIFIED TYPE: ICD-10-CM

## 2022-09-22 DIAGNOSIS — E03.9 HYPOTHYROIDISM (ACQUIRED): ICD-10-CM

## 2022-09-22 PROCEDURE — 93000 ELECTROCARDIOGRAM COMPLETE: CPT | Performed by: INTERNAL MEDICINE

## 2022-09-22 PROCEDURE — 99204 OFFICE O/P NEW MOD 45 MIN: CPT | Performed by: INTERNAL MEDICINE

## 2022-09-26 ENCOUNTER — OFFICE VISIT (OUTPATIENT)
Dept: INTERNAL MEDICINE | Facility: CLINIC | Age: 42
End: 2022-09-26

## 2022-09-26 VITALS
HEART RATE: 72 BPM | WEIGHT: 209.4 LBS | BODY MASS INDEX: 28.36 KG/M2 | HEIGHT: 72 IN | TEMPERATURE: 98 F | SYSTOLIC BLOOD PRESSURE: 124 MMHG | DIASTOLIC BLOOD PRESSURE: 84 MMHG

## 2022-09-26 DIAGNOSIS — I10 ESSENTIAL HYPERTENSION: Primary | Chronic | ICD-10-CM

## 2022-09-26 DIAGNOSIS — E03.9 HYPOTHYROIDISM (ACQUIRED): Chronic | ICD-10-CM

## 2022-09-26 PROCEDURE — 99214 OFFICE O/P EST MOD 30 MIN: CPT | Performed by: STUDENT IN AN ORGANIZED HEALTH CARE EDUCATION/TRAINING PROGRAM

## 2022-09-26 RX ORDER — SPIRONOLACTONE 25 MG/1
25 TABLET ORAL DAILY
Qty: 90 TABLET | Refills: 1 | Status: SHIPPED | OUTPATIENT
Start: 2022-09-26 | End: 2023-03-20 | Stop reason: SDUPTHER

## 2022-09-26 NOTE — PROGRESS NOTES
"Chief Complaint  Eduardo Luna is a 41 y.o. male presenting for lab results and Med Refill.     From [place]. Engaged, in long term relationship with Teresa since 2016, living together.  No children. 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    History of Present Illness  Patient is here for follow-up.    He was experiencing some lower extremity swelling on amlodipine 10 mg dose was reduced to 5 mg and spironolactone 25 mg added.  He has tolerated well, swelling has resolved.  He did blood work that showed normal creatinine and normal electrolytes.  He checks home blood pressures, which typically range around 120-130/low 80s.    The following portions of the patient's history were reviewed and updated as appropriate: allergies, current medications, past family history, past medical history, past social history, past surgical history and problem list.    Objective  /84 (BP Location: Left arm, Patient Position: Sitting, Cuff Size: Adult)   Pulse 72   Temp 98 °F (36.7 °C) (Temporal)   Ht 182.9 cm (72.01\")   Wt 95 kg (209 lb 6.4 oz)   BMI 28.39 kg/m²     Physical Exam  Constitutional:       Appearance: Normal appearance.   HENT:      Head: Normocephalic and atraumatic.   Pulmonary:      Effort: No respiratory distress.   Musculoskeletal:      Cervical back: Neck supple.      Right lower leg: No edema.      Left lower leg: No edema.   Neurological:      Mental Status: He is alert and oriented to person, place, and time. Mental status is at baseline.   Psychiatric:         Behavior: Behavior normal.         Thought Content: Thought content normal.         Assessment/Plan   1. Essential hypertension  BP Readings from Last 3 Encounters: "   09/26/22 124/84   09/22/22 118/80   08/18/22 138/88   Home blood pressures reassuring.  Current blood pressure at goal in the office.  We will continue on current medications including the reduced dose of amlodipine 5 mg and also continue on spironolactone.  No concern for electrolyte derangements after medication change.  - spironolactone (Aldactone) 25 MG tablet; Take 1 tablet by mouth Daily.  Dispense: 90 tablet; Refill: 1    2. Hypothyroidism (acquired)  Thyroid has been well controlled for a longer time, he has been stable on Synthroid 75 mcg daily.  We will continue on this dose and I recommend checking thyroid function 1-2 times yearly.      Return if symptoms worsen or fail to improve, for Next scheduled follow up.    Future Appointments       Provider Department Center    11/22/2022 3:00 PM Clare Cox APRN [facility]    1/18/2023 9:45 AM Dallas Ugarte MD [facility]    9/28/2023 3:45 PM Marcos Gonsalez MD [facility] CARDIOLOGY [facility] [place] [facility]          Dallas Ugarte MD  Family Medicine  09/26/2022    Answers for HPI/ROS submitted by the patient on 9/21/2022  What is the primary reason for your visit?: High Blood Pressure  Chronicity: recurrent  Onset: more than 1 year ago  Progression since onset: waxing and waning  Condition status: controlled  anxiety: Yes  blurred vision: No  chest pain: No  headaches: No  malaise/fatigue: No  neck pain: No  orthopnea: No  palpitations: No  peripheral edema: No  PND: No  shortness of breath: No  sweats: No  Agents associated with hypertension: thyroid hormones  CAD risks: dyslipidemia, family history, stress  Compliance problems: diet, exercise

## 2022-09-30 ENCOUNTER — IMMUNIZATION (OUTPATIENT)
Dept: VACCINE CLINIC | Facility: HOSPITAL | Age: 42
End: 2022-09-30

## 2022-09-30 DIAGNOSIS — Z23 NEED FOR VACCINATION: Primary | ICD-10-CM

## 2022-09-30 PROCEDURE — 0124A: CPT | Performed by: HOSPITALIST

## 2022-09-30 PROCEDURE — 91312 HC SARSCOV2 VAC 30MCG/0.3ML IM BIVALENT BOOSTER 12 YRS AND OLDER: CPT | Performed by: HOSPITALIST

## 2022-10-11 ENCOUNTER — HOSPITAL ENCOUNTER (OUTPATIENT)
Dept: SLEEP MEDICINE | Facility: HOSPITAL | Age: 42
Discharge: HOME | End: 2022-10-11
Admitting: OTOLARYNGOLOGY

## 2022-10-11 VITALS — WEIGHT: 209 LBS | BODY MASS INDEX: 28.31 KG/M2 | HEIGHT: 72 IN

## 2022-10-11 DIAGNOSIS — G47.33 OBSTRUCTIVE SLEEP APNEA (ADULT) (PEDIATRIC): ICD-10-CM

## 2022-10-11 PROCEDURE — 95800 SLP STDY UNATTENDED: CPT | Performed by: INTERNAL MEDICINE

## 2022-10-11 PROCEDURE — 95800 SLP STDY UNATTENDED: CPT

## 2022-10-28 ENCOUNTER — HOSPITAL ENCOUNTER (OUTPATIENT)
Dept: CARDIOLOGY | Facility: HOSPITAL | Age: 42
Discharge: HOME | End: 2022-10-28

## 2022-10-28 ENCOUNTER — TELEPHONE (OUTPATIENT)
Dept: CARDIOLOGY | Facility: CLINIC | Age: 42
End: 2022-10-28

## 2022-10-28 VITALS
HEART RATE: 80 BPM | WEIGHT: 209 LBS | HEIGHT: 72 IN | SYSTOLIC BLOOD PRESSURE: 140 MMHG | BODY MASS INDEX: 28.31 KG/M2 | DIASTOLIC BLOOD PRESSURE: 100 MMHG

## 2022-10-28 VITALS — WEIGHT: 209 LBS | HEIGHT: 72 IN | BODY MASS INDEX: 28.31 KG/M2

## 2022-10-28 DIAGNOSIS — R07.9 CHEST PAIN, UNSPECIFIED TYPE: ICD-10-CM

## 2022-10-28 DIAGNOSIS — I47.1 PAT (PAROXYSMAL ATRIAL TACHYCARDIA): ICD-10-CM

## 2022-10-28 LAB
BH CV ECHO MEAS - AO MAX PG: 5.8 MMHG
BH CV ECHO MEAS - AO MEAN PG: 3.8 MMHG
BH CV ECHO MEAS - AO ROOT DIAM: 3.3 CM
BH CV ECHO MEAS - AO V2 MAX: 120.8 CM/SEC
BH CV ECHO MEAS - AO V2 VTI: 25.4 CM
BH CV ECHO MEAS - AVA(I,D): 2.09 CM2
BH CV ECHO MEAS - EDV(CUBED): 205.5 ML
BH CV ECHO MEAS - EDV(MOD-SP2): 86 ML
BH CV ECHO MEAS - EDV(MOD-SP4): 93.1 ML
BH CV ECHO MEAS - EF(MOD-BP): 57 %
BH CV ECHO MEAS - EF(MOD-SP2): 52.6 %
BH CV ECHO MEAS - EF(MOD-SP4): 40.4 %
BH CV ECHO MEAS - ESV(CUBED): 103.9 ML
BH CV ECHO MEAS - ESV(MOD-SP2): 40.8 ML
BH CV ECHO MEAS - ESV(MOD-SP4): 55.5 ML
BH CV ECHO MEAS - FS: 20.3 %
BH CV ECHO MEAS - IVS/LVPW: 0.96 CM
BH CV ECHO MEAS - IVSD: 0.66 CM
BH CV ECHO MEAS - LA DIMENSION: 3.6 CM
BH CV ECHO MEAS - LAT PEAK E' VEL: 9.1 CM/SEC
BH CV ECHO MEAS - LV MASS(C)D: 147.2 GRAMS
BH CV ECHO MEAS - LV MAX PG: 3.2 MMHG
BH CV ECHO MEAS - LV MEAN PG: 1.97 MMHG
BH CV ECHO MEAS - LV V1 MAX: 90 CM/SEC
BH CV ECHO MEAS - LV V1 VTI: 17 CM
BH CV ECHO MEAS - LVIDD: 5.9 CM
BH CV ECHO MEAS - LVIDS: 4.7 CM
BH CV ECHO MEAS - LVOT AREA: 3.1 CM2
BH CV ECHO MEAS - LVOT DIAM: 1.99 CM
BH CV ECHO MEAS - LVPWD: 0.69 CM
BH CV ECHO MEAS - MED PEAK E' VEL: 7.5 CM/SEC
BH CV ECHO MEAS - MV A MAX VEL: 84.4 CM/SEC
BH CV ECHO MEAS - MV DEC TIME: 0.13 MSEC
BH CV ECHO MEAS - MV E MAX VEL: 93.8 CM/SEC
BH CV ECHO MEAS - MV E/A: 1.11
BH CV ECHO MEAS - MV MAX PG: 3.7 MMHG
BH CV ECHO MEAS - MV MEAN PG: 1.75 MMHG
BH CV ECHO MEAS - MV V2 VTI: 22.6 CM
BH CV ECHO MEAS - MVA(VTI): 2.35 CM2
BH CV ECHO MEAS - PA ACC TIME: 0.12 SEC
BH CV ECHO MEAS - PA PR(ACCEL): 23.5 MMHG
BH CV ECHO MEAS - SV(LVOT): 53.1 ML
BH CV ECHO MEAS - SV(MOD-SP2): 45.2 ML
BH CV ECHO MEAS - SV(MOD-SP4): 37.6 ML
BH CV ECHO MEAS - TAPSE (>1.6): 2.09 CM
BH CV ECHO MEASUREMENTS AVERAGE E/E' RATIO: 11.3
BH CV REST NUCLEAR ISOTOPE DOSE: 9.9 MCI
BH CV STRESS BP STAGE 1: NORMAL
BH CV STRESS BP STAGE 2: NORMAL
BH CV STRESS DURATION MIN STAGE 1: 3
BH CV STRESS DURATION MIN STAGE 2: 3
BH CV STRESS DURATION MIN STAGE 3: 2
BH CV STRESS DURATION SEC STAGE 1: 0
BH CV STRESS DURATION SEC STAGE 2: 0
BH CV STRESS DURATION SEC STAGE 3: 45
BH CV STRESS GRADE STAGE 1: 10
BH CV STRESS GRADE STAGE 2: 12
BH CV STRESS GRADE STAGE 3: 14
BH CV STRESS HR STAGE 1: 113
BH CV STRESS HR STAGE 2: 133
BH CV STRESS HR STAGE 3: 153
BH CV STRESS METS STAGE 1: 5
BH CV STRESS METS STAGE 2: 7.5
BH CV STRESS METS STAGE 3: 10
BH CV STRESS NUCLEAR ISOTOPE DOSE: 33 MCI
BH CV STRESS O2 STAGE 1: 98
BH CV STRESS O2 STAGE 2: 98
BH CV STRESS O2 STAGE 3: 98
BH CV STRESS PROTOCOL 1: NORMAL
BH CV STRESS RECOVERY BP: NORMAL MMHG
BH CV STRESS RECOVERY HR: 100 BPM
BH CV STRESS RECOVERY O2: 97 %
BH CV STRESS SPEED STAGE 1: 1.7
BH CV STRESS SPEED STAGE 2: 2.5
BH CV STRESS SPEED STAGE 3: 3.4
BH CV STRESS STAGE 1: 1
BH CV STRESS STAGE 2: 2
BH CV STRESS STAGE 3: 3
BH CV VAS BP RIGHT ARM: NORMAL MMHG
BH CV XLRA - RV BASE: 3.6 CM
BH CV XLRA - RV LENGTH: 8 CM
BH CV XLRA - RV MID: 2.39 CM
BH CV XLRA - TDI S': 12.9 CM/SEC
LEFT ATRIUM VOLUME INDEX: 20.1 ML/M2
LV EF NUC BP: 61 %
MAXIMAL PREDICTED HEART RATE: 177 BPM
MAXIMAL PREDICTED HEART RATE: 177 BPM
PERCENT MAX PREDICTED HR: 87.57 %
STRESS BASELINE BP: NORMAL MMHG
STRESS BASELINE HR: 80 BPM
STRESS O2 SAT REST: 98 %
STRESS PERCENT HR: 103 %
STRESS POST ESTIMATED WORKLOAD: 10.4 METS
STRESS POST EXERCISE DUR MIN: 8 MIN
STRESS POST EXERCISE DUR SEC: 45 SEC
STRESS POST O2 SAT PEAK: 98 %
STRESS POST PEAK BP: NORMAL MMHG
STRESS POST PEAK HR: 155 BPM
STRESS TARGET HR: 150 BPM
STRESS TARGET HR: 150 BPM

## 2022-10-28 PROCEDURE — 93306 TTE W/DOPPLER COMPLETE: CPT | Performed by: INTERNAL MEDICINE

## 2022-10-28 PROCEDURE — A9500 TC99M SESTAMIBI: HCPCS | Performed by: INTERNAL MEDICINE

## 2022-10-28 PROCEDURE — 78452 HT MUSCLE IMAGE SPECT MULT: CPT | Performed by: INTERNAL MEDICINE

## 2022-10-28 PROCEDURE — 0 TECHNETIUM SESTAMIBI: Performed by: INTERNAL MEDICINE

## 2022-10-28 PROCEDURE — 93306 TTE W/DOPPLER COMPLETE: CPT

## 2022-10-28 PROCEDURE — 93017 CV STRESS TEST TRACING ONLY: CPT

## 2022-10-28 PROCEDURE — 93018 CV STRESS TEST I&R ONLY: CPT | Performed by: INTERNAL MEDICINE

## 2022-10-28 PROCEDURE — 78452 HT MUSCLE IMAGE SPECT MULT: CPT

## 2022-10-28 RX ADMIN — TECHNETIUM TC 99M SESTAMIBI 1 DOSE: 1 INJECTION INTRAVENOUS at 08:10

## 2022-10-28 RX ADMIN — TECHNETIUM TC 99M SESTAMIBI 1 DOSE: 1 INJECTION INTRAVENOUS at 10:15

## 2022-10-28 NOTE — TELEPHONE ENCOUNTER
----- Message from Marcos Gonsalez MD sent at 10/28/2022  1:46 PM EDT -----  Please inform the patient of their test results. Thank you.

## 2022-11-29 DIAGNOSIS — G47.9 SLEEP DISTURBANCE: ICD-10-CM

## 2022-11-29 RX ORDER — LAMOTRIGINE 100 MG/1
100 TABLET ORAL DAILY
Qty: 90 TABLET | Refills: 3 | Status: SHIPPED | OUTPATIENT
Start: 2022-11-29

## 2022-11-29 RX ORDER — BUSPIRONE HYDROCHLORIDE 30 MG/1
30 TABLET ORAL 2 TIMES DAILY
Qty: 180 TABLET | Refills: 1 | Status: CANCELLED | OUTPATIENT
Start: 2022-11-29

## 2022-11-29 RX ORDER — ESZOPICLONE 3 MG/1
3 TABLET, FILM COATED ORAL NIGHTLY
Qty: 30 TABLET | Refills: 2 | Status: CANCELLED | OUTPATIENT
Start: 2022-11-29

## 2022-11-30 DIAGNOSIS — G47.9 SLEEP DISTURBANCE: ICD-10-CM

## 2022-11-30 RX ORDER — ESZOPICLONE 3 MG/1
3 TABLET, FILM COATED ORAL NIGHTLY
Qty: 30 TABLET | Refills: 2 | Status: SHIPPED | OUTPATIENT
Start: 2022-11-30 | End: 2023-02-26 | Stop reason: SDUPTHER

## 2022-12-01 RX ORDER — BUSPIRONE HYDROCHLORIDE 30 MG/1
30 TABLET ORAL 2 TIMES DAILY
Qty: 180 TABLET | Refills: 1 | Status: SHIPPED | OUTPATIENT
Start: 2022-12-01

## 2022-12-13 ENCOUNTER — OFFICE VISIT (OUTPATIENT)
Dept: PSYCHIATRY | Facility: CLINIC | Age: 42
End: 2022-12-13

## 2022-12-13 DIAGNOSIS — G47.9 SLEEP DISTURBANCE: ICD-10-CM

## 2022-12-13 DIAGNOSIS — F33.1 MODERATE EPISODE OF RECURRENT MAJOR DEPRESSIVE DISORDER: Primary | ICD-10-CM

## 2022-12-13 DIAGNOSIS — F41.0 PANIC DISORDER: ICD-10-CM

## 2022-12-13 DIAGNOSIS — F41.1 GENERALIZED ANXIETY DISORDER: ICD-10-CM

## 2022-12-13 PROCEDURE — 99212 OFFICE O/P EST SF 10 MIN: CPT | Performed by: NURSE PRACTITIONER

## 2022-12-13 NOTE — PROGRESS NOTES
"  Subjective   Eduardo Luna is a single, employed 42 y.o. male who is here today for medication management follow up. in person with Covid precautions taken.     TIME IN:314  TIME OUT: 330    I spent 15 minutes in patient care: reviewing records prior to the visit, assessing the patient, entering orders and documentation.      Chief Complaint: ANTONIA, MDD , sleep disturbance     History of Present Illness Patient reports stability in mood. He reports he may have a day when he is down but doesn't stay depressed. He reports this has been best combination of medications he has ever been on, usually has more \"bouts of depression\". Feels the Lamictal really helped as mood stabilizer.  He has been doing better with driving, not having panic. He isn't doing much more than working, his girl friend is about the same, not working and not doing much in apartment, but they are still together. He cont to like his work. For the holidays they don't decorate and her parents will probably come to visit as they did last year. Sleep better on Lunesta . Takes hydroxyzine 25 mg as needed for increased anxiety \"it helps\".  Denies adverse effects from medications.       The following portions of the patient's history were reviewed and updated as appropriate: allergies, current medications, past family history, past medical history, past social history, past surgical history and problem list.    Review of Systems  A 14 point review of systems was performed and is negative except as noted above.    Objective   Physical Exam  There were no vitals taken for this visit.    Allergies   Allergen Reactions   • Lisinopril Other (See Comments)     Throat tickle   • Amoxicillin Hives, Itching and Rash       Current Medications:   Current Outpatient Medications   Medication Sig Dispense Refill   • amLODIPine (NORVASC) 5 MG tablet Take 1 tablet by mouth Daily. 90 tablet 1   • atorvastatin (LIPITOR) 10 MG tablet Take 1 tablet by mouth Daily. 90 tablet " 1   • buPROPion XL (Wellbutrin XL) 150 MG 24 hr tablet Take 1 tablet by mouth Daily. 90 tablet 3   • busPIRone (BUSPAR) 30 MG tablet Take 1 tablet by mouth 2 (Two) Times a Day. 180 tablet 1   • cloNIDine (Catapres) 0.1 MG tablet Take 1 tablet by mouth 2 (Two) Times a Day As Needed for High Blood Pressure. 20 tablet 0   • desonide (DESOWEN) 0.05 % cream Apply to rash twice a day, tapering as improves. Not for long term use. (Patient taking differently: Apply  topically to the appropriate area as directed. PRN) 60 g 0   • eszopiclone (LUNESTA) 3 MG tablet Take 1 tablet by mouth Every Night. Take immediately before bedtime 30 tablet 2   • FLUoxetine (PROzac) 40 MG capsule Take 1 capsule by mouth Daily. 90 capsule 3   • Hydrocortisone, Perianal, (ANUSOL-HC) 2.5 % rectal cream Apply twice daily to hemorrhoids as needed 28.35 g 1   • HYDROmorphone (Dilaudid) 2 MG tablet Take 1-3 (one-to-three) tablets by mouth every 6 (six) hours as needed 16 tablet 0   • hydrOXYzine (ATARAX) 25 MG tablet Take 1 tablet by mouth 3 (Three) Times a Day As Needed for Itching or Allergies. 90 tablet 3   • lamoTRIgine (LaMICtal) 100 MG tablet Take 1 tablet by mouth Daily. 90 tablet 3   • levothyroxine (SYNTHROID, LEVOTHROID) 75 MCG tablet Take 1 tablet by mouth Every Morning 1 hour prior to food or other medications 90 tablet 1   • metoprolol succinate XL (TOPROL-XL) 100 MG 24 hr tablet Take 1 tablet by mouth Daily. 90 tablet 1   • pantoprazole (PROTONIX) 40 MG EC tablet Take 1 tablet by mouth 2 (Two) Times a Day. 180 tablet 3   • spironolactone (Aldactone) 25 MG tablet Take 1 tablet by mouth Daily. 90 tablet 1   • vitamin D3 125 MCG (5000 UT) capsule capsule Take 1 capsule by mouth Daily.       No current facility-administered medications for this visit.       Lab Results:  Hospital Outpatient Visit on 10/28/2022   Component Date Value Ref Range Status   • Target HR (85%) 10/28/2022 150  bpm Final   • Max. Pred. HR (100%) 10/28/2022 177  bpm  Final   • BH CV VAS BP RIGHT ARM 10/28/2022 139/98  mmHg Final   • EF(MOD-bp) 10/28/2022 57.0  % Final   • LVIDd 10/28/2022 5.9  cm Final   • LVIDs 10/28/2022 4.7  cm Final   • IVSd 10/28/2022 0.66  cm Final   • LVPWd 10/28/2022 0.69  cm Final   • FS 10/28/2022 20.3  % Final   • IVS/LVPW 10/28/2022 0.96  cm Final   • ESV(cubed) 10/28/2022 103.9  ml Final   • EDV(cubed) 10/28/2022 205.5  ml Final   • LVOT area 10/28/2022 3.1  cm2 Final   • LV mass(C)d 10/28/2022 147.2  grams Final   • LVOT diam 10/28/2022 1.99  cm Final   • EDV(MOD-sp2) 10/28/2022 86.0  ml Final   • EDV(MOD-sp4) 10/28/2022 93.1  ml Final   • ESV(MOD-sp2) 10/28/2022 40.8  ml Final   • ESV(MOD-sp4) 10/28/2022 55.5  ml Final   • SV(MOD-sp2) 10/28/2022 45.2  ml Final   • SV(MOD-sp4) 10/28/2022 37.6  ml Final   • EF(MOD-sp2) 10/28/2022 52.6  % Final   • EF(MOD-sp4) 10/28/2022 40.4  % Final   • MV E max kai 10/28/2022 93.8  cm/sec Final   • MV A max kai 10/28/2022 84.4  cm/sec Final   • MV dec time 10/28/2022 0.13  msec Final   • MV E/A 10/28/2022 1.11   Final   • LA ESV Index (BP) 10/28/2022 20.1  ml/m2 Final   • Med Peak E' Kai 10/28/2022 7.5  cm/sec Final   • Lat Peak E' Kai 10/28/2022 9.1  cm/sec Final   • Avg E/e' ratio 10/28/2022 11.30   Final   • SV(LVOT) 10/28/2022 53.1  ml Final   • RV Base 10/28/2022 3.6  cm Final   • RV Mid 10/28/2022 2.39  cm Final   • RV Length 10/28/2022 8.0  cm Final   • TAPSE (>1.6) 10/28/2022 2.09  cm Final   • RV S' 10/28/2022 12.9  cm/sec Final   • LA dimension (2D)  10/28/2022 3.6  cm Final   • LV V1 max 10/28/2022 90.0  cm/sec Final   • LV V1 max PG 10/28/2022 3.2  mmHg Final   • LV V1 mean PG 10/28/2022 1.97  mmHg Final   • LV V1 VTI 10/28/2022 17.0  cm Final   • Ao pk kai 10/28/2022 120.8  cm/sec Final   • Ao max PG 10/28/2022 5.8  mmHg Final   • Ao mean PG 10/28/2022 3.8  mmHg Final   • Ao V2 VTI 10/28/2022 25.4  cm Final   • SASKIA(I,D) 10/28/2022 2.09  cm2 Final   • MV max PG 10/28/2022 3.7  mmHg Final   • MV mean PG  10/28/2022 1.75  mmHg Final   • MV V2 VTI 10/28/2022 22.6  cm Final   • MVA(VTI) 10/28/2022 2.35  cm2 Final   • PA acc time 10/28/2022 0.12  sec Final   • PA pr(Accel) 10/28/2022 23.5  mmHg Final   • Ao root diam 10/28/2022 3.3  cm Final   Hospital Outpatient Visit on 10/28/2022   Component Date Value Ref Range Status   • Target HR (85%) 10/28/2022 150  bpm Final   • Max. Pred. HR (100%) 10/28/2022 177  bpm Final   • BH CV STRESS PROTOCOL 1 10/28/2022 Dav   Final   • Stage 1 10/28/2022 1   Final   • HR Stage 1 10/28/2022 113   Final   • BP Stage 1 10/28/2022 170/100   Final   • O2 Stage 1 10/28/2022 98   Final   • Duration Min Stage 1 10/28/2022 3   Final   • Duration Sec Stage 1 10/28/2022 0   Final   • Grade Stage 1 10/28/2022 10   Final   • Speed Stage 1 10/28/2022 1.7   Final   • BH CV STRESS METS STAGE 1 10/28/2022 5   Final   • Stage 2 10/28/2022 2   Final   • HR Stage 2 10/28/2022 133   Final   • BP Stage 2 10/28/2022 190/100   Final   • O2 Stage 2 10/28/2022 98   Final   • Duration Min Stage 2 10/28/2022 3   Final   • Duration Sec Stage 2 10/28/2022 0   Final   • Grade Stage 2 10/28/2022 12   Final   • Speed Stage 2 10/28/2022 2.5   Final   • BH CV STRESS METS STAGE 2 10/28/2022 7.5   Final   • Stage 3 10/28/2022 3   Final   • HR Stage 3 10/28/2022 153   Final   • O2 Stage 3 10/28/2022 98   Final   • Duration Min Stage 3 10/28/2022 2   Final   • Duration Sec Stage 3 10/28/2022 45   Final   • Grade Stage 3 10/28/2022 14   Final   • Speed Stage 3 10/28/2022 3.4   Final   • BH CV STRESS METS STAGE 3 10/28/2022 10.0   Final   • Baseline HR 10/28/2022 80  bpm Final   • Baseline BP 10/28/2022 140/100  mmHg Final   • O2 sat rest 10/28/2022 98  % Final   • O2 sat peak 10/28/2022 98  % Final   • Recovery O2 10/28/2022 97  % Final   • Exercise duration (min) 10/28/2022 8  min Final   • Exercise duration (sec) 10/28/2022 45  sec Final   • Peak HR 10/28/2022 155  bpm Final   • Percent Max Pred HR 10/28/2022 87.57  %  Final   • Percent Target HR 10/28/2022 103  % Final   • Peak BP 10/28/2022 200/100  mmHg Final   • Recovery HR 10/28/2022 100  bpm Final   • Recovery BP 10/28/2022 160/120  mmHg Final   • Estimated workload 10/28/2022 10.4  METS Final   • BH CV REST NUCLEAR ISOTOPE DOSE 10/28/2022 9.9  mCi Final   • BH CV STRESS NUCLEAR ISOTOPE DOSE 10/28/2022 33.0  mCi Final   • Nuc Stress EF 10/28/2022 61  % Final   Lab on 09/13/2022   Component Date Value Ref Range Status   • Glucose 09/13/2022 73  65 - 99 mg/dL Final   • BUN 09/13/2022 19  6 - 20 mg/dL Final   • Creatinine 09/13/2022 1.11  0.76 - 1.27 mg/dL Final   • Sodium 09/13/2022 140  136 - 145 mmol/L Final   • Potassium 09/13/2022 4.5  3.5 - 5.2 mmol/L Final   • Chloride 09/13/2022 103  98 - 107 mmol/L Final   • CO2 09/13/2022 26.0  22.0 - 29.0 mmol/L Final   • Calcium 09/13/2022 9.2  8.6 - 10.5 mg/dL Final   • BUN/Creatinine Ratio 09/13/2022 17.1  7.0 - 25.0 Final   • Anion Gap 09/13/2022 11.0  5.0 - 15.0 mmol/L Final   • eGFR 09/13/2022 85.0  >60.0 mL/min/1.73 Final    National Kidney Foundation and American Society of Nephrology (ASN) Task Force recommended calculation based on the Chronic Kidney Disease Epidemiology Collaboration (CKD-EPI) equation refit without adjustment for race.   Admission on 08/17/2022, Discharged on 08/17/2022   Component Date Value Ref Range Status   • Rapid Strep A Screen 08/17/2022 Negative  Negative, VALID, INVALID, Not Performed Final   • Internal Control 08/17/2022 Passed  Passed Final   • Lot Number 08/17/2022 [id number]   Final   • Expiration Date 08/17/2022 11-10-24   Final   Admission on 08/15/2022, Discharged on 08/15/2022   Component Date Value Ref Range Status   • SARS-CoV-2 COLEEN 08/15/2022 Not Detected  Not Detected Final   Lab on 07/15/2022   Component Date Value Ref Range Status   • Chlamydia trachomatis, COLEEN 07/15/2022 Negative  Negative Final   • Gonococcus by COLEEN 07/15/2022 Negative  Negative Final   • Trichomonas vaginosis  07/15/2022 Negative  Negative Final   Lab on 07/11/2022   Component Date Value Ref Range Status   • TSH 07/11/2022 3.200  0.270 - 4.200 uIU/mL Final   • Total Cholesterol 07/11/2022 154  0 - 200 mg/dL Final   • Triglycerides 07/11/2022 113  0 - 150 mg/dL Final   • HDL Cholesterol 07/11/2022 52  40 - 60 mg/dL Final   • LDL Cholesterol  07/11/2022 82  0 - 100 mg/dL Final   • VLDL Cholesterol 07/11/2022 20  5 - 40 mg/dL Final   • LDL/HDL Ratio 07/11/2022 1.53   Final   • Vitamin B-12 07/11/2022 438  211 - 946 pg/mL Final   • 25 Hydroxy, Vitamin D 07/11/2022 63.8  30.0 - 100.0 ng/ml Final   • Testosterone, Total 07/11/2022 373.00  249.00 - 836.00 ng/dL Final   • Chlamydia trachomatis, COLEEN 07/11/2022    Final    Test Not Performed.  The specimen submitted was too viscous  for analysis.   • Gonococcus by COLEEN 07/11/2022    Final    Test Not Performed.  The specimen submitted was too viscous  for analysis.   • Trichomonas vaginosis 07/11/2022 Negative  Negative Final   • HIV-1/ HIV-2 07/11/2022 Non-Reactive  Non-Reactive Final    A non-reactive test result does not preclude the possibility of exposure to HIV or infection with HIV. An antibody response to recent exposure may take several months to reach detectable levels.   • HSV 1 IgG, Type Specific 07/11/2022 <0.91  0.00 - 0.90 index Final                                     Negative        <0.91                                   Equivocal 0.91 - 1.09                                   Positive        >1.09   Note: Negative indicates no antibodies detected to   HSV-1. Equivocal may suggest early infection.  If   clinically appropriate, retest at later date. Positive   indicates antibodies detected to HSV-1.   • HSV 2 IgG 07/11/2022 <0.91  0.00 - 0.90 index Final                                     Negative        <0.91                                   Equivocal 0.91 - 1.09                                   Positive        >1.09   Note: Negative indicates no HSV-2  antibodies detected.   Positive indicates HSV-2 antibodies detected.   Equivocal and low positive HSV-2 screens   (Index 0.91-5.00) may be false positive and are   reflexed to supplemental testing in accordance with   CDC guidelines.   • RPR 07/11/2022 Non-Reactive  Non-Reactive Final   Admission on 06/22/2022, Discharged on 06/22/2022   Component Date Value Ref Range Status   • Glucose 06/22/2022 132 (H)  65 - 99 mg/dL Final   • BUN 06/22/2022 15  6 - 20 mg/dL Final   • Creatinine 06/22/2022 1.06  0.76 - 1.27 mg/dL Final   • Sodium 06/22/2022 143  136 - 145 mmol/L Final   • Potassium 06/22/2022 4.7  3.5 - 5.2 mmol/L Final    Slight hemolysis detected by analyzer. Results may be affected.   • Chloride 06/22/2022 107  98 - 107 mmol/L Final   • CO2 06/22/2022 24.0  22.0 - 29.0 mmol/L Final   • Calcium 06/22/2022 9.8  8.6 - 10.5 mg/dL Final   • Total Protein 06/22/2022 7.0  6.0 - 8.5 g/dL Final   • Albumin 06/22/2022 4.90  3.50 - 5.20 g/dL Final   • ALT (SGPT) 06/22/2022 28  1 - 41 U/L Final   • AST (SGOT) 06/22/2022 20  1 - 40 U/L Final   • Alkaline Phosphatase 06/22/2022 94  39 - 117 U/L Final   • Total Bilirubin 06/22/2022 0.4  0.0 - 1.2 mg/dL Final   • Globulin 06/22/2022 2.1  gm/dL Final    Calculated Result   • A/G Ratio 06/22/2022 2.3  g/dL Final   • BUN/Creatinine Ratio 06/22/2022 14.2  7.0 - 25.0 Final   • Anion Gap 06/22/2022 12.0  5.0 - 15.0 mmol/L Final   • eGFR 06/22/2022 89.9  >60.0 mL/min/1.73 Final    National Kidney Foundation and American Society of Nephrology (ASN) Task Force recommended calculation based on the Chronic Kidney Disease Epidemiology Collaboration (CKD-EPI) equation refit without adjustment for race.   • Lipase 06/22/2022 49  13 - 60 U/L Final   • Color, UA 06/22/2022 Yellow  Yellow, Straw Final   • Appearance, UA 06/22/2022 Turbid (A)  Clear Final   • pH, UA 06/22/2022 <=5.0  5.0 - 8.0 Final   • Specific Gravity, UA 06/22/2022 1.029  1.001 - 1.030 Final   • Glucose, UA 06/22/2022  Negative  Negative Final   • Ketones, UA 06/22/2022 Trace (A)  Negative Final   • Bilirubin, UA 06/22/2022 Negative  Negative Final   • Blood, UA 06/22/2022 Large (3+) (A)  Negative Final   • Protein, UA 06/22/2022 30 mg/dL (1+) (A)  Negative Final   • Leuk Esterase, UA 06/22/2022 Trace (A)  Negative Final   • Nitrite, UA 06/22/2022 Negative  Negative Final   • Urobilinogen, UA 06/22/2022 1.0 E.U./dL  0.2 - 1.0 E.U./dL Final   • Extra Tube 06/22/2022 Hold for add-ons.   Final    Auto resulted.   • Extra Tube 06/22/2022 hold for add-on   Final    Auto resulted   • Extra Tube 06/22/2022 Hold for add-ons.   Final    Auto resulted.   • Extra Tube 06/22/2022 Hold for add-ons.   Final    Auto resulted.   • Extra Tube 06/22/2022 Hold for add-ons.   Final    Auto resulted   • WBC 06/22/2022 10.60  3.40 - 10.80 10*3/mm3 Final   • RBC 06/22/2022 5.31  4.14 - 5.80 10*6/mm3 Final   • Hemoglobin 06/22/2022 16.2  13.0 - 17.7 g/dL Final   • Hematocrit 06/22/2022 46.2  37.5 - 51.0 % Final   • MCV 06/22/2022 87.0  79.0 - 97.0 fL Final   • MCH 06/22/2022 30.5  26.6 - 33.0 pg Final   • MCHC 06/22/2022 35.1  31.5 - 35.7 g/dL Final   • RDW 06/22/2022 12.8  12.3 - 15.4 % Final   • RDW-SD 06/22/2022 40.1  37.0 - 54.0 fl Final   • MPV 06/22/2022 10.1  6.0 - 12.0 fL Final   • Platelets 06/22/2022 271  140 - 450 10*3/mm3 Final   • Neutrophil % 06/22/2022 82.2 (H)  42.7 - 76.0 % Final   • Lymphocyte % 06/22/2022 10.5 (L)  19.6 - 45.3 % Final   • Monocyte % 06/22/2022 5.6  5.0 - 12.0 % Final   • Eosinophil % 06/22/2022 0.7  0.3 - 6.2 % Final   • Basophil % 06/22/2022 0.3  0.0 - 1.5 % Final   • Immature Grans % 06/22/2022 0.7 (H)  0.0 - 0.5 % Final   • Neutrophils, Absolute 06/22/2022 8.73 (H)  1.70 - 7.00 10*3/mm3 Final   • Lymphocytes, Absolute 06/22/2022 1.11  0.70 - 3.10 10*3/mm3 Final   • Monocytes, Absolute 06/22/2022 0.59  0.10 - 0.90 10*3/mm3 Final   • Eosinophils, Absolute 06/22/2022 0.07  0.00 - 0.40 10*3/mm3 Final   • Basophils,  Absolute 06/22/2022 0.03  0.00 - 0.20 10*3/mm3 Final   • Immature Grans, Absolute 06/22/2022 0.07 (H)  0.00 - 0.05 10*3/mm3 Final   • nRBC 06/22/2022 0.0  0.0 - 0.2 /100 WBC Final   • RBC, UA 06/22/2022 Too Numerous to Count (A)  None Seen, 0-2 /HPF Final   • WBC, UA 06/22/2022 0-2  None Seen, 0-2 /HPF Final   • Bacteria, UA 06/22/2022 None Seen  None Seen, Trace /HPF Final   • Squamous Epithelial Cells, UA 06/22/2022 0-2  None Seen, 0-2 /HPF Final   • Hyaline Casts, UA 06/22/2022 0-6  0 - 6 /LPF Final   • Methodology 06/22/2022 Manual Light Microscopy   Final       Appearance: WNL  Hygiene:   good  Cooperation:  Cooperative  Eye Contact:  direct  Psychomotor Behavior:  denies psychomotor agitation/retardation, No EPS, No motor tics  Mood:  within normal limits  Affect:  Congruent   Hopelessness: Denies  Speech:  Normal  Thought Process:  Linear  Thought Content:  Normal  Concentration: Normal   Suicidal: denies  Homicidal:  None  Hallucinations:  None  Delusion:  None  Memory:  Intact  Orientation:  Person, Place, Time and Situation  Reliability:  good  Insight:  Fair  Judgement: good  Impulse Control: good  Estimated Intelligence: average range    KENNY REVIEWED NO RED FLAGS    Assessment & Plan   Diagnoses and all orders for this visit:    1. Moderate episode of recurrent major depressive disorder (HCC) (Primary)    2. Generalized anxiety disorder    3. Panic disorder    4. Sleep disturbance        PLAN: cont med management:  Fluoxetine 40 mg daily for depression and anxiety  Buspirone 30 mg BID for anxiety  Eszopiclone 3 mg po at hs as needed for sleep  Hydroxyzine 25 mg as needed for high anxiety  Lamotrigine 100 mg daily for mood stabilization (depression)    We discussed risks, benefits, and side effects of the above medications and the patient was agreeable with the plan. Patient was educated on the importance of compliance with treatment and follow-up appointments.     Counseled patient regarding  multimodal approach with encouragement of healthy nutrition, healthy sleep, encouraged regular physical mobility, social involvement and medication compliance.     Assisted patient in identifying risk factors which would indicate the need for higher level of care including thoughts to harm self or others and/or self-harming behavior and encouraged patient to contact this office, call 911, or present to the nearest emergency room should any of these events occur. Discussed crisis intervention services and means to access.  Patient adamantly and convincingly denies current suicidal or homicidal ideation or perceptual disturbance.    Treatment Plan: stabilize mood, patient will stay out of psychiatric hospital and be at optimal level of functioning with therapy and take all medication as prescribed. Patient verbalized  understanding and agreement to plan.    Instructed to call for questions or concerns and return early if necessary.     Greater than 50% time was spent in coordination of care, and counseling the patient regarding current assessment, symptoms, plan of care going forward, supportive therapy.  Answered any questions patient had regarding medications and plan of care.    Return in about 6 months (around 6/13/2023).

## 2022-12-15 ENCOUNTER — OFFICE VISIT (OUTPATIENT)
Dept: SLEEP MEDICINE | Facility: HOSPITAL | Age: 42
End: 2022-12-15

## 2022-12-15 VITALS
HEIGHT: 72 IN | OXYGEN SATURATION: 97 % | DIASTOLIC BLOOD PRESSURE: 64 MMHG | BODY MASS INDEX: 28.47 KG/M2 | HEART RATE: 77 BPM | WEIGHT: 210.2 LBS | SYSTOLIC BLOOD PRESSURE: 102 MMHG

## 2022-12-15 DIAGNOSIS — G47.33 OSA (OBSTRUCTIVE SLEEP APNEA): Primary | ICD-10-CM

## 2022-12-15 DIAGNOSIS — G47.19 EXCESSIVE DAYTIME SLEEPINESS: ICD-10-CM

## 2022-12-15 PROCEDURE — 99204 OFFICE O/P NEW MOD 45 MIN: CPT | Performed by: INTERNAL MEDICINE

## 2022-12-15 NOTE — PROGRESS NOTES
Eduardo Luna is a 42 y.o. male.   Chief Complaint   Patient presents with   • Sleeping Problem       HPI     42 y.o. male seen for the above    He has been seen in the past in the sleep center.  His last visit was in 2019 with CORINNE Mckeon.  He has a long sleep history.    He was diagnosed with obstructive sleep apnea initially about 10 years ago at [facility].  He tolerated CPAP poorly and was tried with a mandibular advancement device.  He tolerated this adequately but this simply did not help him.  Dr. Castillo did a tonsillectomy in 2013 and subsequently a septoplasty and turbinate reduction in 2018.    He was seen back here in 2019 and tried CPAP again.  Again he tolerated poorly.  He references difficulty with the pressure and the mask.    He underwent a uvulopalatoplasty performed by Dr. Bradley in 2020.  He subsequently was interested in an inspire device and saw Dr. Gary and he ordered another home sleep apnea test earlier this year on 10/11.  This revealed a severe degree of obstructive sleep apnea and the AHI of 31.  He had his existing CPAP machine and tried it again around this time and again did not tolerate it for similar reasons.  He has tried multiple masks.    He was told that inspire was not an option at our facility.  He was interested in considering BiPAP.    Lindon Scale is: 12/24    The patient's relevant past medical, surgical, family, and social history reviewed and updated in Epic as appropriate.    Current medications are:   Current Outpatient Medications:   •  amLODIPine (NORVASC) 5 MG tablet, Take 1 tablet by mouth Daily., Disp: 90 tablet, Rfl: 1  •  atorvastatin (LIPITOR) 10 MG tablet, Take 1 tablet by mouth Daily., Disp: 90 tablet, Rfl: 1  •  buPROPion XL (Wellbutrin XL) 150 MG 24 hr tablet, Take 1 tablet by mouth Daily., Disp: 90 tablet, Rfl: 3  •  busPIRone (BUSPAR) 30 MG tablet, Take 1 tablet by mouth 2 (Two) Times a Day., Disp: 180 tablet, Rfl: 1  •  cloNIDine  "(Catapres) 0.1 MG tablet, Take 1 tablet by mouth 2 (Two) Times a Day As Needed for High Blood Pressure., Disp: 20 tablet, Rfl: 0  •  eszopiclone (LUNESTA) 3 MG tablet, Take 1 tablet by mouth Every Night. Take immediately before bedtime, Disp: 30 tablet, Rfl: 2  •  FLUoxetine (PROzac) 40 MG capsule, Take 1 capsule by mouth Daily., Disp: 90 capsule, Rfl: 3  •  Hydrocortisone, Perianal, (ANUSOL-HC) 2.5 % rectal cream, Apply twice daily to hemorrhoids as needed, Disp: 28.35 g, Rfl: 1  •  hydrOXYzine (ATARAX) 25 MG tablet, Take 1 tablet by mouth 3 (Three) Times a Day As Needed for Itching or Allergies., Disp: 90 tablet, Rfl: 3  •  lamoTRIgine (LaMICtal) 100 MG tablet, Take 1 tablet by mouth Daily., Disp: 90 tablet, Rfl: 3  •  levothyroxine (SYNTHROID, LEVOTHROID) 75 MCG tablet, Take 1 tablet by mouth Every Morning 1 hour prior to food or other medications, Disp: 90 tablet, Rfl: 1  •  metoprolol succinate XL (TOPROL-XL) 100 MG 24 hr tablet, Take 1 tablet by mouth Daily., Disp: 90 tablet, Rfl: 1  •  pantoprazole (PROTONIX) 40 MG EC tablet, Take 1 tablet by mouth 2 (Two) Times a Day., Disp: 180 tablet, Rfl: 3  •  spironolactone (Aldactone) 25 MG tablet, Take 1 tablet by mouth Daily., Disp: 90 tablet, Rfl: 1  •  vitamin D3 125 MCG (5000 UT) capsule capsule, Take 1 capsule by mouth Daily., Disp: , Rfl: .    Review of Systems    Review of Systems  ROS documented in patient questionnaire ×14 systems.  Reviewed with patient.  Otherwise negative except as noted in HPI.    Physical Exam    Blood pressure 102/64, pulse 77, height 182.9 cm (72\"), weight 95.3 kg (210 lb 3.2 oz), SpO2 97 %. Body mass index is 28.51 kg/m².    Physical Exam  Vitals and nursing note reviewed.   Constitutional:       Appearance: Normal appearance. He is well-developed.   HENT:      Head: Normocephalic and atraumatic.      Nose: Nose normal.      Mouth/Throat:      Mouth: Mucous membranes are moist.      Pharynx: Oropharynx is clear. No oropharyngeal " exudate.      Comments: S/p UPPP  Eyes:      General: No scleral icterus.     Conjunctiva/sclera: Conjunctivae normal.   Neck:      Thyroid: No thyromegaly.      Trachea: No tracheal deviation.   Cardiovascular:      Rate and Rhythm: Normal rate and regular rhythm.      Heart sounds: No murmur heard.    No friction rub. No gallop.   Pulmonary:      Effort: Pulmonary effort is normal. No respiratory distress.      Breath sounds: No wheezing or rales.   Musculoskeletal:         General: No deformity. Normal range of motion.   Skin:     General: Skin is warm and dry.      Findings: No rash.   Neurological:      Mental Status: He is alert and oriented to person, place, and time.   Psychiatric:         Behavior: Behavior normal.         Thought Content: Thought content normal.         DATA:    Reviewed 10/11/2022 home sleep apnea test as discussed above    Reviewed prior sleep lab records including split-night study in March 2019 and follow-up visit in May 2019.    ASSESSMENT:    Problem List Items Addressed This Visit        Pulmonary Problems    JAVON (obstructive sleep apnea) - Primary    Overview     Difficulty with CPAP         Relevant Orders    PAP Therapy   Other Visit Diagnoses     Excessive daytime sleepiness              42-year-old male with a long sleep history as discussed above.  He has severe obstructive sleep apnea despite multiple surgeries.  He is intolerant of CPAP therapy after multiple attempts including earlier this year.  He had a mandibular advancement device that was ineffective.  He was interested in considering an inspire device or BiPAP therapy.    PLAN:    1. I spent a fair amount of time discussing how BiPAP is different than CPAP and how this might benefit him from a comfort standpoint and allow his sleep apnea be treated with PAP therapy  2. We also discussed the inspire device and the fact that he would meet preliminary criteria if he is not able to be treated with PAP therapy.  If we were  faced with this eventuality could consider referral to a physician and center that could evaluate him for this device.  3. I think he would be a good candidate for BiPAP therapy as he does have severe obstructive sleep apnea which was confirmed by study earlier this year and he has failed multiple attempts at CPAP therapy, including recently, due to his difficulty with exhalation against pressure.  4. I have ordered auto BiPAP therapy and will see him in follow-up    I have reviewed the results of my evaluation and impression and discussed my recommendations in detail with the patient.    Level of service justified based on 47 minutes spent in patient care on this date of service including, but not limited to: preparing to see the patient, obtaining and/or reviewing history, performing medically appropriate examination, ordering tests/medicine/procedures, independently interpreting results, documenting clinical information in EHR, and counseling/education of patient/family/caregiver.  This is exclusive of time spent on other separate services such as performing procedures or test interpretation, if applicable.  (Level 4 45-59 minutes; Level 5 60-74 minutes)      Moderate risk of morbidity due to the possibility of untreated sleep apnea.    Signed by  Abdirahman Ng MD    December 15, 2022      CC: Dallas Ugarte MD          No ref. provider found

## 2023-01-18 ENCOUNTER — OFFICE VISIT (OUTPATIENT)
Dept: INTERNAL MEDICINE | Facility: CLINIC | Age: 43
End: 2023-01-18
Payer: COMMERCIAL

## 2023-01-18 ENCOUNTER — LAB (OUTPATIENT)
Dept: LAB | Facility: HOSPITAL | Age: 43
End: 2023-01-18
Payer: COMMERCIAL

## 2023-01-18 VITALS
HEART RATE: 60 BPM | BODY MASS INDEX: 27.75 KG/M2 | WEIGHT: 209.4 LBS | SYSTOLIC BLOOD PRESSURE: 120 MMHG | HEIGHT: 73 IN | TEMPERATURE: 97.8 F | DIASTOLIC BLOOD PRESSURE: 90 MMHG

## 2023-01-18 DIAGNOSIS — Z92.3 HISTORY OF RADIATION TO HEAD AND NECK REGION: ICD-10-CM

## 2023-01-18 DIAGNOSIS — E03.9 HYPOTHYROIDISM (ACQUIRED): Chronic | ICD-10-CM

## 2023-01-18 DIAGNOSIS — R19.5 STOOL COLOR ABNORMAL: ICD-10-CM

## 2023-01-18 DIAGNOSIS — E66.3 OVERWEIGHT (BMI 25.0-29.9): ICD-10-CM

## 2023-01-18 DIAGNOSIS — I10 ESSENTIAL HYPERTENSION: Chronic | ICD-10-CM

## 2023-01-18 DIAGNOSIS — R73.09 ELEVATED GLUCOSE: ICD-10-CM

## 2023-01-18 DIAGNOSIS — Z00.00 WELL ADULT EXAM: Primary | ICD-10-CM

## 2023-01-18 DIAGNOSIS — E78.2 MIXED HYPERLIPIDEMIA: ICD-10-CM

## 2023-01-18 PROCEDURE — 80050 GENERAL HEALTH PANEL: CPT

## 2023-01-18 PROCEDURE — 83540 ASSAY OF IRON: CPT

## 2023-01-18 PROCEDURE — 82728 ASSAY OF FERRITIN: CPT

## 2023-01-18 PROCEDURE — 80061 LIPID PANEL: CPT

## 2023-01-18 PROCEDURE — 99396 PREV VISIT EST AGE 40-64: CPT | Performed by: STUDENT IN AN ORGANIZED HEALTH CARE EDUCATION/TRAINING PROGRAM

## 2023-01-18 PROCEDURE — 83036 HEMOGLOBIN GLYCOSYLATED A1C: CPT

## 2023-01-18 PROCEDURE — 84439 ASSAY OF FREE THYROXINE: CPT

## 2023-01-18 PROCEDURE — 36415 COLL VENOUS BLD VENIPUNCTURE: CPT

## 2023-01-18 PROCEDURE — 84466 ASSAY OF TRANSFERRIN: CPT

## 2023-01-18 RX ORDER — EMOLLIENT COMBINATION NO.43
CREAM (GRAM) TOPICAL
COMMUNITY
Start: 2022-09-16

## 2023-01-18 NOTE — PROGRESS NOTES
Chief Complaint  Eduardo Luna is a 42 y.o. male presenting for Annual Exam (Not fasting ).     From [place]. Engaged, in long term relationship with Teresa since 2016, living together.  No children. Works full time as [employer] at [employer] in [place]     Patient has a past medical history of hypertension, hyperlipidemia, paroxysmal atrial tachycardia, hypothyroidism, elevated liver enzymes, GERD, adenomatous colon polyp, Hodgkin's lymphoma (age 21), ANTONIA, depression, JAVON, periodic limb movement disorder and insomnia.    History of Present Illness  Patient is here for annual physical.    He did see sleep medicine and noted with severe sleep apnea.  He did not tolerate CPAP, he also tried BiPAP, which he did not tolerate.  He saw ENT for evaluation, he previously had resection of the uvula, but they did not recommend any additional surgery.  Patient also saw cardiology, stress test was negative.    Recently patient has noticed some maroon looking liquid in the crevices of his stool, about every other bowel movement.  No bright red blood, no black or tarry stools, nothing on the paper after bowel movement.  He did have a little bit of loose bowel occasionally, but no diarrhea.  His last episode was yesterday, his second bowel movement yesterday was normal.  Weight is stable, appetite is good.  He was wondering if he needs to see GI Dr. Aden again for evaluation.    Patient does have a history of Hodgkin's lymphoma at age 21, he received radiation therapy against his neck.  Over the last year he has noticed occasional mild stinging just to the left of his lower anterior neck.  No difficulties swallowing, no painful swallowing.  He has not felt any mass.  Symptoms are fairly stable.    The following portions of the patient's history were reviewed and updated as appropriate: allergies, current medications, past family history, past medical history, past social history, past surgical history and  "problem list.    Objective  /90 (BP Location: Left arm, Patient Position: Sitting, Cuff Size: Adult)   Pulse 60   Temp 97.8 °F (36.6 °C) (Temporal)   Ht 185 cm (72.84\")   Wt 95 kg (209 lb 6.4 oz)   BMI 27.75 kg/m²     Physical Exam  Vitals reviewed.   Constitutional:       Appearance: Normal appearance.   HENT:      Head: Normocephalic and atraumatic.      Nose: No congestion.   Eyes:      Extraocular Movements: Extraocular movements intact.      Conjunctiva/sclera: Conjunctivae normal.   Neck:      Comments: Neck exam is normal, no lymphadenopathy.  No palpable mass around the thyroid, no nodules, normal palpation on swallowing.  Cardiovascular:      Rate and Rhythm: Normal rate and regular rhythm.      Heart sounds: Normal heart sounds. No murmur heard.  Pulmonary:      Effort: Pulmonary effort is normal.      Breath sounds: Normal breath sounds.   Abdominal:      General: There is no distension.      Palpations: Abdomen is soft. There is no mass.      Tenderness: There is no abdominal tenderness.   Genitourinary:     Rectum: Normal. Guaiac result negative.      Comments: MARISELA: No perianal lesions.  Good sphincter tone.  No palpable lesions of the anus.  No palpable lesions of the rectal mucosa.  Hard stool in the vault.  Guaiac is completely negative.  Musculoskeletal:         General: No swelling.      Cervical back: Neck supple. No tenderness.   Lymphadenopathy:      Cervical: No cervical adenopathy.   Skin:     General: Skin is warm and dry.   Neurological:      Mental Status: He is alert and oriented to person, place, and time. Mental status is at baseline.   Psychiatric:         Behavior: Behavior normal.         Thought Content: Thought content normal.         Assessment/Plan   1. Well adult exam  Patient is up-to-date on vaccines.    2. Stool color abnormal  Guaiac negative.  Maroon color may not represent blood in the stool.  If his symptoms persist I would recommend evaluation by GI again.  It " looks like he is still established with Dr. Aden, and would probably be able to reach out to their office.  We will do blood work as ordered for now.  - CBC (No Diff); Future  - Comprehensive Metabolic Panel; Future  - Ferritin; Future  - Iron Profile; Future    3. Essential hypertension  BP Readings from Last 3 Encounters:   01/18/23 120/90   12/15/22 102/64   10/28/22 140/100   His blood pressure is currently not at goal.  He reports home blood pressures typically 120s/80s.  I have asked that he send me a [employer] message in a couple of weeks with 3 days of home blood pressure measurements.  If these blood pressures are within normal I would continue on his current medications.    4. Elevated glucose  Will check A1c given elevated glucose  - Hemoglobin A1c; Future    5. Hypothyroidism (acquired)  We will recheck thyroid.  - TSH; Future  - T4, Free; Future    6. History of radiation to head and neck region  No dysphagia, no odynophagia, no palpable lesions.  We will continue to monitor.  If any worsening symptoms I recommend evaluation by ENT, possible imaging if needed.    7. Overweight (BMI 25.0-29.9)  BMI is >= 25 and <30. (Overweight) The following options were offered after discussion;: exercise counseling/recommendations    Return in about 3 months (around 4/18/2023) for Recheck.    Future Appointments       Provider Department Center    4/14/2023 9:00 AM Dallas Ugarte MD [facility] INTERNAL MEDICINE [facility]    9/28/2023 3:45 PM Marcos Gonsalez MD [facility]OLOGY [facility]          Dallas Ugarte MD  Family Medicine  01/18/2023

## 2023-01-19 ENCOUNTER — PATIENT MESSAGE (OUTPATIENT)
Dept: INTERNAL MEDICINE | Facility: CLINIC | Age: 43
End: 2023-01-19
Payer: COMMERCIAL

## 2023-01-19 DIAGNOSIS — E78.2 MIXED HYPERLIPIDEMIA: Primary | ICD-10-CM

## 2023-01-19 LAB
ALBUMIN SERPL-MCNC: 4.5 G/DL (ref 3.5–5.2)
ALBUMIN/GLOB SERPL: 1.7 G/DL
ALP SERPL-CCNC: 85 U/L (ref 39–117)
ALT SERPL W P-5'-P-CCNC: 52 U/L (ref 1–41)
ANION GAP SERPL CALCULATED.3IONS-SCNC: 8.9 MMOL/L (ref 5–15)
AST SERPL-CCNC: 31 U/L (ref 1–40)
BILIRUB SERPL-MCNC: 0.3 MG/DL (ref 0–1.2)
BUN SERPL-MCNC: 13 MG/DL (ref 6–20)
BUN/CREAT SERPL: 13.5 (ref 7–25)
CALCIUM SPEC-SCNC: 9.7 MG/DL (ref 8.6–10.5)
CHLORIDE SERPL-SCNC: 104 MMOL/L (ref 98–107)
CHOLEST SERPL-MCNC: 175 MG/DL (ref 0–200)
CO2 SERPL-SCNC: 29.1 MMOL/L (ref 22–29)
CREAT SERPL-MCNC: 0.96 MG/DL (ref 0.76–1.27)
DEPRECATED RDW RBC AUTO: 42.7 FL (ref 37–54)
EGFRCR SERPLBLD CKD-EPI 2021: 100.6 ML/MIN/1.73
ERYTHROCYTE [DISTWIDTH] IN BLOOD BY AUTOMATED COUNT: 13.4 % (ref 12.3–15.4)
FERRITIN SERPL-MCNC: 120 NG/ML (ref 30–400)
GLOBULIN UR ELPH-MCNC: 2.7 GM/DL
GLUCOSE SERPL-MCNC: 89 MG/DL (ref 65–99)
HBA1C MFR BLD: 5.2 % (ref 4.8–5.6)
HCT VFR BLD AUTO: 44.5 % (ref 37.5–51)
HDLC SERPL-MCNC: 50 MG/DL (ref 40–60)
HGB BLD-MCNC: 15.1 G/DL (ref 13–17.7)
IRON 24H UR-MRATE: 131 MCG/DL (ref 59–158)
IRON SATN MFR SERPL: 35 % (ref 20–50)
LDLC SERPL CALC-MCNC: 100 MG/DL (ref 0–100)
LDLC/HDLC SERPL: 1.94 {RATIO}
MCH RBC QN AUTO: 29.8 PG (ref 26.6–33)
MCHC RBC AUTO-ENTMCNC: 33.9 G/DL (ref 31.5–35.7)
MCV RBC AUTO: 87.8 FL (ref 79–97)
PLATELET # BLD AUTO: 243 10*3/MM3 (ref 140–450)
PMV BLD AUTO: 10.5 FL (ref 6–12)
POTASSIUM SERPL-SCNC: 4.5 MMOL/L (ref 3.5–5.2)
PROT SERPL-MCNC: 7.2 G/DL (ref 6–8.5)
RBC # BLD AUTO: 5.07 10*6/MM3 (ref 4.14–5.8)
SODIUM SERPL-SCNC: 142 MMOL/L (ref 136–145)
T4 FREE SERPL-MCNC: 1.25 NG/DL (ref 0.93–1.7)
TIBC SERPL-MCNC: 374 MCG/DL (ref 298–536)
TRANSFERRIN SERPL-MCNC: 251 MG/DL (ref 200–360)
TRIGL SERPL-MCNC: 141 MG/DL (ref 0–150)
TSH SERPL DL<=0.05 MIU/L-ACNC: 3.39 UIU/ML (ref 0.27–4.2)
VLDLC SERPL-MCNC: 25 MG/DL (ref 5–40)
WBC NRBC COR # BLD: 5.1 10*3/MM3 (ref 3.4–10.8)

## 2023-01-26 ENCOUNTER — HOSPITAL ENCOUNTER (OUTPATIENT)
Dept: GENERAL RADIOLOGY | Facility: HOSPITAL | Age: 43
Discharge: HOME | End: 2023-01-26
Admitting: UROLOGY
Payer: COMMERCIAL

## 2023-01-26 ENCOUNTER — TRANSCRIBE ORDERS (OUTPATIENT)
Dept: GENERAL RADIOLOGY | Facility: HOSPITAL | Age: 43
End: 2023-01-26
Payer: COMMERCIAL

## 2023-01-26 DIAGNOSIS — N20.0 URIC ACID NEPHROLITHIASIS: ICD-10-CM

## 2023-01-26 DIAGNOSIS — N20.0 URIC ACID NEPHROLITHIASIS: Primary | ICD-10-CM

## 2023-01-26 PROCEDURE — 74018 RADEX ABDOMEN 1 VIEW: CPT

## 2023-02-03 ENCOUNTER — PATIENT MESSAGE (OUTPATIENT)
Dept: INTERNAL MEDICINE | Facility: CLINIC | Age: 43
End: 2023-02-03
Payer: COMMERCIAL

## 2023-02-03 VITALS — DIASTOLIC BLOOD PRESSURE: 79 MMHG | SYSTOLIC BLOOD PRESSURE: 109 MMHG

## 2023-02-12 DIAGNOSIS — I10 ESSENTIAL HYPERTENSION: ICD-10-CM

## 2023-02-12 DIAGNOSIS — E03.9 HYPOTHYROIDISM (ACQUIRED): ICD-10-CM

## 2023-02-13 RX ORDER — AMLODIPINE BESYLATE 5 MG/1
5 TABLET ORAL DAILY
Qty: 90 TABLET | Refills: 1 | Status: SHIPPED | OUTPATIENT
Start: 2023-02-13

## 2023-02-13 RX ORDER — LEVOTHYROXINE SODIUM 0.07 MG/1
75 TABLET ORAL
Qty: 90 TABLET | Refills: 1 | Status: SHIPPED | OUTPATIENT
Start: 2023-02-13

## 2023-02-13 RX ORDER — METOPROLOL SUCCINATE 100 MG/1
100 TABLET, EXTENDED RELEASE ORAL DAILY
Qty: 90 TABLET | Refills: 1 | Status: SHIPPED | OUTPATIENT
Start: 2023-02-13

## 2023-02-26 DIAGNOSIS — T78.40XD ALLERGY, SUBSEQUENT ENCOUNTER: ICD-10-CM

## 2023-02-26 DIAGNOSIS — G47.9 SLEEP DISTURBANCE: ICD-10-CM

## 2023-02-27 RX ORDER — HYDROXYZINE HYDROCHLORIDE 25 MG/1
25 TABLET, FILM COATED ORAL 3 TIMES DAILY PRN
Qty: 90 TABLET | Refills: 3 | Status: SHIPPED | OUTPATIENT
Start: 2023-02-27

## 2023-02-27 NOTE — TELEPHONE ENCOUNTER
Rx Refill Note  Requested Prescriptions     Pending Prescriptions Disp Refills   • hydrOXYzine (ATARAX) 25 MG tablet 90 tablet 3     Sig: Take 1 tablet by mouth 3 (Three) Times a Day As Needed for Itching or Allergies.      Last office visit with prescribing clinician: 1/18/2023   Last telemedicine visit with prescribing clinician: 3/31/2023   Next office visit with prescribing clinician: 3/31/2023                         Would you like a call back once the refill request has been completed: [] Yes [] No    If the office needs to give you a call back, can they leave a voicemail: [] Yes [] No    Kristy Sifuentes MA  02/27/23, 10:13 EST

## 2023-02-28 RX ORDER — ESZOPICLONE 3 MG/1
3 TABLET, FILM COATED ORAL NIGHTLY
Qty: 30 TABLET | Refills: 2 | Status: SHIPPED | OUTPATIENT
Start: 2023-02-28

## 2023-03-20 DIAGNOSIS — I10 ESSENTIAL HYPERTENSION: Chronic | ICD-10-CM

## 2023-03-20 RX ORDER — SPIRONOLACTONE 25 MG/1
25 TABLET ORAL DAILY
Qty: 90 TABLET | Refills: 1 | Status: SHIPPED | OUTPATIENT
Start: 2023-03-20

## 2023-03-31 ENCOUNTER — OFFICE VISIT (OUTPATIENT)
Dept: INTERNAL MEDICINE | Facility: CLINIC | Age: 43
End: 2023-03-31
Payer: COMMERCIAL

## 2023-03-31 VITALS
DIASTOLIC BLOOD PRESSURE: 80 MMHG | BODY MASS INDEX: 28.39 KG/M2 | WEIGHT: 214.2 LBS | TEMPERATURE: 97.1 F | SYSTOLIC BLOOD PRESSURE: 100 MMHG | HEIGHT: 73 IN | HEART RATE: 64 BPM

## 2023-03-31 DIAGNOSIS — R74.01 ELEVATED ALT MEASUREMENT: ICD-10-CM

## 2023-03-31 DIAGNOSIS — I10 ESSENTIAL HYPERTENSION: Primary | Chronic | ICD-10-CM

## 2023-03-31 DIAGNOSIS — E03.9 HYPOTHYROIDISM (ACQUIRED): Chronic | ICD-10-CM

## 2023-03-31 PROCEDURE — 99214 OFFICE O/P EST MOD 30 MIN: CPT | Performed by: STUDENT IN AN ORGANIZED HEALTH CARE EDUCATION/TRAINING PROGRAM

## 2023-03-31 NOTE — PROGRESS NOTES
"Chief Complaint  Eduardo Luna is a 42 y.o. male presenting for Hypertension (F/u).     From [place]. Engaged, in long term relationship with Teresa since 2016, living together.  No children. 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    History of Present Illness  Patient is here accompanied by his significant other for follow-up.    He is overall doing well. No symptoms of lightheadedness. Home BP /60-80.    Otherwise patient is feeling well and has no ongoing issues to discuss with me today.    The following portions of the patient's history were reviewed and updated as appropriate: allergies, current medications, past family history, past medical history, past social history, past surgical history and problem list.    Objective  /80 (BP Location: Left arm, Patient Position: Sitting, Cuff Size: Adult)   Pulse 64   Temp 97.1 °F (36.2 °C) (Temporal)   Ht 185 cm (72.84\")   Wt 97.2 kg (214 lb 3.2 oz)   BMI 28.39 kg/m²     Physical Exam  Vitals reviewed.   Constitutional:       Appearance: Normal appearance.   HENT:      Head: Normocephalic and atraumatic.      Nose: No congestion.   Eyes:      Extraocular Movements: Extraocular movements intact.      Conjunctiva/sclera: Conjunctivae normal.   Cardiovascular:      Rate and Rhythm: Normal rate and regular rhythm.      Heart sounds: Normal heart sounds. No murmur heard.  Pulmonary:      Effort: Pulmonary effort is normal.      Breath sounds: Normal breath sounds.   Musculoskeletal:      Cervical back: Neck supple.      Right lower leg: No edema.      Left lower leg: No edema.   Skin:     General: Skin is warm and dry.   Neurological:      Mental Status: " He is alert and oriented to person, place, and time. Mental status is at baseline.   Psychiatric:         Behavior: Behavior normal.         Thought Content: Thought content normal.         Assessment/Plan   1. Essential hypertension  BP Readings from Last 3 Encounters:   03/31/23 100/80   02/03/23 109/79   01/18/23 120/90   Blood pressure overall well controlled based on home blood pressures and office blood pressure.  Office blood pressure borderline low, but patient is asymptomatic.  We will continue on current medications.  Patient up-to-date on blood work.  Recheck blood work next time with regard to spironolactone.    2. Hypothyroidism (acquired)  Thyroid is overall stable.  Consider recheck on next visit in about 6 months.  For now continue on unchanged dose of levothyroxine 75 mcg daily.    3. Elevated ALT measurement  This liver enzymes are overall stable, borderline elevated.  I would not be concerned about this level.  He is mildly overweight, he is also on medications that potentially could cause it.  We will continue to monitor      Return in about 6 months (around 9/30/2023) for Recheck.    Future Appointments       Provider Department Center    9/28/2023 3:45 PM Marcos Gonsalez MD [facility]OLOG[facility]    10/9/2023 3:30 PM Dallas Ugarte MD [facility] MEDICINE [facility]            Dallas Ugarte MD  Family Medicine  03/31/2023

## 2023-05-05 ENCOUNTER — PATIENT MESSAGE (OUTPATIENT)
Dept: INTERNAL MEDICINE | Facility: CLINIC | Age: 43
End: 2023-05-05
Payer: COMMERCIAL

## 2023-05-05 RX ORDER — CLONIDINE HYDROCHLORIDE 0.1 MG/1
0.1 TABLET ORAL 2 TIMES DAILY PRN
Qty: 20 TABLET | Refills: 0 | Status: SHIPPED | OUTPATIENT
Start: 2023-05-05

## 2023-05-05 NOTE — TELEPHONE ENCOUNTER
From: Eduardo Luna  To: Dallas Ugarte  Sent: 2023 5:39 AM EDT  Subject: Clonidine 0.1 mg tabs    Donny Ugarte,    The clonidine 0.1 mg tabs that I have are about to . May I have a new prescription?    Thank you,    Eduardo Luna

## 2023-05-08 RX ORDER — PANTOPRAZOLE SODIUM 40 MG/1
40 TABLET, DELAYED RELEASE ORAL 2 TIMES DAILY
Qty: 180 TABLET | Refills: 3 | Status: SHIPPED | OUTPATIENT
Start: 2023-05-08

## 2023-05-24 ENCOUNTER — PATIENT MESSAGE (OUTPATIENT)
Dept: INTERNAL MEDICINE | Facility: CLINIC | Age: 43
End: 2023-05-24
Payer: COMMERCIAL

## 2023-05-24 DIAGNOSIS — F41.8 DEPRESSION WITH ANXIETY: Primary | ICD-10-CM

## 2023-05-24 DIAGNOSIS — F41.1 GAD (GENERALIZED ANXIETY DISORDER): ICD-10-CM

## 2023-05-24 RX ORDER — FLUOXETINE HYDROCHLORIDE 40 MG/1
40 CAPSULE ORAL DAILY
Qty: 90 CAPSULE | Refills: 3 | Status: SHIPPED | OUTPATIENT
Start: 2023-05-24

## 2023-05-24 RX ORDER — BUSPIRONE HYDROCHLORIDE 30 MG/1
30 TABLET ORAL 2 TIMES DAILY
Qty: 180 TABLET | Refills: 1 | Status: SHIPPED | OUTPATIENT
Start: 2023-05-24

## 2023-05-24 RX ORDER — LAMOTRIGINE 100 MG/1
100 TABLET ORAL DAILY
Qty: 90 TABLET | Refills: 3 | Status: SHIPPED | OUTPATIENT
Start: 2023-05-24

## 2023-05-24 RX ORDER — BUPROPION HYDROCHLORIDE 150 MG/1
150 TABLET ORAL DAILY
Qty: 90 TABLET | Refills: 3 | Status: SHIPPED | OUTPATIENT
Start: 2023-05-24

## 2023-05-27 DIAGNOSIS — G47.9 SLEEP DISTURBANCE: ICD-10-CM

## 2023-05-30 DIAGNOSIS — G47.9 SLEEP DISTURBANCE: ICD-10-CM

## 2023-05-30 RX ORDER — ESZOPICLONE 3 MG/1
3 TABLET, FILM COATED ORAL NIGHTLY
Qty: 30 TABLET | Refills: 2 | OUTPATIENT
Start: 2023-05-30

## 2023-05-30 RX ORDER — ESZOPICLONE 3 MG/1
3 TABLET, FILM COATED ORAL NIGHTLY
Qty: 30 TABLET | Refills: 2 | Status: SHIPPED | OUTPATIENT
Start: 2023-05-30

## 2023-07-24 DIAGNOSIS — F51.01 PRIMARY INSOMNIA: Chronic | ICD-10-CM

## 2023-07-24 RX ORDER — ESZOPICLONE 3 MG/1
3 TABLET, FILM COATED ORAL NIGHTLY
Qty: 30 TABLET | Refills: 0 | Status: SHIPPED | OUTPATIENT
Start: 2023-07-24

## 2023-07-24 NOTE — TELEPHONE ENCOUNTER
Rx Refill Note  Requested Prescriptions     Pending Prescriptions Disp Refills    eszopiclone (LUNESTA) 3 MG tablet 30 tablet 0     Sig: Take 1 tablet by mouth Every Night, immediately before bedtime      Last office visit with prescribing clinician: 6/21/2023   Last telemedicine visit with prescribing clinician: Visit date not found   Next office visit with prescribing clinician: 10/9/2023                         Would you like a call back once the refill request has been completed: [] Yes [] No    If the office needs to give you a call back, can they leave a voicemail: [] Yes [] No    Emma Dugan LPN  07/24/23, 09:08 EDT

## 2023-08-05 ENCOUNTER — PATIENT MESSAGE (OUTPATIENT)
Dept: INTERNAL MEDICINE | Facility: CLINIC | Age: 43
End: 2023-08-05
Payer: COMMERCIAL

## 2023-08-05 DIAGNOSIS — I10 ESSENTIAL HYPERTENSION: Chronic | ICD-10-CM

## 2023-08-05 DIAGNOSIS — E03.9 HYPOTHYROIDISM (ACQUIRED): ICD-10-CM

## 2023-08-07 RX ORDER — AMLODIPINE BESYLATE 5 MG/1
5 TABLET ORAL DAILY
Qty: 90 TABLET | Refills: 1 | Status: SHIPPED | OUTPATIENT
Start: 2023-08-07

## 2023-08-07 RX ORDER — METOPROLOL SUCCINATE 100 MG/1
100 TABLET, EXTENDED RELEASE ORAL DAILY
Qty: 90 TABLET | Refills: 1 | Status: SHIPPED | OUTPATIENT
Start: 2023-08-07

## 2023-08-07 RX ORDER — SPIRONOLACTONE 25 MG/1
25 TABLET ORAL DAILY
Qty: 90 TABLET | Refills: 1 | Status: SHIPPED | OUTPATIENT
Start: 2023-08-07

## 2023-08-07 RX ORDER — LEVOTHYROXINE SODIUM 0.07 MG/1
75 TABLET ORAL
Qty: 90 TABLET | Refills: 1 | Status: SHIPPED | OUTPATIENT
Start: 2023-08-07

## 2023-08-07 NOTE — TELEPHONE ENCOUNTER
From: Eduardo Luna  To: Dallas Ugarte  Sent: 8/5/2023 11:21 AM EDT  Subject: refills    Donny Ugarte,    May I have refills for these medications?    levothyroxine 75mcg tabs  metoprolol succinate XL 100mg tabs  amlodipine 5mg tabs  Spironolactone 25mgs (still have tablets, just out of refills)    Thank you,    Eduardo Luna

## 2023-08-21 DIAGNOSIS — F51.01 PRIMARY INSOMNIA: Chronic | ICD-10-CM

## 2023-08-21 RX ORDER — ESZOPICLONE 3 MG/1
3 TABLET, FILM COATED ORAL NIGHTLY
Qty: 30 TABLET | Refills: 0 | Status: SHIPPED | OUTPATIENT
Start: 2023-08-21

## 2023-08-21 NOTE — TELEPHONE ENCOUNTER
Rx Refill Note  Requested Prescriptions     Pending Prescriptions Disp Refills    eszopiclone (LUNESTA) 3 MG tablet 30 tablet 0     Sig: Take 1 tablet by mouth Every Night, immediately before bedtime      Last office visit with prescribing clinician: 6/21/2023   Last telemedicine visit with prescribing clinician: Visit date not found   Next office visit with prescribing clinician: 10/9/2023                         Would you like a call back once the refill request has been completed: [] Yes [] No    If the office needs to give you a call back, can they leave a voicemail: [] Yes [] No    Emma Dugan LPN  08/21/23, 09:19 EDT

## 2023-09-19 DIAGNOSIS — F51.01 PRIMARY INSOMNIA: Chronic | ICD-10-CM

## 2023-09-19 RX ORDER — ESZOPICLONE 3 MG/1
3 TABLET, FILM COATED ORAL NIGHTLY
Qty: 30 TABLET | Refills: 0 | Status: SHIPPED | OUTPATIENT
Start: 2023-09-19

## 2023-09-19 NOTE — TELEPHONE ENCOUNTER
Rx Refill Note  Requested Prescriptions     Pending Prescriptions Disp Refills    eszopiclone (LUNESTA) 3 MG tablet 30 tablet 0     Sig: Take 1 tablet by mouth Every Night, immediately before bedtime      Last refill:  8/21/23 #30/0  Last office visit with prescribing clinician: 6/21/2023   Last telemedicine visit with prescribing clinician: Visit date not found   Next office visit with prescribing clinician: 10/16/2023                         Would you like a call back once the refill request has been completed: [] Yes [] No    If the office needs to give you a call back, can they leave a voicemail: [] Yes [] No    Kelin Turner RN  09/19/23, 10:42 EDT

## 2023-09-26 NOTE — PROGRESS NOTES
OFFICE VISIT  NOTE  [facility] CARDIOLOGY      Name: Eduardo Luna    Date: 2023  MRN:  [id number]  :  1980      REFERRING/PRIMARY PROVIDER:  Dallas Ugarte MD     Chief Complaint   Patient presents with    Mixed hyperlipidemia     HPI: Eduardo Luna is a 42 y.o. male who presents today for follow up of hypertension and palpitations.  Reports chest pain and shortness of breath with exercise.  He is a pharmacy technician at [employer]. History of Hodgkin's lymphoma with chemotherapy and chest radiation in .  Also history of SVT status post ablation with Dr. Daly in .    Stress MPS 10/2022 negative for ischemia, no coronary calcium noted. Echo with normal EF, no valvular abnormalities.  Overall doing well, planning to get the inspire JAVON implant soon.  Denies significant palpitations, does have some slight shortness of breath only when running up some stairs she does not exercise frequently.    ROS:Pertinent positives as listed in the HPI.  All other systems reviewed and negative.    Past Medical History:   Diagnosis Date    Allergic     Since childhood    Anxiety     Since childhood    Cancer     hodgkins lymphoma    Colon polyp     Depression     GERD (gastroesophageal reflux disease)     Hyperlipidemia     Hypertension     Hypothyroidism     Acquired from radiation therapy    Kidney stone     diagnosed with C.T scan in [facility]    Migraine     Sleep apnea     SVT (supraventricular tachycardia)        Past Surgical History:   Procedure Laterality Date    ABLATION OF DYSRHYTHMIC FOCUS      Dr. George Daly-EP study    CARDIAC ABLATION      SVT    COLONOSCOPY      Dr. Yovani Aden    LYMPH NODE BIOPSY  2001    @ [facility] with Dr. Daniel Marinelli    MOLE REMOVAL  2016    Dr Loyd at [facility].     PORTACATH PLACEMENT  2002    [facility] Placement @ [facility] with Dr. Daniel Marinelli    SEPTOPLASTY  10/18/2018    Dr. Lexa Castillo     SEPTOPLASTY, RESECTION INFERIOR TURBINATES Bilateral 10/18/2018    Procedure: SEPTOPLASTY, BILATERAL RESECTION INFERIOR TURBINATES;  Surgeon: Lexa Castillo MD;  Location: [facility] OR;  Service: ENT    TONSILLECTOMY  2013    @ [facility] with Lexa Castillo    VASECTOMY  2012    @ [facility] with Dr. Aidan COTA TOOTH EXTRACTION  10/1997    @ [facility] with Dr. Momo Nunn       Social History     Socioeconomic History    Marital status: Significant Other   Tobacco Use    Smoking status: Never    Smokeless tobacco: Never   Vaping Use    Vaping Use: Never used   Substance and Sexual Activity    Alcohol use: No    Drug use: Never    Sexual activity: Yes     Partners: Female     Birth control/protection: Surgical, Vasectomy     Comment: [id number]. [employer]       Family History   Problem Relation Age of Onset    Hypertension Mother     Depression Mother     Hyperlipidemia Mother     Cancer Mother         Soft tissue sarcoma- 2003    Arrhythmia Mother         Tachycardia    Colon cancer Father     Colon polyps Father     Hypertension Father     Hyperlipidemia Father     Cancer Father         Colon cancer- 10-    Breast cancer Sister     Arthritis Maternal Grandmother     Asthma Maternal Grandmother     Hyperlipidemia Maternal Grandmother     Hypertension Maternal Grandmother     Depression Maternal Grandmother     Heart disease Paternal Uncle         CABG    Heart attack Paternal Uncle             Hyperlipidemia Brother     Hypertension Brother     Hypertension Maternal Grandfather         Allergies   Allergen Reactions    Lisinopril Other (See Comments)     Throat tickle    Amoxicillin Hives, Itching and Rash       Current Outpatient Medications   Medication Instructions    amLODIPine (NORVASC) 5 mg, Oral, Daily    Antiseborrheic Products, Misc. (Promiseb) cream 1- 2 times a day     atorvastatin (LIPITOR) 10 mg, Oral, Daily    buPROPion XL  "(WELLBUTRIN XL) 150 mg, Oral, Daily    busPIRone (BUSPAR) 30 mg, Oral, 2 Times Daily    clindamycin (CLEOCIN T) 1 % external solution Apply 1 application topically to the appropriate area as directed once or twice a Day As Needed.    cloNIDine (CATAPRES) 0.1 mg, Oral, 2 Times Daily PRN    eszopiclone (LUNESTA) 3 MG tablet Take 1 tablet by mouth Every Night, immediately before bedtime    FLUoxetine (PROZAC) 40 mg, Oral, Daily    hydrOXYzine (ATARAX) 25 mg, Oral, 3 Times Daily PRN    lamoTRIgine (LAMICTAL) 100 mg, Oral, Daily    levothyroxine (SYNTHROID, LEVOTHROID) 75 MCG tablet Take 1 tablet by mouth Every Morning 1 hour prior to food or other medications    metoprolol succinate XL (TOPROL-XL) 100 mg, Oral, Daily    pantoprazole (PROTONIX) 40 mg, Oral, 2 Times Daily    spironolactone (ALDACTONE) 25 mg, Oral, Daily    vitamin D3 125 MCG (5000 UT) capsule capsule 1 capsule, Oral, Daily       Vitals:    09/28/23 1510   BP: 118/92   BP Location: Right arm   Patient Position: Sitting   Pulse: 67   SpO2: 97%   Weight: 96.7 kg (213 lb 3.2 oz)   Height: 182.9 cm (72\")     Body mass index is 28.92 kg/m².    PHYSICAL EXAM:    General Appearance:   well developed  well nourished  Neck:  thyroid not enlarged  supple  Respiratory:  no respiratory distress  normal breath sounds  no rales  Cardiovascular:  no jugular venous distention  regular rhythm  apical impulse normal  S1 normal, S2 normal  no S3, no S4   no murmur  no rub, no thrill  carotid pulses normal; no bruit  pedal pulses normal  lower extremity edema: none    Skin:   warm, dry    RESULTS:   Procedures    Results for orders placed during the hospital encounter of 10/28/22    Adult Transthoracic Echo Complete W/ Cont if Necessary Per Protocol    Interpretation Summary    Left ventricular systolic function is normal. Calculated left ventricular EF = 57% Left ventricular ejection fraction appears to be 56 - 60%.    Left ventricular diastolic function was normal.    No " significant structural or functional valvular disease.        Labs:  Lab Results   Component Value Date    CHOL 175 01/18/2023    TRIG 141 01/18/2023    HDL 50 01/18/2023     01/18/2023    AST 31 01/18/2023    ALT 52 (H) 01/18/2023     Lab Results   Component Value Date    HGBA1C 5.20 01/18/2023     Creatinine   Date Value Ref Range Status   01/18/2023 0.96 0.76 - 1.27 mg/dL Final   09/13/2022 1.11 0.76 - 1.27 mg/dL Final   06/22/2022 1.06 0.76 - 1.27 mg/dL Final     eGFR Non  Amer   Date Value Ref Range Status   01/31/2022 82 >60 mL/min/1.73 Final   07/12/2021 81 >60 mL/min/1.73 Final   03/30/2021 97 >60 mL/min/1.73 Final         ASSESSMENT:  Problem List Items Addressed This Visit          Cardiac and Vasculature    Essential hypertension - Primary (Chronic)    Overview     WHITECOAT         PAT (paroxysmal atrial tachycardia)    Overview     Post ablation 2008         Mixed hyperlipidemia       PLAN:  1.  Chest pain:  Stress MPS 10/2022 negative for ischemia, no coronary calcium noted  Continue risk factor modification     2.  Dyspnea on exertion:  Echocardiogram 10/2022 with normal EF, no significant valvular abnormalities   Increase aerobic exercise     3.  Hypertension:  Long-term goal blood pressure is 130/80  Well-controlled on current regimen, advised him that if he continues increasing his exercise and has some weight loss he could probably come off amlodipine if blood pressure remains under 130/80 long-term     4.  Hyperlipidemia:  Lipid panel reviewed, goal LDL less than 100, currently controlled on atorvastatin.    5.  History of paroxysmal SVT status post ablation in 2008:  Continue metoprolol, most recent Holter in 2021 normal    6.  Preop cardiovascular assessment:  Okay to proceed at low cardiovascular risk for the inspire JAVON implant surgery      Follow-up   Return in about 1 year (around 9/28/2024).    Marcos Gonsalez MD, FACC, [facility]  Interventional Cardiology

## 2023-09-28 ENCOUNTER — OFFICE VISIT (OUTPATIENT)
Dept: CARDIOLOGY | Facility: CLINIC | Age: 43
End: 2023-09-28
Payer: COMMERCIAL

## 2023-09-28 VITALS
DIASTOLIC BLOOD PRESSURE: 92 MMHG | BODY MASS INDEX: 28.88 KG/M2 | HEIGHT: 72 IN | WEIGHT: 213.2 LBS | HEART RATE: 67 BPM | OXYGEN SATURATION: 97 % | SYSTOLIC BLOOD PRESSURE: 118 MMHG

## 2023-09-28 DIAGNOSIS — I47.19 PAT (PAROXYSMAL ATRIAL TACHYCARDIA): ICD-10-CM

## 2023-09-28 DIAGNOSIS — I10 ESSENTIAL HYPERTENSION: Primary | Chronic | ICD-10-CM

## 2023-09-28 DIAGNOSIS — E78.2 MIXED HYPERLIPIDEMIA: ICD-10-CM

## 2023-09-28 PROCEDURE — 99214 OFFICE O/P EST MOD 30 MIN: CPT | Performed by: INTERNAL MEDICINE

## 2023-09-30 DIAGNOSIS — T78.40XD ALLERGY, SUBSEQUENT ENCOUNTER: ICD-10-CM

## 2023-10-02 RX ORDER — HYDROXYZINE HYDROCHLORIDE 25 MG/1
25 TABLET, FILM COATED ORAL 3 TIMES DAILY PRN
Qty: 90 TABLET | Refills: 3 | Status: SHIPPED | OUTPATIENT
Start: 2023-10-02

## 2023-10-02 NOTE — TELEPHONE ENCOUNTER
Rx Refill Note  Requested Prescriptions     Pending Prescriptions Disp Refills    hydrOXYzine (ATARAX) 25 MG tablet 90 tablet 3     Sig: Take 1 tablet by mouth 3 (Three) Times a Day As Needed for Itching or Allergies.      Last office visit with prescribing clinician: 6/21/2023   Last telemedicine visit with prescribing clinician: Visit date not found   Next office visit with prescribing clinician: 10/16/2023                         Would you like a call back once the refill request has been completed: [] Yes [] No    If the office needs to give you a call back, can they leave a voicemail: [] Yes [] No    Laly Milner LPN  10/02/23, 12:14 EDT

## 2023-10-16 ENCOUNTER — OFFICE VISIT (OUTPATIENT)
Dept: INTERNAL MEDICINE | Facility: CLINIC | Age: 43
End: 2023-10-16
Payer: COMMERCIAL

## 2023-10-16 VITALS
WEIGHT: 215.4 LBS | HEIGHT: 72 IN | SYSTOLIC BLOOD PRESSURE: 104 MMHG | HEART RATE: 60 BPM | TEMPERATURE: 97.8 F | BODY MASS INDEX: 29.17 KG/M2 | DIASTOLIC BLOOD PRESSURE: 82 MMHG

## 2023-10-16 DIAGNOSIS — I10 ESSENTIAL HYPERTENSION: Primary | Chronic | ICD-10-CM

## 2023-10-16 DIAGNOSIS — F51.01 PRIMARY INSOMNIA: Chronic | ICD-10-CM

## 2023-10-16 DIAGNOSIS — G47.33 OSA (OBSTRUCTIVE SLEEP APNEA): Chronic | ICD-10-CM

## 2023-10-16 DIAGNOSIS — E03.9 HYPOTHYROIDISM (ACQUIRED): Chronic | ICD-10-CM

## 2023-10-16 PROCEDURE — 99214 OFFICE O/P EST MOD 30 MIN: CPT | Performed by: STUDENT IN AN ORGANIZED HEALTH CARE EDUCATION/TRAINING PROGRAM

## 2023-10-16 RX ORDER — ESZOPICLONE 3 MG/1
3 TABLET, FILM COATED ORAL NIGHTLY
Qty: 30 TABLET | Refills: 0 | Status: SHIPPED | OUTPATIENT
Start: 2023-10-16

## 2023-10-16 NOTE — PROGRESS NOTES
"Chief Complaint  Eduardo Luna is a 43 y.o. male presenting for Hypertension.     From [place]. Engaged, in long term relationship with Teresa since 2016, living together.  No children. 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    History of Present Illness  Patient is here for follow-up.    He is overall doing well.  He has surgery scheduled for November 14 due to sleep apnea, he does not tolerate CPAP or other devices, and they have scheduled him for neurostimulator.    He does check his blood pressure at home at times, it is always normal.    Patient request refill on Lunesta.    The following portions of the patient's history were reviewed and updated as appropriate: allergies, current medications, past family history, past medical history, past social history, past surgical history, and problem list.    Objective  /82 (BP Location: Left arm, Patient Position: Sitting, Cuff Size: Adult)   Pulse 60   Temp 97.8 °F (36.6 °C) (Temporal)   Ht 182.9 cm (72.01\")   Wt 97.7 kg (215 lb 6.4 oz)   BMI 29.21 kg/m²     Physical Exam  Constitutional:       Appearance: Normal appearance.   HENT:      Head: Normocephalic and atraumatic.   Eyes:      Extraocular Movements: Extraocular movements intact.      Conjunctiva/sclera: Conjunctivae normal.   Pulmonary:      Effort: Pulmonary effort is normal. No respiratory distress.   Musculoskeletal:      Cervical back: Neck supple.   Neurological:      Mental Status: He is alert and oriented to person, place, and time. Mental status is at baseline.   Psychiatric:         Behavior: Behavior normal.         Thought Content: Thought content normal. "         Assessment/Plan   1. Essential hypertension  BP Readings from Last 3 Encounters:   10/16/23 104/82   09/28/23 118/92   06/21/23 100/88   Blood pressure well controlled, continue on current medications.  We will do recheck CMP due to use of spironolactone  - Comprehensive Metabolic Panel; Future    2. Primary insomnia  Collins reviewed appropriate.  Treatment contract on file.  UDS on file.  I will refill his Lunesta.  We need to have visits at least twice a year as long as he is on this medication  - eszopiclone (LUNESTA) 3 MG tablet; Take 1 tablet by mouth Every Night, immediately before bedtime  Dispense: 30 tablet; Refill: 0    3. JAVON (obstructive sleep apnea)  Patient will follow-up with the surgeon for    4. Hypothyroidism (acquired)  We will recheck his thyroid with his blood draw  - T4, Free; Future  - TSH; Future        Return in about 3 months (around 1/18/2024) for Annual physical.    Future Appointments         Provider Department Center    1/16/2024 3:00 PM Dallas Ugarte MD [facility] INTERNAL MEDICINE [facility]    10/3/2024 3:45 PM Marcos Gonsalez MD [facility] CARDIOLOGY [facility]            Dallas Ugarte MD  Family Medicine  10/16/2023

## 2023-10-17 ENCOUNTER — LAB (OUTPATIENT)
Dept: LAB | Facility: HOSPITAL | Age: 43
End: 2023-10-17
Payer: COMMERCIAL

## 2023-10-17 DIAGNOSIS — E03.9 HYPOTHYROIDISM (ACQUIRED): Chronic | ICD-10-CM

## 2023-10-17 DIAGNOSIS — I10 ESSENTIAL HYPERTENSION: Chronic | ICD-10-CM

## 2023-10-17 LAB
ALBUMIN SERPL-MCNC: 4.6 G/DL (ref 3.5–5.2)
ALBUMIN/GLOB SERPL: 1.8 G/DL
ALP SERPL-CCNC: 89 U/L (ref 39–117)
ALT SERPL W P-5'-P-CCNC: 39 U/L (ref 1–41)
ANION GAP SERPL CALCULATED.3IONS-SCNC: 10.2 MMOL/L (ref 5–15)
AST SERPL-CCNC: 28 U/L (ref 1–40)
BILIRUB SERPL-MCNC: 0.6 MG/DL (ref 0–1.2)
BUN SERPL-MCNC: 16 MG/DL (ref 6–20)
BUN/CREAT SERPL: 13.9 (ref 7–25)
CALCIUM SPEC-SCNC: 9.6 MG/DL (ref 8.6–10.5)
CHLORIDE SERPL-SCNC: 105 MMOL/L (ref 98–107)
CO2 SERPL-SCNC: 26.8 MMOL/L (ref 22–29)
CREAT SERPL-MCNC: 1.15 MG/DL (ref 0.76–1.27)
EGFRCR SERPLBLD CKD-EPI 2021: 80.5 ML/MIN/1.73
GLOBULIN UR ELPH-MCNC: 2.5 GM/DL
GLUCOSE SERPL-MCNC: 84 MG/DL (ref 65–99)
POTASSIUM SERPL-SCNC: 4.4 MMOL/L (ref 3.5–5.2)
PROT SERPL-MCNC: 7.1 G/DL (ref 6–8.5)
SODIUM SERPL-SCNC: 142 MMOL/L (ref 136–145)
T4 FREE SERPL-MCNC: 1.55 NG/DL (ref 0.93–1.7)
TSH SERPL DL<=0.05 MIU/L-ACNC: 3.07 UIU/ML (ref 0.27–4.2)

## 2023-10-17 PROCEDURE — 84443 ASSAY THYROID STIM HORMONE: CPT

## 2023-10-17 PROCEDURE — 80053 COMPREHEN METABOLIC PANEL: CPT

## 2023-10-17 PROCEDURE — 84439 ASSAY OF FREE THYROXINE: CPT

## 2023-11-16 DIAGNOSIS — F51.01 PRIMARY INSOMNIA: Chronic | ICD-10-CM

## 2023-11-16 NOTE — TELEPHONE ENCOUNTER
Rx Refill Note  Requested Prescriptions     Pending Prescriptions Disp Refills    eszopiclone (LUNESTA) 3 MG tablet 30 tablet 0     Sig: Take 1 tablet by mouth Every Night, immediately before bedtime      Last office visit with prescribing clinician: 10/16/2023   Last telemedicine visit with prescribing clinician: Visit date not found   Next office visit with prescribing clinician: 1/16/2024     LA: 10/16/23 #30 0R                        Would you like a call back once the refill request has been completed: [] Yes [] No    If the office needs to give you a call back, can they leave a voicemail: [] Yes [] No    Laly Milner LPN  11/16/23, 15:56 EST

## 2023-11-17 RX ORDER — ESZOPICLONE 3 MG/1
3 TABLET, FILM COATED ORAL NIGHTLY
Qty: 30 TABLET | Refills: 0 | Status: SHIPPED | OUTPATIENT
Start: 2023-11-17

## 2023-12-05 DIAGNOSIS — E78.00 PURE HYPERCHOLESTEROLEMIA: ICD-10-CM

## 2023-12-05 RX ORDER — ATORVASTATIN CALCIUM 10 MG/1
10 TABLET, FILM COATED ORAL DAILY
Qty: 90 TABLET | Refills: 1 | Status: SHIPPED | OUTPATIENT
Start: 2023-12-05

## 2023-12-05 NOTE — TELEPHONE ENCOUNTER
Rx Refill Note  Requested Prescriptions     Pending Prescriptions Disp Refills    atorvastatin (LIPITOR) 10 MG tablet 90 tablet 1     Sig: Take 1 tablet by mouth Daily.      Last office visit with prescribing clinician: 10/16/2023   Last telemedicine visit with prescribing clinician: Visit date not found   Next office visit with prescribing clinician: 1/16/2024                         Would you like a call back once the refill request has been completed: [] Yes [] No    If the office needs to give you a call back, can they leave a voicemail: [] Yes [] No    Emma Dugan LPN  12/05/23, 15:54 EST

## 2023-12-14 DIAGNOSIS — F51.01 PRIMARY INSOMNIA: Chronic | ICD-10-CM

## 2023-12-14 RX ORDER — ESZOPICLONE 3 MG/1
3 TABLET, FILM COATED ORAL NIGHTLY
Qty: 30 TABLET | Refills: 0 | Status: SHIPPED | OUTPATIENT
Start: 2023-12-14

## 2023-12-14 NOTE — TELEPHONE ENCOUNTER
Rx Refill Note  Requested Prescriptions     Pending Prescriptions Disp Refills    eszopiclone (LUNESTA) 3 MG tablet 30 tablet 0     Sig: Take 1 tablet by mouth Every Night, immediately before bedtime      Last refill:  11/17/23 #30/0  Last office visit with prescribing clinician: 10/16/2023   Last telemedicine visit with prescribing clinician: Visit date not found   Next office visit with prescribing clinician: 1/16/2024                         Would you like a call back once the refill request has been completed: [] Yes [] No    If the office needs to give you a call back, can they leave a voicemail: [] Yes [] No    Kelin Turner RN  12/14/23, 09:35 EST

## 2024-01-02 ENCOUNTER — OFFICE VISIT (OUTPATIENT)
Dept: SLEEP MEDICINE | Facility: HOSPITAL | Age: 44
End: 2024-01-02
Payer: COMMERCIAL

## 2024-01-02 VITALS
HEART RATE: 81 BPM | HEIGHT: 72 IN | DIASTOLIC BLOOD PRESSURE: 75 MMHG | WEIGHT: 207.6 LBS | OXYGEN SATURATION: 98 % | BODY MASS INDEX: 28.12 KG/M2 | SYSTOLIC BLOOD PRESSURE: 117 MMHG

## 2024-01-02 DIAGNOSIS — G47.33 OSA (OBSTRUCTIVE SLEEP APNEA): Primary | Chronic | ICD-10-CM

## 2024-01-02 PROCEDURE — 99214 OFFICE O/P EST MOD 30 MIN: CPT | Performed by: INTERNAL MEDICINE

## 2024-01-02 NOTE — PROGRESS NOTES
Eduardo Luna is a 43 y.o. male.   Chief Complaint   Patient presents with    Follow-up       HPI     43 y.o. male seen for the above    He has been seen in the past in the sleep center.  His last visit was in 2019 with CORINNE Mckeon.  He has a long sleep history.    He was diagnosed with obstructive sleep apnea initially about 10 years ago at [facility].  He tolerated CPAP poorly and was tried with a mandibular advancement device.  He tolerated this adequately but this simply did not help him.  Dr. Castillo did a tonsillectomy in 2013 and subsequently a septoplasty and turbinate reduction in 2018.    He was seen back here in 2019 and tried CPAP again.  Again he tolerated poorly.  He references difficulty with the pressure and the mask.    He underwent a uvulopalatoplasty performed by Dr. Bradley in 2020.  He subsequently was interested in an inspire device and saw Dr. Gary and he ordered another home sleep apnea test on 10/11/22.  This revealed a severe degree of obstructive sleep apnea and the AHI of 31.  He had his existing CPAP machine and tried it again around this time and again did not tolerate it for similar reasons.  He tried multiple masks.    I last saw him about a year ago in December 2022.  He was tried on BiPAP therapy due to poor CPAP tolerance.  He did not tolerate this very well either.  He eventually was referred to see Dr. Asa Gary and ultimately was approved for and underwent implantation of an inspire device on 11/14/2023.    He is here for activation of his device.    White Scale is: 6/24    The patient's relevant past medical, surgical, family, and social history reviewed and updated in Epic as appropriate.    Current medications are:   Current Outpatient Medications:     amLODIPine (NORVASC) 5 MG tablet, Take 1 tablet by mouth Daily., Disp: 90 tablet, Rfl: 1    Antiseborrheic Products, Misc. (Promiseb) cream, 1- 2 times a day, Disp: , Rfl:     atorvastatin (LIPITOR)  "10 MG tablet, Take 1 tablet by mouth Daily., Disp: 90 tablet, Rfl: 1    buPROPion XL (Wellbutrin XL) 150 MG 24 hr tablet, Take 1 tablet by mouth Daily., Disp: 90 tablet, Rfl: 3    busPIRone (BUSPAR) 30 MG tablet, Take 1 tablet by mouth 2 (Two) Times a Day., Disp: 180 tablet, Rfl: 1    clindamycin (CLEOCIN T) 1 % external solution, Apply 1 application topically to the appropriate area as directed once or twice a Day As Needed., Disp: 60 mL, Rfl: 2    cloNIDine (Catapres) 0.1 MG tablet, Take 1 tablet by mouth 2 (Two) Times a Day As Needed for High Blood Pressure., Disp: 20 tablet, Rfl: 0    eszopiclone (LUNESTA) 3 MG tablet, Take 1 tablet by mouth Every Night, immediately before bedtime, Disp: 30 tablet, Rfl: 0    FLUoxetine (PROzac) 40 MG capsule, Take 1 capsule by mouth Daily., Disp: 90 capsule, Rfl: 3    hydrOXYzine (ATARAX) 25 MG tablet, Take 1 tablet by mouth 3 (Three) Times a Day As Needed for Itching or Allergies., Disp: 90 tablet, Rfl: 3    lamoTRIgine (LaMICtal) 100 MG tablet, Take 1 tablet by mouth Daily., Disp: 90 tablet, Rfl: 3    levothyroxine (SYNTHROID, LEVOTHROID) 75 MCG tablet, Take 1 tablet by mouth Every Morning 1 hour prior to food or other medications, Disp: 90 tablet, Rfl: 1    metoprolol succinate XL (TOPROL-XL) 100 MG 24 hr tablet, Take 1 tablet by mouth Daily., Disp: 90 tablet, Rfl: 1    pantoprazole (PROTONIX) 40 MG EC tablet, Take 1 tablet by mouth 2 (Two) Times a Day., Disp: 180 tablet, Rfl: 3    spironolactone (Aldactone) 25 MG tablet, Take 1 tablet by mouth Daily., Disp: 90 tablet, Rfl: 1    vitamin D3 125 MCG (5000 UT) capsule capsule, Take 1 capsule by mouth Daily., Disp: , Rfl: .    Review of Systems    Review of Systems  ROS documented in patient questionnaire ×14 systems.  Reviewed with patient.  Otherwise negative except as noted in HPI.    Physical Exam    Blood pressure 117/75, pulse 81, height 182.9 cm (72\"), weight 94.2 kg (207 lb 9.6 oz), SpO2 98%. Body mass index is 28.16 " kg/m².    Physical Exam  Vitals and nursing note reviewed.   Constitutional:       Appearance: Normal appearance. He is well-developed.   HENT:      Head: Normocephalic and atraumatic.      Nose: Nose normal.      Mouth/Throat:      Mouth: Mucous membranes are moist.      Pharynx: Oropharynx is clear. No oropharyngeal exudate.      Comments: S/p UPPP  Eyes:      General: No scleral icterus.     Conjunctiva/sclera: Conjunctivae normal.   Neck:      Thyroid: No thyromegaly.      Trachea: No tracheal deviation.      Comments: Well healed right neck wound  Cardiovascular:      Rate and Rhythm: Normal rate and regular rhythm.      Heart sounds: No murmur heard.     No friction rub. No gallop.   Pulmonary:      Effort: Pulmonary effort is normal. No respiratory distress.      Breath sounds: No wheezing or rales.      Comments: Well healed right upper chest wound  Musculoskeletal:         General: No deformity. Normal range of motion.   Skin:     General: Skin is warm and dry.      Findings: No rash.   Neurological:      Mental Status: He is alert and oriented to person, place, and time.   Psychiatric:         Behavior: Behavior normal.         Thought Content: Thought content normal.         DATA:        ASSESSMENT:    Problem List Items Addressed This Visit          Pulmonary Problems    JAVON (obstructive sleep apnea) - Primary (Chronic)    Overview     Difficulty with CPAP  Inspire implant 11/14/23            42-year-old male with a long sleep history as discussed above.  He has severe obstructive sleep apnea despite multiple surgeries.  He was intolerant of CPAP as well as BiPAP.  He had a mandibular advancement device that was ineffective.      He underwent implantation of an inspire device by Dr. Gary on 11/14/2023.  He has done well postoperatively with good healing and he has good tongue function and sensation today.  He has undergone device activation with details as below.    Stimulation level: 1.3  V  Functional level: 1.5 V  Upper limit: 2.5 V  Start delay: 20 minutes  Pause time: 15 minutes  Duration: 8 hours    PLAN:    Device was activated with the above settings  Reviewed and educated the patient on proper sleep remote function and the patient demonstrated competency with the remote  Instructed to use his therapy all night and every night  He will step up his level by 1 level (0.1 V) once per week  Follow-up visit in 1 month    I have reviewed the results of my evaluation and impression and discussed my recommendations in detail with the patient.    Level of service justified based on 31 minutes spent in patient care on this date of service including, but not limited to: preparing to see the patient, obtaining and/or reviewing history, performing medically appropriate examination, ordering tests/medicine/procedures, independently interpreting results, documenting clinical information in EHR, and counseling/education of patient/family/caregiver.  This is exclusive of time spent on other separate services such as performing procedures or test interpretation, if applicable.  (Level 4 30-39 minutes; Level 5 40-54 minutes)    Signed by  Abdirahman Ng MD    January 2, 2024      CC: Dallas Ugarte MD          No ref. provider found

## 2024-01-08 ENCOUNTER — PATIENT MESSAGE (OUTPATIENT)
Dept: INTERNAL MEDICINE | Facility: CLINIC | Age: 44
End: 2024-01-08
Payer: COMMERCIAL

## 2024-01-08 DIAGNOSIS — I10 ESSENTIAL HYPERTENSION: Chronic | ICD-10-CM

## 2024-01-08 DIAGNOSIS — Z85.71 HISTORY OF HODGKIN'S LYMPHOMA: ICD-10-CM

## 2024-01-08 DIAGNOSIS — E78.2 MIXED HYPERLIPIDEMIA: ICD-10-CM

## 2024-01-08 DIAGNOSIS — E03.9 HYPOTHYROIDISM (ACQUIRED): Primary | Chronic | ICD-10-CM

## 2024-01-08 NOTE — TELEPHONE ENCOUNTER
From: Eduardo Luna  To: Dallas Ugarte  Sent: 1/8/2024 9:47 AM EST  Subject: Cholesterol check    Good morning, Dr. Ugarte,    My physical is coming up next week. I wanted to see if you wouldn’t mind to check my cholesterol when I have labs drawn. This is for insurance purposes.   Hope you’ve been well!    Thank you,    Eduardo Luna

## 2024-01-09 ENCOUNTER — PATIENT MESSAGE (OUTPATIENT)
Dept: INTERNAL MEDICINE | Facility: CLINIC | Age: 44
End: 2024-01-09
Payer: COMMERCIAL

## 2024-01-09 DIAGNOSIS — F41.1 GAD (GENERALIZED ANXIETY DISORDER): ICD-10-CM

## 2024-01-09 RX ORDER — BUSPIRONE HYDROCHLORIDE 15 MG/1
15 TABLET ORAL 2 TIMES DAILY
Qty: 180 TABLET | Refills: 1 | Status: SHIPPED | OUTPATIENT
Start: 2024-01-09

## 2024-01-11 ENCOUNTER — LAB (OUTPATIENT)
Dept: LAB | Facility: HOSPITAL | Age: 44
End: 2024-01-11
Payer: COMMERCIAL

## 2024-01-11 DIAGNOSIS — E78.2 MIXED HYPERLIPIDEMIA: ICD-10-CM

## 2024-01-11 DIAGNOSIS — E03.9 HYPOTHYROIDISM (ACQUIRED): Chronic | ICD-10-CM

## 2024-01-11 DIAGNOSIS — Z85.71 HISTORY OF HODGKIN'S LYMPHOMA: ICD-10-CM

## 2024-01-11 DIAGNOSIS — I10 ESSENTIAL HYPERTENSION: Chronic | ICD-10-CM

## 2024-01-11 LAB
ALBUMIN SERPL-MCNC: 5 G/DL (ref 3.5–5.2)
ALBUMIN/GLOB SERPL: 2.3 G/DL
ALP SERPL-CCNC: 111 U/L (ref 39–117)
ALT SERPL W P-5'-P-CCNC: 59 U/L (ref 1–41)
ANION GAP SERPL CALCULATED.3IONS-SCNC: 13 MMOL/L (ref 5–15)
AST SERPL-CCNC: 29 U/L (ref 1–40)
BILIRUB SERPL-MCNC: 0.6 MG/DL (ref 0–1.2)
BUN SERPL-MCNC: 14 MG/DL (ref 6–20)
BUN/CREAT SERPL: 10.9 (ref 7–25)
CALCIUM SPEC-SCNC: 9.8 MG/DL (ref 8.6–10.5)
CHLORIDE SERPL-SCNC: 98 MMOL/L (ref 98–107)
CHOLEST SERPL-MCNC: 194 MG/DL (ref 0–200)
CO2 SERPL-SCNC: 26 MMOL/L (ref 22–29)
CREAT SERPL-MCNC: 1.28 MG/DL (ref 0.76–1.27)
DEPRECATED RDW RBC AUTO: 45.1 FL (ref 37–54)
EGFRCR SERPLBLD CKD-EPI 2021: 70.8 ML/MIN/1.73
ERYTHROCYTE [DISTWIDTH] IN BLOOD BY AUTOMATED COUNT: 13.9 % (ref 12.3–15.4)
GLOBULIN UR ELPH-MCNC: 2.2 GM/DL
GLUCOSE SERPL-MCNC: 87 MG/DL (ref 65–99)
HCT VFR BLD AUTO: 48.4 % (ref 37.5–51)
HDLC SERPL-MCNC: 51 MG/DL (ref 40–60)
HGB BLD-MCNC: 16.6 G/DL (ref 13–17.7)
LDLC SERPL CALC-MCNC: 120 MG/DL (ref 0–100)
LDLC/HDLC SERPL: 2.31 {RATIO}
MCH RBC QN AUTO: 30.6 PG (ref 26.6–33)
MCHC RBC AUTO-ENTMCNC: 34.3 G/DL (ref 31.5–35.7)
MCV RBC AUTO: 89.3 FL (ref 79–97)
PLATELET # BLD AUTO: 272 10*3/MM3 (ref 140–450)
PMV BLD AUTO: 10.3 FL (ref 6–12)
POTASSIUM SERPL-SCNC: 4.6 MMOL/L (ref 3.5–5.2)
PROT SERPL-MCNC: 7.2 G/DL (ref 6–8.5)
RBC # BLD AUTO: 5.42 10*6/MM3 (ref 4.14–5.8)
SODIUM SERPL-SCNC: 137 MMOL/L (ref 136–145)
T4 FREE SERPL-MCNC: 1.54 NG/DL (ref 0.93–1.7)
TRIGL SERPL-MCNC: 127 MG/DL (ref 0–150)
TSH SERPL DL<=0.05 MIU/L-ACNC: 3.46 UIU/ML (ref 0.27–4.2)
VLDLC SERPL-MCNC: 23 MG/DL (ref 5–40)
WBC NRBC COR # BLD AUTO: 5.61 10*3/MM3 (ref 3.4–10.8)

## 2024-01-11 PROCEDURE — 80061 LIPID PANEL: CPT

## 2024-01-11 PROCEDURE — 84439 ASSAY OF FREE THYROXINE: CPT

## 2024-01-11 PROCEDURE — 80050 GENERAL HEALTH PANEL: CPT

## 2024-01-15 DIAGNOSIS — F51.01 PRIMARY INSOMNIA: Chronic | ICD-10-CM

## 2024-01-15 RX ORDER — ESZOPICLONE 3 MG/1
3 TABLET, FILM COATED ORAL NIGHTLY
Qty: 30 TABLET | Refills: 0 | Status: SHIPPED | OUTPATIENT
Start: 2024-01-15

## 2024-01-15 NOTE — TELEPHONE ENCOUNTER
Rx Refill Note  Requested Prescriptions     Pending Prescriptions Disp Refills    eszopiclone (LUNESTA) 3 MG tablet 30 tablet 0     Sig: Take 1 tablet by mouth Every Night, immediately before bedtime      Last office visit with prescribing clinician: 10/16/2023   Last telemedicine visit with prescribing clinician: Visit date not found   Next office visit with prescribing clinician: 2/6/2024                         Would you like a call back once the refill request has been completed: [] Yes [] No    If the office needs to give you a call back, can they leave a voicemail: [] Yes [] No    Emma Dugan LPN  01/15/24, 15:47 EST

## 2024-02-01 ENCOUNTER — TRANSCRIBE ORDERS (OUTPATIENT)
Dept: GENERAL RADIOLOGY | Facility: HOSPITAL | Age: 44
End: 2024-02-01
Payer: COMMERCIAL

## 2024-02-01 ENCOUNTER — HOSPITAL ENCOUNTER (OUTPATIENT)
Dept: GENERAL RADIOLOGY | Facility: HOSPITAL | Age: 44
Discharge: HOME | End: 2024-02-01
Admitting: UROLOGY
Payer: COMMERCIAL

## 2024-02-01 DIAGNOSIS — N20.0 KIDNEY STONE: Primary | ICD-10-CM

## 2024-02-01 DIAGNOSIS — N20.0 KIDNEY STONE: ICD-10-CM

## 2024-02-01 PROCEDURE — 74018 RADEX ABDOMEN 1 VIEW: CPT

## 2024-02-06 ENCOUNTER — OFFICE VISIT (OUTPATIENT)
Dept: INTERNAL MEDICINE | Facility: CLINIC | Age: 44
End: 2024-02-06
Payer: COMMERCIAL

## 2024-02-06 VITALS
HEIGHT: 72 IN | BODY MASS INDEX: 27.77 KG/M2 | OXYGEN SATURATION: 96 % | SYSTOLIC BLOOD PRESSURE: 114 MMHG | WEIGHT: 205 LBS | DIASTOLIC BLOOD PRESSURE: 80 MMHG | HEART RATE: 78 BPM | TEMPERATURE: 98.1 F | RESPIRATION RATE: 16 BRPM

## 2024-02-06 DIAGNOSIS — F41.1 GAD (GENERALIZED ANXIETY DISORDER): Chronic | ICD-10-CM

## 2024-02-06 DIAGNOSIS — R74.01 ELEVATED ALT MEASUREMENT: ICD-10-CM

## 2024-02-06 DIAGNOSIS — E66.3 OVERWEIGHT (BMI 25.0-29.9): ICD-10-CM

## 2024-02-06 DIAGNOSIS — F51.01 PRIMARY INSOMNIA: Chronic | ICD-10-CM

## 2024-02-06 DIAGNOSIS — G47.33 OSA (OBSTRUCTIVE SLEEP APNEA): Chronic | ICD-10-CM

## 2024-02-06 DIAGNOSIS — E78.00 PURE HYPERCHOLESTEROLEMIA: Chronic | ICD-10-CM

## 2024-02-06 DIAGNOSIS — Z00.00 WELL ADULT EXAM: Primary | ICD-10-CM

## 2024-02-06 PROCEDURE — 99396 PREV VISIT EST AGE 40-64: CPT | Performed by: STUDENT IN AN ORGANIZED HEALTH CARE EDUCATION/TRAINING PROGRAM

## 2024-02-06 PROCEDURE — 99214 OFFICE O/P EST MOD 30 MIN: CPT | Performed by: STUDENT IN AN ORGANIZED HEALTH CARE EDUCATION/TRAINING PROGRAM

## 2024-02-06 RX ORDER — ATORVASTATIN CALCIUM 20 MG/1
20 TABLET, FILM COATED ORAL DAILY
Qty: 90 TABLET | Refills: 1 | Status: SHIPPED | OUTPATIENT
Start: 2024-02-06

## 2024-02-06 NOTE — PROGRESS NOTES
"Chief Complaint  Eduardo Luna is a 43 y.o. male presenting for Annual Exam.     From [place]. Engaged, in long term relationship with Teresa since 2016, living together.  No children. 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    History of Present Illness  Patient is here for annual physical and follow-up.    Patient is asking about his cholesterol levels, liver enzymes versus use of atorvastatin.  He has had elevated liver enzymes since January 2018, and he started on atorvastatin 10 mg in March 2018.  His levels have fluctuated slightly.    He reports his mental health is stable, he is overall doing well.  He continues to take his sleep medication regularly.    Patient also went for hypoglossal nerve stimulator placement, it seems to be working well for him.  He did not tolerate PAP therapy.    Patient does not exercise regularly, tries to do some activity.  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  He goes to the dentist twice yearly.    He has upcoming appointment with urology due to 2 small kidney stones on the left side, around 2-3 mm.  No current symptoms.    The following portions of the patient's history were reviewed and updated as appropriate: allergies, current medications, past family history, past medical history, past social history, past surgical history, and problem list.    Objective  /80   Pulse 78   Temp 98.1 °F (36.7 °C)   Resp 16   Ht 182.9 cm (72.01\")   Wt 93 kg (205 lb)   SpO2 96%   BMI 27.80 kg/m²     Physical Exam  Vitals reviewed.   Constitutional:       Appearance: Normal appearance.   HENT: "      Head: Normocephalic and atraumatic.      Right Ear: Tympanic membrane, ear canal and external ear normal. There is no impacted cerumen.      Left Ear: Tympanic membrane, ear canal and external ear normal. There is no impacted cerumen.      Nose: Nose normal. No congestion.      Mouth/Throat:      Mouth: Mucous membranes are moist.   Eyes:      Extraocular Movements: Extraocular movements intact.      Conjunctiva/sclera: Conjunctivae normal.   Cardiovascular:      Rate and Rhythm: Normal rate and regular rhythm.      Heart sounds: Normal heart sounds. No murmur heard.  Pulmonary:      Effort: Pulmonary effort is normal.      Breath sounds: Normal breath sounds.   Abdominal:      General: There is no distension.      Palpations: Abdomen is soft. There is no mass.      Tenderness: There is no abdominal tenderness.   Musculoskeletal:      Cervical back: Neck supple. No tenderness.      Right lower leg: No edema.      Left lower leg: No edema.   Lymphadenopathy:      Cervical: No cervical adenopathy.   Skin:     General: Skin is warm and dry.   Neurological:      Mental Status: He is alert and oriented to person, place, and time. Mental status is at baseline.   Psychiatric:         Behavior: Behavior normal.         Thought Content: Thought content normal.         Assessment/Plan   1. Well adult exam  Counseled recommendations for COVID-vaccines and annual flu shot.  Patient is up-to-date on vaccines.  Counseled recommendations for moderate intensity exercise 2.5 hours weekly.    2. Pure hypercholesterolemia  3. Elevated ALT measurement  The 10-year ASCVD risk score (Lilian NEGRO, et al., 2019) is: 1.6%    Values used to calculate the score:      Age: 43 years      Sex: Male      Is Non- : No      Diabetic: No      Tobacco smoker: No      Systolic Blood Pressure: 114 mmHg      Is BP treated: Yes      HDL Cholesterol: 51 mg/dL      Total Cholesterol: 194 mg/dL  LDL is not at goal at 120.  I doubt  this liver enzymes are elevated due to atorvastatin, ALT was elevated before he started on statin.  I recommend increasing atorvastatin to 20 mg.  He will need to check his liver enzymes again in about 4-6 weeks.  - atorvastatin (LIPITOR) 20 MG tablet; Take 1 tablet by mouth Daily.  Dispense: 90 tablet; Refill: 1  - Hepatic Function Panel; Future    4. ANTONIA (generalized anxiety disorder)  Stable and doing well.    5. Primary insomnia  Stable.  Continue on Lunesta.  Visits every 6 months required.  Will follow-up in 6 months    6. JAVON (obstructive sleep apnea)  Doing well on hypoglossal nerve stimulator.  Continue follow-up with sleep specialist    7. Overweight (BMI 25.0-29.9)  BMI is >= 25 and <30. (Overweight) The following options were offered after discussion;: exercise counseling/recommendations        Return in about 6 months (around 8/6/2024) for Recheck.    Future Appointments         Provider Department Center    3/4/2024 3:15 PM Abdirahman Ng MD [facility] SLEEP [facility]ICINE [facility]    8/6/2024 4:00 PM Dallas Ugarte MD [facility] INTERNAL MEDICINE [facility]    10/3/2024 3:45 PM Marcos Gonsalez MD [facility] CARDIOLOGY [facility]              Dallas Ugarte MD  Family Medicine  02/06/2024

## 2024-02-09 DIAGNOSIS — E03.9 HYPOTHYROIDISM (ACQUIRED): ICD-10-CM

## 2024-02-09 DIAGNOSIS — I10 ESSENTIAL HYPERTENSION: Chronic | ICD-10-CM

## 2024-02-09 RX ORDER — SPIRONOLACTONE 25 MG/1
25 TABLET ORAL DAILY
Qty: 90 TABLET | Refills: 1 | Status: SHIPPED | OUTPATIENT
Start: 2024-02-09

## 2024-02-09 RX ORDER — AMLODIPINE BESYLATE 5 MG/1
5 TABLET ORAL DAILY
Qty: 90 TABLET | Refills: 1 | Status: SHIPPED | OUTPATIENT
Start: 2024-02-09

## 2024-02-09 RX ORDER — LEVOTHYROXINE SODIUM 0.07 MG/1
75 TABLET ORAL
Qty: 90 TABLET | Refills: 1 | Status: SHIPPED | OUTPATIENT
Start: 2024-02-09

## 2024-02-09 RX ORDER — METOPROLOL SUCCINATE 100 MG/1
100 TABLET, EXTENDED RELEASE ORAL DAILY
Qty: 90 TABLET | Refills: 1 | Status: SHIPPED | OUTPATIENT
Start: 2024-02-09

## 2024-02-13 DIAGNOSIS — F51.01 PRIMARY INSOMNIA: Chronic | ICD-10-CM

## 2024-02-13 RX ORDER — ESZOPICLONE 3 MG/1
3 TABLET, FILM COATED ORAL NIGHTLY
Qty: 30 TABLET | Refills: 0 | Status: SHIPPED | OUTPATIENT
Start: 2024-02-13

## 2024-03-04 ENCOUNTER — PATIENT MESSAGE (OUTPATIENT)
Dept: INTERNAL MEDICINE | Facility: CLINIC | Age: 44
End: 2024-03-04
Payer: COMMERCIAL

## 2024-03-04 ENCOUNTER — LAB (OUTPATIENT)
Dept: LAB | Facility: HOSPITAL | Age: 44
End: 2024-03-04
Payer: COMMERCIAL

## 2024-03-04 ENCOUNTER — OFFICE VISIT (OUTPATIENT)
Dept: SLEEP MEDICINE | Facility: HOSPITAL | Age: 44
End: 2024-03-04
Payer: COMMERCIAL

## 2024-03-04 VITALS
DIASTOLIC BLOOD PRESSURE: 72 MMHG | OXYGEN SATURATION: 97 % | HEART RATE: 72 BPM | WEIGHT: 203 LBS | HEIGHT: 72 IN | BODY MASS INDEX: 27.5 KG/M2 | SYSTOLIC BLOOD PRESSURE: 112 MMHG

## 2024-03-04 DIAGNOSIS — G47.33 OSA (OBSTRUCTIVE SLEEP APNEA): Primary | Chronic | ICD-10-CM

## 2024-03-04 DIAGNOSIS — Z12.5 SCREENING PSA (PROSTATE SPECIFIC ANTIGEN): Primary | ICD-10-CM

## 2024-03-04 DIAGNOSIS — Z12.5 SCREENING PSA (PROSTATE SPECIFIC ANTIGEN): ICD-10-CM

## 2024-03-04 LAB — PSA SERPL-MCNC: 0.99 NG/ML (ref 0–4)

## 2024-03-04 PROCEDURE — G0103 PSA SCREENING: HCPCS

## 2024-03-04 PROCEDURE — 99214 OFFICE O/P EST MOD 30 MIN: CPT | Performed by: INTERNAL MEDICINE

## 2024-03-04 NOTE — PROGRESS NOTES
Eduardo Luna is a 43 y.o. male.   Chief Complaint   Patient presents with    Sleeping Problem    Follow-up       HPI     43 y.o. male seen for the above    He has been seen in the past in the sleep center.  His last visit was in 2019 with CORINNE Mckeon.  He has a long sleep history.    He was diagnosed with obstructive sleep apnea initially about 10 years ago at [facility].  He tolerated CPAP poorly and was tried with a mandibular advancement device.  He tolerated this adequately but this simply did not help him.  Dr. Castillo did a tonsillectomy in 2013 and subsequently a septoplasty and turbinate reduction in 2018.    He was seen back here in 2019 and tried CPAP again.  Again he tolerated poorly.  He references difficulty with the pressure and the mask.    He underwent a uvulopalatoplasty performed by Dr. Bradley in 2020.  He subsequently was interested in an inspire device and saw Dr. Gary and he ordered another home sleep apnea test on 10/11/22.  This revealed a severe degree of obstructive sleep apnea and the AHI of 31.  He had his existing CPAP machine and tried it again around this time and again did not tolerate it for similar reasons.  He tried multiple masks.    I saw him in December 2022.  He was tried on BiPAP therapy due to poor CPAP tolerance.  He did not tolerate this very well either.  He eventually was referred to see Dr. Asa Gary and ultimately was approved for and underwent implantation of an inspire device on 11/14/2023.    He underwent activation on 1/2/2024    Coulter Scale is: 6/24    The patient's relevant past medical, surgical, family, and social history reviewed and updated in Epic as appropriate.    Current medications are:   Current Outpatient Medications:     amLODIPine (NORVASC) 5 MG tablet, Take 1 tablet by mouth Daily., Disp: 90 tablet, Rfl: 1    Antiseborrheic Products, Misc. (Promiseb) cream, 1- 2 times a day, Disp: , Rfl:     atorvastatin (LIPITOR) 20 MG  tablet, Take 1 tablet by mouth Daily., Disp: 90 tablet, Rfl: 1    buPROPion XL (Wellbutrin XL) 150 MG 24 hr tablet, Take 1 tablet by mouth Daily., Disp: 90 tablet, Rfl: 3    busPIRone (BUSPAR) 15 MG tablet, Take 1 tablet by mouth 2 (Two) Times a Day., Disp: 180 tablet, Rfl: 1    clindamycin (CLEOCIN T) 1 % external solution, Apply 1 application topically to the appropriate area as directed once or twice a Day As Needed., Disp: 60 mL, Rfl: 2    cloNIDine (Catapres) 0.1 MG tablet, Take 1 tablet by mouth 2 (Two) Times a Day As Needed for High Blood Pressure., Disp: 20 tablet, Rfl: 0    eszopiclone (LUNESTA) 3 MG tablet, Take 1 tablet by mouth Every Night, immediately before bedtime, Disp: 30 tablet, Rfl: 0    FLUoxetine (PROzac) 40 MG capsule, Take 1 capsule by mouth Daily., Disp: 90 capsule, Rfl: 3    HYDROmorphone (Dilaudid) 2 MG tablet, Take 1-3 tablets by mouth Every 6 (Six) Hours As Needed., Disp: 14 tablet, Rfl: 0    hydrOXYzine (ATARAX) 25 MG tablet, Take 1 tablet by mouth 3 (Three) Times a Day As Needed for Itching or Allergies., Disp: 90 tablet, Rfl: 3    lamoTRIgine (LaMICtal) 100 MG tablet, Take 1 tablet by mouth Daily., Disp: 90 tablet, Rfl: 3    levothyroxine (SYNTHROID, LEVOTHROID) 75 MCG tablet, Take 1 tablet by mouth Every Morning 1 hour prior to food or other medications, Disp: 90 tablet, Rfl: 1    metoprolol succinate XL (TOPROL-XL) 100 MG 24 hr tablet, Take 1 tablet by mouth Daily., Disp: 90 tablet, Rfl: 1    pantoprazole (PROTONIX) 40 MG EC tablet, Take 1 tablet by mouth 2 (Two) Times a Day., Disp: 180 tablet, Rfl: 3    spironolactone (Aldactone) 25 MG tablet, Take 1 tablet by mouth Daily., Disp: 90 tablet, Rfl: 1    tamsulosin (Flomax) 0.4 MG capsule 24 hr capsule, Take 1 capsule by mouth Daily., Disp: 10 capsule, Rfl: 0    vitamin D3 125 MCG (5000 UT) capsule capsule, Take 1 capsule by mouth Daily., Disp: , Rfl: .    Review of Systems    Review of Systems  ROS documented in patient questionnaire  "×14 systems.  Reviewed with patient.  Otherwise negative except as noted in HPI.    Physical Exam    Blood pressure 112/72, pulse 72, height 182.9 cm (72.01\"), weight 92.1 kg (203 lb), SpO2 97%. Body mass index is 27.52 kg/m².    Physical Exam  Vitals and nursing note reviewed.   Constitutional:       Appearance: Normal appearance. He is well-developed.   HENT:      Head: Normocephalic and atraumatic.      Nose: Nose normal.      Mouth/Throat:      Mouth: Mucous membranes are moist.      Pharynx: Oropharynx is clear. No oropharyngeal exudate.      Comments: S/p UPPP  Eyes:      General: No scleral icterus.     Conjunctiva/sclera: Conjunctivae normal.   Neck:      Thyroid: No thyromegaly.      Trachea: No tracheal deviation.      Comments: Well healed right neck wound  Cardiovascular:      Rate and Rhythm: Normal rate and regular rhythm.      Heart sounds: No murmur heard.     No friction rub. No gallop.   Pulmonary:      Effort: Pulmonary effort is normal. No respiratory distress.      Breath sounds: No wheezing or rales.      Comments: Well healed right upper chest wound  Musculoskeletal:         General: No deformity. Normal range of motion.   Skin:     General: Skin is warm and dry.      Findings: No rash.   Neurological:      Mental Status: He is alert and oriented to person, place, and time.   Psychiatric:         Behavior: Behavior normal.         Thought Content: Thought content normal.         DATA:        ASSESSMENT:    Problem List Items Addressed This Visit          Pulmonary Problems    JAVON (obstructive sleep apnea) - Primary (Chronic)    Overview     Difficulty with CPAP  Inspire implant 11/14/23              42-year-old male with a long sleep history as discussed above.  He has severe obstructive sleep apnea despite multiple surgeries.  He was intolerant of CPAP as well as BiPAP.  He had a mandibular advancement device that was ineffective.      He underwent implantation of an inspire device by Dr." "Abdirahman on 11/14/2023.     He underwent activation on 1/2/2024    He is doing well.  Sleep is improved.  Snoring and apneas are reduced per his bed partner.  He is having no significant untoward effects from the treatment.    Usage 60 hours per week    PLAN:    Functional tongue exam acceptable.  Current functional level limits:  1.5-2.5 v  Current patient level/amplitude:  5 or 1.9v  Sensor waveform was tested and was acceptable    Inspire HGNS device further adjusted with the following settings:    New functional level limits:  1.7-2.7v  Start delay:  Increased to 30 minutes  Pause time:  Kept at 15 mins  Treatment duration:  Kept at 8 hours    Education:    Reviewed proper sleep remote function.    All questions answered.  Patient instructed to continue to use therapy \"all night, every night\".    Patient will increase stimulation by one functional level (0.1 V) once every 10-14 days.   Tentative 2-month follow-up    I have reviewed the results of my evaluation and impression and discussed my recommendations in detail with the patient.    Level of service justified based on 30 minutes spent in patient care on this date of service including, but not limited to: preparing to see the patient, obtaining and/or reviewing history, performing medically appropriate examination, ordering tests/medicine/procedures, independently interpreting results, documenting clinical information in EHR, and counseling/education of patient/family/caregiver.  This is exclusive of time spent on other separate services such as performing procedures or test interpretation, if applicable.  (Level 4 30-39 minutes; Level 5 40-54 minutes)    Signed by  Abdirahman Ng MD    March 4, 2024      CC: Dallas Ugarte MD          No ref. provider found          "

## 2024-03-10 DIAGNOSIS — F51.01 PRIMARY INSOMNIA: Chronic | ICD-10-CM

## 2024-03-11 RX ORDER — ESZOPICLONE 3 MG/1
3 TABLET, FILM COATED ORAL NIGHTLY
Qty: 30 TABLET | Refills: 0 | Status: SHIPPED | OUTPATIENT
Start: 2024-03-11

## 2024-03-11 NOTE — TELEPHONE ENCOUNTER
Rx Refill Note  Requested Prescriptions     Pending Prescriptions Disp Refills    eszopiclone (LUNESTA) 3 MG tablet 30 tablet 0     Sig: Take 1 tablet by mouth Every Night, immediately before bedtime      Last office visit with prescribing clinician: 2/6/2024   Last telemedicine visit with prescribing clinician: Visit date not found   Next office visit with prescribing clinician: 8/13/2024     LA: 02/13/24 #30 0R                          Would you like a call back once the refill request has been completed: [] Yes [] No    If the office needs to give you a call back, can they leave a voicemail: [] Yes [] No    Laly Milner LPN  03/11/24, 09:53 EDT

## 2024-04-04 ENCOUNTER — LAB (OUTPATIENT)
Dept: LAB | Facility: HOSPITAL | Age: 44
End: 2024-04-04
Payer: COMMERCIAL

## 2024-04-04 DIAGNOSIS — R74.01 ELEVATED ALT MEASUREMENT: ICD-10-CM

## 2024-04-04 LAB
ALBUMIN SERPL-MCNC: 4.5 G/DL (ref 3.5–5.2)
ALP SERPL-CCNC: 108 U/L (ref 39–117)
ALT SERPL W P-5'-P-CCNC: 44 U/L (ref 1–41)
AST SERPL-CCNC: 24 U/L (ref 1–40)
BILIRUB CONJ SERPL-MCNC: <0.2 MG/DL (ref 0–0.3)
BILIRUB INDIRECT SERPL-MCNC: ABNORMAL MG/DL
BILIRUB SERPL-MCNC: 0.3 MG/DL (ref 0–1.2)
PROT SERPL-MCNC: 7.1 G/DL (ref 6–8.5)

## 2024-04-04 PROCEDURE — 80076 HEPATIC FUNCTION PANEL: CPT

## 2024-04-09 DIAGNOSIS — F51.01 PRIMARY INSOMNIA: Chronic | ICD-10-CM

## 2024-04-10 RX ORDER — CLONIDINE HYDROCHLORIDE 0.1 MG/1
0.1 TABLET ORAL 2 TIMES DAILY PRN
Qty: 20 TABLET | Refills: 0 | Status: SHIPPED | OUTPATIENT
Start: 2024-04-10

## 2024-04-10 RX ORDER — ESZOPICLONE 3 MG/1
3 TABLET, FILM COATED ORAL NIGHTLY
Qty: 30 TABLET | Refills: 0 | Status: SHIPPED | OUTPATIENT
Start: 2024-04-10

## 2024-04-10 NOTE — TELEPHONE ENCOUNTER
Rx Refill Note  Requested Prescriptions     Pending Prescriptions Disp Refills    cloNIDine (Catapres) 0.1 MG tablet 20 tablet 0     Sig: Take 1 tablet by mouth 2 (Two) Times a Day As Needed for High Blood Pressure.    eszopiclone (LUNESTA) 3 MG tablet 30 tablet 0     Sig: Take 1 tablet by mouth Every Night, immediately before bedtime      Last office visit with prescribing clinician: 2/6/2024   Next office visit with prescribing clinician: 8/13/2024     LA: 03/11/24 #30 0R                          Would you like a call back once the refill request has been completed: [] Yes [] No    If the office needs to give you a call back, can they leave a voicemail: [] Yes [] No    Laly Milner LPN  04/10/24, 07:59 EDT

## 2024-05-06 DIAGNOSIS — F51.01 PRIMARY INSOMNIA: Chronic | ICD-10-CM

## 2024-05-06 RX ORDER — ESZOPICLONE 3 MG/1
3 TABLET, FILM COATED ORAL NIGHTLY
Qty: 30 TABLET | Refills: 0 | Status: SHIPPED | OUTPATIENT
Start: 2024-05-06

## 2024-05-14 ENCOUNTER — OFFICE VISIT (OUTPATIENT)
Dept: SLEEP MEDICINE | Age: 44
End: 2024-05-14
Payer: COMMERCIAL

## 2024-05-14 VITALS
SYSTOLIC BLOOD PRESSURE: 124 MMHG | WEIGHT: 208.5 LBS | HEART RATE: 85 BPM | DIASTOLIC BLOOD PRESSURE: 86 MMHG | HEIGHT: 72 IN | OXYGEN SATURATION: 97 % | BODY MASS INDEX: 28.24 KG/M2 | TEMPERATURE: 98.5 F

## 2024-05-14 DIAGNOSIS — G47.33 OSA (OBSTRUCTIVE SLEEP APNEA): Primary | Chronic | ICD-10-CM

## 2024-05-14 PROCEDURE — 99214 OFFICE O/P EST MOD 30 MIN: CPT | Performed by: INTERNAL MEDICINE

## 2024-05-14 NOTE — PROGRESS NOTES
Eduardo Luna is a 43 y.o. male.   Chief Complaint   Patient presents with    Sleep Apnea    Snoring       HPI     43 y.o. male seen for the above    He has been seen in the past in the sleep center.  His last visit was in 2019 with CORINNE Mckeon.  He has a long sleep history.    He was diagnosed with obstructive sleep apnea initially about 10 years ago at [facility].  He tolerated CPAP poorly and was tried with a mandibular advancement device.  He tolerated this adequately but this simply did not help him.  Dr. Castillo did a tonsillectomy in 2013 and subsequently a septoplasty and turbinate reduction in 2018.    He was seen back here in 2019 and tried CPAP again.  Again he tolerated poorly.  He references difficulty with the pressure and the mask.    He underwent a uvulopalatoplasty performed by Dr. Bradley in 2020.  He subsequently was interested in an inspire device and saw Dr. Gary and he ordered another home sleep apnea test on 10/11/22.  This revealed a severe degree of obstructive sleep apnea and the AHI of 31.  He had his existing CPAP machine and tried it again around this time and again did not tolerate it for similar reasons.  He tried multiple masks.    I saw him in December 2022.  He was tried on BiPAP therapy due to poor CPAP tolerance.  He did not tolerate this very well either.  He eventually was referred to see Dr. Asa Gary and ultimately was approved for and underwent implantation of an inspire device on 11/14/2023.    He underwent activation on 1/2/2024    Clinton Scale is: 7/24    The patient's relevant past medical, surgical, family, and social history reviewed and updated in Epic as appropriate.    Current medications are:   Current Outpatient Medications:     amLODIPine (NORVASC) 5 MG tablet, Take 1 tablet by mouth Daily., Disp: 90 tablet, Rfl: 1    Antiseborrheic Products, Misc. (Promiseb) cream, 1- 2 times a day, Disp: , Rfl:     atorvastatin (LIPITOR) 20 MG tablet,  Take 1 tablet by mouth Daily., Disp: 90 tablet, Rfl: 1    buPROPion XL (Wellbutrin XL) 150 MG 24 hr tablet, Take 1 tablet by mouth Daily., Disp: 90 tablet, Rfl: 3    busPIRone (BUSPAR) 15 MG tablet, Take 1 tablet by mouth 2 (Two) Times a Day., Disp: 180 tablet, Rfl: 1    clindamycin (CLEOCIN T) 1 % external solution, Apply 1 application topically to the appropriate area as directed once or twice a Day As Needed., Disp: 60 mL, Rfl: 2    cloNIDine (Catapres) 0.1 MG tablet, Take 1 tablet by mouth 2 (Two) Times a Day As Needed for High Blood Pressure., Disp: 20 tablet, Rfl: 0    eszopiclone (LUNESTA) 3 MG tablet, Take 1 tablet by mouth Every Night, immediately before bedtime, Disp: 30 tablet, Rfl: 0    FLUoxetine (PROzac) 40 MG capsule, Take 1 capsule by mouth Daily., Disp: 90 capsule, Rfl: 3    HYDROmorphone (Dilaudid) 2 MG tablet, Take 1-3 tablets by mouth Every 6 (Six) Hours As Needed., Disp: 14 tablet, Rfl: 0    hydrOXYzine (ATARAX) 25 MG tablet, Take 1 tablet by mouth 3 (Three) Times a Day As Needed for Itching or Allergies., Disp: 90 tablet, Rfl: 3    lamoTRIgine (LaMICtal) 100 MG tablet, Take 1 tablet by mouth Daily., Disp: 90 tablet, Rfl: 3    levothyroxine (SYNTHROID, LEVOTHROID) 75 MCG tablet, Take 1 tablet by mouth Every Morning 1 hour prior to food or other medications, Disp: 90 tablet, Rfl: 1    metoprolol succinate XL (TOPROL-XL) 100 MG 24 hr tablet, Take 1 tablet by mouth Daily., Disp: 90 tablet, Rfl: 1    pantoprazole (PROTONIX) 40 MG EC tablet, Take 1 tablet by mouth 2 (Two) Times a Day., Disp: 180 tablet, Rfl: 3    spironolactone (Aldactone) 25 MG tablet, Take 1 tablet by mouth Daily., Disp: 90 tablet, Rfl: 1    tamsulosin (Flomax) 0.4 MG capsule 24 hr capsule, Take 1 capsule by mouth Daily., Disp: 10 capsule, Rfl: 0    vitamin D3 125 MCG (5000 UT) capsule capsule, Take 1 capsule by mouth Daily., Disp: , Rfl: .    Review of Systems    Review of Systems  ROS documented in patient questionnaire ×14  "systems.  Reviewed with patient.  Otherwise negative except as noted in HPI.    Physical Exam    Blood pressure 124/86, pulse 85, temperature 98.5 °F (36.9 °C), height 182.9 cm (72.01\"), weight 94.6 kg (208 lb 8 oz), SpO2 97%. Body mass index is 28.27 kg/m².    Physical Exam  Vitals and nursing note reviewed.   Constitutional:       Appearance: Normal appearance. He is well-developed.   HENT:      Head: Normocephalic and atraumatic.      Nose: Nose normal.      Mouth/Throat:      Mouth: Mucous membranes are moist.      Pharynx: Oropharynx is clear. No oropharyngeal exudate.      Comments: S/p UPPP  Eyes:      General: No scleral icterus.     Conjunctiva/sclera: Conjunctivae normal.   Neck:      Thyroid: No thyromegaly.      Trachea: No tracheal deviation.   Cardiovascular:      Rate and Rhythm: Normal rate and regular rhythm.      Heart sounds: No murmur heard.     No friction rub. No gallop.   Pulmonary:      Effort: Pulmonary effort is normal. No respiratory distress.      Breath sounds: No wheezing or rales.   Musculoskeletal:         General: No deformity. Normal range of motion.   Skin:     General: Skin is warm and dry.      Findings: No rash.   Neurological:      Mental Status: He is alert and oriented to person, place, and time.   Psychiatric:         Behavior: Behavior normal.         Thought Content: Thought content normal.         DATA:        ASSESSMENT:    Problem List Items Addressed This Visit          Pulmonary Problems    JAVON (obstructive sleep apnea) - Primary (Chronic)    Overview     Difficulty with CPAP  Inspire implant 11/14/23                42-year-old male with a long sleep history as discussed above.  He has severe obstructive sleep apnea despite multiple surgeries.  He was intolerant of CPAP as well as BiPAP.  He had a mandibular advancement device that was ineffective.      He underwent implantation of an inspire device by Dr. Gary on 11/14/2023.     He underwent activation on " "1/2/2024    At the time of his first follow-up after activation on 3/4 his voltage was up to 1.9 V.  He was only able to increase that to that level.  Since then he he has actually had to turn it down due to discomfort and he is currently at 1.7.    He notes a good functional response and that snoring and apneas have been significantly reduced per his bed partner.  His sleep is improved.  His usage is good.    PLAN:    Functional tongue exam acceptable.  Current functional level limits: 1.7-2.7 V  Current patient level/amplitude: 1.7 V  Sensor waveform was tested and was acceptable    Inspire HGNS device further adjusted with the following settings:    New functional level limits: Keep at 1.7-2.7v  Start delay: Keep at 30 minutes  Pause time:  Kept at 15 mins  Treatment duration:  Kept at 8 hours    Education:    All questions answered.  Patient instructed to continue to use therapy \"all night, every night\".    I would still like him to try to increase stimulation.  He can try to go up by 1 functional level every 10-14 days.  Tentative 2-month follow-up and then sleep study thereafter if it looks like he has plateaued on his voltage.    I have reviewed the results of my evaluation and impression and discussed my recommendations in detail with the patient.    Level of service justified based on 30 minutes spent in patient care on this date of service including, but not limited to: preparing to see the patient, obtaining and/or reviewing history, performing medically appropriate examination, ordering tests/medicine/procedures, independently interpreting results, documenting clinical information in EHR, and counseling/education of patient/family/caregiver.  This is exclusive of time spent on other separate services such as performing procedures or test interpretation, if applicable.  (Level 4 30-39 minutes; Level 5 40-54 minutes)    Signed by  Abdirahman Ng MD    May 14, 2024      CC: Dallas Ugarte MD   "        Asa Gary MD

## 2024-05-31 DIAGNOSIS — F41.8 DEPRESSION WITH ANXIETY: ICD-10-CM

## 2024-05-31 RX ORDER — LAMOTRIGINE 100 MG/1
100 TABLET ORAL DAILY
Qty: 90 TABLET | Refills: 3 | Status: SHIPPED | OUTPATIENT
Start: 2024-05-31

## 2024-06-04 ENCOUNTER — OFFICE VISIT (OUTPATIENT)
Dept: INTERNAL MEDICINE | Facility: CLINIC | Age: 44
End: 2024-06-04
Payer: COMMERCIAL

## 2024-06-04 VITALS
HEIGHT: 72 IN | WEIGHT: 211 LBS | TEMPERATURE: 98.6 F | HEART RATE: 60 BPM | BODY MASS INDEX: 28.58 KG/M2 | SYSTOLIC BLOOD PRESSURE: 122 MMHG | DIASTOLIC BLOOD PRESSURE: 78 MMHG

## 2024-06-04 DIAGNOSIS — L21.9 SEBORRHEIC DERMATITIS: Primary | Chronic | ICD-10-CM

## 2024-06-04 DIAGNOSIS — L71.0 PERIORAL DERMATITIS: ICD-10-CM

## 2024-06-04 DIAGNOSIS — E66.3 OVERWEIGHT (BMI 25.0-29.9): ICD-10-CM

## 2024-06-04 PROCEDURE — 99214 OFFICE O/P EST MOD 30 MIN: CPT | Performed by: STUDENT IN AN ORGANIZED HEALTH CARE EDUCATION/TRAINING PROGRAM

## 2024-06-04 RX ORDER — TACROLIMUS 1 MG/G
1 OINTMENT TOPICAL
Qty: 60 G | Refills: 1 | Status: SHIPPED | OUTPATIENT
Start: 2024-06-04

## 2024-06-04 RX ORDER — KETOCONAZOLE 20 MG/G
1 CREAM TOPICAL DAILY
Qty: 60 G | Refills: 1 | Status: SHIPPED | OUTPATIENT
Start: 2024-06-04

## 2024-06-04 NOTE — PROGRESS NOTES
"Chief Complaint  Eduardo Luna is a 43 y.o. male presenting for redness around mouth (Nose and eye brows/).     From [place]. Engaged, in long term relationship with Teresa since 2016, living together.  No children. 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    History of Present Illness  Patient is here for flareup of his seborrheic dermatitis.  He has been experiencing more scaling and irritation of his eyebrows, around his nostrils, but also around his mouth.  He has been applying topical low potency steroid/desonide cream.  Previously he did try ketoconazole cream.    He also did gain a little bit of weight, he is asking about appetite suppressant.    The following portions of the patient's history were reviewed and updated as appropriate: allergies, current medications, past family history, past medical history, past social history, past surgical history, and problem list.    Image captured per patient verbal consent:          Objective  /78 (BP Location: Left arm, Patient Position: Sitting, Cuff Size: Adult)   Pulse 60   Temp 98.6 °F (37 °C) (Temporal)   Ht 182.9 cm (72.01\")   Wt 95.7 kg (211 lb)   BMI 28.61 kg/m²     Physical Exam  Constitutional:       Appearance: Normal appearance.   HENT:      Head: Normocephalic and atraumatic.   Eyes:      Extraocular Movements: Extraocular movements intact.      Conjunctiva/sclera: Conjunctivae normal.   Pulmonary:      Effort: Pulmonary effort is normal. No respiratory distress.   Musculoskeletal:      Cervical back: Normal range of motion and neck supple.   Skin:   "   Findings: Rash present.      Comments: See image of rash around the perioral area.   Neurological:      Mental Status: He is alert and oriented to person, place, and time. Mental status is at baseline.   Psychiatric:         Behavior: Behavior normal.         Thought Content: Thought content normal.         Assessment/Plan   1. Seborrheic dermatitis  Chronic and getting worse.  Recommend ketoconazole days, and at [facility] on top.  Avoid steroids for now, can reconsider later on.  - pimecrolimus (ELIDEL) 1 % cream; Apply 1 Application topically to the appropriate area as directed 2 (Two) Times a Day. Apply BID for 2 w then 2-3 x/w PRN.  Dispense: 60 g; Refill: 1  - ketoconazole (NIZORAL) 2 % cream; Apply 1 Application topically to the appropriate area as directed Daily. Apply 1-2x/d for 2-3w, the 2-3x/w PRN  Dispense: 60 g; Refill: 1    2. Perioral dermatitis  Use of steroid cream and location has me concern for mild perioral dermatitis.  This is a known side effect of steroids around the mouth.  I have added information from up-to-date and patient's after visit summary.  Also added information about seborrheic dermatitis.  Recommend avoiding steroids around the mouth.  Will do follow-up Elidel.  - pimecrolimus (ELIDEL) 1 % cream; Apply 1 Application topically to the appropriate area as directed 2 (Two) Times a Day. Apply BID for 2 w then 2-3 x/w PRN.  Dispense: 60 g; Refill: 1    3. Overweight (BMI 25.0-29.9)     Discussed appetite suppressant.  He is on buspirone for his mental health, which sometimes can help suppress appetite.  I would not recommend phentermine since he is already on a controlled medication.  Potentially GLP-1 could be an option, but would not be covered by his plan.  For now I recommend focusing on lifestyle activity, exercise, watching portion sizes.      Return if symptoms worsen or fail to improve, for Next scheduled follow up.    Future Appointments         Provider Department Center     7/23/2024 3:15 PM Abdirahman Ng MD [facility] SLEEP MEDICINE [facility]    8/6/2024 3:30 PM Yovani Aden MD [facility] GASTROENTEROLOGY [facility]    8/13/2024 4:15 PM Dallas Ugarte MD [facility] INTERNAL MEDICINE [facility]    10/3/2024 3:45 PM Marcos Gonsalez MD [facility] CARDIOLOGY [facility]              Dallas Ugarte MD  Family Medicine  06/04/2024

## 2024-06-04 NOTE — PATIENT INSTRUCTIONS
"MANAGEMENT  The management of POD generally involves the discontinuation of topical corticosteroids, avoidance of topical products that may promote or exacerbate POD, and pharmacologic therapy (algorithm 1). Although POD may improve within a few months with the first two interventions, distress over the appearance of POD and the disease's unpredictable course cause most patients to desire intervention to reduce the duration of symptoms. Limited data suggest that some pharmacologic therapies are useful; disease severity influences the selection of treatment. (See 'Disease course' above and 'Elimination of corticosteroids and irritants (zero therapy)' below and 'Mild disease' below and 'Moderate to severe disease' below.)  All patients -- Elimination of corticosteroids and skin irritants is considered an important therapeutic measure for all patients with POD.  Elimination of corticosteroids and irritants (zero therapy) -- The discontinuation of topical corticosteroids and avoidance of skin care products and cosmetics that may irritate or occlude the skin is also referred to as \"zero therapy.\" This approach is supported by the observation that many vehicle- or placebo-treated patients in randomized trials have improved within two to three months without active therapy [12,42,43].  Although the cessation of topical corticosteroid use is accepted as an important component of management, patients typically become distressed when disease flares occur following cessation. Some clinicians attempt to reduce the likelihood of a rebound flare by either transitioning patients utilizing high- or medium-potency topical corticosteroids to low-potency agents (eg, hydrocortisone 1%) or slowly tapering the frequency of corticosteroid application prior to treatment cessation [3,6,8,16]. The benefit of these techniques is unproven.  An effort to minimize potential skin irritants and allergens typically involves [8,24]:  ?Gentle skin " "cleansing practices (gently cleansing the skin with a fragrance-free, nonsoap cleanser promptly followed by complete and gentle rinsing of the cleanser from the skin)  ?Limiting the use of topical products (eg, cosmetics, sunscreens, emollients) on the face to the occasional \"only as needed\" application of a bland, nonocclusive moisturizing lotion  Once stable remission is attained, skin care products may be slowly restarted (eg, one product per week). The patient should pay close attention to the skin's response to each product. Products that seem to induce recurrences should be promptly discontinued.  Mild disease -- Mild POD may be considered the presence of lesions that involve a relatively small area of facial skin and do not cause the patient significant emotional distress (picture 1A-B, 1D, 1F). In an attempt to accelerate clinical improvement in children and adults with mild disease, we usually prescribe topical medication in addition to zero therapy.  Initial therapy -- We prefer topical therapy rather than systemic therapy for the initial treatment of mild POD due to the relatively low risk for drug-induced systemic adverse effects. Topical calcineurin inhibitors and topical antibiotics are the primary modes of therapy.  The relative efficacy of different topical agents is unclear. Therefore, selection of a topical therapy is primarily based upon other factors, such as availability, cost, clinician familiarity with particular agents, and patient preference. Compared with topical calcineurin inhibitors, topical erythromycin and topical metronidazole are typically less expensive therapies.  Topical calcineurin inhibitors -- Topical calcineurin inhibitors that have been used for POD include topical pimecrolimus cream and topical tacrolimus ointment. We typically utilize pimecrolimus because efficacy data for tacrolimus ointment are more limited. In addition, many of our patients prefer a cream vehicle for " "facial use over an ointment:  ?Administration - Pimecrolimus 1% cream is applied to the affected area twice daily. Topical tacrolimus ointment is used similarly and is commercially available as a 0.03% or 0.1% ointment. The 0.03% concentration, rather than the 0.1% concentration, is typically used for children. Signs of improvement from topical calcineurin inhibitors typically occur within the first month of therapy. (See 'Moderate to severe disease' below.)  Treatment with topical calcineurin inhibitors is usually well tolerated. Although the [place] Food and Drug Administration has placed a boxed warning on topical calcineurin inhibitors due to concerns about an association between these agents and malignancies, the risk for such events appears to be minimal [44]. (See \"Treatment of atopic dermatitis (eczema)\", section on 'Topical calcineurin inhibitors'.)  ?Efficacy - In a multicenter randomized trial in which 124 adults were treated for up to four weeks with twice-daily applications of pimecrolimus 1% cream or a vehicle cream, pimecrolimus therapy was associated with more rapid improvement [12]. After eight days, greater than 50 percent reductions in disease severity scores occurred in 24 of 60 patients in the pimecrolimus group (40 percent) and only 7 of 64 patients in the vehicle group (11 percent). In addition, the median time to response was 14 versus 28 days. Patients with a history of topical corticosteroid use appeared to respond best to pimecrolimus. However, the benefit of pimecrolimus dissipated over time; by day 29, disease severity scores were similar in both groups.  The efficacy of pimecrolimus for POD was also supported by a smaller randomized trial in which 40 adults were treated with either pimecrolimus 1% cream or vehicle cream applied twice daily [42]. Throughout the four weeks of treatment, the reduction in disease severity was greater in patients treated with pimecrolimus, and after the first " week of treatment, at least a 50 percent reduction in the disease severity score was present in 50 versus 15 percent of patients. As in the larger trial, the duration of treatment benefit was limited. Four weeks after the end of treatment, the response rates were similar in both groups.  In the randomized trials, adverse effects were mild and primarily limited to the sites of treatment, and post-treatment disease flares did not appear to occur after the cessation of pimecrolimus [12,42].  No randomized trials of pimecrolimus therapy have been performed in children. A retrospective study that assessed outcomes for 72 children (ages 0 to 17 years) with POD who received topical tacrolimus 0.03% (53 patients), tacrolimus 0.1% (7 patients), or pimecrolimus 1% cream (12 patients) suggests benefit [45]. Complete responses, as assessed through medical record review or telephone call, occurred in 33 of 48 patients (69 percent) treated with either topical tacrolimus or topical pimecrolimus as monotherapy. In addition, among the patients prescribed combination therapy, 9 of 12 patients (75 percent) also using topical metronidazole and 7 of 9 patients (78 percent) also taking an oral antibiotic had complete responses. Two of three patients treated with triple therapy (a topical calcineurin inhibitor, topical metronidazole, and an oral antibiotic) achieved complete responses. The median time to partial or complete improvement during topical calcineurin inhibitor therapy was 14 days. Adverse events were mild and rare. Recurrences were documented in 13 patients (18 percent).  Topical tacrolimus therapy has also been associated with clinical improvement in granulomatous POD in case reports [7,35,46].

## 2024-06-07 ENCOUNTER — TELEPHONE (OUTPATIENT)
Dept: INTERNAL MEDICINE | Facility: CLINIC | Age: 44
End: 2024-06-07

## 2024-06-07 NOTE — TELEPHONE ENCOUNTER
Pharmacy sent notice that patient's Pimecrolimus 1 % cream is not covered by patient's insurance and it being a high cost to patient they recommend Tacrolimus ointment

## 2024-06-07 NOTE — TELEPHONE ENCOUNTER
This was already discussed directly with pharmacist and patient.  Did they need new order?  Thanks

## 2024-06-10 DIAGNOSIS — F51.01 PRIMARY INSOMNIA: Chronic | ICD-10-CM

## 2024-06-11 RX ORDER — ESZOPICLONE 3 MG/1
3 TABLET, FILM COATED ORAL NIGHTLY
Qty: 30 TABLET | Refills: 0 | Status: SHIPPED | OUTPATIENT
Start: 2024-06-11

## 2024-06-11 NOTE — TELEPHONE ENCOUNTER
Rx Refill Note  Requested Prescriptions     Pending Prescriptions Disp Refills    eszopiclone (LUNESTA) 3 MG tablet 30 tablet 0     Sig: Take 1 tablet by mouth Every Night, immediately before bedtime      Last office visit with prescribing clinician: 6/4/2024   Next office visit with prescribing clinician: 8/13/2024     LA: 05/06/24 #30 0R                          Would you like a call back once the refill request has been completed: [] Yes [] No    If the office needs to give you a call back, can they leave a voicemail: [] Yes [] No    Laly Milner LPN  06/11/24, 08:59 EDT

## 2024-06-18 ENCOUNTER — HOSPITAL ENCOUNTER (EMERGENCY)
Facility: HOSPITAL | Age: 44
Discharge: HOME | End: 2024-06-18
Attending: EMERGENCY MEDICINE
Payer: COMMERCIAL

## 2024-06-18 ENCOUNTER — APPOINTMENT (OUTPATIENT)
Dept: CT IMAGING | Facility: HOSPITAL | Age: 44
End: 2024-06-18
Payer: COMMERCIAL

## 2024-06-18 VITALS
HEIGHT: 72 IN | SYSTOLIC BLOOD PRESSURE: 128 MMHG | TEMPERATURE: 97.5 F | WEIGHT: 210 LBS | RESPIRATION RATE: 18 BRPM | HEART RATE: 85 BPM | OXYGEN SATURATION: 93 % | BODY MASS INDEX: 28.44 KG/M2 | DIASTOLIC BLOOD PRESSURE: 94 MMHG

## 2024-06-18 DIAGNOSIS — N20.0 KIDNEY STONE: Primary | ICD-10-CM

## 2024-06-18 LAB
ALBUMIN SERPL-MCNC: 4.8 G/DL (ref 3.5–5.2)
ALBUMIN/GLOB SERPL: 1.7 G/DL
ALP SERPL-CCNC: 118 U/L (ref 39–117)
ALT SERPL W P-5'-P-CCNC: 45 U/L (ref 1–41)
ANION GAP SERPL CALCULATED.3IONS-SCNC: 11 MMOL/L (ref 5–15)
AST SERPL-CCNC: 27 U/L (ref 1–40)
BASOPHILS # BLD AUTO: 0.03 10*3/MM3 (ref 0–0.2)
BASOPHILS NFR BLD AUTO: 0.3 % (ref 0–1.5)
BILIRUB SERPL-MCNC: 0.5 MG/DL (ref 0–1.2)
BILIRUB UR QL STRIP: NEGATIVE
BUN SERPL-MCNC: 19 MG/DL (ref 6–20)
BUN/CREAT SERPL: 10.1 (ref 7–25)
CALCIUM SPEC-SCNC: 9.7 MG/DL (ref 8.6–10.5)
CHLORIDE SERPL-SCNC: 100 MMOL/L (ref 98–107)
CLARITY UR: CLEAR
CO2 SERPL-SCNC: 28 MMOL/L (ref 22–29)
COLOR UR: YELLOW
CREAT SERPL-MCNC: 1.89 MG/DL (ref 0.76–1.27)
DEPRECATED RDW RBC AUTO: 43.7 FL (ref 37–54)
EGFRCR SERPLBLD CKD-EPI 2021: 44.3 ML/MIN/1.73
EOSINOPHIL # BLD AUTO: 0.05 10*3/MM3 (ref 0–0.4)
EOSINOPHIL NFR BLD AUTO: 0.4 % (ref 0.3–6.2)
ERYTHROCYTE [DISTWIDTH] IN BLOOD BY AUTOMATED COUNT: 13.4 % (ref 12.3–15.4)
GLOBULIN UR ELPH-MCNC: 2.9 GM/DL
GLUCOSE SERPL-MCNC: 101 MG/DL (ref 65–99)
GLUCOSE UR STRIP-MCNC: NEGATIVE MG/DL
HCT VFR BLD AUTO: 48.4 % (ref 37.5–51)
HGB BLD-MCNC: 16.6 G/DL (ref 13–17.7)
HGB UR QL STRIP.AUTO: NEGATIVE
HOLD SPECIMEN: NORMAL
IMM GRANULOCYTES # BLD AUTO: 0.04 10*3/MM3 (ref 0–0.05)
IMM GRANULOCYTES NFR BLD AUTO: 0.4 % (ref 0–0.5)
KETONES UR QL STRIP: NEGATIVE
LEUKOCYTE ESTERASE UR QL STRIP.AUTO: NEGATIVE
LIPASE SERPL-CCNC: 52 U/L (ref 13–60)
LYMPHOCYTES # BLD AUTO: 1.14 10*3/MM3 (ref 0.7–3.1)
LYMPHOCYTES NFR BLD AUTO: 10.1 % (ref 19.6–45.3)
MCH RBC QN AUTO: 30.6 PG (ref 26.6–33)
MCHC RBC AUTO-ENTMCNC: 34.3 G/DL (ref 31.5–35.7)
MCV RBC AUTO: 89.1 FL (ref 79–97)
MONOCYTES # BLD AUTO: 1.13 10*3/MM3 (ref 0.1–0.9)
MONOCYTES NFR BLD AUTO: 10 % (ref 5–12)
NEUTROPHILS NFR BLD AUTO: 78.8 % (ref 42.7–76)
NEUTROPHILS NFR BLD AUTO: 8.95 10*3/MM3 (ref 1.7–7)
NITRITE UR QL STRIP: NEGATIVE
NRBC BLD AUTO-RTO: 0 /100 WBC (ref 0–0.2)
PH UR STRIP.AUTO: 5.5 [PH] (ref 5–8)
PLATELET # BLD AUTO: 243 10*3/MM3 (ref 140–450)
PMV BLD AUTO: 10.2 FL (ref 6–12)
POTASSIUM SERPL-SCNC: 4.7 MMOL/L (ref 3.5–5.2)
PROT SERPL-MCNC: 7.7 G/DL (ref 6–8.5)
PROT UR QL STRIP: ABNORMAL
RBC # BLD AUTO: 5.43 10*6/MM3 (ref 4.14–5.8)
SODIUM SERPL-SCNC: 139 MMOL/L (ref 136–145)
SP GR UR STRIP: 1.03 (ref 1–1.03)
UROBILINOGEN UR QL STRIP: ABNORMAL
WBC NRBC COR # BLD AUTO: 11.34 10*3/MM3 (ref 3.4–10.8)
WHOLE BLOOD HOLD COAG: NORMAL
WHOLE BLOOD HOLD SPECIMEN: NORMAL

## 2024-06-18 PROCEDURE — 25010000002 ONDANSETRON PER 1 MG: Performed by: PHYSICIAN ASSISTANT

## 2024-06-18 PROCEDURE — 96376 TX/PRO/DX INJ SAME DRUG ADON: CPT

## 2024-06-18 PROCEDURE — 96374 THER/PROPH/DIAG INJ IV PUSH: CPT

## 2024-06-18 PROCEDURE — 83690 ASSAY OF LIPASE: CPT | Performed by: PHYSICIAN ASSISTANT

## 2024-06-18 PROCEDURE — 25010000002 KETOROLAC TROMETHAMINE PER 15 MG: Performed by: PHYSICIAN ASSISTANT

## 2024-06-18 PROCEDURE — 80053 COMPREHEN METABOLIC PANEL: CPT | Performed by: PHYSICIAN ASSISTANT

## 2024-06-18 PROCEDURE — 85025 COMPLETE CBC W/AUTO DIFF WBC: CPT | Performed by: PHYSICIAN ASSISTANT

## 2024-06-18 PROCEDURE — 99284 EMERGENCY DEPT VISIT MOD MDM: CPT

## 2024-06-18 PROCEDURE — 96361 HYDRATE IV INFUSION ADD-ON: CPT

## 2024-06-18 PROCEDURE — 74176 CT ABD & PELVIS W/O CONTRAST: CPT

## 2024-06-18 PROCEDURE — 96375 TX/PRO/DX INJ NEW DRUG ADDON: CPT

## 2024-06-18 PROCEDURE — 25010000002 ONDANSETRON PER 1 MG: Performed by: EMERGENCY MEDICINE

## 2024-06-18 PROCEDURE — 25010000002 HYDROMORPHONE 1 MG/ML SOLUTION: Performed by: EMERGENCY MEDICINE

## 2024-06-18 PROCEDURE — 81003 URINALYSIS AUTO W/O SCOPE: CPT | Performed by: PHYSICIAN ASSISTANT

## 2024-06-18 PROCEDURE — 25810000003 SODIUM CHLORIDE 0.9 % SOLUTION: Performed by: PHYSICIAN ASSISTANT

## 2024-06-18 PROCEDURE — 25010000002 MORPHINE PER 10 MG: Performed by: EMERGENCY MEDICINE

## 2024-06-18 RX ORDER — SODIUM CHLORIDE 0.9 % (FLUSH) 0.9 %
10 SYRINGE (ML) INJECTION AS NEEDED
Status: DISCONTINUED | OUTPATIENT
Start: 2024-06-18 | End: 2024-06-18 | Stop reason: HOSPADM

## 2024-06-18 RX ORDER — ONDANSETRON 2 MG/ML
4 INJECTION INTRAMUSCULAR; INTRAVENOUS ONCE
Status: COMPLETED | OUTPATIENT
Start: 2024-06-18 | End: 2024-06-18

## 2024-06-18 RX ORDER — NALOXONE HYDROCHLORIDE 4 MG/.1ML
SPRAY NASAL
Qty: 2 EACH | Refills: 0 | Status: SHIPPED | OUTPATIENT
Start: 2024-06-18 | End: 2024-06-21

## 2024-06-18 RX ORDER — TAMSULOSIN HYDROCHLORIDE 0.4 MG/1
1 CAPSULE ORAL DAILY
Qty: 8 CAPSULE | Refills: 0 | Status: SHIPPED | OUTPATIENT
Start: 2024-06-18

## 2024-06-18 RX ORDER — OXYCODONE HYDROCHLORIDE AND ACETAMINOPHEN 5; 325 MG/1; MG/1
1 TABLET ORAL EVERY 6 HOURS PRN
Qty: 15 TABLET | Refills: 0 | Status: SHIPPED | OUTPATIENT
Start: 2024-06-18 | End: 2024-06-18

## 2024-06-18 RX ORDER — TAMSULOSIN HYDROCHLORIDE 0.4 MG/1
0.4 CAPSULE ORAL ONCE
Status: COMPLETED | OUTPATIENT
Start: 2024-06-18 | End: 2024-06-18

## 2024-06-18 RX ORDER — OXYCODONE HYDROCHLORIDE AND ACETAMINOPHEN 5; 325 MG/1; MG/1
1 TABLET ORAL EVERY 6 HOURS PRN
Qty: 15 TABLET | Refills: 0 | Status: SHIPPED | OUTPATIENT
Start: 2024-06-18

## 2024-06-18 RX ORDER — TAMSULOSIN HYDROCHLORIDE 0.4 MG/1
1 CAPSULE ORAL DAILY
Qty: 8 CAPSULE | Refills: 0 | Status: SHIPPED | OUTPATIENT
Start: 2024-06-18 | End: 2024-06-18

## 2024-06-18 RX ORDER — KETOROLAC TROMETHAMINE 15 MG/ML
15 INJECTION, SOLUTION INTRAMUSCULAR; INTRAVENOUS ONCE
Status: COMPLETED | OUTPATIENT
Start: 2024-06-18 | End: 2024-06-18

## 2024-06-18 RX ORDER — MORPHINE SULFATE 4 MG/ML
4 INJECTION, SOLUTION INTRAMUSCULAR; INTRAVENOUS ONCE
Status: COMPLETED | OUTPATIENT
Start: 2024-06-18 | End: 2024-06-18

## 2024-06-18 RX ADMIN — HYDROMORPHONE HYDROCHLORIDE 1 MG: 1 INJECTION, SOLUTION INTRAMUSCULAR; INTRAVENOUS; SUBCUTANEOUS at 18:35

## 2024-06-18 RX ADMIN — SODIUM CHLORIDE 1000 ML: 900 INJECTION, SOLUTION INTRAVENOUS at 17:08

## 2024-06-18 RX ADMIN — ONDANSETRON 4 MG: 2 INJECTION INTRAMUSCULAR; INTRAVENOUS at 17:46

## 2024-06-18 RX ADMIN — ONDANSETRON 4 MG: 2 INJECTION INTRAMUSCULAR; INTRAVENOUS at 17:08

## 2024-06-18 RX ADMIN — KETOROLAC TROMETHAMINE 15 MG: 15 INJECTION, SOLUTION INTRAMUSCULAR; INTRAVENOUS at 17:08

## 2024-06-18 RX ADMIN — TAMSULOSIN HYDROCHLORIDE 0.4 MG: 0.4 CAPSULE ORAL at 17:47

## 2024-06-18 RX ADMIN — MORPHINE SULFATE 4 MG: 4 INJECTION, SOLUTION INTRAMUSCULAR; INTRAVENOUS at 17:47

## 2024-06-18 NOTE — ED PROVIDER NOTES
Subjective  History of Present Illness:    Chief Complaint: Left-sided abdominal pain  History of Present Illness: 43-year-old male presents with left-sided abdominal pain, he has been hurting for a few days reports, he has a history of kidney stones but this is similar.  He has had decreased urine.  The pain is sharp in nature and hurts with movement.  No nausea vomiting fever chills.  Onset: Gradual  Duration: 2 days  Exacerbating / Alleviating factors: Pain with movement  Associated symptoms: Decreased urine urine, pain with urination      Nurses Notes reviewed and agree, including vitals, allergies, social history and prior medical history.     REVIEW OF SYSTEMS: All systems reviewed and not pertinent unless noted.    Review of Systems   Gastrointestinal:  Positive for abdominal pain.   Genitourinary:  Positive for dysuria.   All other systems reviewed and are negative.      Past Medical History:   Diagnosis Date    Allergic     Since childhood    Anxiety     Since childhood    Cancer     hodgkins lymphoma    Colon polyp     Depression     GERD (gastroesophageal reflux disease)     History of medical problems     Facial seborriheic dermatitis    Hyperlipidemia     Hypertension     Hypothyroidism     Acquired from radiation therapy    Kidney stone     diagnosed with C.T scan in E.R.    Migraine     Sleep apnea     SVT (supraventricular tachycardia)        Allergies:    Lisinopril and Amoxicillin      Past Surgical History:   Procedure Laterality Date    ABLATION OF DYSRHYTHMIC FOCUS  7-2008    Dr. George Daly-EP study    CARDIAC ABLATION  2008    SVT    COLONOSCOPY      Dr. Yovani Aden    LYMPH NODE BIOPSY  11/2001    @ [facility] with Dr. Daniel Marinelli    MOLE REMOVAL  11/2016    Dr Loyd at [facility] dermatology.     PORTACATH PLACEMENT  01/2002    Groshong Placement @ [facility] with Dr. Daniel Marinelli    SEPTOPLASTY  10/18/2018    Dr. Lexa Castillo    SEPTOPLASTY, RESECTION INFERIOR TURBINATES Bilateral 10/18/2018     "Procedure: SEPTOPLASTY, BILATERAL RESECTION INFERIOR TURBINATES;  Surgeon: Lexa Castillo MD;  [facility]: [facility];  Service: ENT    TONSILLECTOMY  2013    @ [facility] with Lexa Castillo    VASECTOMY  2012    @ [facility] with Dr. Aidan Castellano    [facility]    @ [facility] with Dr. Momo Nunn         Social History     Socioeconomic History    Marital status: Single   Tobacco Use    Smoking status: Never    Smokeless tobacco: Never   Vaping Use    Vaping status: Never Used   Substance and Sexual Activity    Alcohol use: No    Drug use: Never    Sexual activity: Yes     Partners: Female     Birth control/protection: Vasectomy, Surgical     Comment: [id number]. [employer] Ray         Family History   Problem Relation Age of Onset    Hypertension Mother     Depression Mother     Hyperlipidemia Mother     Cancer Mother         Soft tissue sarcoma- 2003    Arrhythmia Mother         Tachycardia    Colon cancer Father     Colon polyps Father     Hypertension Father     Hyperlipidemia Father     Cancer Father         Colon cancer- 10-    Breast cancer Sister     Arthritis Maternal Grandmother     Asthma Maternal Grandmother     Hyperlipidemia Maternal Grandmother     Hypertension Maternal Grandmother     Depression Maternal Grandmother     COPD Maternal Grandmother     Heart disease Paternal Uncle         CABG    Heart attack Paternal Uncle             Hyperlipidemia Brother     Hypertension Brother     Hypertension Maternal Grandfather             Anxiety disorder Sister        Objective  Physical Exam:  /94   Pulse 85   Temp 97.5 °F (36.4 °C) (Oral)   Resp 18   Ht 182.9 cm (72\")   Wt 95.3 kg (210 lb)   SpO2 93%   BMI 28.48 kg/m²      Physical Exam  Vitals and nursing note reviewed.   Constitutional:       Appearance: He is well-developed.   HENT:      Head: Normocephalic and atraumatic.      Mouth/Throat:      Mouth: " Mucous membranes are moist.   Eyes:      Extraocular Movements: Extraocular movements intact.   Cardiovascular:      Rate and Rhythm: Normal rate and regular rhythm.   Pulmonary:      Effort: Pulmonary effort is normal.      Breath sounds: Normal breath sounds.   Abdominal:      Palpations: Abdomen is soft.      Tenderness: There is abdominal tenderness in the left lower quadrant.   Musculoskeletal:         General: Normal range of motion.      Cervical back: Normal range of motion.   Skin:     General: Skin is warm and dry.   Neurological:      Mental Status: He is alert and oriented to person, place, and time.   Psychiatric:         Behavior: Behavior normal.         Thought Content: Thought content normal.         Judgment: Judgment normal.           Procedures    ED Course:    CT abdomen pelvis interpretation by me: Distal Ureteral stone    ED Course as of 06/18/24 2101 Tue Jun 18, 2024   1711 Review of previous  non ED visits, prior labs, prior imaging, available notes from prior evaluations or visits with specialists, medication list, allergies, past medical history, past surgical history     [CS]   1740 Toradol did not manage his pain, added morphine, and given oral Percocet, as well as Flomax [CS]   1821 I Personally reviewed all laboratory studies performed in the emergency department  [CS]   1822 Reevaluation, patient continues to complain of pain, initiated Dilaudid for pain [CS]      ED Course User Index  [CS] Aidan Dewitt Jr., PADeepC       Lab Results (last 24 hours)       Procedure Component Value Units Date/Time    CBC & Differential [[id number]]  (Abnormal) Collected: 06/18/24 1704    Specimen: Blood Updated: 06/18/24 1712    Narrative:      The following orders were created for panel order CBC & Differential.  Procedure                               Abnormality         Status                     ---------                               -----------         ------                     CBC Auto  Differential[[id number]]        Abnormal            Final result                 Please view results for these tests on the individual orders.    Comprehensive Metabolic Panel [[id number]]  (Abnormal) Collected: 06/18/24 1704    Specimen: Blood Updated: 06/18/24 1737     Glucose 101 mg/dL      BUN 19 mg/dL      Creatinine 1.89 mg/dL      Sodium 139 mmol/L      Potassium 4.7 mmol/L      Comment: Slight hemolysis detected by analyzer. Result may be falsely elevated.        Chloride 100 mmol/L      CO2 28.0 mmol/L      Calcium 9.7 mg/dL      Total Protein 7.7 g/dL      Albumin 4.8 g/dL      ALT (SGPT) 45 U/L      AST (SGOT) 27 U/L      Alkaline Phosphatase 118 U/L      Total Bilirubin 0.5 mg/dL      Globulin 2.9 gm/dL      Comment: Calculated Result        A/G Ratio 1.7 g/dL      BUN/Creatinine Ratio 10.1     Anion Gap 11.0 mmol/L      eGFR 44.3 mL/min/1.73     Narrative:      GFR Normal >60  Chronic Kidney Disease <60  Kidney Failure <15      Lipase [[id number]]  (Normal) Collected: 06/18/24 1704    Specimen: Blood Updated: 06/18/24 1737     Lipase 52 U/L     Urinalysis With Culture If Indicated - Urine, Clean Catch [[id number]]  (Abnormal) Collected: 06/18/24 1704    Specimen: Urine, Clean Catch Updated: 06/18/24 1714     Color, UA Yellow     Appearance, UA Clear     pH, UA 5.5     Specific Gravity, UA 1.033     Glucose, UA Negative     Ketones, UA Negative     Bilirubin, UA Negative     Blood, UA Negative     Protein, UA Trace     Leuk Esterase, UA Negative     Nitrite, UA Negative     Urobilinogen, UA 1.0 E.U./dL    Narrative:      In absence of clinical symptoms, the presence of pyuria, bacteria, and/or nitrites on the urinalysis result does not correlate with infection.  Urine microscopic not indicated.    CBC Auto Differential [[id number]]  (Abnormal) Collected: 06/18/24 1704    Specimen: Blood Updated: 06/18/24 1712     WBC 11.34 10*3/mm3      RBC 5.43 10*6/mm3      Hemoglobin 16.6 g/dL      Hematocrit 48.4 %       MCV 89.1 fL      MCH 30.6 pg      MCHC 34.3 g/dL      RDW 13.4 %      RDW-SD 43.7 fl      MPV 10.2 fL      Platelets 243 10*3/mm3      Neutrophil % 78.8 %      Lymphocyte % 10.1 %      Monocyte % 10.0 %      Eosinophil % 0.4 %      Basophil % 0.3 %      Immature Grans % 0.4 %      Neutrophils, Absolute 8.95 10*3/mm3      Lymphocytes, Absolute 1.14 10*3/mm3      Monocytes, Absolute 1.13 10*3/mm3      Eosinophils, Absolute 0.05 10*3/mm3      Basophils, Absolute 0.03 10*3/mm3      Immature Grans, Absolute 0.04 10*3/mm3      nRBC 0.0 /100 WBC              CT Abdomen Pelvis Without Contrast    Result Date: 6/18/2024  CT ABDOMEN PELVIS WO CONTRAST Date of Exam: 6/18/2024 4:47 PM EDT Indication: left flank pain llq pain h/o kidney stones. Comparison: CT abdomen and pelvis 6/22/2022 Technique: Axial CT images were obtained of the abdomen and pelvis without the administration of contrast. Reconstructed coronal and sagittal images were also obtained. Automated exposure control and iterative construction methods were used. Findings: There is mild left-sided hydroureteronephrosis secondary to an obstructing 2 x 3 mm stone at the left UVJ (series 2 image 141). There are couple additional small bilateral nonobstructing renal calculi. No evidence of right-sided hydronephrosis. The right  ureter is normal. Unremarkable appearance of the decompressed bladder. Unremarkable appearance of the prostate and seminal vesicles. Unremarkable appearance of the lung bases, liver, gallbladder, bile ducts, spleen, pancreas, and adrenal glands. Unremarkable appearance of the GI tract. Normal appendix. There is fluid in the stomach. No pneumoperitoneum. No abdominopelvic free fluid or  conspicuous fat stranding. No lymphadenopathy. Normal noncontrast appearance of the vasculature. Unremarkable appearance of the body wall soft tissues. No acute or suspicious bony findings.     Impression: Impression: Mild left-sided hydroureteronephrosis secondary  to an obstructing 2 x 3 mm mm stone at the left UVJ. Additional small renal calculi are noted. Electronically Signed: Alex Rascon MD  6/18/2024 4:58 PM EDT  Workstation ID: [id number]        Medical Decision Making  Patient Presentation 39-year-old male presented with left lower quadrant abdominal pain history of kidney stone.  On my initial assessment he had left CVA tenderness and left lower quadrant abdominal pain    DDX Differential diagnosis would include colitis, diverticulitis, or bowel disease, or bowel syndrome, nephrolithiasis, pyelonephritis       Data Review/ Non ED Records /Analysis/Ordering unique tests  Review of previous  non ED visits, prior labs, prior imaging, available notes from prior evaluations or visits with specialists, medication list, allergies, past medical history, past surgical history        Independent Review Studies  I Personally reviewed all laboratory studies performed in the emergency department     Intervention/Re-evaluation included IV since Toradol pain medication, symptoms improved on reevaluation    Independent Clinician no consultation    Risk Stratification tools/clinical decision rules patient presented with left lower quadrant abdominal pain, considered kidney stone, enteritis, colitis, diverticulitis, urinary tract infection, patient was given IV fluids and pain medicine as well as antiemetics, he was found to have a kidney stone, his pain was well-controlled, underside stable, labs unremarkable, he was discharged home on Flomax and pain medication as well as referral to urology    Shared Decision Making discussed this plan of care with patient and they were agreeable    Disposition patient stable for discharge    Problems Addressed:  Kidney stone: complicated acute illness or injury    Amount and/or Complexity of Data Reviewed  External Data Reviewed: labs, radiology and notes.  Labs: ordered. Decision-making details documented in ED Course.  Radiology: ordered and  independent interpretation performed. Decision-making details documented in ED Course.    Risk  Prescription drug management.          Final diagnoses:   Kidney stone           Disposition discharged       Aidan Dewitt Jr., PA-SORAIDA  06/18/24 [id number]

## 2024-06-19 ENCOUNTER — TELEPHONE (OUTPATIENT)
Dept: UROLOGY | Facility: CLINIC | Age: 44
End: 2024-06-19
Payer: COMMERCIAL

## 2024-06-19 NOTE — TELEPHONE ENCOUNTER
CM received call from Hospice, reviewed Palliative Care's note. [place] of choice was offered. Referral sent via CC to [facility], meeting to take place today.     Valorie Barker  Management  [phone]/Available on [facility] Ed stone discharge- can add Friday or early next week per Dr. Castellano.     Lvm for patient to return my call to schedule.

## 2024-06-21 ENCOUNTER — OFFICE VISIT (OUTPATIENT)
Dept: UROLOGY | Facility: CLINIC | Age: 44
End: 2024-06-21
Payer: COMMERCIAL

## 2024-06-21 ENCOUNTER — TELEPHONE (OUTPATIENT)
Age: 44
End: 2024-06-21

## 2024-06-21 VITALS — WEIGHT: 210 LBS | HEIGHT: 72 IN | BODY MASS INDEX: 28.44 KG/M2

## 2024-06-21 DIAGNOSIS — N20.1 LEFT URETERAL STONE: Primary | ICD-10-CM

## 2024-06-21 DIAGNOSIS — N20.0 NEPHROLITHIASIS: ICD-10-CM

## 2024-06-21 NOTE — PROGRESS NOTES
Office Note Kidney Stone      Patient Name: Eduardo Luna  : 1980   MRN: [id number]     Chief Complaint: History of Nephrolithiasis/Ureterolithiasis         History of Present Illness: Eduardo Luna is a 43 y.o. male who presents today for evaluation secondary to history of acute stone.  Patient has been identified to have obstructing 3 to 4 mm left distal ureteral stone, additional bilateral nonobstructing renal stone burden.  He reports onset of symptoms over the past 1 week, continues to report intermittent pain and discomfort today.  He denies lower urinary tract symptoms.  Denies dysuria or hematuria.  Denies associated nausea, emesis, fever, chills.        Stone related history  Family history of stones:   no  Renal disease or anatomic abnormality: no  Malabsorptive disease or gastric bypass: no  Frequent UTI's    no  Parathyroid disease    no        Subjective      Review of System: Review of Systems   Genitourinary:  Negative for decreased urine volume, difficulty urinating, dysuria, enuresis, flank pain, frequency, hematuria and urgency.        I have reviewed the ROS documented by my clinical staff, updated as appropriate and I agree. Eduardo Castellano MD    Past Medical History:   Past Medical History:   Diagnosis Date    Allergic     Since childhood    Anxiety     Since childhood    Cancer     hodgkins lymphoma    Colon polyp     Depression     Epididymitis     GERD (gastroesophageal reflux disease)     History of medical problems     Facial seborriheic dermatitis    Hyperlipidemia     Hypertension     Hypothyroidism     Acquired from radiation therapy    Kidney stone     diagnosed with C.T scan in E.R.    Migraine     Pyelonephritis 2018    Kidney stones    Sleep apnea     SVT (supraventricular tachycardia)        Past Surgical History:   Past Surgical History:   Procedure Laterality Date    ABLATION OF DYSRHYTHMIC FOCUS      Dr. George Daly-EP study    CARDIAC ABLATION       SVT    COLONOSCOPY      Dr. Yovani Aden    LYMPH NODE BIOPSY  2001    @ [facility] with Dr. Daniel Marinelli    MOLE REMOVAL  2016    Dr Loyd at [facility] dermatology.     PORTACATH PLACEMENT  2002    Groshong Placement @ [facility] with Dr. Daniel Marinelli    SEPTOPLASTY  10/18/2018    Dr. Lexa Castillo    SEPTOPLASTY, RESECTION INFERIOR TURBINATES Bilateral 10/18/2018    Procedure: SEPTOPLASTY, BILATERAL RESECTION INFERIOR TURBINATES;  Surgeon: Lexa Castillo MD;  Location: [facility] OR;  Service: ENT    [facility]    VASECTOMY  2012    @ [facility] with Dr. Aidan COTA TOOTH EXTRACTION  10/1997    @ [facility] with Dr. Momo Nunn       Family History:   Family History   Problem Relation Age of Onset    Hypertension Mother     Depression Mother     Hyperlipidemia Mother     Cancer Mother         Soft tissue sarcoma- 2003    Arrhythmia Mother         Tachycardia    Colon cancer Father     Colon polyps Father     Hypertension Father     Hyperlipidemia Father     Cancer Father         Colon cancer- 10-    Breast cancer Sister     Arthritis Maternal Grandmother     Asthma Maternal Grandmother     Hyperlipidemia Maternal Grandmother     Hypertension Maternal Grandmother     Depression Maternal Grandmother     COPD Maternal Grandmother     Heart disease Paternal Uncle         CABG    Heart attack Paternal Uncle             Hyperlipidemia Brother     Hypertension Brother     Hypertension Maternal Grandfather             Anxiety disorder Sister        Social History:   Social History     Socioeconomic History    Marital status: Single   Tobacco Use    Smoking status: Never    Smokeless tobacco: Never   Vaping Use    Vaping status: Never Used   Substance and Sexual Activity    Alcohol use: No    Drug use: Never    Sexual activity: Yes     Partners: Female     Birth control/protection: Vasectomy, Surgical      Comment: [id number]. [employer]       Medications:     Current Outpatient Medications:     amLODIPine (NORVASC) 5 MG tablet, Take 1 tablet by mouth Daily., Disp: 90 tablet, Rfl: 1    Antiseborrheic Products, Misc. (Promiseb) cream, 1- 2 times a day, Disp: , Rfl:     atorvastatin (LIPITOR) 20 MG tablet, Take 1 tablet by mouth Daily., Disp: 90 tablet, Rfl: 1    buPROPion XL (Wellbutrin XL) 150 MG 24 hr tablet, Take 1 tablet by mouth Daily., Disp: 90 tablet, Rfl: 3    busPIRone (BUSPAR) 15 MG tablet, Take 1 tablet by mouth 2 (Two) Times a Day., Disp: 180 tablet, Rfl: 1    clindamycin (CLEOCIN T) 1 % external solution, Apply 1 application topically to the appropriate area as directed once or twice a Day As Needed., Disp: 60 mL, Rfl: 2    cloNIDine (Catapres) 0.1 MG tablet, Take 1 tablet by mouth 2 (Two) Times a Day As Needed for High Blood Pressure., Disp: 20 tablet, Rfl: 0    eszopiclone (LUNESTA) 3 MG tablet, Take 1 tablet by mouth Every Night, immediately before bedtime, Disp: 30 tablet, Rfl: 0    FLUoxetine (PROzac) 40 MG capsule, Take 1 capsule by mouth Daily., Disp: 90 capsule, Rfl: 3    hydrOXYzine (ATARAX) 25 MG tablet, Take 1 tablet by mouth 3 (Three) Times a Day As Needed for Itching or Allergies., Disp: 90 tablet, Rfl: 3    ketoconazole (NIZORAL) 2 % cream, Apply 1 Application topically to the appropriate area as directed Daily. Apply 1-2 times per day for 2-3 weeks, Then 2-3 times per week as needed, Disp: 60 g, Rfl: 1    lamoTRIgine (LaMICtal) 100 MG tablet, Take 1 tablet by mouth Daily., Disp: 90 tablet, Rfl: 3    levothyroxine (SYNTHROID, LEVOTHROID) 75 MCG tablet, Take 1 tablet by mouth Every Morning 1 hour prior to food or other medications, Disp: 90 tablet, Rfl: 1    metoprolol succinate XL (TOPROL-XL) 100 MG 24 hr tablet, Take 1 tablet by mouth Daily., Disp: 90 tablet, Rfl: 1    oxyCODONE-acetaminophen (PERCOCET) 5-325 MG per tablet, Take 1 tablet by mouth Every 6 (Six) Hours As Needed for  "Moderate Pain., Disp: 15 tablet, Rfl: 0    pantoprazole (PROTONIX) 40 MG EC tablet, Take 1 tablet by mouth 2 (Two) Times a Day., Disp: 180 tablet, Rfl: 3    spironolactone (Aldactone) 25 MG tablet, Take 1 tablet by mouth Daily., Disp: 90 tablet, Rfl: 1    tacrolimus (PROTOPIC) 0.1 % ointment, Apply 1 Application topically to the appropriate area as directed twice daily for 2 weeks; THEN use 2-3 times per week as needed., Disp: 60 g, Rfl: 1    tamsulosin (FLOMAX) 0.4 MG capsule 24 hr capsule, Take 1 capsule by mouth Daily., Disp: 8 capsule, Rfl: 0    vitamin D3 125 MCG (5000 UT) capsule capsule, Take 1 capsule by mouth Daily., Disp: , Rfl:     Allergies:   Allergies   Allergen Reactions    Lisinopril Other (See Comments)     Throat tickle    Amoxicillin Hives, Itching and Rash       Objective     Physical Exam:   Vital Signs:   Vitals:    06/21/24 1135   Weight: 95.3 kg (210 lb)   Height: 182.9 cm (72\")   PainSc:   1   PainLoc: Back     Body mass index is 28.48 kg/m².     Physical Exam  Vitals and nursing note reviewed.   Constitutional:       Appearance: Normal appearance.   HENT:      Head: Normocephalic and atraumatic.   Cardiovascular:      Comments: Well perfused  Pulmonary:      Effort: Pulmonary effort is normal.   Abdominal:      General: Abdomen is flat.      Palpations: Abdomen is soft.   Musculoskeletal:         General: Normal range of motion.   Skin:     General: Skin is warm and dry.   Neurological:      General: No focal deficit present.      Mental Status: He is alert and oriented to person, place, and time. Mental status is at baseline.   Psychiatric:         Mood and Affect: Mood normal.         Behavior: Behavior normal.         Thought Content: Thought content normal.         Judgment: Judgment normal.         Labs:   Brief Urine Lab Results  (Last result in the past 365 days)        Color   Clarity   Blood   Leuk Est   Nitrite   Protein   CREAT   Urine HCG        06/18/24 1704 Yellow   Clear   " Negative   Negative   Negative   Trace                        Lab Results   Component Value Date    GLUCOSE 101 (H) 06/18/2024    CALCIUM 9.7 06/18/2024     06/18/2024    K 4.7 06/18/2024    CO2 28.0 06/18/2024     06/18/2024    BUN 19 06/18/2024    CREATININE 1.89 (H) 06/18/2024    EGFRIFNONA 82 01/31/2022    BCR 10.1 06/18/2024    ANIONGAP 11.0 06/18/2024       Lab Results   Component Value Date    WBC 11.34 (H) 06/18/2024    HGB 16.6 06/18/2024    HCT 48.4 06/18/2024    MCV 89.1 06/18/2024     06/18/2024         Images:   CT Abdomen Pelvis Without Contrast    Result Date: 6/18/2024  Impression: Mild left-sided hydroureteronephrosis secondary to an obstructing 2 x 3 mm mm stone at the left UVJ. Additional small renal calculi are noted. Electronically Signed: Alex Rascon MD  6/18/2024 4:58 PM EDT  Workstation ID: [id number]      Measures:   Tobacco:   Eduardo Luna  reports that he has never smoked. He has never used smokeless tobacco. I have educated him on the risk of diseases from using tobacco products.     Assessment / Plan      Assessment/Plan:   Eduardo Luna is a 43 y.o. male who presented today with nephrolithiasis/ureterolithiasis.  Patient with acute stone episode, CT imaging revealing 3 to 4 mm distal left ureteral stone and additional bilateral nonobstructing stone burden.  He continues to have significant intermittent discomfort.  Given symptomatic status we have recommended consideration for operative intervention.  We have discussed the risks and benefits of ureteroscopy and laser lithotripsy with stent placement.  We have discussed management of ureteral stone as well as nonobstructing left renal stone burden.  He is understanding agreeable, would like to proceed given symptomatic status.  We will coordinate and schedule pending patient in OR availability.    Diagnoses and all orders for this visit:    1. Left ureteral stone (Primary)  -     External Facility  Surgical/Procedural Request; Future    2. Nephrolithiasis           I spent approximately 45 minutes providing clinical care for this patient; including review of patient's chart and provider documentation, face to face time spent with patient in examination room (obtaining history, performing physical exam, discussing diagnosis and management options), placing orders, and completing patient documentation.     Eduardo Castellano MD  [facility] [place]

## 2024-06-21 NOTE — TELEPHONE ENCOUNTER
----- Message from Bhakti GORMAN sent at 6/21/2024  1:18 PM EDT -----  Regarding: FW: Note for work  Contact: [phone]    ----- Message -----  From: Eduardo Luna  Sent: 6/21/2024  12:40 PM EDT  To: [facility]  Subject: Note for work                                    Donny Castellano,    I forgot to ask in my appointment. May I have a note for missing work today?    Thank you,  Eduardo Luna

## 2024-06-27 ENCOUNTER — DOCUMENTATION (OUTPATIENT)
Dept: UROLOGY | Facility: CLINIC | Age: 44
End: 2024-06-27

## 2024-06-27 ENCOUNTER — OUTSIDE FACILITY SERVICE (OUTPATIENT)
Dept: UROLOGY | Facility: CLINIC | Age: 44
End: 2024-06-27
Payer: COMMERCIAL

## 2024-06-27 DIAGNOSIS — N13.2 URETERAL STONE WITH HYDRONEPHROSIS: Primary | ICD-10-CM

## 2024-06-27 RX ORDER — PHENAZOPYRIDINE HYDROCHLORIDE 200 MG/1
200 TABLET, FILM COATED ORAL 3 TIMES DAILY PRN
Qty: 20 TABLET | Refills: 0 | Status: SHIPPED | OUTPATIENT
Start: 2024-06-27

## 2024-06-27 RX ORDER — TAMSULOSIN HYDROCHLORIDE 0.4 MG/1
1 CAPSULE ORAL DAILY
Qty: 14 CAPSULE | Refills: 0 | Status: SHIPPED | OUTPATIENT
Start: 2024-06-27 | End: 2024-07-11

## 2024-06-27 RX ORDER — NITROFURANTOIN 25; 75 MG/1; MG/1
100 CAPSULE ORAL 2 TIMES DAILY
Qty: 14 CAPSULE | Refills: 0 | Status: SHIPPED | OUTPATIENT
Start: 2024-06-27

## 2024-06-27 RX ORDER — KETOROLAC TROMETHAMINE 10 MG/1
10 TABLET, FILM COATED ORAL EVERY 6 HOURS PRN
Qty: 15 TABLET | Refills: 0 | Status: SHIPPED | OUTPATIENT
Start: 2024-06-27

## 2024-06-27 RX ORDER — OXYBUTYNIN CHLORIDE 5 MG/1
5 TABLET, EXTENDED RELEASE ORAL DAILY PRN
Qty: 14 TABLET | Refills: 0 | Status: SHIPPED | OUTPATIENT
Start: 2024-06-27

## 2024-06-27 NOTE — PROGRESS NOTES
[facility]         OPERATIVE REPORT     Patient Name:  Eduardo Luna  YOB: 1980    Patient MRN: [id number]    Date of Surgery:  6/27/24     Indications:  42 yo male with a history of left ureteral/renal  stone.  We have discussed the indication for definitive stone treatment.  We have discussed the risks and benefits of ureteroscopy and laser lithotripsy with stent placement.  Patient elects to proceed.    Pre-op Diagnosis:   Left Ureteral Stone  Left Renal Stone    Post-op Diagnosis:   Left Ureteral  Stone  Left Renal Stone    Procedures Performed:  CYSTOSCOPY  LEFT URETEROSCOPY  LEFT PYELOSCOPY, LASER LITHOTRIPSY  LEFT RETROGRADE PYELOGRAM  LEFT URETERAL STENT PLACEMENT    MODIFER 59- SEPARATE AND INDEPENDENT STONE LOCATION    Staff:  Eduardo Castellano MD    Anesthesia: General    Estimated Blood Loss: Minimal    Implants:  4.8F x 26cm ureteral stent     Specimen:  none    Findings:   Normal urinary bladder  Left ureteroscopy with stone extraction of very distal left ureteral stent.  Left pyeloscopy with identification multiple nonobstructing lower pole stones.  Laser lithotripsy complete dusting of stone.  Left retrograde pyelography with mild dilation of collecting system  Left ureteral stent placement    Complications: None immediate    Description of Procedure:    After informed consent, the patient was brought back to the operating suite and moved over to the operating table. General anesthesia was smoothly induced, IV antibiotics were administered, and the patient was placed in the dorsal lithotomy position with careful attention focused on padding all pressure points. The patient was prepped and draped in standard fashion. A timeout was performed to ensure the correct patient and procedure    A 22Fr cystoscope was used to cannulate the urethra. The urethra was of normal course and caliber.  Upon entering the bladder, pan-cystoscopy revealed no bladder abnormalities.  The  bilateral ureteral orifices were visualized in their orthotopic positions.     A sensor wire was advanced up the left ureter and into the collecting system on fluoroscopy to serve as a safety wire which was clamped to the surgical drapes.     SEMIRIGID URETEROSCOPY  The semi-rigid ureteroscope was then advanced into the bladder. The left ureter was cannulated demonstrating stone of the UL, this was extracted completely.  The scope was then advanced to the distal mid proximal ureter to ensure clearance no residual ureteral stone.  A second sensor wire was advanced into the kidney through the scope and the scope was backed out.     NO ACCESS SHEATH  We then switched to pressure-bag irrigation and a flexible ureteroscope was advanced over the second guidewire into the renal pelvis under live fluoroscopy and the second guidewire was removed. Pan-pyeloscopy was then performed which revealed nonobstructing lower pole stones.. A 200 micron Thulium laser fiber was then advanced through the ureteroscope. The stones were fragmented into tiny stone dust particles.  Pan pyeloscopy was then performed which confirmed no significant stone fragments. The ureter was inspected as the flexible ureteroscope was removed and found to be free of any injuries or stone.        Left retrograde Pyelogram Interpretation  A retrograde pyelogram was performed demonstrating normal course and caliber of the ureter, outlining the location of the renal pelvis and collecting system, with mild dilation.     CYSTO PLACEMENT  We switched back to the rigid cystoscope which was reinserted over the existing guidewire. A 4.8F x 26cm double J ureteral stent was advanced up the left ureter under direct visualization which confirmed good curl in the bladder. Fluoroscopy confirmed good curl in the kidney. The bladder was drained and this concluded our procedure.      The patient was brought back to the PACU in stable condition. All scopes and instruments were  in good working order at the end of the case. There were no complications.    Disposition/Follow Up: Patient will be discharged when meeting appropriate PACU criteria.  The patient will follow-up in 5 to 7 days for cystoscopy and stent removal.    Eduardo Castellano MD     Date: 6/27/2024  Time: 10:57 EDT

## 2024-07-02 ENCOUNTER — PROCEDURE VISIT (OUTPATIENT)
Dept: UROLOGY | Facility: CLINIC | Age: 44
End: 2024-07-02
Payer: COMMERCIAL

## 2024-07-02 VITALS — HEIGHT: 72 IN | WEIGHT: 211 LBS | BODY MASS INDEX: 28.58 KG/M2

## 2024-07-02 DIAGNOSIS — N20.0 NEPHROLITHIASIS: ICD-10-CM

## 2024-07-02 DIAGNOSIS — N13.30 HYDRONEPHROSIS, UNSPECIFIED HYDRONEPHROSIS TYPE: Primary | ICD-10-CM

## 2024-07-02 NOTE — PROGRESS NOTES
Procedure: Flexible Cystoscopy with Stent Removal     Preoperative Diagnosis: Left Ureteral Stone    Postoperative Diagnosis: Left Ureteral  Stone    Procedure performed: Cystoscopy with ureteral stent removal     Surgeon: Eduardo Castellano MD    Anesthesia: 2% Lidocaine Jelly    Indications: Eduardo Luna is a 43 y.o. year old male with a history of Left Ureteral  stone who underwent definitive stone treatment. A ureteral stent was left in place. The patient returns for planned ureteral stent removal today.     Procedure: Patient was taken to the urology procedure suite and prepped and draped in sterile fashion. A pre-procedural identification was performed. 10 cc of 2% lidocaine jelly was injected into the urethra. After adequate anesthesia, a lubricated flexible cystoscope was inserted into the urethra. The urethra appeared normal. The urinary sphincter was intact. The bladder was entered and 360 degree panendoscopy was performed revealing normal bladder anatomy, bilateral orthotopic ureteral orifices, and no concern for bladder stones, trabeculations, mucosal lesions/tumors, or divetrticuli. The ureteral stent was in good position emanating from the ureteral orifice.      The ureteral stent was grasped with a pair of grasping forceps and was removed without difficulty. The stent was removed intact.     PLAN    1) Discharge home    2) Follow up: 4 week with Renal US

## 2024-07-08 DIAGNOSIS — F51.01 PRIMARY INSOMNIA: Chronic | ICD-10-CM

## 2024-07-09 RX ORDER — ESZOPICLONE 3 MG/1
3 TABLET, FILM COATED ORAL NIGHTLY
Qty: 30 TABLET | Refills: 0 | Status: SHIPPED | OUTPATIENT
Start: 2024-07-09

## 2024-07-09 NOTE — TELEPHONE ENCOUNTER
Rx Refill Note  Requested Prescriptions     Pending Prescriptions Disp Refills    eszopiclone (LUNESTA) 3 MG tablet 30 tablet 0     Sig: Take 1 tablet by mouth Every Night, immediately before bedtime      Last office visit with prescribing clinician: 6/4/2024   Last telemedicine visit with prescribing clinician: Visit date not found   Next office visit with prescribing clinician: 8/13/2024                         Would you like a call back once the refill request has been completed: [] Yes [] No    If the office needs to give you a call back, can they leave a voicemail: [] Yes [] No    Emma Dugan LPN  07/09/24, 07:49 EDT

## 2024-07-24 ENCOUNTER — HOSPITAL ENCOUNTER (OUTPATIENT)
Dept: ULTRASOUND IMAGING | Facility: HOSPITAL | Age: 44
Discharge: HOME | End: 2024-07-24
Admitting: UROLOGY
Payer: COMMERCIAL

## 2024-07-24 DIAGNOSIS — N13.30 HYDRONEPHROSIS, UNSPECIFIED HYDRONEPHROSIS TYPE: ICD-10-CM

## 2024-07-24 PROCEDURE — 76775 US EXAM ABDO BACK WALL LIM: CPT

## 2024-07-28 ENCOUNTER — PATIENT MESSAGE (OUTPATIENT)
Dept: INTERNAL MEDICINE | Facility: CLINIC | Age: 44
End: 2024-07-28
Payer: COMMERCIAL

## 2024-07-28 DIAGNOSIS — I10 ESSENTIAL HYPERTENSION: Chronic | ICD-10-CM

## 2024-07-28 DIAGNOSIS — F41.1 GAD (GENERALIZED ANXIETY DISORDER): ICD-10-CM

## 2024-07-28 DIAGNOSIS — K21.9 GASTROESOPHAGEAL REFLUX DISEASE WITHOUT ESOPHAGITIS: Primary | ICD-10-CM

## 2024-07-28 DIAGNOSIS — F41.8 DEPRESSION WITH ANXIETY: ICD-10-CM

## 2024-07-28 DIAGNOSIS — E78.00 PURE HYPERCHOLESTEROLEMIA: Chronic | ICD-10-CM

## 2024-07-28 DIAGNOSIS — E03.9 HYPOTHYROIDISM (ACQUIRED): ICD-10-CM

## 2024-07-29 RX ORDER — LEVOTHYROXINE SODIUM 0.07 MG/1
75 TABLET ORAL
Qty: 90 TABLET | Refills: 1 | Status: SHIPPED | OUTPATIENT
Start: 2024-07-29

## 2024-07-29 RX ORDER — SPIRONOLACTONE 25 MG/1
25 TABLET ORAL DAILY
Qty: 90 TABLET | Refills: 1 | Status: SHIPPED | OUTPATIENT
Start: 2024-07-29

## 2024-07-29 RX ORDER — ATORVASTATIN CALCIUM 20 MG/1
20 TABLET, FILM COATED ORAL DAILY
Qty: 90 TABLET | Refills: 1 | Status: SHIPPED | OUTPATIENT
Start: 2024-07-29

## 2024-07-29 RX ORDER — BUPROPION HYDROCHLORIDE 150 MG/1
150 TABLET ORAL DAILY
Qty: 90 TABLET | Refills: 1 | Status: SHIPPED | OUTPATIENT
Start: 2024-07-29

## 2024-07-29 RX ORDER — AMLODIPINE BESYLATE 5 MG/1
5 TABLET ORAL DAILY
Qty: 90 TABLET | Refills: 1 | Status: SHIPPED | OUTPATIENT
Start: 2024-07-29

## 2024-07-29 RX ORDER — BUSPIRONE HYDROCHLORIDE 15 MG/1
15 TABLET ORAL 2 TIMES DAILY
Qty: 180 TABLET | Refills: 1 | Status: SHIPPED | OUTPATIENT
Start: 2024-07-29

## 2024-07-29 RX ORDER — PANTOPRAZOLE SODIUM 40 MG/1
40 TABLET, DELAYED RELEASE ORAL DAILY
Qty: 90 TABLET | Refills: 1 | Status: SHIPPED | OUTPATIENT
Start: 2024-07-29

## 2024-07-29 RX ORDER — FLUOXETINE HYDROCHLORIDE 40 MG/1
40 CAPSULE ORAL DAILY
Qty: 90 CAPSULE | Refills: 1 | Status: SHIPPED | OUTPATIENT
Start: 2024-07-29

## 2024-07-29 RX ORDER — METOPROLOL SUCCINATE 100 MG/1
100 TABLET, EXTENDED RELEASE ORAL DAILY
Qty: 90 TABLET | Refills: 1 | Status: SHIPPED | OUTPATIENT
Start: 2024-07-29

## 2024-07-29 NOTE — TELEPHONE ENCOUNTER
Rx Refill Note  Requested Prescriptions     Pending Prescriptions Disp Refills    buPROPion XL (Wellbutrin XL) 150 MG 24 hr tablet 90 tablet 3     Sig: Take 1 tablet by mouth Daily.    FLUoxetine (PROzac) 40 MG capsule 90 capsule 3     Sig: Take 1 capsule by mouth Daily.    busPIRone (BUSPAR) 15 MG tablet 180 tablet 1     Sig: Take 1 tablet by mouth 2 (Two) Times a Day.    atorvastatin (LIPITOR) 20 MG tablet 90 tablet 1     Sig: Take 1 tablet by mouth Daily.    levothyroxine (SYNTHROID, LEVOTHROID) 75 MCG tablet 90 tablet 1     Sig: Take 1 tablet by mouth Every Morning 1 hour prior to food or other medications    metoprolol succinate XL (TOPROL-XL) 100 MG 24 hr tablet 90 tablet 1     Sig: Take 1 tablet by mouth Daily.    amLODIPine (NORVASC) 5 MG tablet 90 tablet 1     Sig: Take 1 tablet by mouth Daily.    spironolactone (Aldactone) 25 MG tablet 90 tablet 1     Sig: Take 1 tablet by mouth Daily.      Last office visit with prescribing clinician: 6/4/2024   Last telemedicine visit with prescribing clinician: Visit date not found   Next office visit with prescribing clinician: 8/13/2024                         Would you like a call back once the refill request has been completed: [] Yes [] No    If the office needs to give you a call back, can they leave a voicemail: [] Yes [] No    Emma Dugan LPN  07/29/24, 09:14 EDT

## 2024-07-29 NOTE — TELEPHONE ENCOUNTER
Rx Refill Note  Requested Prescriptions     Pending Prescriptions Disp Refills    buPROPion XL (Wellbutrin XL) 150 MG 24 hr tablet 90 tablet 3     Sig: Take 1 tablet by mouth Daily.    FLUoxetine (PROzac) 40 MG capsule 90 capsule 3     Sig: Take 1 capsule by mouth Daily.    busPIRone (BUSPAR) 15 MG tablet 180 tablet 1     Sig: Take 1 tablet by mouth 2 (Two) Times a Day.    atorvastatin (LIPITOR) 20 MG tablet 90 tablet 1     Sig: Take 1 tablet by mouth Daily.    levothyroxine (SYNTHROID, LEVOTHROID) 75 MCG tablet 90 tablet 1     Sig: Take 1 tablet by mouth Every Morning 1 hour prior to food or other medications    metoprolol succinate XL (TOPROL-XL) 100 MG 24 hr tablet 90 tablet 1     Sig: Take 1 tablet by mouth Daily.    amLODIPine (NORVASC) 5 MG tablet 90 tablet 1     Sig: Take 1 tablet by mouth Daily.    spironolactone (Aldactone) 25 MG tablet 90 tablet 1     Sig: Take 1 tablet by mouth Daily.      Last office visit with prescribing clinician: 6/4/2024   Last telemedicine visit with prescribing clinician: Visit date not found   Next office visit with prescribing clinician: 8/13/2024                         Would you like a call back once the refill request has been completed: [] Yes [] No    If the office needs to give you a call back, can they leave a voicemail: [] Yes [] No    Daphney Baeza MA  07/29/24, 09:14 EDT

## 2024-07-30 ENCOUNTER — OFFICE VISIT (OUTPATIENT)
Dept: UROLOGY | Facility: CLINIC | Age: 44
End: 2024-07-30
Payer: COMMERCIAL

## 2024-07-30 DIAGNOSIS — N13.30 HYDRONEPHROSIS, UNSPECIFIED HYDRONEPHROSIS TYPE: Primary | ICD-10-CM

## 2024-07-30 NOTE — PROGRESS NOTES
Office Note Kidney Stone      Patient Name: Eduardo Luna  : 1980   MRN: [id number]     Chief Complaint: History of Nephrolithiasis/Ureterolithiasis         History of Present Illness: Eduardo Luna is a 43 y.o. male who presents today for 4-week follow-up after ureteroscopy and laser lithotripsy, stent removal.  Presents today with renal ultrasound.  Denies current flank pain or lower urinary tract symptoms.  Doing well today.        Subjective      Review of System: Review of Systems   Genitourinary:  Negative for decreased urine volume, difficulty urinating, dysuria, enuresis, flank pain, frequency, hematuria and urgency.        I have reviewed the ROS documented by my clinical staff, updated as appropriate and I agree. Eduardo Castellano MD    Past Medical History:   Past Medical History:   Diagnosis Date    Allergic     Since childhood    Anxiety     Since childhood    Cancer     hodgkins lymphoma    Colon polyp     Depression     Epididymitis     GERD (gastroesophageal reflux disease)     History of medical problems     Facial seborriheic dermatitis    Hyperlipidemia     Hypertension     Hypothyroidism     Acquired from radiation therapy    Kidney stone     diagnosed with C.T scan in [place]    Migraine     Pyelonephritis 2018    Kidney stones    Sleep apnea     SVT (supraventricular tachycardia)        Past Surgical History:   Past Surgical History:   Procedure Laterality Date    ABLATION OF DYSRHYTHMIC FOCUS      Dr. George Daly-EP study    CARDIAC ABLATION      SVT    COLONOSCOPY      Dr. Yovani Aden    CYSTOSCOPY  23    KIDNEY STONE SURGERY  24    LITHOTRIPSY  24    LYMPH NODE BIOPSY  2001    @ [facility] with Dr. Daniel Marinelli    MOLE REMOVAL  2016    Dr Loyd at [facility] dermatology.     PORTACATH PLACEMENT  2002    [facility] Placement @ [facility] with Dr. Daniel Marinelli    SEPTOPLASTY  10/18/2018    Dr. Lexa Castillo    SEPTOPLASTY, RESECTION INFERIOR TURBINATES  Bilateral 10/18/2018    Procedure: SEPTOPLASTY, BILATERAL RESECTION INFERIOR TURBINATES;  Surgeon: Lexa Castillo MD;  Location: [facility] OR;  Service: ENT    [facility]    VASECTOMY  2012    @ [facility] with Dr. Aidan Castellano    [facility]    @ [facility] with Dr. Momo Nunn       Family History:   Family History   Problem Relation Age of Onset    Hypertension Mother     Depression Mother     Hyperlipidemia Mother     Cancer Mother         Soft tissue sarcoma- 2003    Arrhythmia Mother         Tachycardia    Colon cancer Father     Colon polyps Father     Hypertension Father     Hyperlipidemia Father     Cancer Father         Colon cancer- 10-    Breast cancer Sister     Arthritis Maternal Grandmother     Asthma Maternal Grandmother     Hyperlipidemia Maternal Grandmother     Hypertension Maternal Grandmother     Depression Maternal Grandmother     COPD Maternal Grandmother     Heart disease Paternal Uncle         CABG    Heart attack Paternal Uncle             Hyperlipidemia Brother     Hypertension Brother     Hypertension Maternal Grandfather             Anxiety disorder Sister        Social History:   Social History     Socioeconomic History    Marital status: Single   Tobacco Use    Smoking status: Never     Passive exposure: Never    Smokeless tobacco: Never   Vaping Use    Vaping status: Never Used   Substance and Sexual Activity    Alcohol use: No    Drug use: Never    Sexual activity: Yes     Partners: Female     Birth control/protection: Vasectomy     Comment: [id number]. Charles Ray       Medications:     Current Outpatient Medications:     amLODIPine (NORVASC) 5 MG tablet, Take 1 tablet by mouth Daily., Disp: 90 tablet, Rfl: 1    Antiseborrheic Products, Misc. (Promiseb) cream, 1- 2 times a day, Disp: , Rfl:     atorvastatin (LIPITOR) 20 MG tablet, Take 1 tablet by mouth  Daily., Disp: 90 tablet, Rfl: 1    buPROPion XL (Wellbutrin XL) 150 MG 24 hr tablet, Take 1 tablet by mouth Daily., Disp: 90 tablet, Rfl: 1    busPIRone (BUSPAR) 15 MG tablet, Take 1 tablet by mouth 2 (Two) Times a Day., Disp: 180 tablet, Rfl: 1    clindamycin (CLEOCIN T) 1 % external solution, Apply 1 application topically to the appropriate area as directed once or twice a Day As Needed., Disp: 60 mL, Rfl: 2    cloNIDine (Catapres) 0.1 MG tablet, Take 1 tablet by mouth 2 (Two) Times a Day As Needed for High Blood Pressure., Disp: 20 tablet, Rfl: 0    eszopiclone (LUNESTA) 3 MG tablet, Take 1 tablet by mouth Every Night, immediately before bedtime, Disp: 30 tablet, Rfl: 0    FLUoxetine (PROzac) 40 MG capsule, Take 1 capsule by mouth Daily., Disp: 90 capsule, Rfl: 1    hydrOXYzine (ATARAX) 25 MG tablet, Take 1 tablet by mouth 3 (Three) Times a Day As Needed for Itching or Allergies., Disp: 90 tablet, Rfl: 3    ketoconazole (NIZORAL) 2 % cream, Apply 1 Application topically to the appropriate area as directed Daily. Apply 1-2 times per day for 2-3 weeks, Then 2-3 times per week as needed, Disp: 60 g, Rfl: 1    lamoTRIgine (LaMICtal) 100 MG tablet, Take 1 tablet by mouth Daily., Disp: 90 tablet, Rfl: 3    levothyroxine (SYNTHROID, LEVOTHROID) 75 MCG tablet, Take 1 tablet by mouth Every Morning 1 hour prior to food or other medications, Disp: 90 tablet, Rfl: 1    metoprolol succinate XL (TOPROL-XL) 100 MG 24 hr tablet, Take 1 tablet by mouth Daily., Disp: 90 tablet, Rfl: 1    pantoprazole (PROTONIX) 40 MG EC tablet, Take 1 tablet by mouth Daily., Disp: 90 tablet, Rfl: 1    spironolactone (Aldactone) 25 MG tablet, Take 1 tablet by mouth Daily., Disp: 90 tablet, Rfl: 1    tacrolimus (PROTOPIC) 0.1 % ointment, Apply 1 Application topically to the appropriate area as directed twice daily for 2 weeks; THEN use 2-3 times per week as needed., Disp: 60 g, Rfl: 1    vitamin D3 125 MCG (5000 UT) capsule capsule, Take 1 capsule  by mouth Daily., Disp: , Rfl:     Allergies:   Allergies   Allergen Reactions    Lisinopril Other (See Comments)     Throat tickle    Amoxicillin Hives, Itching and Rash       Objective     Physical Exam:   Vital Signs: There were no vitals filed for this visit.  There is no height or weight on file to calculate BMI.     Physical Exam  Vitals and nursing note reviewed.   Constitutional:       Appearance: Normal appearance.   HENT:      Head: Normocephalic and atraumatic.   Cardiovascular:      Comments: Well perfused  Pulmonary:      Effort: Pulmonary effort is normal.   Abdominal:      General: Abdomen is flat.      Palpations: Abdomen is soft.   Musculoskeletal:         General: Normal range of motion.   Skin:     General: Skin is warm and dry.   Neurological:      General: No focal deficit present.      Mental Status: He is alert and oriented to person, place, and time. Mental status is at baseline.   Psychiatric:         Mood and Affect: Mood normal.         Behavior: Behavior normal.         Thought Content: Thought content normal.         Judgment: Judgment normal.         Labs:   Brief Urine Lab Results  (Last result in the past 365 days)        Color   Clarity   Blood   Leuk Est   Nitrite   Protein   CREAT   Urine HCG        06/18/24 1704 Yellow   Clear   Negative   Negative   Negative   Trace                        Lab Results   Component Value Date    GLUCOSE 101 (H) 06/18/2024    CALCIUM 9.7 06/18/2024     06/18/2024    K 4.7 06/18/2024    CO2 28.0 06/18/2024     06/18/2024    BUN 19 06/18/2024    CREATININE 1.89 (H) 06/18/2024    EGFRIFNONA 82 01/31/2022    BCR 10.1 06/18/2024    ANIONGAP 11.0 06/18/2024       Lab Results   Component Value Date    WBC 11.34 (H) 06/18/2024    HGB 16.6 06/18/2024    HCT 48.4 06/18/2024    MCV 89.1 06/18/2024     06/18/2024         Images:   US Renal Bilateral    Result Date: 7/25/2024  Impression: Few tiny punctate bilateral nonobstructive renal calculi.  No evidence of hydronephrosis Electronically Signed: José Reyes MD  7/25/2024 8:21 AM EDT  Workstation ID: [id number]    CT Abdomen Pelvis Without Contrast    Result Date: 6/18/2024  Impression: Mild left-sided hydroureteronephrosis secondary to an obstructing 2 x 3 mm mm stone at the left UVJ. Additional small renal calculi are noted. Electronically Signed: Alex Rascon MD  6/18/2024 4:58 PM EDT  Workstation ID: [id number]      Measures:   Tobacco:   Eduardo Luna  reports that he has never smoked. He has never been exposed to tobacco smoke. He has never used smokeless tobacco. I have educated him on the risk of diseases from using tobacco products.     Assessment / Plan      Assessment/Plan:   Eduardo Luna is a 43 y.o. male who presented today for 4-week follow-up after ureteroscopy and laser lithotripsy, stent removal.  Ultrasound has revealed resolution of hydronephrosis.  We have discussed conservative strategies to reduce stone risk including increasing fluid intake to greater than 2-2 and half liters, decreasing sodium and animal protein, increasing citrate intake.  He will follow-up in 1 year with repeat ultrasound.  Alert with any acute stone symptoms in the interim.    Diagnoses and all orders for this visit:    1. Hydronephrosis, unspecified hydronephrosis type (Primary)  -     US Renal Bilateral; Future         Follow Up:   Return in about 1 year (around 7/30/2025) for Recheck.    I spent approximately 30 minutes providing clinical care for this patient; including review of patient's chart and provider documentation, face to face time spent with patient in examination room (obtaining history, performing physical exam, discussing diagnosis and management options), placing orders, and completing patient documentation.     Eduardo Castellano MD  [facility]

## 2024-08-06 DIAGNOSIS — F51.01 PRIMARY INSOMNIA: Chronic | ICD-10-CM

## 2024-08-06 RX ORDER — ESZOPICLONE 3 MG/1
3 TABLET, FILM COATED ORAL NIGHTLY
Qty: 30 TABLET | Refills: 0 | Status: SHIPPED | OUTPATIENT
Start: 2024-08-06

## 2024-08-06 NOTE — TELEPHONE ENCOUNTER
Rx Refill Note  Requested Prescriptions     Pending Prescriptions Disp Refills    eszopiclone (LUNESTA) 3 MG tablet 30 tablet 0     Sig: Take 1 tablet by mouth Every Night, immediately before bedtime      Last office visit with prescribing clinician: Visit date not found   Last telemedicine visit with prescribing clinician: Visit date not found   Next office visit with prescribing clinician: Visit date not found                         Would you like a call back once the refill request has been completed: [] Yes [] No    If the office needs to give you a call back, can they leave a voicemail: [] Yes [] No    Cadence Cano MA  08/06/24, 13:48 EDT

## 2024-08-13 ENCOUNTER — OFFICE VISIT (OUTPATIENT)
Dept: INTERNAL MEDICINE | Facility: CLINIC | Age: 44
End: 2024-08-13
Payer: COMMERCIAL

## 2024-08-13 ENCOUNTER — PATIENT MESSAGE (OUTPATIENT)
Dept: INTERNAL MEDICINE | Facility: CLINIC | Age: 44
End: 2024-08-13

## 2024-08-13 VITALS
WEIGHT: 209 LBS | BODY MASS INDEX: 28.31 KG/M2 | HEIGHT: 72 IN | DIASTOLIC BLOOD PRESSURE: 86 MMHG | TEMPERATURE: 98.7 F | HEART RATE: 64 BPM | SYSTOLIC BLOOD PRESSURE: 112 MMHG

## 2024-08-13 DIAGNOSIS — F51.01 PRIMARY INSOMNIA: Chronic | ICD-10-CM

## 2024-08-13 DIAGNOSIS — E03.9 HYPOTHYROIDISM (ACQUIRED): Chronic | ICD-10-CM

## 2024-08-13 DIAGNOSIS — I10 ESSENTIAL HYPERTENSION: Primary | Chronic | ICD-10-CM

## 2024-08-13 DIAGNOSIS — F41.1 GAD (GENERALIZED ANXIETY DISORDER): Primary | Chronic | ICD-10-CM

## 2024-08-13 DIAGNOSIS — E55.9 VITAMIN D DEFICIENCY: ICD-10-CM

## 2024-08-13 DIAGNOSIS — F41.8 DEPRESSION WITH ANXIETY: ICD-10-CM

## 2024-08-13 PROCEDURE — 99214 OFFICE O/P EST MOD 30 MIN: CPT | Performed by: STUDENT IN AN ORGANIZED HEALTH CARE EDUCATION/TRAINING PROGRAM

## 2024-08-13 NOTE — PROGRESS NOTES
"Chief Complaint  Eduardo Luna is a 43 y.o. male presenting for Hypertension.     From [place]. Engaged, in long term relationship with Teresa since 2016, living together.  No children. 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    History of Present Illness  Patient is here for follow-up.    He is overall doing well, and has no concerns for today.  He was noted with drop in kidney function and has followed with urology.  No concern for hydronephrosis at this time.    The following portions of the patient's history were reviewed and updated as appropriate: allergies, current medications, past family history, past medical history, past social history, past surgical history, and problem list.    Objective  /86 (BP Location: Left arm, Patient Position: Sitting, Cuff Size: Adult)   Pulse 64   Temp 98.7 °F (37.1 °C) (Temporal)   Ht 182.9 cm (72.01\")   Wt 94.8 kg (209 lb)   BMI 28.34 kg/m²     Physical Exam  Constitutional:       Appearance: Normal appearance.   HENT:      Head: Normocephalic and atraumatic.   Eyes:      Extraocular Movements: Extraocular movements intact.      Conjunctiva/sclera: Conjunctivae normal.   Pulmonary:      Effort: Pulmonary effort is normal. No respiratory distress.   Musculoskeletal:      Cervical back: Neck supple.   Neurological:      Mental Status: He is alert and oriented to person, place, and time. Mental status is at baseline.   Psychiatric:         Behavior: Behavior normal.         Thought Content: Thought content normal.         Assessment/Plan   1. Essential hypertension  BP " Readings from Last 3 Encounters:   08/13/24 112/86   06/18/24 128/94   06/04/24 122/78   Blood pressure improved.  Continue on current medications.  Will recheck BMP given his  elevated creatinine.  - Basic Metabolic Panel; Future    2. Primary insomnia  Stable and doing well.  I will continue prescribing eszopiclone.  Collins reviewed.    3. Vitamin D deficiency  - Vitamin D,25-Hydroxy; Future    4. Hypothyroidism (acquired)  Will recheck thyroid, last checked in January.  Continue on current dose of Synthroid 75 mcg daily for now.  - T4, Free; Future  - TSH; Future      Return in about 6 months (around 2/6/2025), or if symptoms worsen or fail to improve, for Annual physical.    Future Appointments         Provider Department Center    10/24/2024 3:30 PM (Arrive by 3:00 PM) Marcos Gonsalez MD [facility]ARDIOLOGY [place]    2/7/2025 4:00 PM Dallas Ugarte MD [facility] INTERNAL MEDICINE [facility]    8/5/2025 3:30 PM (Arrive by 3:15 PM) Eduardo Castellano MD [facility] UROLOGY [facility]              Dallas Ugarte MD  Family Medicine  08/13/2024

## 2024-08-14 ENCOUNTER — LAB (OUTPATIENT)
Dept: LAB | Facility: HOSPITAL | Age: 44
End: 2024-08-14
Payer: COMMERCIAL

## 2024-08-14 DIAGNOSIS — E55.9 VITAMIN D DEFICIENCY: ICD-10-CM

## 2024-08-14 DIAGNOSIS — E03.9 HYPOTHYROIDISM (ACQUIRED): Chronic | ICD-10-CM

## 2024-08-14 DIAGNOSIS — I10 ESSENTIAL HYPERTENSION: Chronic | ICD-10-CM

## 2024-08-14 LAB
25(OH)D3 SERPL-MCNC: 75.8 NG/ML (ref 30–100)
ANION GAP SERPL CALCULATED.3IONS-SCNC: 11 MMOL/L (ref 5–15)
BUN SERPL-MCNC: 16 MG/DL (ref 6–20)
BUN/CREAT SERPL: 13.3 (ref 7–25)
CALCIUM SPEC-SCNC: 9.8 MG/DL (ref 8.6–10.5)
CHLORIDE SERPL-SCNC: 100 MMOL/L (ref 98–107)
CO2 SERPL-SCNC: 25 MMOL/L (ref 22–29)
CREAT SERPL-MCNC: 1.2 MG/DL (ref 0.76–1.27)
EGFRCR SERPLBLD CKD-EPI 2021: 76.5 ML/MIN/1.73
GLUCOSE SERPL-MCNC: 88 MG/DL (ref 65–99)
POTASSIUM SERPL-SCNC: 4.8 MMOL/L (ref 3.5–5.2)
SODIUM SERPL-SCNC: 136 MMOL/L (ref 136–145)
T4 FREE SERPL-MCNC: 1.62 NG/DL (ref 0.92–1.68)
TSH SERPL DL<=0.05 MIU/L-ACNC: 4.28 UIU/ML (ref 0.27–4.2)

## 2024-08-14 PROCEDURE — 82306 VITAMIN D 25 HYDROXY: CPT

## 2024-08-14 PROCEDURE — 80048 BASIC METABOLIC PNL TOTAL CA: CPT

## 2024-08-14 PROCEDURE — 84443 ASSAY THYROID STIM HORMONE: CPT

## 2024-08-14 PROCEDURE — 84439 ASSAY OF FREE THYROXINE: CPT

## 2024-08-15 DIAGNOSIS — E03.9 HYPOTHYROIDISM (ACQUIRED): ICD-10-CM

## 2024-08-15 RX ORDER — LEVOTHYROXINE SODIUM 88 UG/1
88 TABLET ORAL
Qty: 90 TABLET | Refills: 1 | Status: SHIPPED | OUTPATIENT
Start: 2024-08-15

## 2024-09-06 ENCOUNTER — PATIENT MESSAGE (OUTPATIENT)
Dept: GASTROENTEROLOGY | Facility: CLINIC | Age: 44
End: 2024-09-06
Payer: COMMERCIAL

## 2024-09-07 DIAGNOSIS — F51.01 PRIMARY INSOMNIA: Chronic | ICD-10-CM

## 2024-09-09 RX ORDER — ESZOPICLONE 3 MG/1
3 TABLET, FILM COATED ORAL NIGHTLY
Qty: 30 TABLET | Refills: 0 | Status: SHIPPED | OUTPATIENT
Start: 2024-09-09

## 2024-09-09 NOTE — TELEPHONE ENCOUNTER
Rx Refill Note  Requested Prescriptions     Pending Prescriptions Disp Refills    eszopiclone (LUNESTA) 3 MG tablet 30 tablet 0     Sig: Take 1 tablet by mouth Every Night, immediately before bedtime      Last office visit with prescribing clinician: 8/13/2024   Last telemedicine visit with prescribing clinician: Visit date not found   Next office visit with prescribing clinician: 2/7/2025                         Would you like a call back once the refill request has been completed: [] Yes [] No    If the office needs to give you a call back, can they leave a voicemail: [] Yes [] No    Tonya Mcdaniels MA  09/09/24, 08:51 EDT

## 2024-09-16 ENCOUNTER — OFFICE VISIT (OUTPATIENT)
Age: 44
End: 2024-09-16
Payer: COMMERCIAL

## 2024-09-16 VITALS
BODY MASS INDEX: 28.71 KG/M2 | SYSTOLIC BLOOD PRESSURE: 112 MMHG | HEART RATE: 84 BPM | OXYGEN SATURATION: 98 % | HEIGHT: 72 IN | WEIGHT: 212 LBS | DIASTOLIC BLOOD PRESSURE: 62 MMHG

## 2024-09-16 DIAGNOSIS — F41.1 GENERALIZED ANXIETY DISORDER: ICD-10-CM

## 2024-09-16 DIAGNOSIS — F32.A DEPRESSION, UNSPECIFIED DEPRESSION TYPE: ICD-10-CM

## 2024-09-16 DIAGNOSIS — Z51.81 ENCOUNTER FOR THERAPEUTIC DRUG MONITORING: Primary | ICD-10-CM

## 2024-09-16 DIAGNOSIS — F43.10 POST TRAUMATIC STRESS DISORDER (PTSD): ICD-10-CM

## 2024-09-16 PROCEDURE — 99214 OFFICE O/P EST MOD 30 MIN: CPT

## 2024-09-16 RX ORDER — BUPROPION HYDROCHLORIDE 300 MG/1
300 TABLET ORAL EVERY MORNING
Qty: 30 TABLET | Refills: 2 | Status: SHIPPED | OUTPATIENT
Start: 2024-09-16

## 2024-09-17 ENCOUNTER — PATIENT MESSAGE (OUTPATIENT)
Dept: INTERNAL MEDICINE | Facility: CLINIC | Age: 44
End: 2024-09-17
Payer: COMMERCIAL

## 2024-09-18 ENCOUNTER — PATIENT MESSAGE (OUTPATIENT)
Dept: INTERNAL MEDICINE | Facility: CLINIC | Age: 44
End: 2024-09-18
Payer: COMMERCIAL

## 2024-09-25 RX ORDER — SODIUM, POTASSIUM,MAG SULFATES 17.5-3.13G
SOLUTION, RECONSTITUTED, ORAL ORAL
Qty: 354 ML | Refills: 0 | Status: SHIPPED | OUTPATIENT
Start: 2024-09-25

## 2024-10-04 ENCOUNTER — PATIENT MESSAGE (OUTPATIENT)
Dept: INTERNAL MEDICINE | Facility: CLINIC | Age: 44
End: 2024-10-04
Payer: COMMERCIAL

## 2024-10-07 DIAGNOSIS — F51.01 PRIMARY INSOMNIA: Chronic | ICD-10-CM

## 2024-10-07 RX ORDER — ESZOPICLONE 3 MG/1
3 TABLET, FILM COATED ORAL NIGHTLY
Qty: 30 TABLET | Refills: 0 | Status: SHIPPED | OUTPATIENT
Start: 2024-10-07

## 2024-10-07 NOTE — TELEPHONE ENCOUNTER
Rx Refill Note  Requested Prescriptions     Pending Prescriptions Disp Refills    eszopiclone (LUNESTA) 3 MG tablet 30 tablet 0     Sig: Take 1 tablet by mouth Every Night, immediately before bedtime      Last refill:  9/9/24 #30/0  Last office visit with prescribing clinician: 8/13/2024   Last telemedicine visit with prescribing clinician: Visit date not found   Next office visit with prescribing clinician: 2/7/2025                         Would you like a call back once the refill request has been completed: [] Yes [] No    If the office needs to give you a call back, can they leave a voicemail: [] Yes [] No    Kelin Turner RN  10/07/24, 16:25 EDT

## 2024-10-09 ENCOUNTER — LAB REQUISITION (OUTPATIENT)
Dept: LAB | Facility: HOSPITAL | Age: 44
End: 2024-10-09
Payer: COMMERCIAL

## 2024-10-09 ENCOUNTER — OUTSIDE FACILITY SERVICE (OUTPATIENT)
Dept: GASTROENTEROLOGY | Facility: CLINIC | Age: 44
End: 2024-10-09
Payer: COMMERCIAL

## 2024-10-09 DIAGNOSIS — Z83.719 FAMILY HISTORY OF COLON POLYPS, UNSPECIFIED: ICD-10-CM

## 2024-10-09 DIAGNOSIS — Z80.0 FAMILY HISTORY OF MALIGNANT NEOPLASM OF DIGESTIVE ORGANS: ICD-10-CM

## 2024-10-09 DIAGNOSIS — D12.5 BENIGN NEOPLASM OF SIGMOID COLON: ICD-10-CM

## 2024-10-09 DIAGNOSIS — Z86.0100 PERSONAL HISTORY OF COLON POLYPS, UNSPECIFIED: ICD-10-CM

## 2024-10-09 PROCEDURE — 88305 TISSUE EXAM BY PATHOLOGIST: CPT | Performed by: INTERNAL MEDICINE

## 2024-10-14 ENCOUNTER — OFFICE VISIT (OUTPATIENT)
Age: 44
End: 2024-10-14
Payer: COMMERCIAL

## 2024-10-14 VITALS
HEIGHT: 70 IN | HEART RATE: 83 BPM | WEIGHT: 210 LBS | BODY MASS INDEX: 30.06 KG/M2 | OXYGEN SATURATION: 98 % | SYSTOLIC BLOOD PRESSURE: 122 MMHG | DIASTOLIC BLOOD PRESSURE: 72 MMHG

## 2024-10-14 DIAGNOSIS — F43.10 POST TRAUMATIC STRESS DISORDER (PTSD): ICD-10-CM

## 2024-10-14 DIAGNOSIS — F41.1 GENERALIZED ANXIETY DISORDER: Primary | ICD-10-CM

## 2024-10-14 DIAGNOSIS — F32.A DEPRESSION, UNSPECIFIED DEPRESSION TYPE: ICD-10-CM

## 2024-10-14 PROCEDURE — 99214 OFFICE O/P EST MOD 30 MIN: CPT

## 2024-10-14 PROCEDURE — 96127 BRIEF EMOTIONAL/BEHAV ASSMT: CPT

## 2024-10-14 NOTE — PROGRESS NOTES
Follow Up Office Visit      Patient Name: Eduardo Luna  : 1980   MRN: [id number]     Referring Provider: Dallas Ugarte MD    Chief Complaint:      ICD-10-CM ICD-9-CM   1. Generalized anxiety disorder  F41.1 300.02   2. Depression, unspecified depression type  F32.A 311   3. Post traumatic stress disorder (PTSD)  F43.10 309.81        History of Present Illness:   Eduardo Luna is a 44 y.o. male who is here today for follow up and medication management.    Answers submitted by the patient for this visit:  Other (Submitted on 10/7/2024)  Please describe your symptoms.: Medication check.  Have you had these symptoms before?: Yes  How long have you been having these symptoms?: Greater than 2 weeks  Please list any medications you are currently taking for this condition.: See Mychart.  Please describe any probable cause for these symptoms. : See Mychart.  Primary Reason for Visit (Submitted on 10/7/2024)  What is the primary reason for your visit?: Problem Not Listed    Subjective      Patient Reports:   Patient reports the increase dose of Wellbutrin has helped manage current symptoms. Patient reports being concerned about increased appetite and would like to begin tapering Prozac. Patient reports being on Prozac for many years.    Patient rates anxiety /10. Patient denies panic attacks. Patient denies mood swings, anger outburst and lashing out. Patient denies exaggerated sense of well being or self-confidence. Patient denies decreased need for sleep. Patient denies impulsive and risk-taking behaviors.    Patient rates depression /10. Patient denies SI/HI/AVH.     PHQ-9= 11 decreased score from previous visit  ANTONIA-7= 12 decreased score from previous visit    Review of Systems:   Review of Systems   All other systems reviewed and are negative.    Sleep pattern: 5 hours of inconsistent with frequent naps  Appetite: increased    PHQ-9 Depression Screening  Little interest or pleasure in doing  things? Over half   Feeling down, depressed, or hopeless? Several days   Trouble falling or staying asleep, or sleeping too much? Over half   Feeling tired or having little energy? Over half   Poor appetite or overeating? Almost all   Feeling bad about yourself - or that you are a failure or have let yourself or your family down? Several days   Trouble concentrating on things, such as reading the newspaper or watching television? Not at all   Moving or speaking so slowly that other people could have noticed? Or the opposite - being so fidgety or restless that you have been moving around a lot more than usual? Not at all   Thoughts that you would be better off dead, or of hurting yourself in some way? Not at all   PHQ-9 Total Score 11   If you checked off any problems, how difficult have these problems made it for you to do your work, take care of things at home, or get along with other people? Not difficult at all        ANTONIA-7 Anxiety Screening  Over the last two weeks, how often have you been bothered by the following problems?  Feeling nervous, anxious or on edge: More than half the days  Not being able to stop or control worrying: More than half the days  Worrying too much about different things: More than half the days  Trouble Relaxing: More than half the days  Being so restless that it is hard to sit still: Several days  Becoming easily annoyed or irritable: Several days  Feeling afraid as if something awful might happen: More than half the days  ANTONIA 7 Total Score: 12  If you checked any problems, how difficult have these problems made it for you to do your work, take care of things at home, or get along with other people: Somewhat difficult    RISK ASSESSMENT:  Patient denies any thoughts or intent of suicide today. Patient denies any impulsive behavior today.     Medications:     Current Outpatient Medications:     amLODIPine (NORVASC) 5 MG tablet, Take 1 tablet by mouth Daily., Disp: 90 tablet, Rfl: 1     Antiseborrheic Products, Misc. (Promiseb) cream, 1- 2 times a day, Disp: , Rfl:     atorvastatin (LIPITOR) 20 MG tablet, Take 1 tablet by mouth Daily., Disp: 90 tablet, Rfl: 1    buPROPion XL (Wellbutrin XL) 300 MG 24 hr tablet, Take 1 tablet by mouth Every Morning., Disp: 30 tablet, Rfl: 2    busPIRone (BUSPAR) 15 MG tablet, Take 1 tablet by mouth 2 (Two) Times a Day., Disp: 180 tablet, Rfl: 1    cloNIDine (Catapres) 0.1 MG tablet, Take 1 tablet by mouth 2 (Two) Times a Day As Needed for High Blood Pressure., Disp: 20 tablet, Rfl: 0    eszopiclone (LUNESTA) 3 MG tablet, Take 1 tablet by mouth Every Night immediately before bedtime., Disp: 30 tablet, Rfl: 0    hydrOXYzine (ATARAX) 25 MG tablet, Take 1 tablet by mouth 3 (Three) Times a Day As Needed for Itching or Allergies., Disp: 90 tablet, Rfl: 3    ketoconazole (NIZORAL) 2 % cream, Apply 1 Application topically to the appropriate area as directed Daily. Apply 1-2 times per day for 2-3 weeks, Then 2-3 times per week as needed, Disp: 60 g, Rfl: 1    lamoTRIgine (LaMICtal) 100 MG tablet, Take 1 tablet by mouth Daily., Disp: 90 tablet, Rfl: 3    levothyroxine (SYNTHROID, LEVOTHROID) 88 MCG tablet, Take 1 tablet by mouth Every Morning Before Breakfast., Disp: 90 tablet, Rfl: 1    metoprolol succinate XL (TOPROL-XL) 100 MG 24 hr tablet, Take 1 tablet by mouth Daily., Disp: 90 tablet, Rfl: 1    pantoprazole (PROTONIX) 40 MG EC tablet, Take 1 tablet by mouth Daily., Disp: 90 tablet, Rfl: 1    spironolactone (Aldactone) 25 MG tablet, Take 1 tablet by mouth Daily., Disp: 90 tablet, Rfl: 1    tacrolimus (PROTOPIC) 0.1 % ointment, Apply 1 Application topically to the appropriate area as directed twice daily for 2 weeks; THEN use 2-3 times per week as needed., Disp: 60 g, Rfl: 1    vitamin D3 125 MCG (5000 UT) capsule capsule, Take 1 capsule by mouth Daily., Disp: , Rfl:     clindamycin (CLEOCIN T) 1 % external solution, Apply 1 application topically to the appropriate area  "as directed once or twice a Day As Needed., Disp: 60 mL, Rfl: 2    FLUoxetine (PROzac) 20 MG capsule, Take 1 capsule by mouth Daily., Disp: 30 capsule, Rfl: 2    sodium-potassium-magnesium sulfates (Suprep Bowel Prep Kit) 17.5-3.13-1.6 GM/177ML solution oral solution, Use as directed by provider for colonoscopy prep, Disp: 354 mL, Rfl: 0    Medication Considerations:  KENNY reviewed and appropriate.      Allergies:   Allergies   Allergen Reactions    Lisinopril Other (See Comments)     Throat tickle    Amoxicillin Hives, Itching and Rash       Results Reviewed:     Objective     Physical Exam:  Vital Signs:   Vitals:    10/14/24 1517   BP: 122/72   Pulse: 83   SpO2: 98%   Weight: 95.3 kg (210 lb)   Height: 177.8 cm (70\")     Body mass index is 30.13 kg/m².     Mental Status Exam:   MENTAL STATUS EXAM   General Appearance:  Cleanly groomed and dressed and well developed  Eye Contact:  Good eye contact  Attitude:  Cooperative  Motor Activity:  Normal gait, posture and fidgety  Muscle Strength:  Normal  Speech:  Normal rate, tone, volume  Language:  Spontaneous  Mood and affect:  Normal, pleasant and anxious  Hopelessness:  1  Loneliness: 1  Thought Process:  Logical  Associations/ Thought Content:  No delusions  Hallucinations:  None  Suicidal Ideations:  Not present  Homicidal Ideation:  Not present  Sensorium:  Alert and clear  Orientation:  Person, place, time and situation  Immediate Recall, Recent, and Remote Memory:  Intact  Attention Span/ Concentration:  Good  Fund of Knowledge:  Appropriate for age and educational level  Intellectual Functioning:  Average range  Insight:  Good  Judgement:  Good  Reliability:  Good  Impulse Control:  Good       @RESULASTCBCDIFFPANEL,TSH,LABLIPI,[id number],QOIXTQIO75,MG,FOLATE,PROLACTIN,CRPRESULT,CMP,[id number])@    Lab Results   Component Value Date    GLUCOSE 88 08/14/2024    BUN 16 08/14/2024    CREATININE 1.20 08/14/2024    EGFR 76.5 08/14/2024    BCR 13.3 08/14/2024    K " 4.8 08/14/2024    CO2 25.0 08/14/2024    CALCIUM 9.8 08/14/2024    ALBUMIN 4.8 06/18/2024    BILITOT 0.5 06/18/2024    AST 27 06/18/2024    ALT 45 (H) 06/18/2024       Lab Results   Component Value Date    WBC 11.34 (H) 06/18/2024    HGB 16.6 06/18/2024    HCT 48.4 06/18/2024    MCV 89.1 06/18/2024     06/18/2024       Lab Results   Component Value Date    CHOL 194 01/11/2024    CHLPL 229 (H) 03/18/2016    TRIG 127 01/11/2024    HDL 51 01/11/2024     (H) 01/11/2024       Assessment / Plan      Visit Diagnosis/Orders Placed This Visit:  Diagnoses and all orders for this visit:    1. Generalized anxiety disorder (Primary)    2. Depression, unspecified depression type    3. Post traumatic stress disorder (PTSD)    Other orders  -     FLUoxetine (PROzac) 20 MG capsule; Take 1 capsule by mouth Daily.  Dispense: 30 capsule; Refill: 2    Functional Status: Mild impairment     Prognosis: Good with Ongoing Treatment     Impression/Formulation:  Patient appeared alert and oriented.  Patient is voluntarily requesting to continue outpatient psychiatric treatment [facility].  Patient is receptive to assistance with maintaining a stable lifestyle.  Patient presents with history of     ICD-10-CM ICD-9-CM   1. Generalized anxiety disorder  F41.1 300.02   2. Depression, unspecified depression type  F32.A 311   3. Post traumatic stress disorder (PTSD)  F43.10 309.81   .    Reviewed patient's previous provider notes. Reviewed most recent labs. Patient meets DSM V diagnostic criteria for diagnoses. Diagnoses may be updated as more information becomes available.     Treatment Plan:   Continue with hydroxyzine, lamictal, wellbutrin, lunesta and buspar as prescribed  Decrease Prozac to 20mg PO qam for 2 weeks and then discontinue medication.   Follow up in 4 weeks and as needed    Patient will continue supportive psychotherapy efforts and medications as indicated. Clinic will  obtain release of information for current treatment team for continuity of care as needed. Patient will contact this office, call 911 or present to the nearest emergency room should suicidal or homicidal ideations occur.  Discussed medication options and treatment plan of prescribed medication(s) as well as the risks, benefits, and potential side effects. Patient acknowledged and verbally consented to continue with current treatment plan and was educated on the importance of compliance with treatment and follow-up appointments.     Quality Measures:   Never smoker    I advised Eduardo Luna of the risks of tobacco use.     Follow Up:   Return in about 4 weeks (around 11/11/2024).      CORINNE Woodruff  [facility] [address]

## 2024-10-24 ENCOUNTER — OFFICE VISIT (OUTPATIENT)
Dept: CARDIOLOGY | Facility: CLINIC | Age: 44
End: 2024-10-24
Payer: COMMERCIAL

## 2024-10-24 VITALS
HEART RATE: 86 BPM | OXYGEN SATURATION: 96 % | BODY MASS INDEX: 28.36 KG/M2 | DIASTOLIC BLOOD PRESSURE: 70 MMHG | WEIGHT: 209.4 LBS | SYSTOLIC BLOOD PRESSURE: 110 MMHG | HEIGHT: 72 IN

## 2024-10-24 DIAGNOSIS — I47.19 PAT (PAROXYSMAL ATRIAL TACHYCARDIA): Primary | ICD-10-CM

## 2024-10-24 DIAGNOSIS — E78.2 MIXED HYPERLIPIDEMIA: ICD-10-CM

## 2024-10-24 DIAGNOSIS — I10 ESSENTIAL HYPERTENSION: Chronic | ICD-10-CM

## 2024-10-24 DIAGNOSIS — C81.90 HODGKIN LYMPHOMA, UNSPECIFIED HODGKIN LYMPHOMA TYPE, UNSPECIFIED BODY REGION: ICD-10-CM

## 2024-10-24 DIAGNOSIS — Z92.3 HISTORY OF HEAD AND NECK RADIATION: ICD-10-CM

## 2024-10-24 PROCEDURE — 99214 OFFICE O/P EST MOD 30 MIN: CPT | Performed by: INTERNAL MEDICINE

## 2024-10-24 RX ORDER — METOPROLOL TARTRATE 50 MG
50 TABLET ORAL
OUTPATIENT
Start: 2024-10-24

## 2024-10-24 RX ORDER — IVABRADINE 5 MG/1
15 TABLET, FILM COATED ORAL ONCE
OUTPATIENT
Start: 2024-10-24 | End: 2024-10-24

## 2024-10-24 RX ORDER — SODIUM CHLORIDE 0.9 % (FLUSH) 0.9 %
10 SYRINGE (ML) INJECTION EVERY 12 HOURS SCHEDULED
OUTPATIENT
Start: 2024-10-24

## 2024-10-24 RX ORDER — NITROGLYCERIN 0.4 MG/1
0.8 TABLET SUBLINGUAL
OUTPATIENT
Start: 2024-10-24

## 2024-10-24 RX ORDER — METOPROLOL TARTRATE 25 MG/1
25 TABLET, FILM COATED ORAL ONCE
OUTPATIENT
Start: 2024-10-24

## 2024-10-24 RX ORDER — METOPROLOL TARTRATE 25 MG/1
150 TABLET, FILM COATED ORAL ONCE
OUTPATIENT
Start: 2024-10-24

## 2024-10-24 RX ORDER — SODIUM CHLORIDE 9 MG/ML
40 INJECTION, SOLUTION INTRAVENOUS AS NEEDED
OUTPATIENT
Start: 2024-10-24 | End: 2024-10-24

## 2024-10-24 RX ORDER — METOPROLOL TARTRATE 1 MG/ML
5 INJECTION, SOLUTION INTRAVENOUS
OUTPATIENT
Start: 2024-10-24

## 2024-10-24 RX ORDER — METOPROLOL TARTRATE 25 MG/1
100 TABLET, FILM COATED ORAL ONCE
OUTPATIENT
Start: 2024-10-24

## 2024-10-24 RX ORDER — NITROGLYCERIN 0.4 MG/1
0.4 TABLET SUBLINGUAL
OUTPATIENT
Start: 2024-10-24 | End: 2024-10-24

## 2024-10-24 RX ORDER — METOPROLOL TARTRATE 50 MG
TABLET ORAL
Qty: 2 TABLET | Refills: 0 | Status: SHIPPED | OUTPATIENT
Start: 2024-10-24

## 2024-10-24 RX ORDER — METOPROLOL TARTRATE 25 MG/1
50 TABLET, FILM COATED ORAL ONCE
OUTPATIENT
Start: 2024-10-24

## 2024-10-24 RX ORDER — METOPROLOL TARTRATE 25 MG/1
200 TABLET, FILM COATED ORAL ONCE
OUTPATIENT
Start: 2024-10-24 | End: 2024-10-24

## 2024-10-24 RX ORDER — SODIUM CHLORIDE 0.9 % (FLUSH) 0.9 %
10 SYRINGE (ML) INJECTION AS NEEDED
OUTPATIENT
Start: 2024-10-24

## 2024-10-24 NOTE — PROGRESS NOTES
OFFICE VISIT  NOTE  [facility] CARDIOLOGY      Name: Eduardo Luna    Date: 10/24/2024  MRN:  [id number]  :  1980      REFERRING/PRIMARY PROVIDER:  Dallas Ugarte MD     Chief Complaint   Patient presents with    Essential hypertension     1-Yr F/U     HPI: Eduardo Luna is a 44 y.o. male who presents today for follow up of hypertension and palpitations.   He is a pharmacy technician at [employer]. History of Hodgkin's lymphoma with chemotherapy and chest radiation in .  Also history of SVT status post ablation with Dr. Daly in .    Stress MPS 10/2022 negative for ischemia, no coronary calcium noted. Echo with normal EF, no valvular abnormalities.  He underwent Inspire implant for JAVON last year, doing well. Denies chest pain or dyspnea. Has occasional palpitations, a few times a week but last only a brief second. He is wondering if he needs further screening due to family history of aneurysms in his father and paternal grandfather and history of chemo/radiation to chest and neck.     ROS:Pertinent positives as listed in the HPI.  All other systems reviewed and negative.    Past Medical History:   Diagnosis Date    Allergic     Since childhood    Anxiety     Since childhood    Arrhythmia     Cancer     hodgkins lymphoma    Colon polyp     Depression     Epididymitis     GERD (gastroesophageal reflux disease)     History of medical problems     Facial seborriheic dermatitis    Hyperlipidemia     Hypertension     Hypothyroidism     Acquired from radiation therapy    Kidney stone     diagnosed with C.T scan in [place]    Migraine     Pyelonephritis 2018    Kidney stones    Sleep apnea     SVT (supraventricular tachycardia)        Past Surgical History:   Procedure Laterality Date    ABLATION OF DYSRHYTHMIC FOCUS      Dr. George Daly-EP study    CARDIAC ABLATION  2008    SVT    COLONOSCOPY      Dr. Yovani Aden    CYSTOSCOPY  23    KIDNEY  STONE SURGERY  24    LITHOTRIPSY  24    LYMPH NODE BIOPSY  2001    @ [facility] with Dr. Daniel Marinelli    MOLE REMOVAL  2016    Dr Loyd at [facility] dermatology.     PORTACATH PLACEMENT  2002    [place] Placement @ [facility] with Dr. Daniel Marinelli    SEPTOPLASTY  10/18/2018    Dr. Lexa Castillo    SEPTOPLASTY, RESECTION INFERIOR TURBINATES Bilateral 10/18/2018    Procedure: SEPTOPLASTY, BILATERAL RESECTION INFERIOR TURBINATES;  Surgeon: Lexa Castillo MD;  Location: [facility];  Service: ENT    TONSILLECTOMY  2013    @ [facility] with Lexa Castillo    VASECTOMY  2012    @ [facility] with Dr. Aidan Castellano    WISDOM TOOTH EXTRACTION  10/1997    @ [facility] with Dr. Momo Nunn       Social History     Socioeconomic History    Marital status: Significant Other   Tobacco Use    Smoking status: Never     Passive exposure: Never    Smokeless tobacco: Never   Vaping Use    Vaping status: Never Used   Substance and Sexual Activity    Alcohol use: No    Drug use: Never    Sexual activity: Yes     Partners: Female     Birth control/protection: Vasectomy     Comment: [id number]. [employer]       Family History   Problem Relation Age of Onset    Hypertension Mother     Depression Mother     Hyperlipidemia Mother     Cancer Mother         Soft tissue sarcoma- 2003    Arrhythmia Mother         Tachycardia    Colon cancer Father     Colon polyps Father     Hypertension Father     Hyperlipidemia Father     Cancer Father         Colon cancer- 10-    No Known Problems Half-Sister     Anxiety disorder Half-Sister     Breast cancer Half-Sister     No Known Problems Half-Sister     Hyperlipidemia Half-Brother     Hypertension Half-Brother     Heart disease Paternal Uncle         CABG    Heart attack Paternal Uncle             Arthritis Maternal Grandmother     Asthma Maternal Grandmother     Hyperlipidemia Maternal Grandmother     Hypertension Maternal Grandmother      "Depression Maternal Grandmother     COPD Maternal Grandmother     Hypertension Maternal Grandfather                 Allergies   Allergen Reactions    Lisinopril Other (See Comments)     Throat tickle    Amoxicillin Hives, Itching and Rash       Current Outpatient Medications   Medication Instructions    amLODIPine (NORVASC) 5 mg, Oral, Daily    atorvastatin (LIPITOR) 20 mg, Oral, Daily    buPROPion XL (WELLBUTRIN XL) 300 mg, Oral, Every Morning    busPIRone (BUSPAR) 15 mg, Oral, 2 Times Daily    cloNIDine (CATAPRES) 0.1 mg, Oral, 2 Times Daily PRN    eszopiclone (LUNESTA) 3 MG tablet Take 1 tablet by mouth Every Night immediately before bedtime.    FLUoxetine (PROZAC) 20 mg, Oral, Daily    hydrOXYzine (ATARAX) 25 mg, Oral, 3 Times Daily PRN    ketoconazole (NIZORAL) 2 % cream 1 Application, Topical, Daily, Apply 1-2 times per day for 2-3 weeks, Then 2-3 times per week as needed    lamoTRIgine (LAMICTAL) 100 mg, Oral, Daily    levothyroxine (SYNTHROID, LEVOTHROID) 88 mcg, Oral, Every Morning Before Breakfast    metoprolol succinate XL (TOPROL-XL) 100 mg, Oral, Daily    metoprolol tartrate (LOPRESSOR) 50 MG tablet Take 1 tablet by mouth at Bedtime the Night Before Coronary CTA Appointment AND 1 tablet in the Morning 1 Hour Prior to Coronary CTA Appointment. Do not take if heart rate less than 60.    pantoprazole (PROTONIX) 40 mg, Oral, Daily    spironolactone (ALDACTONE) 25 mg, Oral, Daily    tacrolimus (PROTOPIC) 0.1 % ointment Apply 1 Application topically to the appropriate area as directed twice daily for 2 weeks; THEN use 2-3 times per week as needed.    vitamin D3 125 MCG (5000 UT) capsule capsule 1 capsule, Daily       Vitals:    10/24/24 1449   BP: 110/70   BP Location: Left arm   Patient Position: Sitting   Cuff Size: Adult   Pulse: 86   SpO2: 96%   Weight: 95 kg (209 lb 6.4 oz)   Height: 182.9 cm (72\")     Body mass index is 28.4 kg/m².    PHYSICAL EXAM:    General Appearance:   well developed  well " nourished  Neck:  thyroid not enlarged  supple  Respiratory:  no respiratory distress  normal breath sounds  no rales  Cardiovascular:  no jugular venous distention  regular rhythm  apical impulse normal  S1 normal, S2 normal  no S3, no S4   no murmur  no rub, no thrill  lower extremity edema: none    Skin:   warm, dry    RESULTS:   Procedures    Results for orders placed during the hospital encounter of 10/28/22    Adult Transthoracic Echo Complete W/ Cont if Necessary Per Protocol    Interpretation Summary    Left ventricular systolic function is normal. Calculated left ventricular EF = 57% Left ventricular ejection fraction appears to be 56 - 60%.    Left ventricular diastolic function was normal.    No significant structural or functional valvular disease.        Labs:  Lab Results   Component Value Date    CHOL 194 01/11/2024    TRIG 127 01/11/2024    HDL 51 01/11/2024     (H) 01/11/2024    AST 27 06/18/2024    ALT 45 (H) 06/18/2024     Lab Results   Component Value Date    HGBA1C 5.20 01/18/2023     Creatinine   Date Value Ref Range Status   08/14/2024 1.20 0.76 - 1.27 mg/dL Final   06/18/2024 1.89 (H) 0.76 - 1.27 mg/dL Final   01/11/2024 1.28 (H) 0.76 - 1.27 mg/dL Final     eGFR Non  Amer   Date Value Ref Range Status   01/31/2022 82 >60 mL/min/1.73 Final   07/12/2021 81 >60 mL/min/1.73 Final   03/30/2021 97 >60 mL/min/1.73 Final         ASSESSMENT:  Problem List Items Addressed This Visit       PAT (paroxysmal atrial tachycardia) - Primary    Overview     Post ablation 2008         Relevant Medications    metoprolol tartrate (LOPRESSOR) 50 MG tablet    Other Relevant Orders    CT Angiogram Coronary    CT Angiogram Coronary-Cardiology Interpretation    Duplex Carotid Ultrasound CAR    Essential hypertension (Chronic)    Overview     WHITECOAT         Relevant Medications    metoprolol tartrate (LOPRESSOR) 50 MG tablet    Other Relevant Orders    CT Angiogram Coronary    CT Angiogram  Coronary-Cardiology Interpretation    Duplex Carotid Ultrasound CAR    Mixed hyperlipidemia    Relevant Orders    CT Angiogram Coronary    CT Angiogram Coronary-Cardiology Interpretation    Duplex Carotid Ultrasound CAR     Other Visit Diagnoses       Hodgkin lymphoma, unspecified Hodgkin lymphoma type, unspecified body region        Relevant Orders    CT Angiogram Coronary    CT Angiogram Coronary-Cardiology Interpretation    Duplex Carotid Ultrasound CAR    History of head and neck radiation        Relevant Orders    Duplex Carotid Ultrasound CAR            PLAN:  1.  Chest pain:  Stress MPS 10/2022 negative for ischemia, no coronary calcium noted  Continue risk factor modification   Asymptomatic currently      2.  Dyspnea on exertion:  Echocardiogram 10/2022 with normal EF, no significant valvular abnormalities   Increase aerobic exercise     3.  Hypertension:  Long-term goal blood pressure is 130/80  BP well controlled      4.  Hyperlipidemia:  Lipid panel reviewed, goal LDL less than 100, currently controlled on atorvastatin.     5.  History of paroxysmal SVT status post ablation in 2008:  Continue metoprolol, most recent Holter in 2021 normal  Brief occasional palpitations currently          Advance Care Planning   ACP discussion was held with the patient during this visit. Patient has an advance directive in EMR which is still valid.       Scribed for Marcos Gonsalez MD by Bianca DURHAM    Follow-up   Return in about 1 year (around 10/24/2025).    I,Marcos Gonsalez M.D., personally performed the services described in this documentation as scribed by the above named individual in my presence, and it is both accurate and complete.    Marcos Gonsalez MD, FACC, [facility]  10/25/24  10:31 EDT

## 2024-10-25 ENCOUNTER — PATIENT MESSAGE (OUTPATIENT)
Dept: INTERNAL MEDICINE | Facility: CLINIC | Age: 44
End: 2024-10-25
Payer: COMMERCIAL

## 2024-10-25 DIAGNOSIS — Z23 NEED FOR HEPATITIS A IMMUNIZATION: ICD-10-CM

## 2024-10-25 DIAGNOSIS — Z23 NEED FOR PNEUMOCOCCAL 20-VALENT CONJUGATE VACCINATION: Primary | ICD-10-CM

## 2024-10-29 LAB
1OH-MIDAZOLAM UR QL SCN: NOT DETECTED NG/MG CREAT
6MAM UR QL SCN: NEGATIVE NG/ML
7AMINOCLONAZEPAM/CREAT UR: NOT DETECTED NG/MG CREAT
A-OH ALPRAZ/CREAT UR: NOT DETECTED NG/MG CREAT
A-OH-TRIAZOLAM/CREAT UR CFM: NOT DETECTED NG/MG CREAT
ACP UR QL CFM: PRESENT
ALPRAZ/CREAT UR CFM: NOT DETECTED NG/MG CREAT
AMPHET UR CFM-MCNC: NOT DETECTED NG/MG CREAT
AMPHETAMINES UR QL SCN: NORMAL NG/ML
AMPHETAMINES UR QL: NEGATIVE
APAP UR QL SCN: NEGATIVE UG/ML
BARBITURATES UR QL SCN: NEGATIVE NG/ML
BENZODIAZ SCN METH UR: NEGATIVE
BUPRENORPHINE UR QL SCN: NEGATIVE
BUPRENORPHINE/CREAT UR: NOT DETECTED NG/MG CREAT
CANNABINOIDS UR QL SCN: NEGATIVE NG/ML
CARISOPRODOL UR QL: NEGATIVE NG/ML
CLONAZEPAM/CREAT UR CFM: NOT DETECTED NG/MG CREAT
COCAINE+BZE UR QL SCN: NEGATIVE NG/ML
CREAT UR-MCNC: 268 MG/DL
D-METHORPHAN UR-MCNC: NOT DETECTED NG/ML
D-METHORPHAN+LEVORPHANOL UR QL: NOT DETECTED
DESALKYLFLURAZ/CREAT UR: NOT DETECTED NG/MG CREAT
DIAZEPAM/CREAT UR: NOT DETECTED NG/MG CREAT
ETHANOL UR QL SCN: NEGATIVE G/DL
ETHANOL UR QL SCN: NEGATIVE NG/ML
FENTANYL CTO UR SCN-MCNC: NEGATIVE NG/ML
FENTANYL/CREAT UR: NOT DETECTED NG/MG CREAT
FLUNITRAZEPAM UR QL SCN: NOT DETECTED NG/MG CREAT
GABAPENTIN UR-MCNC: NEGATIVE UG/ML
HALLUCINOGENS UR: NEGATIVE
HYPNOTICS UR QL SCN: NORMAL
KETAMINE UR QL: NOT DETECTED
LORAZEPAM/CREAT UR: NOT DETECTED NG/MG CREAT
MDA UR CFM-MCNC: NOT DETECTED NG/MG CREAT
MDMA UR CFM-MCNC: NOT DETECTED NG/MG CREAT
MEPERIDINE UR QL SCN: NEGATIVE NG/ML
METHADONE UR QL SCN: NEGATIVE NG/ML
METHADONE+METAB UR QL SCN: NEGATIVE NG/ML
METHAMPHET UR CFM-MCNC: NOT DETECTED NG/MG CREAT
MIDAZOLAM/CREAT UR CFM: NOT DETECTED NG/MG CREAT
MISCELLANEOUS, UR: NEGATIVE
NORBUPRENORPHINE/CREAT UR: NOT DETECTED NG/MG CREAT
NORDIAZEPAM/CREAT UR: NOT DETECTED NG/MG CREAT
NORFENTANYL/CREAT UR: NOT DETECTED NG/MG CREAT
NORFLUNITRAZEPAM UR-MCNC: NOT DETECTED NG/MG CREAT
NORKETAMINE UR-MCNC: NOT DETECTED UG/ML
OPIATES UR SCN-MCNC: NEGATIVE NG/ML
OXAZEPAM/CREAT UR: NOT DETECTED NG/MG CREAT
OXYCODONE CTO UR SCN-MCNC: NEGATIVE NG/ML
PCP UR QL SCN: NEGATIVE NG/ML
PRESCRIBED MEDICATIONS: NORMAL
PROPOXYPH UR QL SCN: NEGATIVE NG/ML
TAPENTADOL CTO UR SCN-MCNC: NEGATIVE NG/ML
TEMAZEPAM/CREAT UR: NOT DETECTED NG/MG CREAT
TRAMADOL UR QL SCN: NEGATIVE NG/ML
ZALEPLON UR-MCNC: NOT DETECTED NG/ML
ZOLPIDEM PHENYL-4-CARB UR QL SCN: NOT DETECTED
ZOLPIDEM UR QL SCN: NOT DETECTED
ZOPICLONE-N-OXIDE UR-MCNC: PRESENT NG/ML

## 2024-11-07 DIAGNOSIS — F51.01 PRIMARY INSOMNIA: Chronic | ICD-10-CM

## 2024-11-07 RX ORDER — ESZOPICLONE 3 MG/1
3 TABLET, FILM COATED ORAL NIGHTLY
Qty: 30 TABLET | Refills: 0 | Status: SHIPPED | OUTPATIENT
Start: 2024-11-07 | End: 2024-11-07 | Stop reason: SDUPTHER

## 2024-11-07 RX ORDER — ESZOPICLONE 3 MG/1
3 TABLET, FILM COATED ORAL NIGHTLY
Qty: 30 TABLET | Refills: 0 | Status: SHIPPED | OUTPATIENT
Start: 2024-11-07

## 2024-11-07 NOTE — TELEPHONE ENCOUNTER
Rx Refill Note  Requested Prescriptions     Pending Prescriptions Disp Refills    eszopiclone (LUNESTA) 3 MG tablet 30 tablet 0     Sig: Take 1 tablet by mouth Every Night immediately before bedtime.      Last office visit with prescribing clinician: 8/13/2024   Last telemedicine visit with prescribing clinician: Visit date not found   Next office visit with prescribing clinician: 2/7/2025     LA: 10/7/24 #30 0R                        Would you like a call back once the refill request has been completed: [] Yes [] No    If the office needs to give you a call back, can they leave a voicemail: [] Yes [] No    Lela Jones MA  11/07/24, 14:47 EST

## 2024-11-08 ENCOUNTER — LAB (OUTPATIENT)
Dept: LAB | Facility: HOSPITAL | Age: 44
End: 2024-11-08
Payer: COMMERCIAL

## 2024-11-08 DIAGNOSIS — E03.9 HYPOTHYROIDISM (ACQUIRED): ICD-10-CM

## 2024-11-08 LAB
T4 FREE SERPL-MCNC: 1.35 NG/DL (ref 0.92–1.68)
TSH SERPL DL<=0.05 MIU/L-ACNC: 1.86 UIU/ML (ref 0.27–4.2)

## 2024-11-08 PROCEDURE — 84439 ASSAY OF FREE THYROXINE: CPT

## 2024-11-08 PROCEDURE — 84443 ASSAY THYROID STIM HORMONE: CPT

## 2024-11-14 ENCOUNTER — OFFICE VISIT (OUTPATIENT)
Age: 44
End: 2024-11-14
Payer: COMMERCIAL

## 2024-11-14 VITALS
DIASTOLIC BLOOD PRESSURE: 76 MMHG | HEART RATE: 83 BPM | OXYGEN SATURATION: 97 % | WEIGHT: 209 LBS | BODY MASS INDEX: 28.35 KG/M2 | SYSTOLIC BLOOD PRESSURE: 106 MMHG

## 2024-11-14 DIAGNOSIS — F43.10 POST TRAUMATIC STRESS DISORDER (PTSD): ICD-10-CM

## 2024-11-14 DIAGNOSIS — F41.1 GENERALIZED ANXIETY DISORDER: Primary | ICD-10-CM

## 2024-11-14 DIAGNOSIS — F51.01 PRIMARY INSOMNIA: ICD-10-CM

## 2024-11-14 DIAGNOSIS — T78.40XD ALLERGY, SUBSEQUENT ENCOUNTER: ICD-10-CM

## 2024-11-14 DIAGNOSIS — F32.A DEPRESSION, UNSPECIFIED DEPRESSION TYPE: ICD-10-CM

## 2024-11-14 RX ORDER — HYDROXYZINE HYDROCHLORIDE 25 MG/1
25 TABLET, FILM COATED ORAL 3 TIMES DAILY PRN
Qty: 90 TABLET | Refills: 3 | Status: SHIPPED | OUTPATIENT
Start: 2024-11-14

## 2024-11-14 RX ORDER — BUSPIRONE HYDROCHLORIDE 15 MG/1
15 TABLET ORAL 2 TIMES DAILY
Qty: 180 TABLET | Refills: 1 | Status: SHIPPED | OUTPATIENT
Start: 2024-11-14

## 2024-11-14 RX ORDER — BUPROPION HYDROCHLORIDE 300 MG/1
300 TABLET ORAL EVERY MORNING
Qty: 30 TABLET | Refills: 2 | Status: SHIPPED | OUTPATIENT
Start: 2024-11-14

## 2024-11-14 RX ORDER — LAMOTRIGINE 25 MG/1
75 TABLET ORAL DAILY
Qty: 90 TABLET | Refills: 0 | Status: SHIPPED | OUTPATIENT
Start: 2024-11-14 | End: 2024-12-14

## 2024-11-14 NOTE — PROGRESS NOTES
Follow Up Office Visit      Patient Name: Eduardo Luna  : 1980   MRN: [id number]     Referring Provider: Dallas Ugarte MD    Chief Complaint:      ICD-10-CM ICD-9-CM   1. Generalized anxiety disorder  F41.1 300.02   2. Depression, unspecified depression type  F32.A 311   3. Post traumatic stress disorder (PTSD)  F43.10 309.81   4. Primary insomnia  F51.01 307.42   5. ANTONIA (generalized anxiety disorder)  F41.1 300.02   6. Allergy, subsequent encounter  T78.40XD V58.89        History of Present Illness:   Eduardo Luna is a 44 y.o. male who is here today for follow up and medication management.         Subjective      Patient Reports:   Patient reports discontinuing Prozac on 10/31. Patient denies experiencing any negative side effects or withdrawal symptoms from discontinuation of medication. Patient reports mood is more stabilized since being off of Prozac.  Patient reports interest in decreasing dose of Lamictal.  Patient reports concerns about increased appetite.    Patient rates anxiety 6/10. Patient denies panic attacks. Patient denies mood swings, anger outburst and lashing out. Patient denies exaggerated sense of well being or self-confidence. Patient denies decreased need for sleep. Patient denies impulsive and risk-taking behaviors.    Patient rates depression 2/10. Patient denies SI/HI/AVH.    PHQ-9= 9 decreased score from previous visit  ANTONIA-7= 9 decreased score from previous visit    Review of Systems:   Review of Systems   Psychiatric/Behavioral:  The patient is nervous/anxious.    All other systems reviewed and are negative.  Sleep pattern: 5 hours of inconsistent sleep with nap  Appetite: increased     PHQ-9 Depression Screening  Little interest or pleasure in doing things? Several days   Feeling down, depressed, or hopeless? Several days   PHQ-2 Total Score 2   Trouble falling or staying asleep, or sleeping too much? Almost all   Feeling tired or having little energy? Not at  all   Poor appetite or overeating? Several days   Feeling bad about yourself - or that you are a failure or have let yourself or your family down? Over half   Trouble concentrating on things, such as reading the newspaper or watching television? Not at all   Moving or speaking so slowly that other people could have noticed? Or the opposite - being so fidgety or restless that you have been moving around a lot more than usual? Not at all   Thoughts that you would be better off dead, or of hurting yourself in some way? Several days   PHQ-9 Total Score 9   If you checked off any problems, how difficult have these problems made it for you to do your work, take care of things at home, or get along with other people? Somewhat difficult       ANTONIA-7 Anxiety Screening  Over the last two weeks, how often have you been bothered by the following problems?  Feeling nervous, anxious or on edge: Several days  Not being able to stop or control worrying: More than half the days  Worrying too much about different things: More than half the days  Trouble Relaxing: Several days  Being so restless that it is hard to sit still: Several days  Becoming easily annoyed or irritable: Several days  Feeling afraid as if something awful might happen: Several days  ANTONIA 7 Total Score: 9  If you checked any problems, how difficult have these problems made it for you to do your work, take care of things at home, or get along with other people: Not difficult at all    RISK ASSESSMENT:  Patient denies any thoughts or intent of suicide today. Patient denies any impulsive behavior today.     Medications:     Current Outpatient Medications:     amLODIPine (NORVASC) 5 MG tablet, Take 1 tablet by mouth Daily., Disp: 90 tablet, Rfl: 1    atorvastatin (LIPITOR) 20 MG tablet, Take 1 tablet by mouth Daily., Disp: 90 tablet, Rfl: 1    buPROPion XL (Wellbutrin XL) 300 MG 24 hr tablet, Take 1 tablet by mouth Every Morning., Disp: 30 tablet, Rfl: 2    busPIRone  (BUSPAR) 15 MG tablet, Take 1 tablet by mouth 2 (Two) Times a Day., Disp: 180 tablet, Rfl: 1    cloNIDine (Catapres) 0.1 MG tablet, Take 1 tablet by mouth 2 (Two) Times a Day As Needed for High Blood Pressure., Disp: 20 tablet, Rfl: 0    eszopiclone (LUNESTA) 3 MG tablet, Take 1 tablet by mouth Every Night immediately before bedtime., Disp: 30 tablet, Rfl: 0    hydrOXYzine (ATARAX) 25 MG tablet, Take 1 tablet by mouth 3 (Three) Times a Day As Needed for Itching or Allergies., Disp: 90 tablet, Rfl: 3    ketoconazole (NIZORAL) 2 % cream, Apply 1 Application topically to the appropriate area as directed Daily. Apply 1-2 times per day for 2-3 weeks, Then 2-3 times per week as needed, Disp: 60 g, Rfl: 1    levothyroxine (SYNTHROID, LEVOTHROID) 88 MCG tablet, Take 1 tablet by mouth Every Morning Before Breakfast., Disp: 90 tablet, Rfl: 1    metoprolol succinate XL (TOPROL-XL) 100 MG 24 hr tablet, Take 1 tablet by mouth Daily., Disp: 90 tablet, Rfl: 1    metoprolol tartrate (LOPRESSOR) 50 MG tablet, Take 1 tablet by mouth at Bedtime the Night Before Coronary CTA Appointment AND 1 tablet in the Morning 1 Hour Prior to Coronary CTA Appointment. Do not take if heart rate less than 60., Disp: 2 tablet, Rfl: 0    pantoprazole (PROTONIX) 40 MG EC tablet, Take 1 tablet by mouth Daily., Disp: 90 tablet, Rfl: 1    spironolactone (Aldactone) 25 MG tablet, Take 1 tablet by mouth Daily., Disp: 90 tablet, Rfl: 1    tacrolimus (PROTOPIC) 0.1 % ointment, Apply 1 Application topically to the appropriate area as directed twice daily for 2 weeks; THEN use 2-3 times per week as needed., Disp: 60 g, Rfl: 1    vitamin D3 125 MCG (5000 UT) capsule capsule, Take 1 capsule by mouth Daily., Disp: , Rfl:     lamoTRIgine (LaMICtal) 25 MG tablet, Take 3 tablets by mouth Daily for 30 days., Disp: 90 tablet, Rfl: 0    Medication Considerations:  KENNY reviewed and appropriate.      Allergies:   Allergies   Allergen Reactions    Lisinopril Other (See  Comments)     Throat tickle    Amoxicillin Hives, Itching and Rash       Results Reviewed:     Objective     Physical Exam:  Vital Signs:   Vitals:    11/14/24 1447   BP: 106/76   Pulse: 83   SpO2: 97%   Weight: 94.8 kg (209 lb)     Body mass index is 28.35 kg/m².     Mental Status Exam:   MENTAL STATUS EXAM   General Appearance:  Cleanly groomed and dressed and well developed  Eye Contact:  Good eye contact  Attitude:  Cooperative  Motor Activity:  Normal gait, posture  Muscle Strength:  Normal  Speech:  Normal rate, tone, volume  Language:  Spontaneous  Mood and affect:  Normal, pleasant and anxious  Hopelessness:  Denies  Loneliness: 2  Thought Process:  Logical  Associations/ Thought Content:  No delusions  Hallucinations:  None  Suicidal Ideations:  Not present  Homicidal Ideation:  Not present  Sensorium:  Alert and clear  Orientation:  Person, place, time and situation  Immediate Recall, Recent, and Remote Memory:  Intact  Attention Span/ Concentration:  Good  Fund of Knowledge:  Appropriate for age and educational level  Intellectual Functioning:  Average range  Insight:  Good  Judgement:  Good  Reliability:  Good  Impulse Control:  Good       @RESULASTCBCDIFFPANEL,TSH,LABLIPI,GGIVZBZG39,TTICPGTL74,MG,FOLATE,PROLACTIN,CRPRESULT,CMP,V5EPABAVBUT)@    Lab Results   Component Value Date    GLUCOSE 88 08/14/2024    BUN 16 08/14/2024    CREATININE 1.20 08/14/2024    EGFR 76.5 08/14/2024    BCR 13.3 08/14/2024    K 4.8 08/14/2024    CO2 25.0 08/14/2024    CALCIUM 9.8 08/14/2024    ALBUMIN 4.8 06/18/2024    BILITOT 0.5 06/18/2024    AST 27 06/18/2024    ALT 45 (H) 06/18/2024       Lab Results   Component Value Date    WBC 11.34 (H) 06/18/2024    HGB 16.6 06/18/2024    HCT 48.4 06/18/2024    MCV 89.1 06/18/2024     06/18/2024       Lab Results   Component Value Date    CHOL 194 01/11/2024    CHLPL 229 (H) 03/18/2016    TRIG 127 01/11/2024    HDL 51 01/11/2024     (H) 01/11/2024       Assessment / Plan       Visit Diagnosis/Orders Placed This Visit:  Diagnoses and all orders for this visit:    1. Generalized anxiety disorder (Primary)  -     buPROPion XL (Wellbutrin XL) 300 MG 24 hr tablet; Take 1 tablet by mouth Every Morning.  Dispense: 30 tablet; Refill: 2    2. Depression, unspecified depression type  -     buPROPion XL (Wellbutrin XL) 300 MG 24 hr tablet; Take 1 tablet by mouth Every Morning.  Dispense: 30 tablet; Refill: 2    3. Post traumatic stress disorder (PTSD)    4. Primary insomnia    5. ANTONIA (generalized anxiety disorder)  -     busPIRone (BUSPAR) 15 MG tablet; Take 1 tablet by mouth 2 (Two) Times a Day.  Dispense: 180 tablet; Refill: 1    6. Allergy, subsequent encounter  -     hydrOXYzine (ATARAX) 25 MG tablet; Take 1 tablet by mouth 3 (Three) Times a Day As Needed for Itching or Allergies.  Dispense: 90 tablet; Refill: 3    Other orders  -     lamoTRIgine (LaMICtal) 25 MG tablet; Take 3 tablets by mouth Daily for 30 days.  Dispense: 90 tablet; Refill: 0         Functional Status: Mild impairment     Prognosis: Good with Ongoing Treatment     Impression/Formulation:  Patient appeared alert and oriented.  Patient is voluntarily requesting to continue outpatient psychiatric treatment at [facility] [address]  Patient is receptive to assistance with maintaining a stable lifestyle.  Patient presents with history of     ICD-10-CM ICD-9-CM   1. Generalized anxiety disorder  F41.1 300.02   2. Depression, unspecified depression type  F32.A 311   3. Post traumatic stress disorder (PTSD)  F43.10 309.81   4. Primary insomnia  F51.01 307.42   5. ANTONIA (generalized anxiety disorder)  F41.1 300.02   6. Allergy, subsequent encounter  T78.40XD V58.89   .    Reviewed patient's previous provider notes. Reviewed most recent labs. Patient meets DSM V diagnostic criteria for diagnoses. Diagnoses may be updated as more information becomes available.       Treatment Plan:   Continue with  hydroxyzine, Wellbutrin, Lunesta and BuSpar as prescribed  Discontinue Prozac  Decreased lamictal 75mg PO qd  Encouraged psychotherapy  Follow-up in 6 weeks and as needed    Patient will continue supportive psychotherapy efforts and medications as indicated. Clinic will obtain release of information for current treatment team for continuity of care as needed. Patient will contact this office, call 911 or present to the nearest emergency room should suicidal or homicidal ideations occur.  Discussed medication options and treatment plan of prescribed medication(s) as well as the risks, benefits, and potential side effects. Patient acknowledged and verbally consented to continue with current treatment plan and was educated on the importance of compliance with treatment and follow-up appointments.     Quality Measures:   Never smoker    I advised Eduardo Luna of the risks of tobacco use.     Follow Up:   Return in about 6 weeks (around 12/26/2024).      CORINNE Woodruff  [facility] [address]  Answers submitted by the patient for this visit:    Other (Submitted on 11/7/2024)  Please describe your symptoms.: Medication checkup. Stopped prozac on 10-31-24. Feeling ok. Energy level is good. Still looking to slow down my appetite.  Have you had these symptoms before?: Yes  How long have you been having these symptoms?: Greater than 2 weeks  Please list any medications you are currently taking for this condition.: See mychart  Please describe any probable cause for these symptoms. : See [email or web address]  Primary Reason for Visit (Submitted on 11/7/2024)  What is the primary reason for your visit?: Problem Not Listed

## 2024-12-08 ENCOUNTER — HOSPITAL ENCOUNTER (OUTPATIENT)
Dept: CARDIOLOGY | Facility: HOSPITAL | Age: 44
Discharge: HOME | End: 2024-12-08
Admitting: INTERNAL MEDICINE
Payer: COMMERCIAL

## 2024-12-08 DIAGNOSIS — Z92.3 HISTORY OF HEAD AND NECK RADIATION: ICD-10-CM

## 2024-12-08 DIAGNOSIS — I47.19 PAT (PAROXYSMAL ATRIAL TACHYCARDIA): ICD-10-CM

## 2024-12-08 DIAGNOSIS — C81.90 HODGKIN LYMPHOMA, UNSPECIFIED HODGKIN LYMPHOMA TYPE, UNSPECIFIED BODY REGION: ICD-10-CM

## 2024-12-08 DIAGNOSIS — I10 ESSENTIAL HYPERTENSION: Chronic | ICD-10-CM

## 2024-12-08 DIAGNOSIS — E78.2 MIXED HYPERLIPIDEMIA: ICD-10-CM

## 2024-12-08 PROCEDURE — 93880 EXTRACRANIAL BILAT STUDY: CPT

## 2024-12-08 PROCEDURE — 93880 EXTRACRANIAL BILAT STUDY: CPT | Performed by: INTERNAL MEDICINE

## 2024-12-09 ENCOUNTER — TELEPHONE (OUTPATIENT)
Dept: INFUSION THERAPY | Facility: HOSPITAL | Age: 44
End: 2024-12-09
Payer: COMMERCIAL

## 2024-12-09 LAB
BH CV XLRA MEAS LEFT DIST CCA EDV: 33.9 CM/SEC
BH CV XLRA MEAS LEFT DIST CCA PSV: 109 CM/SEC
BH CV XLRA MEAS LEFT DIST ICA EDV: 37.1 CM/SEC
BH CV XLRA MEAS LEFT DIST ICA PSV: 83.4 CM/SEC
BH CV XLRA MEAS LEFT ICA/CCA RATIO: 0.9
BH CV XLRA MEAS LEFT MID CCA EDV: 32.9 CM/SEC
BH CV XLRA MEAS LEFT MID CCA PSV: 115 CM/SEC
BH CV XLRA MEAS LEFT MID ICA EDV: 34.3 CM/SEC
BH CV XLRA MEAS LEFT MID ICA PSV: 75.7 CM/SEC
BH CV XLRA MEAS LEFT PROX CCA EDV: 35.1 CM/SEC
BH CV XLRA MEAS LEFT PROX CCA PSV: 138 CM/SEC
BH CV XLRA MEAS LEFT PROX ECA PSV: 114 CM/SEC
BH CV XLRA MEAS LEFT PROX ICA EDV: 31 CM/SEC
BH CV XLRA MEAS LEFT PROX ICA PSV: 104 CM/SEC
BH CV XLRA MEAS LEFT PROX SCLA PSV: 135 CM/SEC
BH CV XLRA MEAS LEFT VERTEBRAL A EDV: 19.7 CM/SEC
BH CV XLRA MEAS LEFT VERTEBRAL A PSV: 52.6 CM/SEC
BH CV XLRA MEAS RIGHT DIST CCA EDV: 28.6 CM/SEC
BH CV XLRA MEAS RIGHT DIST CCA PSV: 85.8 CM/SEC
BH CV XLRA MEAS RIGHT DIST ICA EDV: 35.7 CM/SEC
BH CV XLRA MEAS RIGHT DIST ICA PSV: 88.9 CM/SEC
BH CV XLRA MEAS RIGHT ICA/CCA RATIO: 0.7
BH CV XLRA MEAS RIGHT MID CCA EDV: 32.9 CM/SEC
BH CV XLRA MEAS RIGHT MID CCA PSV: 127 CM/SEC
BH CV XLRA MEAS RIGHT MID ICA EDV: 37.9 CM/SEC
BH CV XLRA MEAS RIGHT MID ICA PSV: 88.4 CM/SEC
BH CV XLRA MEAS RIGHT PROX CCA EDV: 28.6 CM/SEC
BH CV XLRA MEAS RIGHT PROX CCA PSV: 129 CM/SEC
BH CV XLRA MEAS RIGHT PROX ECA PSV: 103 CM/SEC
BH CV XLRA MEAS RIGHT PROX ICA EDV: 22 CM/SEC
BH CV XLRA MEAS RIGHT PROX ICA PSV: 70.2 CM/SEC
BH CV XLRA MEAS RIGHT PROX SCLA PSV: 125 CM/SEC
BH CV XLRA MEAS RIGHT VERTEBRAL A EDV: 14.3 CM/SEC
BH CV XLRA MEAS RIGHT VERTEBRAL A PSV: 48.7 CM/SEC
LEFT ARM BP: NORMAL MMHG
RIGHT ARM BP: NORMAL MMHG

## 2024-12-09 NOTE — TELEPHONE ENCOUNTER
Pt contacted as pre-procedure phone call prior to planned CTA coronary for 12/10/24. Reviewed with patient arrival time, nothing to eat or drink by mouth 2 hours prior to arrival, no caffeine after midnight, please take premedications night before and morning of procedure with a small sip of water as instructed,  recommended,  reviewed procedure instructions and allowed time for questions, and reviewed home medications, allergies, and medical history.

## 2024-12-10 ENCOUNTER — HOSPITAL ENCOUNTER (OUTPATIENT)
Dept: CT IMAGING | Facility: HOSPITAL | Age: 44
Discharge: HOME | End: 2024-12-10
Payer: COMMERCIAL

## 2024-12-10 VITALS
OXYGEN SATURATION: 100 % | SYSTOLIC BLOOD PRESSURE: 116 MMHG | HEART RATE: 62 BPM | TEMPERATURE: 96.8 F | DIASTOLIC BLOOD PRESSURE: 62 MMHG | BODY MASS INDEX: 28.44 KG/M2 | RESPIRATION RATE: 16 BRPM | WEIGHT: 210 LBS | HEIGHT: 72 IN

## 2024-12-10 VITALS — SYSTOLIC BLOOD PRESSURE: 126 MMHG | HEART RATE: 65 BPM | DIASTOLIC BLOOD PRESSURE: 94 MMHG

## 2024-12-10 DIAGNOSIS — C81.90 HODGKIN LYMPHOMA, UNSPECIFIED HODGKIN LYMPHOMA TYPE, UNSPECIFIED BODY REGION: ICD-10-CM

## 2024-12-10 DIAGNOSIS — E78.2 MIXED HYPERLIPIDEMIA: ICD-10-CM

## 2024-12-10 DIAGNOSIS — I47.19 PAT (PAROXYSMAL ATRIAL TACHYCARDIA): ICD-10-CM

## 2024-12-10 DIAGNOSIS — I10 ESSENTIAL HYPERTENSION: Chronic | ICD-10-CM

## 2024-12-10 DIAGNOSIS — I10 ESSENTIAL HYPERTENSION: ICD-10-CM

## 2024-12-10 PROCEDURE — 25510000001 IOPAMIDOL PER 1 ML: Performed by: INTERNAL MEDICINE

## 2024-12-10 PROCEDURE — 75574 CT ANGIO HRT W/3D IMAGE: CPT

## 2024-12-10 RX ORDER — NITROGLYCERIN 0.4 MG/1
0.4 TABLET SUBLINGUAL
Status: COMPLETED | OUTPATIENT
Start: 2024-12-10 | End: 2024-12-10

## 2024-12-10 RX ORDER — METOPROLOL TARTRATE 25 MG/1
25 TABLET, FILM COATED ORAL ONCE
Status: COMPLETED | OUTPATIENT
Start: 2024-12-10 | End: 2024-12-10

## 2024-12-10 RX ORDER — IVABRADINE 5 MG/1
15 TABLET, FILM COATED ORAL ONCE
Status: COMPLETED | OUTPATIENT
Start: 2024-12-10 | End: 2024-12-10

## 2024-12-10 RX ORDER — IOPAMIDOL 755 MG/ML
65 INJECTION, SOLUTION INTRAVASCULAR
Status: COMPLETED | OUTPATIENT
Start: 2024-12-10 | End: 2024-12-10

## 2024-12-10 RX ORDER — METOPROLOL TARTRATE 100 MG/1
100 TABLET ORAL ONCE
Status: COMPLETED | OUTPATIENT
Start: 2024-12-10 | End: 2024-12-10

## 2024-12-10 RX ORDER — SODIUM CHLORIDE 9 MG/ML
40 INJECTION, SOLUTION INTRAVENOUS AS NEEDED
Status: ACTIVE | OUTPATIENT
Start: 2024-12-10 | End: 2024-12-10

## 2024-12-10 RX ORDER — METOPROLOL TARTRATE 100 MG/1
200 TABLET ORAL ONCE
Status: COMPLETED | OUTPATIENT
Start: 2024-12-10 | End: 2024-12-10

## 2024-12-10 RX ORDER — SODIUM CHLORIDE 0.9 % (FLUSH) 0.9 %
10 SYRINGE (ML) INJECTION EVERY 12 HOURS SCHEDULED
Status: DISCONTINUED | OUTPATIENT
Start: 2024-12-10 | End: 2024-12-11 | Stop reason: HOSPADM

## 2024-12-10 RX ORDER — NITROGLYCERIN 0.4 MG/1
0.8 TABLET SUBLINGUAL
Status: COMPLETED | OUTPATIENT
Start: 2024-12-10 | End: 2024-12-10

## 2024-12-10 RX ORDER — SODIUM CHLORIDE 0.9 % (FLUSH) 0.9 %
10 SYRINGE (ML) INJECTION AS NEEDED
Status: DISCONTINUED | OUTPATIENT
Start: 2024-12-10 | End: 2024-12-11 | Stop reason: HOSPADM

## 2024-12-10 RX ORDER — METOPROLOL TARTRATE 1 MG/ML
5 INJECTION, SOLUTION INTRAVENOUS
Status: DISCONTINUED | OUTPATIENT
Start: 2024-12-10 | End: 2024-12-11 | Stop reason: HOSPADM

## 2024-12-10 RX ORDER — METOPROLOL TARTRATE 50 MG
50 TABLET ORAL ONCE
Status: COMPLETED | OUTPATIENT
Start: 2024-12-10 | End: 2024-12-10

## 2024-12-10 RX ORDER — METOPROLOL TARTRATE 50 MG
50 TABLET ORAL
Status: DISCONTINUED | OUTPATIENT
Start: 2024-12-10 | End: 2024-12-11 | Stop reason: HOSPADM

## 2024-12-10 RX ADMIN — METOPROLOL TARTRATE 100 MG: 100 TABLET, FILM COATED ORAL at 16:06

## 2024-12-10 RX ADMIN — NITROGLYCERIN 0.8 MG: 0.4 TABLET SUBLINGUAL at 16:52

## 2024-12-10 RX ADMIN — IVABRADINE 15 MG: 5 TABLET, FILM COATED ORAL at 14:23

## 2024-12-10 RX ADMIN — IOPAMIDOL 65 ML: 755 INJECTION, SOLUTION INTRAVENOUS at 17:09

## 2024-12-16 ENCOUNTER — HOSPITAL ENCOUNTER (OUTPATIENT)
Dept: CT IMAGING | Facility: HOSPITAL | Age: 44
Discharge: HOME | End: 2024-12-16
Admitting: INTERNAL MEDICINE
Payer: COMMERCIAL

## 2024-12-16 ENCOUNTER — TELEPHONE (OUTPATIENT)
Dept: CARDIOLOGY | Facility: CLINIC | Age: 44
End: 2024-12-16

## 2024-12-16 DIAGNOSIS — I10 ESSENTIAL HYPERTENSION: ICD-10-CM

## 2024-12-16 DIAGNOSIS — I47.19 PAT (PAROXYSMAL ATRIAL TACHYCARDIA): Primary | ICD-10-CM

## 2024-12-16 DIAGNOSIS — R93.1 ABNORMAL CORONARY ANGIOGRAM: ICD-10-CM

## 2024-12-16 DIAGNOSIS — E78.2 MIXED HYPERLIPIDEMIA: ICD-10-CM

## 2024-12-16 DIAGNOSIS — I47.19 PAT (PAROXYSMAL ATRIAL TACHYCARDIA): ICD-10-CM

## 2024-12-16 DIAGNOSIS — I10 ESSENTIAL HYPERTENSION: Chronic | ICD-10-CM

## 2024-12-16 PROCEDURE — 75580 N-INVAS EST C FFR SW ALY CTA: CPT

## 2024-12-16 RX ORDER — ASPIRIN 81 MG/1
81 TABLET ORAL DAILY
Qty: 90 TABLET | Refills: 2 | Status: SHIPPED | OUTPATIENT
Start: 2024-12-16

## 2024-12-16 NOTE — PROGRESS NOTES
Please inform the patient of their test results.  His coronary CAT scan shows a possibly significant blockage in his mid LAD.  He should take aspirin 81 mg daily and continue all other current medications.  If he is having chest discomfort or shortness of breath we should proceed with cardiac catheterization.  If he is completely asymptomatic and does not want to proceed with cardiac catheterization that is also an option. Thank you.

## 2024-12-16 NOTE — TELEPHONE ENCOUNTER
"Called and spoke with patient about coronary CAT scan, and patient wishes to proceed with heart cath. Patient states that at times he feels like \"his heart stops.\" Denies any shortness of breath or chest pain at this time. Will order heart cath and labs.       ----- Message from Marcos Gonsalez sent at 12/16/2024  4:32 PM EST -----  Please inform the patient of their test results.  His coronary CAT scan shows a possibly significant blockage in his mid LAD.  He should take aspirin 81 mg daily and continue all other current medications.  If he is having chest discomfort or shortness of breath we should proceed with cardiac catheterization.  If he is completely asymptomatic and does not want to proceed with cardiac catheterization that is also an option. Thank you.   "

## 2024-12-17 ENCOUNTER — LAB (OUTPATIENT)
Dept: LAB | Facility: HOSPITAL | Age: 44
End: 2024-12-17
Payer: COMMERCIAL

## 2024-12-17 DIAGNOSIS — I47.19 PAT (PAROXYSMAL ATRIAL TACHYCARDIA): ICD-10-CM

## 2024-12-17 DIAGNOSIS — E78.2 MIXED HYPERLIPIDEMIA: ICD-10-CM

## 2024-12-17 DIAGNOSIS — R93.1 ABNORMAL CORONARY ANGIOGRAM: ICD-10-CM

## 2024-12-17 LAB
ANION GAP SERPL CALCULATED.3IONS-SCNC: 8.9 MMOL/L (ref 5–15)
BASOPHILS # BLD AUTO: 0.03 10*3/MM3 (ref 0–0.2)
BASOPHILS NFR BLD AUTO: 0.5 % (ref 0–1.5)
BUN SERPL-MCNC: 13 MG/DL (ref 6–20)
BUN/CREAT SERPL: 10.1 (ref 7–25)
CALCIUM SPEC-SCNC: 9.9 MG/DL (ref 8.6–10.5)
CHLORIDE SERPL-SCNC: 102 MMOL/L (ref 98–107)
CO2 SERPL-SCNC: 28.1 MMOL/L (ref 22–29)
CREAT SERPL-MCNC: 1.29 MG/DL (ref 0.76–1.27)
DEPRECATED RDW RBC AUTO: 41.9 FL (ref 37–54)
EGFRCR SERPLBLD CKD-EPI 2021: 69.7 ML/MIN/1.73
EOSINOPHIL # BLD AUTO: 0.17 10*3/MM3 (ref 0–0.4)
EOSINOPHIL NFR BLD AUTO: 3 % (ref 0.3–6.2)
ERYTHROCYTE [DISTWIDTH] IN BLOOD BY AUTOMATED COUNT: 13 % (ref 12.3–15.4)
GLUCOSE SERPL-MCNC: 94 MG/DL (ref 65–99)
HCT VFR BLD AUTO: 47 % (ref 37.5–51)
HGB BLD-MCNC: 15.8 G/DL (ref 13–17.7)
IMM GRANULOCYTES # BLD AUTO: 0.02 10*3/MM3 (ref 0–0.05)
IMM GRANULOCYTES NFR BLD AUTO: 0.4 % (ref 0–0.5)
LYMPHOCYTES # BLD AUTO: 1.47 10*3/MM3 (ref 0.7–3.1)
LYMPHOCYTES NFR BLD AUTO: 26.3 % (ref 19.6–45.3)
MCH RBC QN AUTO: 30.2 PG (ref 26.6–33)
MCHC RBC AUTO-ENTMCNC: 33.6 G/DL (ref 31.5–35.7)
MCV RBC AUTO: 89.7 FL (ref 79–97)
MONOCYTES # BLD AUTO: 0.61 10*3/MM3 (ref 0.1–0.9)
MONOCYTES NFR BLD AUTO: 10.9 % (ref 5–12)
NEUTROPHILS NFR BLD AUTO: 3.3 10*3/MM3 (ref 1.7–7)
NEUTROPHILS NFR BLD AUTO: 58.9 % (ref 42.7–76)
NRBC BLD AUTO-RTO: 0 /100 WBC (ref 0–0.2)
PLATELET # BLD AUTO: 293 10*3/MM3 (ref 140–450)
PMV BLD AUTO: 10.6 FL (ref 6–12)
POTASSIUM SERPL-SCNC: 4.4 MMOL/L (ref 3.5–5.2)
RBC # BLD AUTO: 5.24 10*6/MM3 (ref 4.14–5.8)
SODIUM SERPL-SCNC: 139 MMOL/L (ref 136–145)
WBC NRBC COR # BLD AUTO: 5.6 10*3/MM3 (ref 3.4–10.8)

## 2024-12-17 PROCEDURE — 36415 COLL VENOUS BLD VENIPUNCTURE: CPT

## 2024-12-17 PROCEDURE — 85025 COMPLETE CBC W/AUTO DIFF WBC: CPT

## 2024-12-17 PROCEDURE — 80048 BASIC METABOLIC PNL TOTAL CA: CPT

## 2024-12-23 ENCOUNTER — OFFICE VISIT (OUTPATIENT)
Age: 44
End: 2024-12-23
Payer: COMMERCIAL

## 2024-12-23 ENCOUNTER — PREP FOR SURGERY (OUTPATIENT)
Dept: OTHER | Facility: HOSPITAL | Age: 44
End: 2024-12-23
Payer: COMMERCIAL

## 2024-12-23 VITALS
HEIGHT: 72 IN | DIASTOLIC BLOOD PRESSURE: 80 MMHG | BODY MASS INDEX: 29.22 KG/M2 | WEIGHT: 215.7 LBS | SYSTOLIC BLOOD PRESSURE: 130 MMHG | HEART RATE: 74 BPM | OXYGEN SATURATION: 98 %

## 2024-12-23 DIAGNOSIS — F43.10 POST TRAUMATIC STRESS DISORDER (PTSD): ICD-10-CM

## 2024-12-23 DIAGNOSIS — F41.1 GENERALIZED ANXIETY DISORDER: Primary | ICD-10-CM

## 2024-12-23 DIAGNOSIS — F32.A DEPRESSION, UNSPECIFIED DEPRESSION TYPE: ICD-10-CM

## 2024-12-23 DIAGNOSIS — F51.01 PRIMARY INSOMNIA: ICD-10-CM

## 2024-12-23 RX ORDER — SODIUM CHLORIDE 0.9 % (FLUSH) 0.9 %
10 SYRINGE (ML) INJECTION AS NEEDED
Status: CANCELLED | OUTPATIENT
Start: 2024-12-23

## 2024-12-23 RX ORDER — SODIUM CHLORIDE 9 MG/ML
40 INJECTION, SOLUTION INTRAVENOUS AS NEEDED
Status: CANCELLED | OUTPATIENT
Start: 2024-12-23

## 2024-12-23 RX ORDER — ASPIRIN 81 MG/1
81 TABLET ORAL DAILY
Status: CANCELLED | OUTPATIENT
Start: 2024-12-24

## 2024-12-23 RX ORDER — SODIUM CHLORIDE 0.9 % (FLUSH) 0.9 %
10 SYRINGE (ML) INJECTION EVERY 12 HOURS SCHEDULED
Status: CANCELLED | OUTPATIENT
Start: 2024-12-23

## 2024-12-23 RX ORDER — ACETAMINOPHEN 325 MG/1
650 TABLET ORAL EVERY 4 HOURS PRN
Status: CANCELLED | OUTPATIENT
Start: 2024-12-23

## 2024-12-23 RX ORDER — ASPIRIN 81 MG/1
324 TABLET, CHEWABLE ORAL ONCE
Status: CANCELLED | OUTPATIENT
Start: 2024-12-23 | End: 2024-12-23

## 2024-12-23 RX ORDER — NITROGLYCERIN 0.4 MG/1
0.4 TABLET SUBLINGUAL
Status: CANCELLED | OUTPATIENT
Start: 2024-12-23

## 2024-12-23 NOTE — PROGRESS NOTES
Follow Up Office Visit      Patient Name: Eduardo Luna  : 1980   MRN: [id number]     Referring Provider: Dallas Ugarte MD    Chief Complaint:      ICD-10-CM ICD-9-CM   1. Generalized anxiety disorder  F41.1 300.02   2. Depression, unspecified depression type  F32.A 311   3. Post traumatic stress disorder (PTSD)  F43.10 309.81   4. Primary insomnia  F51.01 307.42        History of Present Illness:   Eduardo Luna is a 44 y.o. male who is here today for follow up and medication management.      Subjective      Patient Reports:   Patient reports his mood is manageable. Patient reports having increased anxiety and a couple of panic attacks since previous visit.  During previous visit, Prozac was discontinued and Lamictal was tapered to 25 mg.  Patient reports utilizing relaxation techniques and grounding techniques to help cope with anxious and depressive symptoms.  Patient reports still struggling with social anxiety.  Patient would like to continue tapering the Lamictal.  Patient reports he plans on starting psychotherapy in February.    Patient rates anxiety 6/10. Patient denies mood swings, anger outburst and lashing out. Patient denies exaggerated sense of well being or self-confidence. Patient denies decreased need for sleep. Patient denies impulsive and risk-taking behaviors.    Patient rates depression 3/10. Patient reports occasional crying spells. Patient denies SI/HI/AVH.    PHQ-9= 10 increased score from previous visit  ANTONIA-7= 10 increased score from previous visit    Review of Systems:   Review of Systems   Constitutional:  Negative for chills, diaphoresis, fatigue and fever.   HENT:  Negative for congestion and sore throat.    Respiratory:  Negative for cough.    Cardiovascular:  Negative for chest pain.   Gastrointestinal:  Negative for abdominal pain, nausea and vomiting.   Genitourinary:  Negative for dysuria.   Musculoskeletal:  Negative for myalgias and neck pain.   Skin:   Negative for rash.   Neurological:  Negative for weakness, numbness and headaches.   Psychiatric/Behavioral:  Positive for dysphoric mood and sleep disturbance. Negative for agitation, decreased concentration, hallucinations, self-injury and suicidal ideas. The patient is nervous/anxious.    Sleep pattern: 5-6 hours of sleep per night  Appetite:  normal to increased    PHQ-9 Depression Screening  Little interest or pleasure in doing things? Several days   Feeling down, depressed, or hopeless? Several days   PHQ-2 Total Score 2   Trouble falling or staying asleep, or sleeping too much? Almost all   Feeling tired or having little energy? Not at all   Poor appetite or overeating? Over half   Feeling bad about yourself - or that you are a failure or have let yourself or your family down? Over half   Trouble concentrating on things, such as reading the newspaper or watching television? Not at all   Moving or speaking so slowly that other people could have noticed? Or the opposite - being so fidgety or restless that you have been moving around a lot more than usual? Several days   Thoughts that you would be better off dead, or of hurting yourself in some way? Not at all   PHQ-9 Total Score 10   If you checked off any problems, how difficult have these problems made it for you to do your work, take care of things at home, or get along with other people? Not difficult at all       ANTONIA-7 Anxiety Screening  Over the last two weeks, how often have you been bothered by the following problems?  Feeling nervous, anxious or on edge: More than half the days  Not being able to stop or control worrying: More than half the days  Worrying too much about different things: More than half the days  Trouble Relaxing: Several days  Being so restless that it is hard to sit still: Several days  Becoming easily annoyed or irritable: Several days  Feeling afraid as if something awful might happen: Several days  ANTONIA 7 Total Score: 10  If you  checked any problems, how difficult have these problems made it for you to do your work, take care of things at home, or get along with other people: Not difficult at all    RISK ASSESSMENT:  Patient denies any thoughts or intent of suicide today. Patient denies any impulsive behavior today.     Medications:     Current Outpatient Medications:     amLODIPine (NORVASC) 5 MG tablet, Take 1 tablet by mouth Daily., Disp: 90 tablet, Rfl: 1    aspirin 81 MG EC tablet, Take 1 tablet by mouth Daily., Disp: 90 tablet, Rfl: 2    atorvastatin (LIPITOR) 20 MG tablet, Take 1 tablet by mouth Daily., Disp: 90 tablet, Rfl: 1    buPROPion XL (Wellbutrin XL) 300 MG 24 hr tablet, Take 1 tablet by mouth Every Morning., Disp: 30 tablet, Rfl: 2    busPIRone (BUSPAR) 15 MG tablet, Take 1 tablet by mouth 2 (Two) Times a Day., Disp: 180 tablet, Rfl: 1    cloNIDine (Catapres) 0.1 MG tablet, Take 1 tablet by mouth 2 (Two) Times a Day As Needed for High Blood Pressure., Disp: 20 tablet, Rfl: 0    eszopiclone (LUNESTA) 3 MG tablet, Take 1 tablet by mouth Every Night immediately before bedtime., Disp: 30 tablet, Rfl: 0    hydrOXYzine (ATARAX) 25 MG tablet, Take 1 tablet by mouth 3 (Three) Times a Day As Needed for Itching or Allergies., Disp: 90 tablet, Rfl: 3    ketoconazole (NIZORAL) 2 % cream, Apply 1 Application topically to the appropriate area as directed Daily. Apply 1-2 times per day for 2-3 weeks, Then 2-3 times per week as needed, Disp: 60 g, Rfl: 1    levothyroxine (SYNTHROID, LEVOTHROID) 88 MCG tablet, Take 1 tablet by mouth Every Morning Before Breakfast., Disp: 90 tablet, Rfl: 1    metoprolol succinate XL (TOPROL-XL) 100 MG 24 hr tablet, Take 1 tablet by mouth Daily., Disp: 90 tablet, Rfl: 1    pantoprazole (PROTONIX) 40 MG EC tablet, Take 1 tablet by mouth Daily., Disp: 90 tablet, Rfl: 1    spironolactone (Aldactone) 25 MG tablet, Take 1 tablet by mouth Daily., Disp: 90 tablet, Rfl: 1    tacrolimus (PROTOPIC) 0.1 % ointment, Apply  "1 Application topically to the appropriate area as directed twice daily for 2 weeks; THEN use 2-3 times per week as needed., Disp: 60 g, Rfl: 1    vitamin D3 125 MCG (5000 UT) capsule capsule, Take 1 capsule by mouth Daily., Disp: , Rfl:     metoprolol tartrate (LOPRESSOR) 50 MG tablet, Take 1 tablet by mouth at Bedtime the Night Before Coronary CTA Appointment AND 1 tablet in the Morning 1 Hour Prior to Coronary CTA Appointment. Do not take if heart rate less than 60., Disp: 2 tablet, Rfl: 0    Medication Considerations:  KENNY reviewed and appropriate.      Allergies:   Allergies   Allergen Reactions    Lisinopril Other (See Comments)     Throat tickle    Amoxicillin Hives, Itching and Rash       Results Reviewed:    yes     Objective     Physical Exam:  Vital Signs:   Vitals:    12/23/24 1506 12/23/24 1507   BP:  130/80   Pulse:  74   SpO2:  98%   Weight: 97.8 kg (215 lb 11.2 oz)    Height: 182.9 cm (72\")      Body mass index is 29.25 kg/m².     Mental Status Exam:   MENTAL STATUS EXAM   General Appearance:  Cleanly groomed and dressed and well developed  Eye Contact:  Good eye contact  Attitude:  Cooperative  Motor Activity:  Normal gait, posture  Muscle Strength:  Normal  Speech:  Normal rate, tone, volume  Language:  Spontaneous  Mood and affect:  Depressed and anxious  Hopelessness:  Denies  Loneliness: 2  Thought Process:  Logical  Associations/ Thought Content:  No delusions  Hallucinations:  None  Suicidal Ideations:  Not present  Homicidal Ideation:  Not present  Sensorium:  Alert and clear  Orientation:  Person, place, time and situation  Immediate Recall, Recent, and Remote Memory:  Intact  Attention Span/ Concentration:  Good  Fund of Knowledge:  Appropriate for age and educational level  Intellectual Functioning:  Average range  Insight:  Good  Judgement:  Good  Reliability:  Good  Impulse Control:  Good   "     @RESULASTCBCDIFFPANEL,TSH,LABLIPI,CXOGFBYT40,XJKKPDLA26,MG,FOLATE,PROLACTIN,CRPRESULT,CMP,[id number])@    Lab Results   Component Value Date    GLUCOSE 94 12/17/2024    BUN 13 12/17/2024    CREATININE 1.29 (H) 12/17/2024    EGFR 69.7 12/17/2024    BCR 10.1 12/17/2024    K 4.4 12/17/2024    CO2 28.1 12/17/2024    CALCIUM 9.9 12/17/2024    ALBUMIN 4.8 06/18/2024    BILITOT 0.5 06/18/2024    AST 27 06/18/2024    ALT 45 (H) 06/18/2024       Lab Results   Component Value Date    WBC 5.60 12/17/2024    HGB 15.8 12/17/2024    HCT 47.0 12/17/2024    MCV 89.7 12/17/2024     12/17/2024       Lab Results   Component Value Date    CHOL 194 01/11/2024    CHLPL 229 (H) 03/18/2016    TRIG 127 01/11/2024    HDL 51 01/11/2024     (H) 01/11/2024       Assessment / Plan      Visit Diagnosis/Orders Placed This Visit:  Diagnoses and all orders for this visit:    1. Generalized anxiety disorder (Primary)    2. Depression, unspecified depression type    3. Post traumatic stress disorder (PTSD)    4. Primary insomnia       Functional Status: Mild impairment     Prognosis: Good with Ongoing Treatment     Impression/Formulation:  Patient appeared alert and oriented.  Patient is voluntarily requesting to continue outpatient psychiatric treatment at [facility] [address] [facility].  Patient is receptive to assistance with maintaining a stable lifestyle.  Patient presents with history of     ICD-10-CM ICD-9-CM   1. Generalized anxiety disorder  F41.1 300.02   2. Depression, unspecified depression type  F32.A 311   3. Post traumatic stress disorder (PTSD)  F43.10 309.81   4. Primary insomnia  F51.01 307.42   .  Reviewed patient's previous provider notes. Reviewed most recent labs. Patient meets DSM V diagnostic criteria for diagnoses. Diagnoses may be updated as more information becomes available.     Treatment Plan:   Continue with hydroxyzine, Wellbutrin, Lunesta and BuSpar as prescribed.  No refills  needed  Discontinue Lamictal.   Encouraged psychotherapy  Follow-up in 2 months and as needed    Patient will continue supportive psychotherapy efforts and medications as indicated. Clinic will obtain release of information for current treatment team for continuity of care as needed. Patient will contact this office, call 911 or present to the nearest emergency room should suicidal or homicidal ideations occur.  Discussed medication options and treatment plan of prescribed medication(s) as well as the risks, benefits, and potential side effects. Patient acknowledged and verbally consented to continue with current treatment plan and was educated on the importance of compliance with treatment and follow-up appointments.     Quality Measures:   Never smoker    I advised Eduardo Luna of the risks of tobacco use.     Follow Up:   Return in about 2 months (around 2/23/2025).      CORINNE Woodruff  [facility] [address]    Answers submitted by the patient for this visit:  Primary Reason for Visit (Submitted on 12/16/2024)  What is the primary reason for your visit?: Problem Not Listed  Problem not listed (Submitted on 12/16/2024)  Chief Complaint: Other medical problem  Reason for appointment: Medication check  anorexia: No  joint pain: No  change in stool: No  joint swelling: No  swollen glands: No  vertigo: No  visual change: No  Onset: more than 5 years  Chronicity: chronic  Frequency: intermittently  Medications tried: See my chart med list

## 2024-12-27 ENCOUNTER — HOSPITAL ENCOUNTER (OUTPATIENT)
Facility: HOSPITAL | Age: 44
Setting detail: HOSPITAL OUTPATIENT SURGERY
Discharge: HOME | End: 2024-12-27
Attending: INTERNAL MEDICINE | Admitting: INTERNAL MEDICINE
Payer: COMMERCIAL

## 2024-12-27 VITALS
SYSTOLIC BLOOD PRESSURE: 104 MMHG | TEMPERATURE: 98.4 F | RESPIRATION RATE: 14 BRPM | DIASTOLIC BLOOD PRESSURE: 77 MMHG | WEIGHT: 209.2 LBS | HEIGHT: 72 IN | BODY MASS INDEX: 28.33 KG/M2 | HEART RATE: 84 BPM | OXYGEN SATURATION: 96 %

## 2024-12-27 DIAGNOSIS — I25.118 CORONARY ARTERY DISEASE OF NATIVE ARTERY OF NATIVE HEART WITH STABLE ANGINA PECTORIS: Primary | ICD-10-CM

## 2024-12-27 DIAGNOSIS — R93.1 ABNORMAL CORONARY ANGIOGRAM: ICD-10-CM

## 2024-12-27 DIAGNOSIS — I47.19 PAT (PAROXYSMAL ATRIAL TACHYCARDIA): ICD-10-CM

## 2024-12-27 DIAGNOSIS — E78.00 PURE HYPERCHOLESTEROLEMIA: Chronic | ICD-10-CM

## 2024-12-27 DIAGNOSIS — E78.2 MIXED HYPERLIPIDEMIA: ICD-10-CM

## 2024-12-27 LAB
ACT BLD: 348 SECONDS (ref 82–152)
QT INTERVAL: 412 MS
QTC INTERVAL: 460 MS

## 2024-12-27 PROCEDURE — C1753 CATH, INTRAVAS ULTRASOUND: HCPCS | Performed by: INTERNAL MEDICINE

## 2024-12-27 PROCEDURE — 25010000002 LORAZEPAM PER 2 MG: Performed by: INTERNAL MEDICINE

## 2024-12-27 PROCEDURE — 25810000003 SODIUM CHLORIDE 0.9 % SOLUTION: Performed by: PHYSICIAN ASSISTANT

## 2024-12-27 PROCEDURE — C1769 GUIDE WIRE: HCPCS | Performed by: INTERNAL MEDICINE

## 2024-12-27 PROCEDURE — 25510000001 IOPAMIDOL PER 1 ML: Performed by: INTERNAL MEDICINE

## 2024-12-27 PROCEDURE — 25010000002 LIDOCAINE PF 1% 1 % SOLUTION: Performed by: INTERNAL MEDICINE

## 2024-12-27 PROCEDURE — C1725 CATH, TRANSLUMIN NON-LASER: HCPCS | Performed by: INTERNAL MEDICINE

## 2024-12-27 PROCEDURE — 63710000001 ONDANSETRON ODT 4 MG TABLET DISPERSIBLE: Performed by: INTERNAL MEDICINE

## 2024-12-27 PROCEDURE — C1894 INTRO/SHEATH, NON-LASER: HCPCS | Performed by: INTERNAL MEDICINE

## 2024-12-27 PROCEDURE — 25010000002 MIDAZOLAM PER 1 MG: Performed by: INTERNAL MEDICINE

## 2024-12-27 PROCEDURE — 92978 ENDOLUMINL IVUS OCT C 1ST: CPT | Performed by: INTERNAL MEDICINE

## 2024-12-27 PROCEDURE — 25010000002 HEPARIN (PORCINE) PER 1000 UNITS: Performed by: INTERNAL MEDICINE

## 2024-12-27 PROCEDURE — C1887 CATHETER, GUIDING: HCPCS | Performed by: INTERNAL MEDICINE

## 2024-12-27 PROCEDURE — 93005 ELECTROCARDIOGRAM TRACING: CPT | Performed by: INTERNAL MEDICINE

## 2024-12-27 PROCEDURE — 85347 COAGULATION TIME ACTIVATED: CPT

## 2024-12-27 PROCEDURE — 93458 L HRT ARTERY/VENTRICLE ANGIO: CPT | Performed by: INTERNAL MEDICINE

## 2024-12-27 PROCEDURE — C1874 STENT, COATED/COV W/DEL SYS: HCPCS | Performed by: INTERNAL MEDICINE

## 2024-12-27 PROCEDURE — C9600 PERC DRUG-EL COR STENT SING: HCPCS | Performed by: INTERNAL MEDICINE

## 2024-12-27 PROCEDURE — 25010000002 FENTANYL CITRATE (PF) 50 MCG/ML SOLUTION: Performed by: INTERNAL MEDICINE

## 2024-12-27 PROCEDURE — 25010000002 DIPHENHYDRAMINE PER 50 MG: Performed by: INTERNAL MEDICINE

## 2024-12-27 DEVICE — XIENCE SKYPOINT™ EVEROLIMUS ELUTING CORONARY STENT SYSTEM 2.75 MM X 23 MM / RAPID-EXCHANGE
Type: IMPLANTABLE DEVICE | Site: CORONARY | Status: FUNCTIONAL
Brand: XIENCE SKYPOINT™

## 2024-12-27 RX ORDER — NITROGLYCERIN 0.4 MG/1
0.4 TABLET SUBLINGUAL
Status: DISCONTINUED | OUTPATIENT
Start: 2024-12-27 | End: 2024-12-27 | Stop reason: HOSPADM

## 2024-12-27 RX ORDER — NITROGLYCERIN 0.4 MG/1
TABLET SUBLINGUAL
Qty: 25 TABLET | Refills: 11 | Status: SHIPPED | OUTPATIENT
Start: 2024-12-27

## 2024-12-27 RX ORDER — ATORVASTATIN CALCIUM 20 MG/1
40 TABLET, FILM COATED ORAL DAILY
Qty: 90 TABLET | Refills: 3 | Status: SHIPPED | OUTPATIENT
Start: 2024-12-27

## 2024-12-27 RX ORDER — ASPIRIN 81 MG/1
81 TABLET ORAL DAILY
Status: DISCONTINUED | OUTPATIENT
Start: 2024-12-28 | End: 2024-12-27 | Stop reason: HOSPADM

## 2024-12-27 RX ORDER — LORAZEPAM 2 MG/ML
INJECTION INTRAMUSCULAR
Status: DISCONTINUED | OUTPATIENT
Start: 2024-12-27 | End: 2024-12-27 | Stop reason: HOSPADM

## 2024-12-27 RX ORDER — DIPHENHYDRAMINE HYDROCHLORIDE 50 MG/ML
INJECTION INTRAMUSCULAR; INTRAVENOUS
Status: DISCONTINUED | OUTPATIENT
Start: 2024-12-27 | End: 2024-12-27 | Stop reason: HOSPADM

## 2024-12-27 RX ORDER — MIDAZOLAM HYDROCHLORIDE 1 MG/ML
INJECTION, SOLUTION INTRAMUSCULAR; INTRAVENOUS
Status: DISCONTINUED | OUTPATIENT
Start: 2024-12-27 | End: 2024-12-27 | Stop reason: HOSPADM

## 2024-12-27 RX ORDER — CLOPIDOGREL BISULFATE 75 MG/1
TABLET ORAL
Status: DISCONTINUED | OUTPATIENT
Start: 2024-12-27 | End: 2024-12-27 | Stop reason: HOSPADM

## 2024-12-27 RX ORDER — ONDANSETRON 4 MG/1
4 TABLET, ORALLY DISINTEGRATING ORAL EVERY 6 HOURS PRN
Status: DISCONTINUED | OUTPATIENT
Start: 2024-12-27 | End: 2024-12-27 | Stop reason: HOSPADM

## 2024-12-27 RX ORDER — ASPIRIN 81 MG/1
324 TABLET, CHEWABLE ORAL ONCE
Status: COMPLETED | OUTPATIENT
Start: 2024-12-27 | End: 2024-12-27

## 2024-12-27 RX ORDER — ACETAMINOPHEN 325 MG/1
650 TABLET ORAL EVERY 4 HOURS PRN
Status: DISCONTINUED | OUTPATIENT
Start: 2024-12-27 | End: 2024-12-27 | Stop reason: HOSPADM

## 2024-12-27 RX ORDER — SODIUM CHLORIDE 0.9 % (FLUSH) 0.9 %
10 SYRINGE (ML) INJECTION AS NEEDED
Status: DISCONTINUED | OUTPATIENT
Start: 2024-12-27 | End: 2024-12-27 | Stop reason: HOSPADM

## 2024-12-27 RX ORDER — IOPAMIDOL 755 MG/ML
INJECTION, SOLUTION INTRAVASCULAR
Status: DISCONTINUED | OUTPATIENT
Start: 2024-12-27 | End: 2024-12-27 | Stop reason: HOSPADM

## 2024-12-27 RX ORDER — LIDOCAINE HYDROCHLORIDE 10 MG/ML
INJECTION, SOLUTION EPIDURAL; INFILTRATION; INTRACAUDAL; PERINEURAL
Status: DISCONTINUED | OUTPATIENT
Start: 2024-12-27 | End: 2024-12-27 | Stop reason: HOSPADM

## 2024-12-27 RX ORDER — HEPARIN SODIUM 1000 [USP'U]/ML
INJECTION, SOLUTION INTRAVENOUS; SUBCUTANEOUS
Status: DISCONTINUED | OUTPATIENT
Start: 2024-12-27 | End: 2024-12-27 | Stop reason: HOSPADM

## 2024-12-27 RX ORDER — SODIUM CHLORIDE 0.9 % (FLUSH) 0.9 %
10 SYRINGE (ML) INJECTION EVERY 12 HOURS SCHEDULED
Status: DISCONTINUED | OUTPATIENT
Start: 2024-12-27 | End: 2024-12-27 | Stop reason: HOSPADM

## 2024-12-27 RX ORDER — FENTANYL CITRATE 50 UG/ML
INJECTION, SOLUTION INTRAMUSCULAR; INTRAVENOUS
Status: DISCONTINUED | OUTPATIENT
Start: 2024-12-27 | End: 2024-12-27 | Stop reason: HOSPADM

## 2024-12-27 RX ORDER — SODIUM CHLORIDE 9 MG/ML
40 INJECTION, SOLUTION INTRAVENOUS AS NEEDED
Status: DISCONTINUED | OUTPATIENT
Start: 2024-12-27 | End: 2024-12-27 | Stop reason: HOSPADM

## 2024-12-27 RX ORDER — CLOPIDOGREL BISULFATE 75 MG/1
75 TABLET ORAL DAILY
Qty: 90 TABLET | Refills: 1 | Status: SHIPPED | OUTPATIENT
Start: 2024-12-27

## 2024-12-27 RX ADMIN — ONDANSETRON 4 MG: 4 TABLET, ORALLY DISINTEGRATING ORAL at 15:01

## 2024-12-27 RX ADMIN — SODIUM CHLORIDE 293.4 ML: 9 INJECTION, SOLUTION INTRAVENOUS at 08:39

## 2024-12-27 RX ADMIN — ASPIRIN 81 MG 324 MG: 81 TABLET ORAL at 08:52

## 2024-12-27 NOTE — PROGRESS NOTES
Pt. Referred for Phase II Cardiac Rehab. Staff discussed benefits of exercise, program protocol, and educational material provided. Teach back verified.  Patient refused cardiac rehab at this time due to work schedule, patient will call in 2025 if they become interested. Patient educated on AHA guidelines for home exercise and given home exercise sheet. Pt given brochure for [facility] if they have any further questions about cardiac rehab.

## 2024-12-27 NOTE — Clinical Note
First balloon inflation max pressure = 14 montez. First balloon inflation duration = 10 seconds. Second inflation of balloon - Max pressure = 16 montez. 2nd Inflation of balloon - Duration = 15 seconds. 2nd inflation was done at 11:02 EST. The balloon is positioned in the Proximal segment of the vessel.

## 2024-12-27 NOTE — H&P
[facility]   History and physical      Patient Care Team:  Dallas Ugarte MD as PCP - General (Family Medicine)  River Bradley MD as Consulting Physician (Otolaryngology)  Yovani Aden MD as Consulting Physician (Gastroenterology)  Marcos Gonsalez MD as Consulting Physician (Cardiology)    Past Medical History:   Diagnosis Date    Allergic     Since childhood    Anxiety     Since childhood    Arrhythmia     Cancer     hodgkins lymphoma    Colon polyp     Depression     Epididymitis     GERD (gastroesophageal reflux disease)     History of medical problems     Facial seborriheic dermatitis    Hyperlipidemia     Hypertension     Hypothyroidism     Acquired from radiation therapy    Kidney stone     diagnosed with C.T scan in [facility]    Migraine     Pyelonephritis 5-    Kidney stones    Sleep apnea     SVT (supraventricular tachycardia)        Past Surgical History:   Procedure Laterality Date    ABLATION OF DYSRHYTHMIC FOCUS  7-2008    Dr. George Daly-EP study    CARDIAC ABLATION  2008    SVT    COLONOSCOPY      Dr. Yovani Aden    CYSTOSCOPY  6-27-24    KIDNEY STONE SURGERY  6-27-24    LITHOTRIPSY  6-27-24    LYMPH NODE BIOPSY  11/2001    @ [facility] with Dr. Daniel Marinelli    MOLE REMOVAL  11/2016    Dr Loyd at [facility] dermatology.     PORTACATH PLACEMENT  01/2002    [place] Placement @ [facility] with Dr. Daniel Marinelli    SEPTOPLASTY  10/18/2018    Dr. Lexa Castillo    SEPTOPLASTY, RESECTION INFERIOR TURBINATES Bilateral 10/18/2018    Procedure: SEPTOPLASTY, BILATERAL RESECTION INFERIOR TURBINATES;  Surgeon: Lexa Castillo MD;  Location: [facility];  Service: ENT    TONSILLECTOMY  09/2013    @ [facility] with Lexa Castillo    VASECTOMY  01/2012    @ [facility] with Dr. Aidan COTA TOOTH EXTRACTION  10/1997    @ [facility] with Dr. Momo Nunn         Allergies   Allergen Reactions    Lisinopril Other (See Comments)     Throat tickle    Amoxicillin  Hives, Itching and Rash           Current Facility-Administered Medications:     acetaminophen (TYLENOL) tablet 650 mg, 650 mg, Oral, Q4H PRN, McGrannahan, Bianca E, PA-C    aspirin chewable tablet 324 mg, 324 mg, Oral, Once **AND** [START ON 12/28/2024] aspirin EC tablet 81 mg, 81 mg, Oral, Daily, McGrannahan, Bianca E, PA-C    nitroglycerin (NITROSTAT) SL tablet 0.4 mg, 0.4 mg, Sublingual, Q5 Min PRN, McGrannahan, Bianca E, PA-C    sodium chloride 0.9 % bolus 293.4 mL, 3 mL/kg, Intravenous, Once Over 1 Hour, McGrannahan, Bianca E, PA-C    sodium chloride 0.9 % flush 10 mL, 10 mL, Intravenous, Q12H, McGrannahan, Bianca E, PA-C    sodium chloride 0.9 % flush 10 mL, 10 mL, Intravenous, PRN, McGrannahan, Bianca E, PA-C    sodium chloride 0.9 % infusion 40 mL, 40 mL, Intravenous, PRN, McGrannahan, Bianca E, PA-C         Medications Prior to Admission   Medication Sig Dispense Refill Last Dose/Taking    amLODIPine (NORVASC) 5 MG tablet Take 1 tablet by mouth Daily. 90 tablet 1     aspirin 81 MG EC tablet Take 1 tablet by mouth Daily. 90 tablet 2     atorvastatin (LIPITOR) 20 MG tablet Take 1 tablet by mouth Daily. 90 tablet 1     buPROPion XL (Wellbutrin XL) 300 MG 24 hr tablet Take 1 tablet by mouth Every Morning. 30 tablet 2     busPIRone (BUSPAR) 15 MG tablet Take 1 tablet by mouth 2 (Two) Times a Day. 180 tablet 1     cloNIDine (Catapres) 0.1 MG tablet Take 1 tablet by mouth 2 (Two) Times a Day As Needed for High Blood Pressure. 20 tablet 0     eszopiclone (LUNESTA) 3 MG tablet Take 1 tablet by mouth Every Night immediately before bedtime. 30 tablet 0     hydrOXYzine (ATARAX) 25 MG tablet Take 1 tablet by mouth 3 (Three) Times a Day As Needed for Itching or Allergies. 90 tablet 3     ketoconazole (NIZORAL) 2 % cream Apply 1 Application topically to the appropriate area as directed Daily. Apply 1-2 times per day for 2-3 weeks, Then 2-3 times per week as needed 60 g 1     levothyroxine (SYNTHROID,  LEVOTHROID) 88 MCG tablet Take 1 tablet by mouth Every Morning Before Breakfast. 90 tablet 1     metoprolol succinate XL (TOPROL-XL) 100 MG 24 hr tablet Take 1 tablet by mouth Daily. 90 tablet 1     metoprolol tartrate (LOPRESSOR) 50 MG tablet Take 1 tablet by mouth at Bedtime the Night Before Coronary CTA Appointment AND 1 tablet in the Morning 1 Hour Prior to Coronary CTA Appointment. Do not take if heart rate less than 60. 2 tablet 0     pantoprazole (PROTONIX) 40 MG EC tablet Take 1 tablet by mouth Daily. 90 tablet 1     spironolactone (Aldactone) 25 MG tablet Take 1 tablet by mouth Daily. 90 tablet 1     tacrolimus (PROTOPIC) 0.1 % ointment Apply 1 Application topically to the appropriate area as directed twice daily for 2 weeks; THEN use 2-3 times per week as needed. 60 g 1     vitamin D3 125 MCG (5000 UT) capsule capsule Take 1 capsule by mouth Daily.            Subjective .   History of present illness:    Patient is a 44-year-old  male who presents today for cardiac catheterization secondary to exertional chest pain and abnormal coronary CTA suggesting significant LAD lesion.  Patient notes continued symptoms with exertion.  Notes he has been compliant with his medications.      Social History     Socioeconomic History    Marital status: Single   Tobacco Use    Smoking status: Never     Passive exposure: Never    Smokeless tobacco: Never   Vaping Use    Vaping status: Never Used   Substance and Sexual Activity    Alcohol use: No    Drug use: Never    Sexual activity: Yes     Partners: Female     Birth control/protection: Vasectomy     Comment: [id number][employer]     Family History   Problem Relation Age of Onset    Hypertension Mother     Depression Mother     Hyperlipidemia Mother     Cancer Mother         Soft tissue sarcoma- 2003    Arrhythmia Mother         Tachycardia    Colon cancer Father     Colon polyps Father     Hypertension Father     Hyperlipidemia Father      "Cancer Father         Colon cancer- 10-    No Known Problems Half-Sister     Anxiety disorder Half-Sister     Breast cancer Half-Sister     No Known Problems Half-Sister     Hyperlipidemia Half-Brother     Hypertension Half-Brother     Heart disease Paternal Uncle         CABG    Heart attack Paternal Uncle             Arthritis Maternal Grandmother     Asthma Maternal Grandmother     Hyperlipidemia Maternal Grandmother     Hypertension Maternal Grandmother     Depression Maternal Grandmother     COPD Maternal Grandmother     Hypertension Maternal Grandfather                  Review of Systems:  Review of Systems   Constitutional: Positive for malaise/fatigue. Negative for fever.   HENT:  Negative for nosebleeds.    Eyes:  Negative for redness and visual disturbance.   Cardiovascular:  Positive for chest pain and dyspnea on exertion. Negative for orthopnea, palpitations and paroxysmal nocturnal dyspnea.   Respiratory:  Negative for cough, snoring, sputum production and wheezing.    Hematologic/Lymphatic: Negative for bleeding problem.   Skin:  Negative for flushing, itching and rash.   Musculoskeletal:  Negative for falls, joint pain and muscle cramps.   Gastrointestinal:  Negative for abdominal pain, diarrhea, heartburn, nausea and vomiting.   Genitourinary:  Negative for hematuria.   Neurological:  Negative for excessive daytime sleepiness, dizziness, headaches, tremors and weakness.   Psychiatric/Behavioral:  Negative for substance abuse. The patient is nervous/anxious.               Objective   Vitals:  BP (!) 142/102 (BP Location: Left arm, Patient Position: Lying)   Pulse 85   Temp 98.4 °F (36.9 °C) (Temporal)   Resp 18   Ht 182.9 cm (72\")   Wt 94.9 kg (209 lb 3.2 oz)   SpO2 94%   BMI 28.37 kg/m²    No intake or output data in the 24 hours ending 24 0840    Vitals reviewed.   Constitutional:       Appearance: Healthy appearance. Well-developed and not in distress. " "  Neck:      Vascular: No JVD.      Trachea: No tracheal deviation.   Pulmonary:      Effort: Pulmonary effort is normal.      Breath sounds: Normal breath sounds.   Cardiovascular:      Normal rate. Regular rhythm.      Murmurs: There is no murmur.      Comments: Right Alec's positive  Pulses:     Intact distal pulses.   Edema:     Peripheral edema absent.   Abdominal:      General: Bowel sounds are normal.      Palpations: Abdomen is soft.      Tenderness: There is no abdominal tenderness.   Musculoskeletal:         General: No deformity. Skin:     General: Skin is warm and dry.   Neurological:      Mental Status: Alert and oriented to person, place, and time.              Results Review:  I reviewed the patient's new clinical results.            Invalid input(s): \"LABALBU\", \"PROT\"          No results found for: \"TROPONINT\"              Coronary CTA 12/2024    The mid LAD 50 to 69% stenosis has a high likelihood of lesion specific ischemia with an FFR CT value of 0.79.    Consider antianginal therapy versus cardiac catheterization for further assessment and treatment.      Tele: Sinus rhythm          Assessment & Plan     Angina pectoris  Hypertension  PAT  Dyslipidemia      Plan:    Will proceed to cardiac catheterization plus or minus catheter-based intervention today.  This was discussed with the patient.  Will proceed via right radial access.  Further recommendations to follow procedure.      CORINNE Torres     The risks, benefits, and alternative options of cardiac catheterization were discussed with the patient.  The risks include death, MI, stroke, infection, vascular injury requiring surgical repair and/or blood transfusion, coronary dissection, renal dysfunction/failure, allergic reaction, emergent CABG.  If PCI is needed there is a 1-2% risk of emergent CABG.  The patient is agreeable for cardiac catheterization, possible PCI or CABG. Plan is to proceed with cardiac catheterization and possible " PCI.     Marcos Gonsalez MD, [facility], [facility]  Interventional Cardiology  12/27/24  09:14 EST      Dictated utilizing Dragon dictation

## 2024-12-30 ENCOUNTER — DOCUMENTATION (OUTPATIENT)
Dept: CARDIAC REHAB | Facility: HOSPITAL | Age: 44
End: 2024-12-30
Payer: COMMERCIAL

## 2024-12-30 ENCOUNTER — TRANSCRIBE ORDERS (OUTPATIENT)
Dept: CARDIAC REHAB | Facility: HOSPITAL | Age: 44
End: 2024-12-30
Payer: COMMERCIAL

## 2024-12-30 ENCOUNTER — CALL CENTER PROGRAMS (OUTPATIENT)
Dept: CALL CENTER | Facility: HOSPITAL | Age: 44
End: 2024-12-30
Payer: COMMERCIAL

## 2024-12-30 DIAGNOSIS — Z95.5 STENTED CORONARY ARTERY: Primary | ICD-10-CM

## 2024-12-30 DIAGNOSIS — F41.1 GENERALIZED ANXIETY DISORDER: ICD-10-CM

## 2024-12-30 DIAGNOSIS — F32.A DEPRESSION, UNSPECIFIED DEPRESSION TYPE: ICD-10-CM

## 2024-12-30 RX ORDER — BUPROPION HYDROCHLORIDE 300 MG/1
300 TABLET ORAL EVERY MORNING
Qty: 90 TABLET | Refills: 0 | Status: SHIPPED | OUTPATIENT
Start: 2024-12-30 | End: 2025-03-30

## 2024-12-30 NOTE — PROGRESS NOTES
Pt. Referred for Phase II Cardiac Rehab. Staff discussed benefits of exercise, program protocol, and educational material provided. Teach back verified.  Patient scheduled for orientation at [facility] on Monday, January 20th at 1300.

## 2024-12-30 NOTE — OUTREACH NOTE
PCI/Device Survey      Flowsheet Row Responses   Facility patient discharged from? [place]   Procedure date 12/27/24   Procedure (if device, specify in description) PCI   PCI site Right, Arm   Performing MD Dr. Marcos Gonsalez   Attempt successful? Yes   Call start time 1114   Call end time 1116   Is the patient taking prescribed medications: ASA, Plavix   Nursing intervention Reminded to continue to take prescribed medications, Nurse provided patient education   Does the patient have any of the following symptoms related to the cath/surgical site? --  [R. radial site with bruising noted remains soft to touch, no issues per pt report]   Nursing intervention Patient education provided   Does the patient have an appointment scheduled with the cardiologist? Yes   If the patient is a current smoker, are they able to teach back resources for cessation? Not a smoker   Did the patient feel prepared to go home on the same day as the procedure? Yes   Is the patient satisfied with the same day discharge process? Yes   PCI/Device call completed Yes            Sydney Adame Registered Nurse

## 2024-12-31 LAB
QT INTERVAL: 412 MS
QTC INTERVAL: 460 MS

## 2025-01-02 ENCOUNTER — DOCUMENTATION (OUTPATIENT)
Dept: CARDIAC REHAB | Facility: HOSPITAL | Age: 45
End: 2025-01-02
Payer: COMMERCIAL

## 2025-01-02 DIAGNOSIS — F51.01 PRIMARY INSOMNIA: Chronic | ICD-10-CM

## 2025-01-02 RX ORDER — ESZOPICLONE 3 MG/1
3 TABLET, FILM COATED ORAL NIGHTLY
Qty: 30 TABLET | Refills: 0 | Status: SHIPPED | OUTPATIENT
Start: 2025-01-02

## 2025-01-02 NOTE — PROGRESS NOTES
Patient contacted staff regarding his Phase II Cardiac Rehab orientation that is scheduled for Monday, January 6th. Patient states that he is unable to attend this appointment due to work. Patient states that he will reschedule his appointment at a later time. Staff available if additional assistance needed.

## 2025-01-03 ENCOUNTER — PATIENT MESSAGE (OUTPATIENT)
Age: 45
End: 2025-01-03
Payer: COMMERCIAL

## 2025-01-13 DIAGNOSIS — I10 ESSENTIAL HYPERTENSION: Chronic | ICD-10-CM

## 2025-01-13 RX ORDER — AMLODIPINE BESYLATE 5 MG/1
5 TABLET ORAL DAILY
Qty: 90 TABLET | Refills: 1 | Status: SHIPPED | OUTPATIENT
Start: 2025-01-13

## 2025-01-16 ENCOUNTER — OFFICE VISIT (OUTPATIENT)
Age: 45
End: 2025-01-16
Payer: COMMERCIAL

## 2025-01-16 VITALS
OXYGEN SATURATION: 99 % | HEART RATE: 81 BPM | HEIGHT: 72 IN | SYSTOLIC BLOOD PRESSURE: 120 MMHG | DIASTOLIC BLOOD PRESSURE: 74 MMHG | WEIGHT: 206 LBS | BODY MASS INDEX: 27.9 KG/M2

## 2025-01-16 DIAGNOSIS — F32.A DEPRESSION, UNSPECIFIED DEPRESSION TYPE: ICD-10-CM

## 2025-01-16 DIAGNOSIS — F43.10 POST TRAUMATIC STRESS DISORDER (PTSD): ICD-10-CM

## 2025-01-16 DIAGNOSIS — F51.01 PRIMARY INSOMNIA: ICD-10-CM

## 2025-01-16 DIAGNOSIS — F41.1 GENERALIZED ANXIETY DISORDER: Primary | ICD-10-CM

## 2025-01-16 RX ORDER — LAMOTRIGINE 25 MG/1
25 TABLET ORAL DAILY
Qty: 30 TABLET | Refills: 2 | Status: SHIPPED | OUTPATIENT
Start: 2025-01-16 | End: 2026-01-16

## 2025-01-16 NOTE — PROGRESS NOTES
Follow Up Office Visit      Patient Name: Eduardo Luna  : 1980   MRN: [id number]     Referring Provider: Dallas Ugarte MD    Chief Complaint:      ICD-10-CM ICD-9-CM   1. Generalized anxiety disorder  F41.1 300.02   2. Depression, unspecified depression type  F32.A 311   3. Post traumatic stress disorder (PTSD)  F43.10 309.81   4. Primary insomnia  F51.01 307.42        History of Present Illness:   Eduardo Luna is a 44 y.o. male who is here today for follow up and medication management for increased anxiety.    History of Present Illness    MEDICATIONS  Current: Wellbutrin, Lunesta, Hydroxyzine, Buspar      Subjective      Patient Reports:   Patient reports experiencing jitteriness, which he attributes to anxiety rather than a medication side effect. He has been on increased dose of Wellbutrin since 2024 and recently discontinued Lamictal, with the onset of jitteriness coinciding with the discontinuation. He recently underwent a CT angiogram that revealed a blockage, leading to a recommendation for a heart catheterization and stent placement. This has caused some stress, which he believes may be contributing to his symptoms. He also reports difficulties with his fiancee, which he thinks may be exacerbating his mental health condition. He denies suicidal ideations, thoughts of self-harm, or hallucinations. His sleep pattern remains consistent at 5 to 6 hours per night, aided by Lunesta. His appetite is normal, and he has been making efforts to improve his diet. He has not experienced any significant weight gain. He has secured an appointment with Daphney,  therapist, for further evaluation. He has previously been on Zoloft, Celexa, Lexapro, Effexor, Paxil, Pristiq, Xanax, and Valium. He has undergone Samares testing. He did not experience these symptoms while on Lamictal. He recalls episodes of restlessness during periods of inactivity at work, often accompanied by racing thoughts.  Similar episodes occur in the evenings when he is unoccupied. He has experienced a few panic attacks, which have increased in frequency since discontinuing Lamictal, with the most recent episode occurring last week, characterized by palpitations reminiscent of his past tachycardia.    PHQ-9= will assess at f/u visit  ANTONIA-7= will assess at f/u visit    Review of Systems:   Review of Systems   Constitutional:  Negative for appetite change.   Eyes:  Negative for visual disturbance.   Respiratory:  Negative for chest tightness and shortness of breath.    Cardiovascular:  Positive for palpitations. Negative for chest pain.   Gastrointestinal:  Negative for abdominal pain and nausea.   Musculoskeletal:  Negative for back pain.   Neurological:  Negative for dizziness, weakness and numbness.   Psychiatric/Behavioral:  Positive for dysphoric mood and sleep disturbance. Negative for agitation, confusion, decreased concentration, hallucinations, self-injury and suicidal ideas. The patient is nervous/anxious.    Sleep pattern: 5-6 hours of sleep per night   Appetite: normal  Patient reports palpitations occur with increased anxiety.    RISK ASSESSMENT:  Patient denies any thoughts or intent of suicide today. Patient denies any impulsive behavior today.     Medications:     Current Outpatient Medications:     amLODIPine (NORVASC) 5 MG tablet, Take 1 tablet by mouth Daily., Disp: 90 tablet, Rfl: 1    aspirin 81 MG EC tablet, Take 1 tablet by mouth Daily., Disp: 90 tablet, Rfl: 2    atorvastatin (LIPITOR) 20 MG tablet, Take 2 tablets by mouth Daily., Disp: 90 tablet, Rfl: 3    buPROPion XL (Wellbutrin XL) 300 MG 24 hr tablet, Take 1 tablet by mouth Every Morning, Disp: 90 tablet, Rfl: 0    busPIRone (BUSPAR) 15 MG tablet, Take 1 tablet by mouth 2 (Two) Times a Day., Disp: 180 tablet, Rfl: 1    cloNIDine (Catapres) 0.1 MG tablet, Take 1 tablet by mouth 2 (Two) Times a Day As Needed for High Blood Pressure., Disp: 20 tablet, Rfl:  0    clopidogrel (PLAVIX) 75 MG tablet, Take 1 tablet by mouth Daily., Disp: 90 tablet, Rfl: 1    eszopiclone (LUNESTA) 3 MG tablet, Take 1 tablet by mouth Every Night immediately before bedtime., Disp: 30 tablet, Rfl: 0    hydrOXYzine (ATARAX) 25 MG tablet, Take 1 tablet by mouth 3 (Three) Times a Day As Needed for Itching or Allergies., Disp: 90 tablet, Rfl: 3    ketoconazole (NIZORAL) 2 % cream, Apply 1 Application topically to the appropriate area as directed Daily. Apply 1-2 times per day for 2-3 weeks, Then 2-3 times per week as needed, Disp: 60 g, Rfl: 1    levothyroxine (SYNTHROID, LEVOTHROID) 88 MCG tablet, Take 1 tablet by mouth Every Morning Before Breakfast., Disp: 90 tablet, Rfl: 1    metoprolol succinate XL (TOPROL-XL) 100 MG 24 hr tablet, Take 1 tablet by mouth Daily., Disp: 90 tablet, Rfl: 1    nitroglycerin (NITROSTAT) 0.4 MG SL tablet, Dissolve 1 tablet under the tongue as needed for angina, may repeat every 5 minutes for up three doses, Disp: 25 tablet, Rfl: 11    pantoprazole (PROTONIX) 40 MG EC tablet, Take 1 tablet by mouth Daily., Disp: 90 tablet, Rfl: 1    spironolactone (Aldactone) 25 MG tablet, Take 1 tablet by mouth Daily., Disp: 90 tablet, Rfl: 1    tacrolimus (PROTOPIC) 0.1 % ointment, Apply 1 Application topically to the appropriate area as directed twice daily for 2 weeks; THEN use 2-3 times per week as needed., Disp: 60 g, Rfl: 1    vitamin D3 125 MCG (5000 UT) capsule capsule, Take 1 capsule by mouth Daily., Disp: , Rfl:     lamoTRIgine (LaMICtal) 25 MG tablet, Take 1 tablet by mouth Daily., Disp: 30 tablet, Rfl: 2    Medication Considerations:  KENNY reviewed and appropriate.      Allergies:   Allergies   Allergen Reactions    Lisinopril Other (See Comments)     Throat tickle    Amoxicillin Hives, Itching and Rash       Results Reviewed:   Yes     Objective     Physical Exam:  Vital Signs:   Vitals:    01/16/25 1533   BP: 120/74   Pulse: 81   SpO2: 99%   Weight: 93.4 kg (206 lb)  "  Height: 182.9 cm (72\")     Body mass index is 27.94 kg/m².     Mental Status Exam:   MENTAL STATUS EXAM   General Appearance:  Cleanly groomed and dressed and well developed  Eye Contact:  Good eye contact  Attitude:  Cooperative  Motor Activity:  Normal gait, posture  Muscle Strength:  Normal  Speech:  Normal rate, tone, volume  Language:  Spontaneous  Mood and affect:  Depressed and anxious  Hopelessness:  Denies  Loneliness: Denies  Thought Process:  Logical  Associations/ Thought Content:  No delusions  Hallucinations:  None  Suicidal Ideations:  Not present  Homicidal Ideation:  Not present  Sensorium:  Alert and clear  Orientation:  Person, place, time and situation  Immediate Recall, Recent, and Remote Memory:  Intact  Attention Span/ Concentration:  Good  Fund of Knowledge:  Appropriate for age and educational level  Intellectual Functioning:  Average range  Insight:  Good  Judgement:  Good  Reliability:  Good  Impulse Control:  Good       @RESULASTCBCDIFFPANEL,TSH,LABLIPI,XTPRKHXO05,IMJFNQVT21,MG,FOLATE,PROLACTIN,CRPRESULT,CMP,[id number])@    Lab Results   Component Value Date    GLUCOSE 94 12/17/2024    BUN 13 12/17/2024    CREATININE 1.29 (H) 12/17/2024    EGFR 69.7 12/17/2024    BCR 10.1 12/17/2024    K 4.4 12/17/2024    CO2 28.1 12/17/2024    CALCIUM 9.9 12/17/2024    ALBUMIN 4.8 06/18/2024    BILITOT 0.5 06/18/2024    AST 27 06/18/2024    ALT 45 (H) 06/18/2024       Lab Results   Component Value Date    WBC 5.60 12/17/2024    HGB 15.8 12/17/2024    HCT 47.0 12/17/2024    MCV 89.7 12/17/2024     12/17/2024       Lab Results   Component Value Date    CHOL 194 01/11/2024    CHLPL 229 (H) 03/18/2016    TRIG 127 01/11/2024    HDL 51 01/11/2024     (H) 01/11/2024       Assessment / Plan      Visit Diagnosis/Orders Placed This Visit:  Diagnoses and all orders for this visit:    1. Generalized anxiety disorder (Primary)  -     lamoTRIgine (LaMICtal) 25 MG tablet; Take 1 tablet by mouth Daily.  " Dispense: 30 tablet; Refill: 2    2. Depression, unspecified depression type  -     lamoTRIgine (LaMICtal) 25 MG tablet; Take 1 tablet by mouth Daily.  Dispense: 30 tablet; Refill: 2    3. Post traumatic stress disorder (PTSD)    4. Primary insomnia         Assessment & Plan  1. Anxiety.  The patient reports increased episodes of anxiety and panic.  He reports episodes increased when discontinuing Lamictal.  Patient reports increased stress related to external stressors.  He reports recent panic attacks including 1 last week with palpitations and tachycardic episodes. His sleep is managed with Lunesta, and his appetite remains normal. He will restart Lamictal at 25 mg, with gradual dose adjustments based on his response. He is encouraged to continue lifestyle modifications, including a healthier diet, exercise and to engage in therapy.    PROCEDURE  The patient underwent a heart catheterization and stent placement.      Functional Status: Mild impairment     Prognosis: Good with Ongoing Treatment     Impression/Formulation:  Patient appeared alert and oriented.  Patient is voluntarily requesting to continue outpatient psychiatric treatment at [facility] [address] [place] [address].  Patient is receptive to assistance with maintaining a stable lifestyle.  Patient presents with history of     ICD-10-CM ICD-9-CM   1. Generalized anxiety disorder  F41.1 300.02   2. Depression, unspecified depression type  F32.A 311   3. Post traumatic stress disorder (PTSD)  F43.10 309.81   4. Primary insomnia  F51.01 307.42   .    Reviewed patient's previous provider notes. Reviewed most recent labs. Patient meets DSM V diagnostic criteria for diagnoses. Diagnoses may be updated as more information becomes available.       Treatment Plan:   Continue with hydroxyzine, Wellbutrin, Lunesta and BuSpar as prescribed.  No refills needed  Restart Lamictal 25mg PO qd  Encouraged to pursue psychotherapy. Patient has future apt  scheduled with [facility]  Follow-up in 4 weeks and as needed    Patient will continue supportive psychotherapy efforts and medications as indicated. Clinic will obtain release of information for current treatment team for continuity of care as needed. Patient will contact this office, call 911 or present to the nearest emergency room should suicidal or homicidal ideations occur.  Discussed medication options and treatment plan of prescribed medication(s) as well as the risks, benefits, and potential side effects. Patient acknowledged and verbally consented to continue with current treatment plan and was educated on the importance of compliance with treatment and follow-up appointments.     Pt has no previous history of seizure or current eating disorder, which would be a contraindication for use. The possibility of activation with initiation was discussed and patient verbalizes understanding.      We discussed the  risk of  Geronimo Flynn Syndrome with use of Lamictal that is most common in younger population and occurs within first few months of starting medication. Lamictal's only serious risk is SJS which can be fatal if left untreated. Slow titration reduces this risk from 1 in 100 to 1 in 2500. (Trevor 2021). Patient is encouraged to monitor skin for correlating rash, lesions in the mucous membranes, or flu-like symptoms. Should any of these symptoms occur, patient is advised to stop medication immediatly and notify provider. Patient should avoid trying new hygiene products or changing laundry soaps at this time. Anytime medication is not taken for five consecutive days, patient must notify provider and the titration must be restarted at 25 mg to minimize risk of SJS. Patient verbalizes understanding.        Quality Measures:   Never smoker    I advised Eduardo Luna of the risks of tobacco use.     Follow Up:   Return in about 4 weeks (around 2/13/2025).      Patient or patient representative verbalized consent  for the use of Ambient Listening during the visit with  CORINNE Woodruff for chart documentation. 1/17/2025  15:47 EST    CORINNE Woodruff  [facility] [address]    Answers submitted by the patient for this visit:  Primary Reason for Visit (Submitted on 1/14/2025)  What is the primary reason for your visit?: Anxiety  Anxiety (Submitted on 1/14/2025)  Chief Complaint: Anxiety  Visit: follow-up  Frequency: constantly  Severity: moderate  depressed mood: Yes  dry mouth: No  excessive worry: Yes  insomnia: Yes  irritability: No  malaise/fatigue: No  obsessions: No  Sleep quality: fair  Hours of sleep per night: 6 Hours  Aggravated by: medication, social activities  Medication compliance: %  Side effects: palpitations  Additional information: Some of my anxiety/panic is brought on by thoughts.

## 2025-01-21 DIAGNOSIS — I10 ESSENTIAL HYPERTENSION: Chronic | ICD-10-CM

## 2025-01-21 DIAGNOSIS — E03.9 HYPOTHYROIDISM (ACQUIRED): ICD-10-CM

## 2025-01-22 DIAGNOSIS — F32.A DEPRESSION, UNSPECIFIED DEPRESSION TYPE: ICD-10-CM

## 2025-01-22 RX ORDER — METOPROLOL SUCCINATE 100 MG/1
100 TABLET, EXTENDED RELEASE ORAL DAILY
Qty: 90 TABLET | Refills: 1 | Status: SHIPPED | OUTPATIENT
Start: 2025-01-22

## 2025-01-22 RX ORDER — LEVOTHYROXINE SODIUM 88 UG/1
88 TABLET ORAL
Qty: 90 TABLET | Refills: 1 | Status: SHIPPED | OUTPATIENT
Start: 2025-01-22

## 2025-01-22 RX ORDER — BUSPIRONE HYDROCHLORIDE 7.5 MG/1
7.5 TABLET ORAL 2 TIMES DAILY
Qty: 30 TABLET | Refills: 2 | Status: SHIPPED | OUTPATIENT
Start: 2025-01-22

## 2025-01-28 DIAGNOSIS — F51.01 PRIMARY INSOMNIA: Chronic | ICD-10-CM

## 2025-01-28 RX ORDER — ESZOPICLONE 3 MG/1
3 TABLET, FILM COATED ORAL NIGHTLY
Qty: 30 TABLET | Refills: 0 | Status: SHIPPED | OUTPATIENT
Start: 2025-01-28

## 2025-02-07 ENCOUNTER — OFFICE VISIT (OUTPATIENT)
Dept: INTERNAL MEDICINE | Facility: CLINIC | Age: 45
End: 2025-02-07
Payer: COMMERCIAL

## 2025-02-07 VITALS
HEIGHT: 72 IN | BODY MASS INDEX: 27.3 KG/M2 | TEMPERATURE: 98.6 F | DIASTOLIC BLOOD PRESSURE: 84 MMHG | WEIGHT: 201.6 LBS | SYSTOLIC BLOOD PRESSURE: 104 MMHG | HEART RATE: 68 BPM

## 2025-02-07 DIAGNOSIS — E03.9 HYPOTHYROIDISM (ACQUIRED): Chronic | ICD-10-CM

## 2025-02-07 DIAGNOSIS — I10 ESSENTIAL HYPERTENSION: Chronic | ICD-10-CM

## 2025-02-07 DIAGNOSIS — E78.00 PURE HYPERCHOLESTEROLEMIA: Chronic | ICD-10-CM

## 2025-02-07 DIAGNOSIS — E66.3 OVERWEIGHT (BMI 25.0-29.9): ICD-10-CM

## 2025-02-07 DIAGNOSIS — Z00.00 WELL ADULT EXAM: Primary | ICD-10-CM

## 2025-02-07 DIAGNOSIS — Z12.5 SCREENING PSA (PROSTATE SPECIFIC ANTIGEN): ICD-10-CM

## 2025-02-07 DIAGNOSIS — E55.9 VITAMIN D DEFICIENCY: ICD-10-CM

## 2025-02-07 DIAGNOSIS — I25.118 CORONARY ARTERY DISEASE OF NATIVE ARTERY OF NATIVE HEART WITH STABLE ANGINA PECTORIS: Chronic | ICD-10-CM

## 2025-02-07 DIAGNOSIS — F41.1 GAD (GENERALIZED ANXIETY DISORDER): Chronic | ICD-10-CM

## 2025-02-07 PROCEDURE — 99214 OFFICE O/P EST MOD 30 MIN: CPT | Performed by: STUDENT IN AN ORGANIZED HEALTH CARE EDUCATION/TRAINING PROGRAM

## 2025-02-07 PROCEDURE — 99396 PREV VISIT EST AGE 40-64: CPT | Performed by: STUDENT IN AN ORGANIZED HEALTH CARE EDUCATION/TRAINING PROGRAM

## 2025-02-07 RX ORDER — ATORVASTATIN CALCIUM 40 MG/1
40 TABLET, FILM COATED ORAL DAILY
Qty: 90 TABLET | Refills: 1 | Status: SHIPPED | OUTPATIENT
Start: 2025-02-07

## 2025-02-07 NOTE — PROGRESS NOTES
"Chief Complaint  Eduardo Luna is a 44 y.o. male presenting for Annual Exam.     From [place]. Engaged, in long term relationship with Teresa since 2016, living together.  No children. 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    History of Present Illness  Patient is here for annual physical and follow-up.    He had an asymptomatic 80% stenosis of LAD and underwent  PCI with stenting in December.  He is doing well in this regard.  They increased his dose of atorvastatin from 10 mg to 40 mg.  He is on aspirin, and additionally clopidogrel for 6 months.    Patient had sent me a message a few days ago regarding his anxiety and asking if there is anything to help for palpitations in setting of anxiety.  He is already on beta-blocker.    Patient has made some changes, eating more healthy, more vegetables.  He also has been walking every day at least 30 minutes.    The following portions of the patient's history were reviewed and updated as appropriate: allergies, current medications, past family history, past medical history, past social history, past surgical history, and problem list.    Objective  /84 (BP Location: Left arm, Patient Position: Sitting, Cuff Size: Adult)   Pulse 68   Temp 98.6 °F (37 °C) (Temporal)   Ht 182.9 cm (72\")   Wt 91.4 kg (201 lb 9.6 oz)   BMI 27.34 kg/m²     Physical Exam  Vitals reviewed.   Constitutional:       Appearance: Normal appearance.   HENT:      Head: Normocephalic and atraumatic.      Right Ear: Tympanic membrane, ear canal and external ear " normal. There is no impacted cerumen.      Left Ear: Tympanic membrane, ear canal and external ear normal. There is no impacted cerumen.      Nose: Nose normal. No congestion.      Mouth/Throat:      Mouth: Mucous membranes are moist.      Pharynx: Oropharynx is clear.   Eyes:      Extraocular Movements: Extraocular movements intact.      Conjunctiva/sclera: Conjunctivae normal.   Cardiovascular:      Rate and Rhythm: Normal rate and regular rhythm.      Heart sounds: Normal heart sounds. No murmur heard.  Pulmonary:      Effort: Pulmonary effort is normal.      Breath sounds: Normal breath sounds.   Abdominal:      General: There is no distension.      Palpations: Abdomen is soft. There is no mass.      Tenderness: There is no abdominal tenderness.   Musculoskeletal:      Cervical back: Neck supple. No tenderness.      Right lower leg: No edema.      Left lower leg: No edema.   Lymphadenopathy:      Cervical: No cervical adenopathy.   Skin:     General: Skin is warm and dry.   Neurological:      Mental Status: He is alert and oriented to person, place, and time. Mental status is at baseline.   Psychiatric:         Behavior: Behavior normal.         Thought Content: Thought content normal.         Assessment/Plan   1. Well adult exam  Counseled on recommendations for annual flu shot and COVID booster.   could consider another COVID booster at this time.  He is otherwise up-to-date on vaccines.  Counseled on recommendations for moderate intensity exercise 2.5 hours weekly, patient meets these recommendations currently.   Counseled recommendations for regular dental visits, at least annually, ideally twice yearly.    2. Coronary artery disease of native artery of native heart with stable angina pectoris  3. Pure hypercholesterolemia  Doing well on aspirin and Plavix.  Tolerated increase of atorvastatin.  It looks like he was prescribed 20 mg strength tablets to take 2 tablets, I will update his prescription to 40 mg  tablets.  He has been on the increased dose of atorvastatin for about a month.  Recommend rechecking liver tests and lipids first week of March  - atorvastatin (LIPITOR) 40 MG tablet; Take 1 tablet by mouth Daily.  Dispense: 90 tablet; Refill: 1  - Lipid Panel; Future  - Hepatic Function Panel; Future    4. Essential hypertension  BP Readings from Last 3 Encounters:   02/07/25 104/84   01/16/25 120/74   12/27/24 104/77   Blood pressure at goal.  Continue on current medications.    5. ANTONIA (generalized anxiety disorder)  He could potentially have tried propranolol, but he is already on metoprolol.  He can check with psychiatry if they have any recommendations.  I do not have any good suggestions at the moment.    6. Hypothyroidism (acquired)  Continue on Synthroid, recheck with blood work first week of March.  - TSH; Future  - T4, Free; Future    7. Screening PSA (prostate specific antigen)  Patient is due for recheck of his PSA first week of March.  No family history.  Normal levels in the past.  He is interested in continued screening  - PSA Screen; Future    8. Vitamin D deficiency  Consider decreasing dose if uptrending level.  Last checked 6 months ago at 75.  - Vitamin D,25-Hydroxy; Future    9. Overweight (BMI 25.0-29.9)  BMI is >= 25 and <30. (Overweight) The following options were offered after discussion;: exercise counseling/recommendations and nutrition counseling/recommendations    Return in about 6 months (around 8/7/2025), or if symptoms worsen or fail to improve, for Recheck.    Future Appointments         Provider Department Center    2/17/2025 4:00 PM Daphney Chi LCSW [facility]    2/24/2025 3:45 PM Conchita Valdes APRN [facility]    5/8/2025 2:30 PM (Arrive by 2:00 PM) Marcos Gonsalez MD [facility] CARDI[facility]    8/5/2025 3:30 PM (Arrive by 3:15 PM) Eduardo Castellano MD [facility]  GROUP UROLOGY [facility]    8/12/2025 3:30 PM Dallas Ugarte MD [facility] INTERNAL MEDICINE [facility]              Dallas Ugarte MD  Family Medicine  02/07/2025

## 2025-02-11 ENCOUNTER — PATIENT MESSAGE (OUTPATIENT)
Age: 45
End: 2025-02-11
Payer: COMMERCIAL

## 2025-02-11 DIAGNOSIS — F32.A DEPRESSION, UNSPECIFIED DEPRESSION TYPE: ICD-10-CM

## 2025-02-11 RX ORDER — BUSPIRONE HYDROCHLORIDE 7.5 MG/1
7.5 TABLET ORAL 2 TIMES DAILY
Qty: 180 TABLET | Refills: 0 | Status: SHIPPED | OUTPATIENT
Start: 2025-02-11

## 2025-02-17 ENCOUNTER — OFFICE VISIT (OUTPATIENT)
Age: 45
End: 2025-02-17
Payer: COMMERCIAL

## 2025-02-17 DIAGNOSIS — F41.1 GENERALIZED ANXIETY DISORDER: Primary | ICD-10-CM

## 2025-02-17 DIAGNOSIS — F32.1 MAJOR DEPRESSIVE DISORDER, SINGLE EPISODE, MODERATE: ICD-10-CM
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
## 2025-02-24 ENCOUNTER — OFFICE VISIT (OUTPATIENT)
Age: 45
End: 2025-02-24
Payer: COMMERCIAL

## 2025-02-24 VITALS
OXYGEN SATURATION: 97 % | SYSTOLIC BLOOD PRESSURE: 112 MMHG | WEIGHT: 202 LBS | DIASTOLIC BLOOD PRESSURE: 70 MMHG | HEART RATE: 85 BPM | BODY MASS INDEX: 27.4 KG/M2

## 2025-02-24 DIAGNOSIS — F43.10 POST TRAUMATIC STRESS DISORDER (PTSD): ICD-10-CM

## 2025-02-24 DIAGNOSIS — F32.A DEPRESSION, UNSPECIFIED DEPRESSION TYPE: ICD-10-CM

## 2025-02-24 DIAGNOSIS — F41.1 GENERALIZED ANXIETY DISORDER: Primary | ICD-10-CM

## 2025-02-24 DIAGNOSIS — F51.01 PRIMARY INSOMNIA: ICD-10-CM

## 2025-02-24 RX ORDER — DULOXETIN HYDROCHLORIDE 30 MG/1
30 CAPSULE, DELAYED RELEASE ORAL DAILY
Qty: 30 CAPSULE | Refills: 1 | Status: SHIPPED | OUTPATIENT
Start: 2025-02-24

## 2025-02-24 NOTE — PROGRESS NOTES
Follow Up Office Visit      Patient Name: Eduardo Luna  : 1980   MRN: [id number]     Referring Provider: Dallas Ugarte MD    Chief Complaint:      ICD-10-CM ICD-9-CM   1. Generalized anxiety disorder  F41.1 300.02   2. Depression, unspecified depression type  F32.A 311   3. Post traumatic stress disorder (PTSD)  F43.10 309.81   4. Primary insomnia  F51.01 307.42        History of Present Illness:   Eduardo Luna is a 44 y.o. male who is here today for follow up and medication management.           Subjective      Patient Reports:   History of Present Illness  He has initiated Lamictal therapy and reduced his BuSpar dosage to 7.5 mg, which he reports as beneficial. His mood remains stable with no significant complaints except for experiencing mild symptoms panic in certain situations, a symptom that has remained consistent over time. He has recently begun therapy and reports a positive experience thus far. The reintroduction of Lamictal has resulted in an improvement in his condition, and he expresses a desire to continue the treatment. His sleep pattern remains unchanged, averaging 5 to 6 hours per night. He reports no alterations in appetite but notes an unexplained weight loss.     He reports experiencing daily feelings of panic, which he attempts to manage proactively using coping mechanisms such as grounding. He is seeking alternatives to beta blockers for adrenaline control. He has previously tried propranolol without success and is currently on metoprolol for cardiac reasons. Hydroxyzine has proven ineffective for him. He was previously on Prozac for an extended period but discontinued due to increased appetite. He acknowledges that his anxiety was better managed on antidepressants but prefers not to resume the ones previous tried. He experiences situational anxiety, particularly related to driving, which occurs daily. He has undergone Scour Prevention testing. Patient denies  SI/HI/AVH.    MEDICATIONS  Current: Lamictal, BuSpar, Lunesta, Wellbutrin,  Past: Zoloft, Celexa, Lexapro, Effexor, Paxil, Pristiq, Xanax, Valium, propranolol, hydroxyzine, Wellbutrin, Lunesta, Prozac    PHQ-9= 10   ANTONIA-7= 7    Review of Systems:   Review of Systems   Constitutional:  Negative for appetite change, fatigue and unexpected weight change.   Eyes:  Negative for visual disturbance.   Respiratory:  Negative for chest tightness and shortness of breath.    Cardiovascular:  Positive for palpitations. Negative for chest pain.   Gastrointestinal:  Negative for nausea.   Musculoskeletal:  Negative for gait problem.   Skin:  Negative for rash and wound.   Neurological:  Negative for dizziness, tremors, seizures, weakness, light-headedness and headaches.   Psychiatric/Behavioral:  Positive for dysphoric mood and sleep disturbance. Negative for agitation, behavioral problems, confusion, decreased concentration, hallucinations, self-injury and suicidal ideas. The patient is nervous/anxious. The patient is not hyperactive.    Sleep pattern: 5 to 6 hours per night  Appetite: Normal    PHQ-9 Depression Screening  Little interest or pleasure in doing things? Several days   Feeling down, depressed, or hopeless? Several days   PHQ-2 Total Score 2   Trouble falling or staying asleep, or sleeping too much? Almost all   Feeling tired or having little energy? Several days   Poor appetite or overeating? Several days   Feeling bad about yourself - or that you are a failure or have let yourself or your family down? Several days   Trouble concentrating on things, such as reading the newspaper or watching television? Not at all   Moving or speaking so slowly that other people could have noticed? Or the opposite - being so fidgety or restless that you have been moving around a lot more than usual? Several days   Thoughts that you would be better off dead, or of hurting yourself in some way? Several days   PHQ-9 Total Score 10   If  you checked off any problems, how difficult have these problems made it for you to do your work, take care of things at home, or get along with other people? Not difficult at all       ANTONIA-7 Anxiety Screening  Over the last two weeks, how often have you been bothered by the following problems?  Feeling nervous, anxious or on edge: More than half the days  Not being able to stop or control worrying: Several days  Worrying too much about different things: Several days  Trouble Relaxing: Several days  Being so restless that it is hard to sit still: Not at all  Becoming easily annoyed or irritable: Several days  Feeling afraid as if something awful might happen: Several days  ANTONIA 7 Total Score: 7  If you checked any problems, how difficult have these problems made it for you to do your work, take care of things at home, or get along with other people: Not difficult at all    RISK ASSESSMENT:  Patient denies any thoughts or intent of suicide today. Patient denies any impulsive behavior today.     Medications:     Current Outpatient Medications:     amLODIPine (NORVASC) 5 MG tablet, Take 1 tablet by mouth Daily., Disp: 90 tablet, Rfl: 1    aspirin 81 MG EC tablet, Take 1 tablet by mouth Daily., Disp: 90 tablet, Rfl: 2    atorvastatin (LIPITOR) 40 MG tablet, Take 1 tablet by mouth Daily., Disp: 90 tablet, Rfl: 1    buPROPion XL (Wellbutrin XL) 300 MG 24 hr tablet, Take 1 tablet by mouth Every Morning, Disp: 90 tablet, Rfl: 0    busPIRone (BUSPAR) 7.5 MG tablet, Take 1 tablet by mouth 2 (Two) Times a Day., Disp: 180 tablet, Rfl: 0    cloNIDine (Catapres) 0.1 MG tablet, Take 1 tablet by mouth 2 (Two) Times a Day As Needed for High Blood Pressure., Disp: 20 tablet, Rfl: 0    clopidogrel (PLAVIX) 75 MG tablet, Take 1 tablet by mouth Daily., Disp: 90 tablet, Rfl: 1    eszopiclone (LUNESTA) 3 MG tablet, Take 1 tablet by mouth Every Night immediately before bedtime., Disp: 30 tablet, Rfl: 0    hydrOXYzine (ATARAX) 25 MG tablet,  Take 1 tablet by mouth 3 (Three) Times a Day As Needed for Itching or Allergies., Disp: 90 tablet, Rfl: 3    ketoconazole (NIZORAL) 2 % cream, Apply 1 Application topically to the appropriate area as directed Daily. Apply 1-2 times per day for 2-3 weeks, Then 2-3 times per week as needed, Disp: 60 g, Rfl: 1    lamoTRIgine (LaMICtal) 25 MG tablet, Take 1 tablet by mouth Daily., Disp: 30 tablet, Rfl: 2    levothyroxine (SYNTHROID, LEVOTHROID) 88 MCG tablet, Take 1 tablet by mouth Every Morning Before Breakfast., Disp: 90 tablet, Rfl: 1    metoprolol succinate XL (TOPROL-XL) 100 MG 24 hr tablet, Take 1 tablet by mouth Daily., Disp: 90 tablet, Rfl: 1    nitroglycerin (NITROSTAT) 0.4 MG SL tablet, Dissolve 1 tablet under the tongue as needed for angina, may repeat every 5 minutes for up three doses, Disp: 25 tablet, Rfl: 11    pantoprazole (PROTONIX) 40 MG EC tablet, Take 1 tablet by mouth Daily., Disp: 90 tablet, Rfl: 1    spironolactone (Aldactone) 25 MG tablet, Take 1 tablet by mouth Daily., Disp: 90 tablet, Rfl: 1    tacrolimus (PROTOPIC) 0.1 % ointment, Apply 1 Application topically to the appropriate area as directed twice daily for 2 weeks; THEN use 2-3 times per week as needed., Disp: 60 g, Rfl: 1    vitamin D3 125 MCG (5000 UT) capsule capsule, Take 1 capsule by mouth Daily., Disp: , Rfl:     DULoxetine (Cymbalta) 30 MG capsule, Take 1 capsule by mouth Daily., Disp: 30 capsule, Rfl: 1    Medication Considerations:  KENNY reviewed and appropriate.      Allergies:   Allergies   Allergen Reactions    Lisinopril Other (See Comments)     Throat tickle    Amoxicillin Hives, Itching and Rash       Results Reviewed:   Yes     Objective     Physical Exam:  Vital Signs:   Vitals:    02/24/25 1534   BP: 112/70   Pulse: 85   SpO2: 97%   Weight: 91.6 kg (202 lb)     Body mass index is 27.4 kg/m².     Mental Status Exam:   MENTAL STATUS EXAM   General Appearance:  Cleanly groomed and dressed and well developed  Eye Contact:   Good eye contact  Attitude:  Cooperative  Motor Activity:  Normal gait, posture  Muscle Strength:  Normal  Speech:  Normal rate, tone, volume  Language:  Spontaneous  Mood and affect:  Depressed and anxious  Hopelessness:  Denies  Loneliness: Denies  Thought Process:  Logical  Associations/ Thought Content:  No delusions  Hallucinations:  None  Suicidal Ideations:  Not present  Homicidal Ideation:  Not present  Sensorium:  Alert and clear  Orientation:  Person, place, time and situation  Immediate Recall, Recent, and Remote Memory:  Intact  Attention Span/ Concentration:  Good  Fund of Knowledge:  Appropriate for age and educational level  Intellectual Functioning:  Average range  Insight:  Good  Judgement:  Good  Reliability:  Good  Impulse Control:  Good       @RESULASTCBCDIFFPANEL,TSH,LABLIPI,FSELEDFK18,GNCBWEEH53,MG,FOLATE,PROLACTIN,CRPRESULT,CMP,[id number])@    Lab Results   Component Value Date    GLUCOSE 94 12/17/2024    BUN 13 12/17/2024    CREATININE 1.29 (H) 12/17/2024    EGFR 69.7 12/17/2024    BCR 10.1 12/17/2024    K 4.4 12/17/2024    CO2 28.1 12/17/2024    CALCIUM 9.9 12/17/2024    ALBUMIN 4.8 06/18/2024    BILITOT 0.5 06/18/2024    AST 27 06/18/2024    ALT 45 (H) 06/18/2024       Lab Results   Component Value Date    WBC 5.60 12/17/2024    HGB 15.8 12/17/2024    HCT 47.0 12/17/2024    MCV 89.7 12/17/2024     12/17/2024       Lab Results   Component Value Date    CHOL 194 01/11/2024    CHLPL 229 (H) 03/18/2016    TRIG 127 01/11/2024    HDL 51 01/11/2024     (H) 01/11/2024       Assessment / Plan      Visit Diagnosis/Orders Placed This Visit:  Diagnoses and all orders for this visit:    1. Generalized anxiety disorder (Primary)  -     DULoxetine (Cymbalta) 30 MG capsule; Take 1 capsule by mouth Daily.  Dispense: 30 capsule; Refill: 1    2. Depression, unspecified depression type  -     DULoxetine (Cymbalta) 30 MG capsule; Take 1 capsule by mouth Daily.  Dispense: 30 capsule; Refill:  1    3. Post traumatic stress disorder (PTSD)    4. Primary insomnia       Assessment & Plan  1. Anxiety.  He reports experiencing daily situational anxiety, particularly while driving. He has been on various medications in the past, including Zoloft, Celexa, Lexapro, Effexor, Paxil, Pristiq, Xanax, Valium, propranolol, hydroxyzine, Wellbutrin, Lunesta, and Prozac. Currently, he is on BuSpar 7.5 mg and Lamictal 25 mg, which he reports have been beneficial. He has also started therapy and finds it helpful. He is advised to use coping mechanisms such as grounding techniques to manage his anxiety.       Functional Status: Mild impairment     Prognosis: Good with Ongoing Treatment     Impression/Formulation:  Patient appeared alert and oriented.  Patient is voluntarily requesting to continue outpatient psychiatric treatment at [facility] [address]  Patient is receptive to assistance with maintaining a stable lifestyle.  Patient presents with history of     ICD-10-CM ICD-9-CM   1. Generalized anxiety disorder  F41.1 300.02   2. Depression, unspecified depression type  F32.A 311   3. Post traumatic stress disorder (PTSD)  F43.10 309.81   4. Primary insomnia  F51.01 307.42   .    Reviewed patient's previous provider notes. Reviewed most recent labs. Patient meets DSM V diagnostic criteria for diagnoses. Diagnoses may be updated as more information becomes available.       Treatment Plan:   Continue Lamictal 25mg, Lunesta 3mg, Wellbutrin 300mg, and BuSpar 7.5mg.  No refills needed  Initiate Cymbalta 30mg PO qd  Encouraged to continue psychotherapy  Follow-up in 6 weeks and as needed    Patient will continue supportive psychotherapy efforts and medications as indicated. Clinic will obtain release of information for current treatment team for continuity of care as needed. Patient will contact this office, call 911 or present to the nearest emergency room should suicidal or homicidal  ideations occur.  Discussed medication options and treatment plan of prescribed medication(s) as well as the risks, benefits, and potential side effects. Patient acknowledged and verbally consented to continue with current treatment plan and was educated on the importance of compliance with treatment and follow-up appointments.      We discussed the  risk of  Geronimo Flynn Syndrome with use of Lamictal that is most common in younger population and occurs within first few months of starting medication. Lamictal's only serious risk is SJS which can be fatal if left untreated. Slow titration reduces this risk from 1 in 100 to 1 in 2500. (Carlat 2021). Patient is encouraged to monitor skin for correlating rash, lesions in the mucous membranes, or flu-like symptoms. Should any of these symptoms occur, patient is advised to stop medication immediatly and notify provider. Patient should avoid trying new hygiene products or changing laundry soaps at this time. Anytime medication is not taken for five consecutive days, patient must notify provider and the titration must be restarted at 25 mg to minimize risk of SJS. Patient verbalizes understanding.        Pt has no previous history of seizure or current eating disorder, which would be a contraindication for use. The possibility of activation with initiation was discussed and patient verbalizes understanding.     Instructed will take 4-6 weeks for full therapeutic effect. Medication risks and side effects discussed with patient including risk for worsening mood, changes in behavior, thoughts of suicide or homicide, induction of elfego, serotonin syndrome, rare hyponatremia, rare SIADH, withdrawal symptoms if abrupt withdrawal, sexual dysfunction.   If any thoughts of SI or HI, worsening mood or changes in behavior, call 911 or crisis line [phone], or go to nearest ER at once. Patient agrees to notify close family/friend of new trial of antidepressants/info above. Pt.verbalizes  understanding and consents to treatment with this medication.     Quality Measures:   Never smoker    I advised Eduardoshefali Hutchinson Jeremy of the risks of tobacco use.     Follow Up:   Return in about 6 weeks (around 4/7/2025).      Patient or patient representative verbalized consent for the use of Ambient Listening during the visit with  CORINNE Woodruff for chart documentation. 2/24/2025  16:23 EST    CORINNE Woodruff  [facility] [address]    Answers submitted by the patient for this visit:  Anxiety (Submitted on 2/17/2025)  Chief Complaint: Anxiety  Visit: follow-up  Frequency: most days  Severity: moderate  depressed mood: No  dry mouth: No  excessive worry: Yes  insomnia: Yes  irritability: No  malaise/fatigue: No  obsessions: No  Sleep quality: fair  Hours of sleep per night: 6 Hours  Aggravated by: social activities, work stress  Medication compliance: %  Side effects: constipation/diarrhea  Additional information: I have driving anxiety. certain situations cause panic

## 2025-02-26 DIAGNOSIS — F51.01 PRIMARY INSOMNIA: Chronic | ICD-10-CM

## 2025-02-26 RX ORDER — ESZOPICLONE 3 MG/1
3 TABLET, FILM COATED ORAL NIGHTLY
Qty: 30 TABLET | Refills: 0 | Status: SHIPPED | OUTPATIENT
Start: 2025-02-26

## 2025-02-26 NOTE — TELEPHONE ENCOUNTER
Rx Refill Note  Requested Prescriptions     Pending Prescriptions Disp Refills    eszopiclone (LUNESTA) 3 MG tablet 30 tablet 0     Sig: Take 1 tablet by mouth Every Night immediately before bedtime.      Last office visit with prescribing clinician: 2/24/2025   Last telemedicine visit with prescribing clinician: Visit date not found   Next office visit with prescribing clinician: 4/7/2025                         Would you like a call back once the refill request has been completed: [] Yes [x] No    If the office needs to give you a call back, can they leave a voicemail: [] Yes [x] No    Patria Muñoz MA  02/26/25, 07:52 EST

## 2025-03-01 DIAGNOSIS — I10 ESSENTIAL HYPERTENSION: Chronic | ICD-10-CM

## 2025-03-03 RX ORDER — SPIRONOLACTONE 25 MG/1
25 TABLET ORAL DAILY
Qty: 90 TABLET | Refills: 1 | Status: SHIPPED | OUTPATIENT
Start: 2025-03-03

## 2025-03-03 NOTE — TELEPHONE ENCOUNTER
Rx Refill Note  Requested Prescriptions     Pending Prescriptions Disp Refills    spironolactone (Aldactone) 25 MG tablet 90 tablet 1     Sig: Take 1 tablet by mouth Daily.      Last office visit with prescribing clinician: 2/7/2025   Last telemedicine visit with prescribing clinician: Visit date not found   Next office visit with prescribing clinician: 8/12/2025                         Would you like a call back once the refill request has been completed: [] Yes [] No    If the office needs to give you a call back, can they leave a voicemail: [] Yes [] No    Haley Bro MA  03/03/25, 08:55 EST

## 2025-03-04 ENCOUNTER — LAB (OUTPATIENT)
Dept: LAB | Facility: HOSPITAL | Age: 45
End: 2025-03-04
Payer: COMMERCIAL

## 2025-03-04 ENCOUNTER — TELEPHONE (OUTPATIENT)
Age: 45
End: 2025-03-04
Payer: COMMERCIAL

## 2025-03-04 DIAGNOSIS — E03.9 HYPOTHYROIDISM (ACQUIRED): Chronic | ICD-10-CM

## 2025-03-04 DIAGNOSIS — E78.00 PURE HYPERCHOLESTEROLEMIA: Chronic | ICD-10-CM

## 2025-03-04 DIAGNOSIS — E55.9 VITAMIN D DEFICIENCY: ICD-10-CM

## 2025-03-04 DIAGNOSIS — Z12.5 SCREENING PSA (PROSTATE SPECIFIC ANTIGEN): ICD-10-CM

## 2025-03-04 LAB
25(OH)D3 SERPL-MCNC: 82.5 NG/ML (ref 30–100)
ALBUMIN SERPL-MCNC: 4.5 G/DL (ref 3.5–5.2)
ALP SERPL-CCNC: 100 U/L (ref 39–117)
ALT SERPL W P-5'-P-CCNC: 33 U/L (ref 1–41)
AST SERPL-CCNC: 24 U/L (ref 1–40)
BILIRUB CONJ SERPL-MCNC: 0.2 MG/DL (ref 0–0.3)
BILIRUB INDIRECT SERPL-MCNC: 0.5 MG/DL
BILIRUB SERPL-MCNC: 0.7 MG/DL (ref 0–1.2)
CHOLEST SERPL-MCNC: 116 MG/DL (ref 0–200)
HDLC SERPL-MCNC: 36 MG/DL (ref 40–60)
LDLC SERPL CALC-MCNC: 63 MG/DL (ref 0–100)
LDLC/HDLC SERPL: 1.74 {RATIO}
PROT SERPL-MCNC: 7.2 G/DL (ref 6–8.5)
PSA SERPL-MCNC: 1.07 NG/ML (ref 0–4)
T4 FREE SERPL-MCNC: 1.7 NG/DL (ref 0.92–1.68)
TRIGL SERPL-MCNC: 86 MG/DL (ref 0–150)
TSH SERPL DL<=0.05 MIU/L-ACNC: 3.66 UIU/ML (ref 0.27–4.2)
VLDLC SERPL-MCNC: 17 MG/DL (ref 5–40)

## 2025-03-04 PROCEDURE — 84439 ASSAY OF FREE THYROXINE: CPT

## 2025-03-04 PROCEDURE — 82306 VITAMIN D 25 HYDROXY: CPT

## 2025-03-04 PROCEDURE — 80076 HEPATIC FUNCTION PANEL: CPT

## 2025-03-04 PROCEDURE — G0103 PSA SCREENING: HCPCS

## 2025-03-04 PROCEDURE — 84443 ASSAY THYROID STIM HORMONE: CPT

## 2025-03-04 PROCEDURE — 80061 LIPID PANEL: CPT

## 2025-03-14 LAB
QT INTERVAL: 412 MS
QTC INTERVAL: 460 MS

## 2025-03-25 DIAGNOSIS — F51.01 PRIMARY INSOMNIA: Chronic | ICD-10-CM

## 2025-03-25 RX ORDER — ESZOPICLONE 3 MG/1
3 TABLET, FILM COATED ORAL NIGHTLY
Qty: 30 TABLET | Refills: 0 | Status: SHIPPED | OUTPATIENT
Start: 2025-03-25

## 2025-03-25 NOTE — TELEPHONE ENCOUNTER
Rx Refill Note  Requested Prescriptions     Pending Prescriptions Disp Refills    eszopiclone (LUNESTA) 3 MG tablet 30 tablet 0     Sig: Take 1 tablet by mouth Every Night immediately before bedtime.      Last office visit with prescribing clinician: 2/24/2025   Last telemedicine visit with prescribing clinician: Visit date not found   Next office visit with prescribing clinician: 4/7/2025                         Would you like a call back once the refill request has been completed: [] Yes [x] No    If the office needs to give you a call back, can they leave a voicemail: [x] Yes [] No    Patria Muñoz MA  03/25/25, 07:45 EDT

## 2025-03-30 DIAGNOSIS — F41.1 GENERALIZED ANXIETY DISORDER: ICD-10-CM

## 2025-03-30 DIAGNOSIS — F32.A DEPRESSION, UNSPECIFIED DEPRESSION TYPE: ICD-10-CM

## 2025-03-31 RX ORDER — LAMOTRIGINE 25 MG/1
25 TABLET ORAL DAILY
Qty: 30 TABLET | Refills: 2 | Status: SHIPPED | OUTPATIENT
Start: 2025-03-31 | End: 2026-03-31

## 2025-03-31 RX ORDER — BUPROPION HYDROCHLORIDE 300 MG/1
300 TABLET ORAL EVERY MORNING
Qty: 90 TABLET | Refills: 0 | Status: SHIPPED | OUTPATIENT
Start: 2025-03-31 | End: 2025-06-30

## 2025-03-31 NOTE — TELEPHONE ENCOUNTER
Rx Refill Note  Requested Prescriptions     Pending Prescriptions Disp Refills    buPROPion XL (Wellbutrin XL) 300 MG 24 hr tablet 90 tablet 0     Sig: Take 1 tablet by mouth Every Morning    lamoTRIgine (LaMICtal) 25 MG tablet 30 tablet 2     Sig: Take 1 tablet by mouth Daily.      Last office visit with prescribing clinician: 2/24/2025   Last telemedicine visit with prescribing clinician: Visit date not found   Next office visit with prescribing clinician: 5/14/2025                         Would you like a call back once the refill request has been completed: [] Yes [x] No    If the office needs to give you a call back, can they leave a voicemail: [x] Yes [] No    Patria Muñoz MA  03/31/25, 07:56 EDT

## 2025-04-01 ENCOUNTER — PATIENT MESSAGE (OUTPATIENT)
Dept: INTERNAL MEDICINE | Facility: CLINIC | Age: 45
End: 2025-04-01
Payer: COMMERCIAL

## 2025-04-14 DIAGNOSIS — K21.9 GASTROESOPHAGEAL REFLUX DISEASE WITHOUT ESOPHAGITIS: ICD-10-CM

## 2025-04-15 RX ORDER — PANTOPRAZOLE SODIUM 40 MG/1
40 TABLET, DELAYED RELEASE ORAL DAILY
Qty: 90 TABLET | Refills: 1 | Status: SHIPPED | OUTPATIENT
Start: 2025-04-15

## 2025-04-22 DIAGNOSIS — F51.01 PRIMARY INSOMNIA: Chronic | ICD-10-CM

## 2025-04-23 RX ORDER — ESZOPICLONE 3 MG/1
3 TABLET, FILM COATED ORAL NIGHTLY
Qty: 30 TABLET | Refills: 0 | Status: SHIPPED | OUTPATIENT
Start: 2025-04-23

## 2025-04-23 NOTE — TELEPHONE ENCOUNTER
Rx Refill Note  Requested Prescriptions     Pending Prescriptions Disp Refills    eszopiclone (LUNESTA) 3 MG tablet 30 tablet 0     Sig: Take 1 tablet by mouth Every Night immediately before bedtime.      Last office visit with prescribing clinician: 2/24/2025   Last telemedicine visit with prescribing clinician: Visit date not found   Next office visit with prescribing clinician: 4/24/2025                         Would you like a call back once the refill request has been completed: [] Yes [x] No    If the office needs to give you a call back, can they leave a voicemail: [x] Yes [] No    Patria Muñoz MA  04/23/25, 07:49 EDT

## 2025-04-24 ENCOUNTER — TELEMEDICINE (OUTPATIENT)
Age: 45
End: 2025-04-24
Payer: COMMERCIAL

## 2025-04-24 DIAGNOSIS — F51.01 PRIMARY INSOMNIA: ICD-10-CM

## 2025-04-24 DIAGNOSIS — F43.10 POST TRAUMATIC STRESS DISORDER (PTSD): ICD-10-CM

## 2025-04-24 DIAGNOSIS — F32.A DEPRESSION, UNSPECIFIED DEPRESSION TYPE: ICD-10-CM

## 2025-04-24 DIAGNOSIS — F41.1 GENERALIZED ANXIETY DISORDER: Primary | ICD-10-CM

## 2025-04-24 RX ORDER — FLUVOXAMINE MALEATE 50 MG
50 TABLET ORAL NIGHTLY
Qty: 30 TABLET | Refills: 2 | Status: SHIPPED | OUTPATIENT
Start: 2025-04-24

## 2025-04-24 NOTE — PROGRESS NOTES
Video Visit      Patient Name: Eduardo Luna  : 1980   MRN: [id number]     Referring Provider: Dallas Ugarte MD    Chief Complaint:      ICD-10-CM ICD-9-CM   1. Generalized anxiety disorder  F41.1 300.02   2. Depression, unspecified depression type  F32.A 311   3. Post traumatic stress disorder (PTSD)  F43.10 309.81   4. Primary insomnia  F51.01 307.42          History of Present Illness:   Eduardo Luna is a 44 y.o. male who is being seen by a video visit today for follow up and medication management.           Subjective   Patient Reports:   History of Present Illness  The chief complaint is daily anxiety, which is attributed to situational triggers such as driving and social interactions. Depression is also reported, characterized by intermittent episodes of feeling down. The patient expresses greater concern about anxiety than depression. Minor panic attacks occur, but no full-blown panic attacks are reported.    Hydroxyzine 25 mg has not provided significant relief. Therapy was discontinued due to change in insurance, and is considering resuming it with a different therapist if able. The patient recently switched jobs and is managing anxiety at work, although there are challenges in remembering all the steps involved.    No suicidal ideation, self-harm tendencies, hallucinations, or outward expressions of anger are reported. Occasional feelings of hopelessness and loneliness are admitted. The current medication regimen includes Lamictal, Lunesta, Wellbutrin, and BuSpar. Prozac was previously tried and found effective but had undesirable side effects related to eating.     Sleep patterns are regular, averaging 6 to 8 hours per night, with medication and appetite remains good.    MEDICATIONS  Current: Lamictal, Lunesta, Wellbutrin, BuSpar, hydroxyzine.  Discontinued: Cymbalta, Prozac.    Review of Systems:   Review of Systems   Constitutional:  Negative for appetite change, fatigue and  unexpected weight change.   Eyes:  Negative for visual disturbance.   Respiratory:  Negative for chest tightness and shortness of breath.    Cardiovascular:  Negative for chest pain and palpitations.   Gastrointestinal:  Negative for nausea.   Musculoskeletal:  Negative for gait problem.   Skin:  Negative for rash and wound.   Neurological:  Negative for dizziness, tremors, seizures, weakness, light-headedness and headaches.   Psychiatric/Behavioral:  Positive for dysphoric mood and sleep disturbance. Negative for agitation, behavioral problems, confusion, decreased concentration, hallucinations, self-injury and suicidal ideas. The patient is nervous/anxious. The patient is not hyperactive.    Sleep pattern: 6-8 hours per night  Appetite: normal       RISK ASSESSMENT:  Patient denies any thoughts of suicide or intent today. Patient denies any suicidal or homicidal ideation today. Patient denies any high risk factors today.     Medications:     Current Outpatient Medications:     amLODIPine (NORVASC) 5 MG tablet, Take 1 tablet by mouth Daily., Disp: 90 tablet, Rfl: 1    aspirin 81 MG EC tablet, Take 1 tablet by mouth Daily., Disp: 90 tablet, Rfl: 2    atorvastatin (LIPITOR) 40 MG tablet, Take 1 tablet by mouth Daily., Disp: 90 tablet, Rfl: 1    buPROPion XL (Wellbutrin XL) 300 MG 24 hr tablet, Take 1 tablet by mouth Every Morning, Disp: 90 tablet, Rfl: 0    busPIRone (BUSPAR) 7.5 MG tablet, Take 1 tablet by mouth 2 (Two) Times a Day., Disp: 180 tablet, Rfl: 0    cloNIDine (Catapres) 0.1 MG tablet, Take 1 tablet by mouth 2 (Two) Times a Day As Needed for High Blood Pressure., Disp: 20 tablet, Rfl: 0    clopidogrel (PLAVIX) 75 MG tablet, Take 1 tablet by mouth Daily., Disp: 90 tablet, Rfl: 1    eszopiclone (LUNESTA) 3 MG tablet, Take 1 tablet by mouth Every Night immediately before bedtime., Disp: 30 tablet, Rfl: 0    fluvoxaMINE (LUVOX) 50 MG tablet, Take 1 tablet by mouth Every Night., Disp: 30 tablet, Rfl: 2     hydrOXYzine (ATARAX) 25 MG tablet, Take 1 tablet by mouth 3 (Three) Times a Day As Needed for Itching or Allergies., Disp: 90 tablet, Rfl: 3    ketoconazole (NIZORAL) 2 % cream, Apply 1 Application topically to the appropriate area as directed Daily. Apply 1-2 times per day for 2-3 weeks, Then 2-3 times per week as needed, Disp: 60 g, Rfl: 1    lamoTRIgine (LaMICtal) 25 MG tablet, Take 1 tablet by mouth Daily., Disp: 30 tablet, Rfl: 2    levothyroxine (SYNTHROID, LEVOTHROID) 88 MCG tablet, Take 1 tablet by mouth Every Morning Before Breakfast., Disp: 90 tablet, Rfl: 1    metoprolol succinate XL (TOPROL-XL) 100 MG 24 hr tablet, Take 1 tablet by mouth Daily., Disp: 90 tablet, Rfl: 1    nitroglycerin (NITROSTAT) 0.4 MG SL tablet, Dissolve 1 tablet under the tongue as needed for angina, may repeat every 5 minutes for up three doses, Disp: 25 tablet, Rfl: 11    pantoprazole (PROTONIX) 40 MG EC tablet, Take 1 tablet by mouth Daily., Disp: 90 tablet, Rfl: 1    spironolactone (Aldactone) 25 MG tablet, Take 1 tablet by mouth Daily., Disp: 90 tablet, Rfl: 1    tacrolimus (PROTOPIC) 0.1 % ointment, Apply 1 Application topically to the appropriate area as directed twice daily for 2 weeks; THEN use 2-3 times per week as needed., Disp: 60 g, Rfl: 1    vitamin D3 125 MCG (5000 UT) capsule capsule, Take 1 capsule by mouth Daily., Disp: , Rfl:     Medication Considerations:  KENNY reviewed and appropriate.      Allergies:   Allergies   Allergen Reactions    Lisinopril Other (See Comments)     Throat tickle    Amoxicillin Hives, Itching and Rash       Objective     Physical Exam:  Vital Signs: There were no vitals filed for this visit.  There is no height or weight on file to calculate BMI.   [facility].  Unable to obtain vital signs due to nature of remote visit.  Height stated at 6 ft.  Weight stated at 200 pounds.     Mental Status Exam:   MENTAL STATUS EXAM    General Appearance:  Cleanly groomed and dressed and well developed  Eye Contact:  Good eye contact  Attitude:  Cooperative  Motor Activity:  Other  Other Comment:  EVI video visit  Muscle Strength:  Other  Other Comment:  EVI video visit  Speech:  Normal rate, tone, volume  Language:  Spontaneous  Mood and affect:  Depressed and anxious  Hopelessness:  Denies  Loneliness: Denies  Thought Process:  Logical  Associations/ Thought Content:  No delusions  Hallucinations:  None  Suicidal Ideations:  Not present  Homicidal Ideation:  Not present  Sensorium:  Alert and clear  Orientation:  Person, place, time and situation  Immediate Recall, Recent, and Remote Memory:  Intact  Attention Span/ Concentration:  Good  Fund of Knowledge:  Appropriate for age and educational level  Intellectual Functioning:  Average range  Insight:  Good  Judgement:  Good  Reliability:  Good  Impulse Control:  Fair       @RESULASTCBCDIFFPANEL,TSH,LABLIPI,[id number],JBGNWHYF41,MG,FOLATE,PROLACTIN,CRPRESULT,CMP,[id number])@    Lab Results   Component Value Date    GLUCOSE 94 12/17/2024    BUN 13 12/17/2024    CREATININE 1.29 (H) 12/17/2024    EGFR 69.7 12/17/2024    BCR 10.1 12/17/2024    K 4.4 12/17/2024    CO2 28.1 12/17/2024    CALCIUM 9.9 12/17/2024    ALBUMIN 4.5 03/04/2025    BILITOT 0.7 03/04/2025    AST 24 03/04/2025    ALT 33 03/04/2025       Lab Results   Component Value Date    WBC 5.60 12/17/2024    HGB 15.8 12/17/2024    HCT 47.0 12/17/2024    MCV 89.7 12/17/2024     12/17/2024       Lab Results   Component Value Date    CHOL 116 03/04/2025    CHLPL 229 (H) 03/18/2016    TRIG 86 03/04/2025    HDL 36 (L) 03/04/2025    LDL 63 03/04/2025       Assessment / Plan      Visit Diagnosis/Orders Placed This Visit:  Diagnoses and all orders for this visit:    1. Generalized anxiety disorder (Primary)    2. Depression, unspecified depression type    3. Post traumatic stress disorder (PTSD)    4. Primary insomnia    Other orders  -      fluvoxaMINE (LUVOX) 50 MG tablet; Take 1 tablet by mouth Every Night.  Dispense: 30 tablet; Refill: 2      Assessment & Plan  The patient continues to experience significant situational anxiety, which impacts his daily functioning. Although depressive symptoms are present, they are not as prominent as his anxiety. The patient reports no suicidal ideation, self-harm tendencies, hallucinations, or anger outbursts. His response to current medications has been mixed, with some negative side effects noted. Overall, his mental health remains stable but requires further intervention to manage anxiety effectively.      Functional Status: Mild impairment     Prognosis: Good with Ongoing Treatment     Impression/Formulation:  Patient appeared alert and oriented.  Patient is voluntarily requesting to continue outpatient psychiatric treatment at [facility].  Patient is receptive to assistance with maintaining a stable lifestyle.  Patient presents with history of     ICD-10-CM ICD-9-CM   1. Generalized anxiety disorder  F41.1 300.02   2. Depression, unspecified depression type  F32.A 311   3. Post traumatic stress disorder (PTSD)  F43.10 309.81   4. Primary insomnia  F51.01 307.42   . Reviewed patient's previous provider notes. Reviewed most recent labs. Patient meets DSM V diagnostic criteria for diagnoses. Diagnoses may be updated as more information becomes available.     Treatment Plan:   Continue Lamictal 25mg, Lunesta 3mg, Wellbutrin 300mg, and BuSpar 7.5mg.  No refills needed  Discontinue Cymbalta (Negative side effects, stopped a few days after initiation)  Initiate Luvox 50 mg PO qhs  Encouraged to pursue psychotherapy and discussed TMS therapy  Follow up in 6 weeks and as needed    Patient will continue supportive psychotherapy efforts and medications as indicated. Clinic will obtain release of information for current treatment team for continuity of care as needed. Patient will contact  this office, call 911 or present to the nearest emergency room should suicidal or homicidal ideations occur. Discussed medication options and treatment plan of prescribed medication(s) as well as the risks, benefits, and potential side effects. Patient ackowledged and verbally consented to continue with current treatment plan and was educated on the importance of compliance with treatment and follow-up appointments.      We discussed the  risk of  Geronimo Flynn Syndrome with use of Lamictal that is most common in younger population and occurs within first few months of starting medication. Lamictal's only serious risk is SJS which can be fatal if left untreated. Slow titration reduces this risk from 1 in 100 to 1 in 2500. (Carlat 2021). Patient is encouraged to monitor skin for correlating rash, lesions in the mucous membranes, or flu-like symptoms. Should any of these symptoms occur, patient is advised to stop medication immediatly and notify provider. Patient should avoid trying new hygiene products or changing laundry soaps at this time. Anytime medication is not taken for five consecutive days, patient must notify provider and the titration must be restarted at 25 mg to minimize risk of SJS. Patient verbalizes understanding.       Instructed will take 4-6 weeks for full therapeutic effect. Medication risks and side effects discussed with patient including risk for worsening mood, changes in behavior, thoughts of suicide or homicide, induction of elfego, serotonin syndrome, rare hyponatremia, rare SIADH, withdrawal symptoms if abrupt withdrawal, sexual dysfunction.   If any thoughts of SI or HI, worsening mood or changes in behavior, call 911 or crisis line [phone], or go to nearest ER at once. Patient agrees to notify close family/friend of new trial of antidepressants/info above. Pt.verbalizes understanding and consents to treatment with this medication.    Pt has no previous history of seizure or current eating  disorder, which would be a contraindication for use. The possibility of activation with initiation was discussed and patient verbalizes understanding.  The patient is being prescribed a controlled substance as part of the treatment plan. The patient has been educated of appropriate use of the medication, including risks and side effects such as somnolence, limited ability to drive and/or work or function safely, potential for dependence, respiratory depression, falls, change in blood pressure, changes in heart rhythm or heart rate, activation of other mental illnesses, and overdose among others. Patient is also informed that the medication is to be used by the patient only, and to avoid any combined use of ETOH, or other substances, with this medication unless prescribed and as directed by a Provider.  The patient verbalized understanding and agreement with this in their own words.     Provider discussed medicinal and nonmedicinal treatment options for treatment of anxiety/depression in patient under age 25, including psychotherapy alone, psychotherapy with SSRI, or SSRI without psychotherapy.  Provider discussed the evidence supporting use of psychotherapy to develop coping skills without the risk of medication side effects in addition to efficacy of combined treatment using therapy and medication. Lengthy conversation regarding the influence of hormone fluctuations on mood symptoms, impulsivity and increased risk of worsening symptoms and suicidality with use of  SSRI medication in adolescence.  Patient/Guardian educated on Black Box Warning of increased suicidal thoughts and behaviors occurring with use of SSRIs in those under age 25 and advised patient must be monitored closely for subtle signs of worsening depression and/or agitation when SSRI medications are initiated. Appropriate safety precautions should be taken including securing firearms and medications out of patient's reach. Provider should be contacted  immediately and patient taken to ED should  SI/HI occur. Provider encouraged patient and/or guardian to weigh risks versus benefits of medication management carefully.    Patient and/or guardian verbalize understanding of risks associated with use of SSRI medication and believe medication is their best treatment option at this time. Guardian and/or patient agrees to monitor for and report worsening symptoms or intolerable side effects and understands patient must return for a two week follow up appointment unless otherwise indicated and agreed upon.       Warned about increased risk of serotonin syndrome associated with use of multiple serotonergic medications.  Patient believes benefits outweigh the risks.  Serotonin syndrome signs and symptoms reviewed such as but not limited to: Diarrhea, shivering, tremors, muscle rigidity, headache and confusion.  Patient agrees to go to ED should any of these occur.    Quality Measures:   Never smoker    I advised Eduardo Luna of the risks of tobacco use.       Follow Up:   Return in about 6 weeks (around 6/5/2025).    Patient or patient representative verbalized consent for the use of Ambient Listening during the visit with  CORINNE Woodruff for chart documentation. 4/24/2025  15:45 EDT    CORINNE Woodruff, PMHNP-[facility] [address]

## 2025-05-08 DIAGNOSIS — F32.A DEPRESSION, UNSPECIFIED DEPRESSION TYPE: ICD-10-CM

## 2025-05-08 RX ORDER — BUSPIRONE HYDROCHLORIDE 7.5 MG/1
7.5 TABLET ORAL 2 TIMES DAILY
Qty: 180 TABLET | Refills: 0 | Status: SHIPPED | OUTPATIENT
Start: 2025-05-08

## 2025-05-08 NOTE — TELEPHONE ENCOUNTER
Rx Refill Note  Requested Prescriptions     Pending Prescriptions Disp Refills    busPIRone (BUSPAR) 7.5 MG tablet 180 tablet 0     Sig: Take 1 tablet by mouth 2 (Two) Times a Day.      Last office visit with prescribing clinician: 2/24/2025   Last telemedicine visit with prescribing clinician: 4/24/2025   Next office visit with prescribing clinician: 5/14/2025                         Would you like a call back once the refill request has been completed: [] Yes [x] No    If the office needs to give you a call back, can they leave a voicemail: [x] Yes [] No    Patria Muñoz MA  05/08/25, 07:59 EDT

## 2025-05-14 ENCOUNTER — OFFICE VISIT (OUTPATIENT)
Age: 45
End: 2025-05-14
Payer: COMMERCIAL

## 2025-05-14 VITALS
DIASTOLIC BLOOD PRESSURE: 74 MMHG | SYSTOLIC BLOOD PRESSURE: 112 MMHG | HEIGHT: 72 IN | BODY MASS INDEX: 26.01 KG/M2 | OXYGEN SATURATION: 97 % | HEART RATE: 80 BPM | WEIGHT: 192 LBS

## 2025-05-14 DIAGNOSIS — F51.01 PRIMARY INSOMNIA: ICD-10-CM

## 2025-05-14 DIAGNOSIS — F41.1 GENERALIZED ANXIETY DISORDER: Primary | ICD-10-CM

## 2025-05-14 DIAGNOSIS — F32.A DEPRESSION, UNSPECIFIED DEPRESSION TYPE: ICD-10-CM

## 2025-05-14 DIAGNOSIS — F43.10 POST TRAUMATIC STRESS DISORDER (PTSD): ICD-10-CM

## 2025-05-14 RX ORDER — BUPROPION HYDROCHLORIDE 150 MG/1
150 TABLET ORAL DAILY
Qty: 30 TABLET | Refills: 2 | Status: SHIPPED | OUTPATIENT
Start: 2025-05-14

## 2025-05-14 RX ORDER — BUPROPION HYDROCHLORIDE 300 MG/1
300 TABLET ORAL EVERY MORNING
Qty: 90 TABLET | Refills: 0 | Status: SHIPPED | OUTPATIENT
Start: 2025-05-14 | End: 2025-08-12

## 2025-05-14 RX ORDER — LAMOTRIGINE 25 MG/1
25 TABLET ORAL DAILY
Qty: 30 TABLET | Refills: 2 | Status: SHIPPED | OUTPATIENT
Start: 2025-05-14 | End: 2026-05-14

## 2025-05-14 RX ORDER — ESZOPICLONE 3 MG/1
3 TABLET, FILM COATED ORAL NIGHTLY
Qty: 30 TABLET | Refills: 0 | Status: SHIPPED | OUTPATIENT
Start: 2025-05-14

## 2025-05-14 NOTE — PROGRESS NOTES
Follow Up Office Visit      Patient Name: Eduardo Luna  : 1980   MRN: [id number]     Referring Provider: Dallas Ugarte MD    Chief Complaint:      ICD-10-CM ICD-9-CM   1. Generalized anxiety disorder  F41.1 300.02   2. Post traumatic stress disorder (PTSD)  F43.10 309.81   3. Primary insomnia  F51.01 307.42   4. Depression, unspecified depression type  F32.A 311        History of Present Illness:   Eduardo Luna is a 44 y.o. male who is here today for follow up and medication management.           Subjective      Patient Reports:   History of Present Illness  He reports experiencing intermittent episodes of anxiety, often triggered by specific driving situations. He has not previously increased his Wellbutrin dosage to 450 mg, having only taken 150 mg or 300 mg. He is uncertain about the efficacy of BuSpar in managing his symptoms. He reports no adverse effects from Wellbutrin and is considering a return to a low dose of Prozac, which he previously took at a dose of 40 mg. Prozac was previously discontinued due to weight gain. He has no history of seizures or eating disorders. He is currently on Lamictal, Lunesta, Wellbutrin, Hydroxyzine and BuSpar, all of which he tolerates well. He has previously tried multiple medications, last one being Luvox but discontinued it due to adverse effects.     He also experiences depression, which he describes as transient and often associated with negative thoughts. He has attempted to seek therapy but has encountered insurance-related obstacles. He had been referred for Transcranial Magnetic Stimulation (TMS) therapy, but the high deductible and session costs have deterred him from pursuing this treatment. He also reports feeling emotionally flat on certain days, which he attributes to his blood pressure medication.    Driving is a trigger for him. He denies SI/HI/AVH.    MEDICATIONS  Current: Lamictal, Lunesta, Wellbutrin, BuSpar  Discontinued: Luvox,  Prozac    PHQ-9= score from previous visit  ANTONIA-7= score from previous visit    Review of Systems:   Review of Systems   Constitutional:  Negative for appetite change, fatigue and unexpected weight change.   Eyes:  Negative for visual disturbance.   Respiratory:  Negative for chest tightness and shortness of breath.    Cardiovascular:  Positive for palpitations. Negative for chest pain.   Gastrointestinal:  Negative for nausea.   Musculoskeletal:  Negative for gait problem.   Skin:  Negative for rash and wound.   Neurological:  Negative for dizziness, tremors, seizures, weakness, light-headedness and headaches.   Psychiatric/Behavioral:  Positive for dysphoric mood and sleep disturbance. Negative for agitation, behavioral problems, confusion, decreased concentration, hallucinations, self-injury and suicidal ideas. The patient is nervous/anxious. The patient is not hyperactive.    Sleep pattern: 6-8 hours per night  Appetite: normal     PHQ-9 Depression Screening  Little interest or pleasure in doing things? Several days   Feeling down, depressed, or hopeless? Several days   PHQ-2 Total Score 2   Trouble falling or staying asleep, or sleeping too much? Almost all   Feeling tired or having little energy? Several days   Poor appetite or overeating? Several days   Feeling bad about yourself - or that you are a failure or have let yourself or your family down? Over half   Trouble concentrating on things, such as reading the newspaper or watching television? Not at all   Moving or speaking so slowly that other people could have noticed? Or the opposite - being so fidgety or restless that you have been moving around a lot more than usual? Not at all   Thoughts that you would be better off dead, or of hurting yourself in some way? Several days   PHQ-9 Total Score 10   If you checked off any problems, how difficult have these problems made it for you to do your work, take care of things at home, or get along with other  people? Not difficult at all       ANTONIA-7 Anxiety Screening  Over the last two weeks, how often have you been bothered by the following problems?  Feeling nervous, anxious or on edge: More than half the days  Not being able to stop or control worrying: More than half the days  Worrying too much about different things: More than half the days  Trouble Relaxing: Several days  Being so restless that it is hard to sit still: Not at all  Becoming easily annoyed or irritable: Not at all  Feeling afraid as if something awful might happen: Several days  ANTONIA 7 Total Score: 8  If you checked any problems, how difficult have these problems made it for you to do your work, take care of things at home, or get along with other people: Not difficult at all    RISK ASSESSMENT:  Patient denies any thoughts or intent of suicide today. Patient denies any impulsive behavior today.     Medications:     Current Outpatient Medications:     amLODIPine (NORVASC) 5 MG tablet, Take 1 tablet by mouth Daily., Disp: 90 tablet, Rfl: 1    aspirin 81 MG EC tablet, Take 1 tablet by mouth Daily., Disp: 90 tablet, Rfl: 2    atorvastatin (LIPITOR) 40 MG tablet, Take 1 tablet by mouth Daily., Disp: 90 tablet, Rfl: 1    buPROPion XL (Wellbutrin XL) 300 MG 24 hr tablet, Take 1 tablet by mouth Every Morning, Disp: 90 tablet, Rfl: 0    busPIRone (BUSPAR) 7.5 MG tablet, Take 1 tablet by mouth 2 (Two) Times a Day., Disp: 180 tablet, Rfl: 0    cloNIDine (Catapres) 0.1 MG tablet, Take 1 tablet by mouth 2 (Two) Times a Day As Needed for High Blood Pressure., Disp: 20 tablet, Rfl: 0    clopidogrel (PLAVIX) 75 MG tablet, Take 1 tablet by mouth Daily., Disp: 90 tablet, Rfl: 1    eszopiclone (LUNESTA) 3 MG tablet, Take 1 tablet by mouth Every Night immediately before bedtime., Disp: 30 tablet, Rfl: 0    hydrOXYzine (ATARAX) 25 MG tablet, Take 1 tablet by mouth 3 (Three) Times a Day As Needed for Itching or Allergies., Disp: 90 tablet, Rfl: 3    ketoconazole (NIZORAL)  "2 % cream, Apply 1 Application topically to the appropriate area as directed Daily. Apply 1-2 times per day for 2-3 weeks, Then 2-3 times per week as needed, Disp: 60 g, Rfl: 1    lamoTRIgine (LaMICtal) 25 MG tablet, Take 1 tablet by mouth Daily., Disp: 30 tablet, Rfl: 2    levothyroxine (SYNTHROID, LEVOTHROID) 88 MCG tablet, Take 1 tablet by mouth Every Morning Before Breakfast., Disp: 90 tablet, Rfl: 1    metoprolol succinate XL (TOPROL-XL) 100 MG 24 hr tablet, Take 1 tablet by mouth Daily., Disp: 90 tablet, Rfl: 1    nitroglycerin (NITROSTAT) 0.4 MG SL tablet, Dissolve 1 tablet under the tongue as needed for angina, may repeat every 5 minutes for up three doses, Disp: 25 tablet, Rfl: 11    pantoprazole (PROTONIX) 40 MG EC tablet, Take 1 tablet by mouth Daily., Disp: 90 tablet, Rfl: 1    spironolactone (Aldactone) 25 MG tablet, Take 1 tablet by mouth Daily., Disp: 90 tablet, Rfl: 1    tacrolimus (PROTOPIC) 0.1 % ointment, Apply 1 Application topically to the appropriate area as directed twice daily for 2 weeks; THEN use 2-3 times per week as needed., Disp: 60 g, Rfl: 1    buPROPion XL (Wellbutrin XL) 150 MG 24 hr tablet, Take 1 tablet by mouth Daily. in addition to 300mg for total of 450mg by mouth per day, Disp: 30 tablet, Rfl: 2    Medication Considerations:  KENNY reviewed and appropriate.      Allergies:   Allergies   Allergen Reactions    Lisinopril Other (See Comments)     Throat tickle    Amoxicillin Hives, Itching and Rash       Results Reviewed:   Yes     Objective     Physical Exam:  Vital Signs:   Vitals:    05/14/25 1538   BP: 112/74   Pulse: 80   SpO2: 97%   Weight: 87.1 kg (192 lb)   Height: 182.9 cm (72.01\")     Body mass index is 26.03 kg/m².     Mental Status Exam:   MENTAL STATUS EXAM   General Appearance:  Cleanly groomed and dressed and well developed  Eye Contact:  Good eye contact  Attitude:  Cooperative and polite  Motor Activity:  Normal gait, posture and fidgety  Muscle Strength:  " Normal  Speech:  Normal rate, tone, volume  Language:  Spontaneous  Mood and affect:  Depressed and anxious  Hopelessness:  Denies  Loneliness: Denies  Thought Process:  Logical  Associations/ Thought Content:  No delusions  Hallucinations:  None  Suicidal Ideations:  Not present  Homicidal Ideation:  Not present  Sensorium:  Alert and clear  Orientation:  Person, place, time and situation  Immediate Recall, Recent, and Remote Memory:  Intact  Attention Span/ Concentration:  Good  Fund of Knowledge:  Appropriate for age and educational level  Intellectual Functioning:  Average range  Insight:  Good  Judgement:  Good  Reliability:  Good  Impulse Control:  Good       @RESULASTCBCDIFFPANEL,TSH,LABLIPI,LVCYXVEA24,QOEMOEXL63,MG,FOLATE,PROLACTIN,CRPRESULT,CMP,[id number])@    Lab Results   Component Value Date    GLUCOSE 94 12/17/2024    BUN 13 12/17/2024    CREATININE 1.29 (H) 12/17/2024    EGFR 69.7 12/17/2024    BCR 10.1 12/17/2024    K 4.4 12/17/2024    CO2 28.1 12/17/2024    CALCIUM 9.9 12/17/2024    ALBUMIN 4.5 03/04/2025    BILITOT 0.7 03/04/2025    AST 24 03/04/2025    ALT 33 03/04/2025       Lab Results   Component Value Date    WBC 5.60 12/17/2024    HGB 15.8 12/17/2024    HCT 47.0 12/17/2024    MCV 89.7 12/17/2024     12/17/2024       Lab Results   Component Value Date    CHOL 116 03/04/2025    CHLPL 229 (H) 03/18/2016    TRIG 86 03/04/2025    HDL 36 (L) 03/04/2025    LDL 63 03/04/2025       Assessment / Plan      Visit Diagnosis/Orders Placed This Visit:  Diagnoses and all orders for this visit:    1. Generalized anxiety disorder (Primary)  -     buPROPion XL (Wellbutrin XL) 150 MG 24 hr tablet; Take 1 tablet by mouth Daily. in addition to 300mg for total of 450mg by mouth per day  Dispense: 30 tablet; Refill: 2  -     lamoTRIgine (LaMICtal) 25 MG tablet; Take 1 tablet by mouth Daily.  Dispense: 30 tablet; Refill: 2  -     buPROPion XL (Wellbutrin XL) 300 MG 24 hr tablet; Take 1 tablet by mouth Every  Morning  Dispense: 90 tablet; Refill: 0    2. Post traumatic stress disorder (PTSD)    3. Primary insomnia  -     eszopiclone (LUNESTA) 3 MG tablet; Take 1 tablet by mouth Every Night immediately before bedtime.  Dispense: 30 tablet; Refill: 0    4. Depression, unspecified depression type  -     buPROPion XL (Wellbutrin XL) 150 MG 24 hr tablet; Take 1 tablet by mouth Daily. in addition to 300mg for total of 450mg by mouth per day  Dispense: 30 tablet; Refill: 2  -     lamoTRIgine (LaMICtal) 25 MG tablet; Take 1 tablet by mouth Daily.  Dispense: 30 tablet; Refill: 2  -     buPROPion XL (Wellbutrin XL) 300 MG 24 hr tablet; Take 1 tablet by mouth Every Morning  Dispense: 90 tablet; Refill: 0         Assessment & Plan  1. Anxiety.  He reports experiencing bouts of anxiety, particularly triggered by driving in certain situations. He is currently on Lamictal, Lunesta, Wellbutrin, hydroxyzine, and BuSpar. The patient was advised to discuss potential medications for managing sexual side effects with his primary care provider. The dosage of Wellbutrin will be increased to 450 mg. He was instructed to monitor for any feelings of emotional flatness, insomnia, dry mouth, restlessness, or decreased appetite, and to report any such symptoms.    2. Depression.  He experiences intermittent depression, which he attributes to his thoughts. He has been on various medications, including Prozac, which had some positive effects but caused weight gain and less sexual side effects. The patient was advised to continue with his current medication regimen and to consider re-engaging in therapy. The potential benefits of TMS were discussed, but the patient has a high deductible and out-of-pocket costs. The dosage of Wellbutrin will be increased to 450 mg. He was instructed to monitor for any feelings of emotional flatness, insomnia, dry mouth, restlessness, or decreased appetite, and to report any such symptoms.      Functional Status:  Moderate impairment     Prognosis: Good with Ongoing Treatment     Impression/Formulation:  Patient appeared alert and oriented.  Patient is voluntarily requesting to continue outpatient psychiatric treatment at [facility] [address]  Patient is receptive to assistance with maintaining a stable lifestyle.  Patient presents with history of     ICD-10-CM ICD-9-CM   1. Generalized anxiety disorder  F41.1 300.02   2. Post traumatic stress disorder (PTSD)  F43.10 309.81   3. Primary insomnia  F51.01 307.42   4. Depression, unspecified depression type  F32.A 311   .    Reviewed patient's previous provider notes. Reviewed most recent labs. Patient meets DSM V diagnostic criteria for diagnoses. Diagnoses may be updated as more information becomes available.       Treatment Plan:   Continue Lamictal 25mg, Lunesta 3mg, hydroxyzine 25mg PRNand BuSpar 7.5mg.  Refilled Lunesta and Lamictal   Increase Wellbutrin 450mg   Encouraged to pursue psychotherapy  Follow-up in 6 weeks and as needed    Patient will continue supportive psychotherapy efforts and medications as indicated. Clinic will obtain release of information for current treatment team for continuity of care as needed. Patient will contact this office, call 911 or present to the nearest emergency room should suicidal or homicidal ideations occur.  Discussed medication options and treatment plan of prescribed medication(s) as well as the risks, benefits, and potential side effects. Patient acknowledged and verbally consented to continue with current treatment plan and was educated on the importance of compliance with treatment and follow-up appointments.      We discussed the  risk of  Geronimo Flynn Syndrome with use of Lamictal that is most common in younger population and occurs within first few months of starting medication. Lamictal's only serious risk is SJS which can be fatal if left untreated. Slow titration reduces this risk from  1 in 100 to 1 in 2500. (Carlat 2021). Patient is encouraged to monitor skin for correlating rash, lesions in the mucous membranes, or flu-like symptoms. Should any of these symptoms occur, patient is advised to stop medication immediatly and notify provider. Patient should avoid trying new hygiene products or changing laundry soaps at this time. Anytime medication is not taken for five consecutive days, patient must notify provider and the titration must be restarted at 25 mg to minimize risk of SJS. Patient verbalizes understanding.       Pt has no previous history of seizure or current eating disorder, which would be a contraindication for use. The possibility of activation with initiation was discussed and patient verbalizes understanding.    The patient is being prescribed a controlled substance as part of the treatment plan. The patient has been educated of appropriate use of the medication, including risks and side effects such as somnolence, limited ability to drive and/or work or function safely, potential for dependence, respiratory depression, falls, change in blood pressure, changes in heart rhythm or heart rate, activation of other mental illnesses, and overdose among others. Patient is also informed that the medication is to be used by the patient only, and to avoid any combined use of ETOH, or other substances, with this medication unless prescribed and as directed by a Provider.  The patient verbalized understanding and agreement with this in their own words.      Quality Measures:   Never smoker    I advised Eduardo Luna of the risks of tobacco use.     Follow Up:   Return in about 6 weeks (around 6/25/2025).      Patient or patient representative verbalized consent for the use of Ambient Listening during the visit with  CORINNE Woodruff for chart documentation. 5/14/2025  16:04 EDT    CORINNE Woodruff  [facility] [address]    Answers submitted by the patient for  this visit:  Anxiety (Submitted on 5/11/2025)  Chief Complaint: Anxiety  Visit: follow-up  Frequency: most days  Severity: severe  depressed mood: Yes  dry mouth: No  excessive worry: Yes  insomnia: Yes  irritability: No  malaise/fatigue: Yes  obsessions: Yes  Sleep quality: fair  Hours of sleep per night: 6 Hours  Medication compliance: %  Additional information: Driving in certain situations. Being in public by myself.

## 2025-05-19 ENCOUNTER — OFFICE VISIT (OUTPATIENT)
Dept: INTERNAL MEDICINE | Facility: CLINIC | Age: 45
End: 2025-05-19
Payer: COMMERCIAL

## 2025-05-19 VITALS
HEIGHT: 72 IN | WEIGHT: 193.4 LBS | TEMPERATURE: 98.4 F | DIASTOLIC BLOOD PRESSURE: 70 MMHG | SYSTOLIC BLOOD PRESSURE: 112 MMHG | BODY MASS INDEX: 26.19 KG/M2 | HEART RATE: 60 BPM

## 2025-05-19 DIAGNOSIS — R21 RASH: Primary | ICD-10-CM

## 2025-05-19 DIAGNOSIS — E03.9 HYPOTHYROIDISM (ACQUIRED): Chronic | ICD-10-CM

## 2025-05-19 DIAGNOSIS — I10 ESSENTIAL HYPERTENSION: Chronic | ICD-10-CM

## 2025-05-19 PROCEDURE — 99214 OFFICE O/P EST MOD 30 MIN: CPT | Performed by: STUDENT IN AN ORGANIZED HEALTH CARE EDUCATION/TRAINING PROGRAM

## 2025-05-19 RX ORDER — TRIAMCINOLONE ACETONIDE 0.25 MG/G
1 OINTMENT TOPICAL 2 TIMES DAILY
Qty: 80 G | Refills: 0 | Status: SHIPPED | OUTPATIENT
Start: 2025-05-19

## 2025-05-19 NOTE — PROGRESS NOTES
Chief Complaint  Eduardo Luna is a 44 y.o. male presenting for Rash and red bumps on arm and hands.     From [place]. Engaged, in long term relationship with Teresa since 2016, living together.  No children. 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    History of Present Illness  Patient is here for acute visit.    For about 1 month he has noticed mild rash of his right elbow, also less on left elbow and on the dorsum of his hands.  There is no itching.  He has not noticed any other rashes.  He does use soap when showering.  Does not wash his hands very frequently during the daytime.  He has noticed mild dryness of the skin.  He was concerned it might be related to medication use.  He has not applied anything topically.    Answers submitted by the patient for this visit:  Rash Questionnaire (Submitted on 5/13/2025)  Chief Complaint: Rash  Chronicity: new  Onset: 1 to 4 weeks ago  Progression since onset: worsening  Affected locations: left hand, right arm, right hand  Characteristics: redness  Exposed to: nothing  anorexia: No  congestion: No  cough: No  diarrhea: No  facial edema: No  fatigue: No  fever: No  joint pain: No  nail changes: No  rhinorrhea: No  shortness of breath: No  sore throat: No  vomiting: No  Additional Information: red bumps in 3 different places      The following portions of the patient's history were reviewed and updated as appropriate: allergies, current medications, past family history, past medical history, past social history, past surgical history, and problem  "list.    Image captured per patient verbal consent:        ]    Objective  /70 (BP Location: Left arm, Patient Position: Sitting, Cuff Size: Adult)   Pulse 60   Temp 98.4 °F (36.9 °C) (Temporal)   Ht 182.9 cm (72.01\")   Wt 87.7 kg (193 lb 6.4 oz)   BMI 26.22 kg/m²     Physical Exam  Constitutional:       Appearance: Normal appearance.   HENT:      Head: Normocephalic and atraumatic.   Eyes:      Extraocular Movements: Extraocular movements intact.      Conjunctiva/sclera: Conjunctivae normal.   Pulmonary:      Effort: Pulmonary effort is normal. No respiratory distress.   Musculoskeletal:      Cervical back: Neck supple.   Skin:     General: Skin is warm and dry.      Findings: Rash present.      Comments: Very mild skin changes of his right elbow, see image.  Some dryness skin notedof the right upper arm.  Also on the dorsum of his hands, a few subtle papules.   Neurological:      Mental Status: He is alert and oriented to person, place, and time. Mental status is at baseline.   Psychiatric:         Behavior: Behavior normal.         Thought Content: Thought content normal.         Assessment/Plan   1. Rash  The rash is fairly localized.  I do not think this is medication related, could be a mild case of dermatitis.  Recommend avoiding frequent use of soap, especially on the arms.  Apply moisturizing lotion.  Will do trial of triamcinolone with tapering application.  He does have a dermatologist, so if this does not get better, potentially gets worse I would consider seeing the dermatologist.  He can come back to see me anytime if he has any concerns.  - triamcinolone (KENALOG) 0.025 % ointment; Apply 1 Application topically to the appropriate area as directed 2 (Two) Times a Day. For 1 w, then evening x1 w, then every other evening for 1-2 w. After 1-2x/w PRN  Dispense: 80 g; Refill: 0    2. Hypothyroidism (acquired)  Borderline high free T4, but high normal TSH, so for now continue on current dose of " Synthroid and recheck before next visit.  - TSH; Future  - T4, Free; Future    3. Essential hypertension  BP Readings from Last 3 Encounters:   05/19/25 112/70   05/14/25 112/74   02/24/25 112/70   Blood pressure at goal and well-controlled.  Will order recheck of CMP, he can collect before next visit in August.  - Comprehensive Metabolic Panel; Future      Return if symptoms worsen or fail to improve, for Next scheduled follow up.    Future Appointments         Provider Department Center    6/19/2025 3:45 PM (Arrive by 3:30 PM) Marcos Gonsalez MD [facility] CARDIOLOGY [facility]    7/7/2025 3:45 PM (Arrive by 3:30 PM) Conchita Valdes APRN [facility]IORAL [facility]    7/30/2025 4:00 PM (Arrive by 3:45 PM) [facility] [address] [facility]    8/5/2025 3:30 PM (Arrive by 3:15 PM) Eduardo Castellano MD [facility] UROLOGY [facility]    8/12/2025 3:30 PM (Arrive by 3:15 PM) Dallas Ugarte MD [facility] INTERNAL MEDICINE [facility]              Dallas Ugarte MD  Family Medicine  05/19/2025

## 2025-05-21 RX ORDER — CLOPIDOGREL BISULFATE 75 MG/1
75 TABLET ORAL DAILY
Qty: 30 TABLET | Refills: 0 | Status: SHIPPED | OUTPATIENT
Start: 2025-05-21

## 2025-05-21 RX ORDER — CLOPIDOGREL BISULFATE 75 MG/1
75 TABLET ORAL DAILY
Qty: 30 TABLET | Refills: 0 | Status: SHIPPED | OUTPATIENT
Start: 2025-05-21 | End: 2025-05-21 | Stop reason: SDUPTHER

## 2025-06-16 DIAGNOSIS — F51.01 PRIMARY INSOMNIA: ICD-10-CM

## 2025-06-17 NOTE — TELEPHONE ENCOUNTER
Rx Refill Note  Requested Prescriptions     Pending Prescriptions Disp Refills    eszopiclone (LUNESTA) 3 MG tablet 30 tablet 0     Sig: Take 1 tablet by mouth Every Night immediately before bedtime.      Last office visit with prescribing clinician: 5/14/2025   Last telemedicine visit with prescribing clinician: 4/24/2025   Next office visit with prescribing clinician: 7/7/2025                         Would you like a call back once the refill request has been completed: [] Yes [] No    If the office needs to give you a call back, can they leave a voicemail: [] Yes [] No    Tere Arce MA  06/17/25, 08:15 EDT

## 2025-06-18 RX ORDER — ESZOPICLONE 3 MG/1
3 TABLET, FILM COATED ORAL NIGHTLY
Qty: 30 TABLET | Refills: 0 | Status: SHIPPED | OUTPATIENT
Start: 2025-06-18

## 2025-06-19 ENCOUNTER — OFFICE VISIT (OUTPATIENT)
Dept: CARDIOLOGY | Facility: CLINIC | Age: 45
End: 2025-06-19
Payer: COMMERCIAL

## 2025-06-19 VITALS
BODY MASS INDEX: 25.73 KG/M2 | HEIGHT: 72 IN | DIASTOLIC BLOOD PRESSURE: 78 MMHG | HEART RATE: 95 BPM | SYSTOLIC BLOOD PRESSURE: 106 MMHG | WEIGHT: 190 LBS | OXYGEN SATURATION: 96 %

## 2025-06-19 DIAGNOSIS — I25.118 CORONARY ARTERY DISEASE OF NATIVE ARTERY OF NATIVE HEART WITH STABLE ANGINA PECTORIS: Primary | Chronic | ICD-10-CM

## 2025-06-19 DIAGNOSIS — I47.19 PAT (PAROXYSMAL ATRIAL TACHYCARDIA): ICD-10-CM

## 2025-06-19 DIAGNOSIS — I10 ESSENTIAL HYPERTENSION: Chronic | ICD-10-CM

## 2025-06-19 PROCEDURE — 99214 OFFICE O/P EST MOD 30 MIN: CPT | Performed by: INTERNAL MEDICINE

## 2025-06-19 NOTE — PROGRESS NOTES
"OFFICE VISIT  NOTE  [facility] CARDIOLOGY      Name: Eduardo Luna    Date: 2025  MRN:  [id number]  :  1980      REFERRING/PRIMARY PROVIDER:  Dallas Ugarte MD     Chief Complaint   Patient presents with    paroxysmal atrial tachycardia     HPI: Eduardo Luna is a 44 y.o. male who presents for CAD, hypertension and palpitations.   He is a pharmacy technician at [employer]. History of Hodgkin's lymphoma with chemotherapy and chest radiation in .  Also history of SVT status post ablation with Dr. Daly in .    Coronary CTA pursued 2024 with mid LAD 50 to 69% stenosis with CT FFR showing abnormal results at 0.79 proceed with cardiac catheterization 2024, which revealed an 80% mid LAD stenosis with an MLA of 1.4 mm², status post PCI with a 2.75 x 23 mm Xience LOUIE postdilated with 3.25 NC.  Normal circumflex left main and RCA.  Doing well since then denies chest pain or shortness of breath.  Has some easy bruising with Plavix and aspirin otherwise normal.    ROS:Pertinent positives as listed in the HPI.  All other systems reviewed and negative.    Past Medical History:   Diagnosis Date    Allergic     Since childhood    Allergic rhinitis since childhood    happens in August \"ragweed\"    Anxiety     Since childhood    Arrhythmia     Cancer     hodgkins lymphoma    Colon polyp     Coronary artery disease of native artery of native heart with stable angina pectoris 2024: LHC at [facility], mid LAD 80% stenosis with MLA of 1.44 mm² by OCT, soft lipidic plaque, status post PCI with a 2.75 x 23 mm Xience LOUIE postdilated with a 3.25 NC balloon high-pressure.  Mid RCA 10 to 20%, normal left main and circumflex.  LVEDP 14    Depression     Epididymitis     GERD (gastroesophageal reflux disease)     History of medical problems     Facial seborriheic dermatitis    Hyperlipidemia     Hypertension     Hypothyroidism     Acquired from radiation " "therapy    Kidney stone     diagnosed with C.T scan in [place]    Lactose intolerance     Migraine     Obsessive-compulsive disorder     Racing thoughts    Panic disorder     Social anxiety, driving    PTSD (post-traumatic stress disorder)     Anxiety & panic attack while driving & groups of people    Pyelonephritis 5-    Kidney stones    Sleep apnea     Sleep apnea, obstructive 9-    mild    SVT (supraventricular tachycardia)        Past Surgical History:   Procedure Laterality Date    ABLATION OF DYSRHYTHMIC FOCUS  7-2008    Dr. George Daly-EP study    BRONCHOSCOPY  2002    at [facility] \"allergic reaction to bleomycin\"    CARDIAC ABLATION  2008    SVT    CARDIAC CATHETERIZATION      12/27/2024 PER DR. MANN    CARDIAC CATHETERIZATION N/A 12/27/2024    Procedure: Left Heart Cath - Right radial access;  Surgeon: Marcos Mann MD;  Location:  [facility];  Service: Cardiovascular;  Laterality: N/A;    CARDIAC CATHETERIZATION N/A 12/27/2024    Procedure: Optical Coherence Tomography;  Surgeon: Marcos Mann MD;  Location:  [facility];  Service: Cardiovascular;  Laterality: N/A;    CARDIAC CATHETERIZATION N/A 12/27/2024    Procedure: Stent LOUIE coronary;  Surgeon: Marcos Mann MD;  Location:  [facility];  Service: Cardiovascular;  Laterality: N/A;    COLONOSCOPY      Dr. Yovani Aden    CORONARY STENT PLACEMENT  12/27/24    CYSTOSCOPY  6-27-24    ENDOSCOPY      GERD & gatritis    KIDNEY STONE SURGERY  6-27-24    LITHOTRIPSY  6-27-24    LYMPH NODE BIOPSY  11/2001    @ [facility] with Dr. Daniel Marinelli    MOLE REMOVAL  11/2016    Dr Loyd at [facility].     PORTACATH PLACEMENT  01/2002    Groshong Placement @ [facility] with Dr. Daniel Marinelli    REPAIR CHOANAL ATRESIA  9/2018    Septoplasty & turbinate reduction- Dr. Lexa Castillo    SEPTOPLASTY  10/18/2018    Dr. Lexa Castillo    SEPTOPLASTY, RESECTION INFERIOR TURBINATES Bilateral " 10/18/2018    Procedure: SEPTOPLASTY, BILATERAL RESECTION INFERIOR TURBINATES;  Surgeon: Lexa Castillo MD;  Location: [facility] OR;  Service: ENT    SKIN BIOPSY      Moles    TONSILLECTOMY  2013    @ [facility] with Lexa Castillo    UPPER GASTROINTESTINAL ENDOSCOPY  2019    gastritis, gastroesophageal refulux disease    VASECTOMY  2012    @ [facility] with Dr. Aidan Castellano    WISDOM TOOTH EXTRACTION  10/1997    @ [facility] with Dr. Momo Nunn       Social History     Socioeconomic History    Marital status: Single   Tobacco Use    Smoking status: Never     Passive exposure: Never    Smokeless tobacco: Never   Vaping Use    Vaping status: Never Used   Substance and Sexual Activity    Alcohol use: No    Drug use: Never    Sexual activity: Yes     Partners: Female     Birth control/protection: Vasectomy     Comment: [id number]. Charles Ray       Family History   Problem Relation Age of Onset    Hypertension Mother     Depression Mother     Hyperlipidemia Mother     Cancer Mother         Soft tissue sarcoma- 2003    Arrhythmia Mother         Tachycardia    Colon cancer Father         diagnosed age 70    Colon polyps Father         colonoscopy    Hypertension Father     Hyperlipidemia Father     Cancer Father         Colon cancer- 10-    Sleep apnea Father     No Known Problems Half-Sister     Anxiety disorder Half-Sister     Breast cancer Half-Sister     No Known Problems Half-Sister     Hyperlipidemia Half-Brother     Hypertension Half-Brother     Heart disease Paternal Uncle         CABG    Heart attack Paternal Uncle             Arthritis Maternal Grandmother     Asthma Maternal Grandmother     Hyperlipidemia Maternal Grandmother     Hypertension Maternal Grandmother     Depression Maternal Grandmother     COPD Maternal Grandmother     Sleep apnea Maternal Grandmother     Chronic bronchitis Maternal Grandmother     Vision loss Maternal Grandmother          Glaucoma    Hypertension Maternal Grandfather             Heart disease Maternal Grandfather     Anxiety disorder Sister     Anxiety disorder Sister     Cancer Sister         breast cancer    Hyperlipidemia Brother     Hypertension Brother     Heart disease Paternal Uncle         CABG    Heart attack Paternal Uncle             Hyperlipidemia Brother     Hypertension Brother     Sleep apnea Brother         Allergies   Allergen Reactions    Lisinopril Other (See Comments)     Throat tickle    Amoxicillin Hives, Itching and Rash       Current Outpatient Medications   Medication Instructions    amLODIPine (NORVASC) 5 mg, Oral, Daily    Aspirin Low Dose 81 mg, Oral, Daily    atorvastatin (LIPITOR) 40 mg, Oral, Daily    buPROPion XL (Wellbutrin XL) 300 MG 24 hr tablet Take 1 tablet by mouth Every Morning    buPROPion XL (WELLBUTRIN XL) 150 mg, Oral, Daily, in addition to 300mg for total of 450mg by mouth per day    busPIRone (BUSPAR) 7.5 mg, Oral, 2 Times Daily    cholecalciferol (VITAMIN D3) 1,000 Units, Daily    cloNIDine (CATAPRES) 0.1 mg, Oral, 2 Times Daily PRN    eszopiclone (LUNESTA) 3 MG tablet Take 1 tablet by mouth Every Night immediately before bedtime.    hydrOXYzine (ATARAX) 25 mg, Oral, 3 Times Daily PRN    ketoconazole (NIZORAL) 2 % cream 1 Application, Topical, Daily, Apply 1-2 times per day for 2-3 weeks, Then 2-3 times per week as needed    lamoTRIgine (LAMICTAL) 25 mg, Oral, Daily    levothyroxine (SYNTHROID, LEVOTHROID) 88 mcg, Oral, Every Morning Before Breakfast    metoprolol succinate XL (TOPROL-XL) 100 mg, Oral, Daily    nitroglycerin (NITROSTAT) 0.4 MG SL tablet Dissolve 1 tablet under the tongue as needed for angina, may repeat every 5 minutes for up three doses    pantoprazole (PROTONIX) 40 mg, Oral, Daily    spironolactone (ALDACTONE) 25 mg, Oral, Daily    tacrolimus (PROTOPIC) 0.1 % ointment Apply 1 Application topically to the appropriate area as directed twice daily for 2  "weeks; THEN use 2-3 times per week as needed.    triamcinolone (KENALOG) 0.025 % ointment Apply topically to the appropriate area as directed 2 (Two) Times a Day for 1 week, then once every evening for 1 week, then every other evening for 1-2 weeks, then use 1-2 times per week as needed.       Vitals:    06/19/25 1519   BP: 106/78   BP Location: Right arm   Patient Position: Sitting   Pulse: 95   SpO2: 96%   Weight: 86.2 kg (190 lb)   Height: 182.9 cm (72\")     Body mass index is 25.77 kg/m².    PHYSICAL EXAM:    General Appearance:   · well developed  · well nourished  Neck:  · thyroid not enlarged  · supple  Respiratory:  · no respiratory distress  · normal breath sounds  · no rales  Cardiovascular:  · no jugular venous distention  · regular rhythm  · apical impulse normal  · S1 normal, S2 normal  · no S3, no S4   · no murmur  · no rub, no thrill  · carotid pulses normal; no bruit  · pedal pulses normal  · lower extremity edema: none    Skin:   warm, dry        RESULTS:   Procedures    Results for orders placed during the hospital encounter of 10/28/22    Adult Transthoracic Echo Complete W/ Cont if Necessary Per Protocol    Interpretation Summary    Left ventricular systolic function is normal. Calculated left ventricular EF = 57% Left ventricular ejection fraction appears to be 56 - 60%.    Left ventricular diastolic function was normal.    No significant structural or functional valvular disease.        Labs:  Lab Results   Component Value Date    CHOL 116 03/04/2025    TRIG 86 03/04/2025    HDL 36 (L) 03/04/2025    LDL 63 03/04/2025    AST 24 03/04/2025    ALT 33 03/04/2025     Lab Results   Component Value Date    HGBA1C 5.20 01/18/2023     Creatinine   Date Value Ref Range Status   12/17/2024 1.29 (H) 0.76 - 1.27 mg/dL Final   08/14/2024 1.20 0.76 - 1.27 mg/dL Final   06/18/2024 1.89 (H) 0.76 - 1.27 mg/dL Final     eGFR Non  Amer   Date Value Ref Range Status   01/31/2022 82 >60 mL/min/1.73 Final "   07/12/2021 81 >60 mL/min/1.73 Final   03/30/2021 97 >60 mL/min/1.73 Final         ASSESSMENT:  Problem List Items Addressed This Visit       PAT (paroxysmal atrial tachycardia)    Overview   Post ablation 2008         Essential hypertension (Chronic)    Overview   WHITECOAT         Coronary artery disease of native artery of native heart with stable angina pectoris - Primary (Chronic)    Overview   12/27/2024: LHC at [facility], mid LAD 80% stenosis with MLA of 1.44 mm² by OCT, soft lipidic plaque, status post PCI with a 2.75 x 23 mm Xience LOUIE postdilated with a 3.25 NC balloon high-pressure.  Mid RCA 10 to 20%, normal left main and circumflex.  LVEDP 14            PLAN:  1.  CAD:  Status post PCI of the proximal LAD 12/27/2024  Continue aspirin 81 mg for life  Continue atorvastatin at current dose  He completed 6 months of clopidogrel which is sufficient he will discontinue it on 6/28/2025  Continue exercise  Currently angina free     2.  Dyspnea on exertion:  Echocardiogram 10/2022 with normal EF, no significant valvular abnormalities   Increase aerobic exercise  Stable currently     3.  Hypertension:  Long-term goal blood pressure is 130/80  BP well controlled      4.  Hyperlipidemia:  LDL goal less than 70, excellent results on lipid panel 12/2024, continue atorvastatin 40 mg daily     5.  History of paroxysmal SVT status post ablation in 2008:  Continue metoprolol, most recent Holter in 2021 normal  Brief occasional palpitations currently          Advance Care Planning   ACP discussion was held with the patient during this visit. Patient has an advance directive in EMR which is still valid.         Follow-up   Return in about 1 year (around 6/19/2026).        Marcos Gonsalez MD, FAC, [facility]  06/19/25  15:50 EDT

## 2025-06-30 DIAGNOSIS — I10 ESSENTIAL HYPERTENSION: Chronic | ICD-10-CM

## 2025-06-30 RX ORDER — AMLODIPINE BESYLATE 5 MG/1
5 TABLET ORAL DAILY
Qty: 90 TABLET | Refills: 1 | Status: SHIPPED | OUTPATIENT
Start: 2025-06-30

## 2025-07-01 DIAGNOSIS — I10 ESSENTIAL HYPERTENSION: Chronic | ICD-10-CM

## 2025-07-01 DIAGNOSIS — E78.00 PURE HYPERCHOLESTEROLEMIA: Chronic | ICD-10-CM

## 2025-07-01 RX ORDER — ATORVASTATIN CALCIUM 40 MG/1
40 TABLET, FILM COATED ORAL DAILY
Qty: 90 TABLET | Refills: 1 | Status: SHIPPED | OUTPATIENT
Start: 2025-07-01

## 2025-07-01 RX ORDER — METOPROLOL SUCCINATE 100 MG/1
100 TABLET, EXTENDED RELEASE ORAL DAILY
Qty: 90 TABLET | Refills: 1 | Status: SHIPPED | OUTPATIENT
Start: 2025-07-01

## 2025-07-02 RX ORDER — CLONIDINE HYDROCHLORIDE 0.1 MG/1
0.1 TABLET ORAL 2 TIMES DAILY PRN
Qty: 20 TABLET | Refills: 0 | Status: SHIPPED | OUTPATIENT
Start: 2025-07-02

## 2025-07-02 NOTE — TELEPHONE ENCOUNTER
Rx Refill Note  Requested Prescriptions     Pending Prescriptions Disp Refills    cloNIDine (Catapres) 0.1 MG tablet 20 tablet 0     Sig: Take 1 tablet by mouth 2 (Two) Times a Day As Needed for High Blood Pressure.      Last office visit with prescribing clinician: 5/19/2025   Last telemedicine visit with prescribing clinician: Visit date not found   Next office visit with prescribing clinician: 8/12/2025                         Would you like a call back once the refill request has been completed: [] Yes [] No    If the office needs to give you a call back, can they leave a voicemail: [] Yes [] No    Kamla Roque MA  07/02/25, 08:36 EDT

## 2025-07-07 ENCOUNTER — OFFICE VISIT (OUTPATIENT)
Age: 45
End: 2025-07-07
Payer: COMMERCIAL

## 2025-07-07 VITALS
BODY MASS INDEX: 25.52 KG/M2 | SYSTOLIC BLOOD PRESSURE: 122 MMHG | OXYGEN SATURATION: 95 % | HEART RATE: 91 BPM | HEIGHT: 72 IN | DIASTOLIC BLOOD PRESSURE: 78 MMHG | WEIGHT: 188.4 LBS

## 2025-07-07 DIAGNOSIS — F43.10 POST TRAUMATIC STRESS DISORDER (PTSD): ICD-10-CM

## 2025-07-07 DIAGNOSIS — F41.1 GENERALIZED ANXIETY DISORDER: Primary | ICD-10-CM

## 2025-07-07 DIAGNOSIS — F51.01 PRIMARY INSOMNIA: ICD-10-CM

## 2025-07-07 DIAGNOSIS — F32.A DEPRESSION, UNSPECIFIED DEPRESSION TYPE: ICD-10-CM

## 2025-07-07 DIAGNOSIS — F32.1 MAJOR DEPRESSIVE DISORDER, SINGLE EPISODE, MODERATE: ICD-10-CM

## 2025-07-07 PROCEDURE — 96127 BRIEF EMOTIONAL/BEHAV ASSMT: CPT

## 2025-07-07 PROCEDURE — 99214 OFFICE O/P EST MOD 30 MIN: CPT

## 2025-07-07 RX ORDER — LAMOTRIGINE 25 MG/1
25 TABLET ORAL DAILY
Qty: 90 TABLET | Refills: 1 | Status: SHIPPED | OUTPATIENT
Start: 2025-07-07 | End: 2026-07-07

## 2025-07-07 NOTE — PROGRESS NOTES
Follow Up Office Visit      Patient Name: Eduardo Luna  : 1980   MRN: [id number]     Referring Provider: Dallas Ugarte MD    Chief Complaint:      ICD-10-CM ICD-9-CM   1. Generalized anxiety disorder  F41.1 300.02   2. Post traumatic stress disorder (PTSD)  F43.10 309.81   3. Primary insomnia  F51.01 307.42   4. Major depressive disorder, single episode, moderate  F32.1 296.22   5. Depression, unspecified depression type  F32.A 311        History of Present Illness:   Eduardo Luna is a 44 y.o. male who is here today for follow up and medication management.           Subjective      Patient Reports:   History of Present Illness  He reports that his anxiety is generally under control, with no specific triggers identified except for driving. He has not experienced any panic attacks or outbursts. His sleep pattern is normal, provided he takes his medication. His appetite remains good, and he reports no changes in his physical health. He does not express any concerns about his current treatment regimen.     He describes his depressive symptoms as mild, with no significant changes noted. He does not endorse any suicidal ideation or self-harm tendencies. He admits to occasional feelings of hopelessness and loneliness but rates these as low.     MEDICATIONS  Current: Wellbutrin, hydroxyzine, Lamictal, Lunesta, BuSpar      PHQ-9= 14 increased score from previous visit  ANTONIA-7= 11 increased score from previous visit    Review of Systems:   Review of Systems   Constitutional:  Negative for appetite change, fatigue and unexpected weight change.   Eyes:  Negative for visual disturbance.   Respiratory:  Negative for chest tightness and shortness of breath.    Cardiovascular:  Negative for chest pain and palpitations.   Gastrointestinal:  Negative for nausea.   Musculoskeletal:  Negative for gait problem.   Skin:  Negative for rash and wound.   Neurological:  Negative for dizziness, tremors, seizures,  weakness, light-headedness and headaches.   Psychiatric/Behavioral:  Positive for dysphoric mood and sleep disturbance. Negative for agitation, behavioral problems, confusion, decreased concentration, hallucinations, self-injury and suicidal ideas. The patient is nervous/anxious. The patient is not hyperactive.    Sleep pattern: 6-8 hours per night   Appetite: normal      PHQ-9 Depression Screening  Little interest or pleasure in doing things? Several days   Feeling down, depressed, or hopeless? Several days   PHQ-2 Total Score 2   Trouble falling or staying asleep, or sleeping too much? Almost all   Feeling tired or having little energy? Several days   Poor appetite or overeating? Over half   Feeling bad about yourself - or that you are a failure or have let yourself or your family down? Almost all   Trouble concentrating on things, such as reading the newspaper or watching television? Not at all   Moving or speaking so slowly that other people could have noticed? Or the opposite - being so fidgety or restless that you have been moving around a lot more than usual? Several days   Thoughts that you would be better off dead, or of hurting yourself in some way? Over half   PHQ-9 Total Score 14   If you checked off any problems, how difficult have these problems made it for you to do your work, take care of things at home, or get along with other people? Not difficult at all       ANTONIA-7 Anxiety Screening  Over the last two weeks, how often have you been bothered by the following problems?  Feeling nervous, anxious or on edge: Several days  Not being able to stop or control worrying: More than half the days  Worrying too much about different things: More than half the days  Trouble Relaxing: More than half the days  Being so restless that it is hard to sit still: Several days  Becoming easily annoyed or irritable: Several days  Feeling afraid as if something awful might happen: More than half the days  ANTONIA 7 Total  Score: 11  If you checked any problems, how difficult have these problems made it for you to do your work, take care of things at home, or get along with other people: Not difficult at all    RISK ASSESSMENT:  Patient denies any thoughts or intent of suicide today. Patient denies any impulsive behavior today.     Medications:     Current Outpatient Medications:     amLODIPine (NORVASC) 5 MG tablet, Take 1 tablet by mouth Daily., Disp: 90 tablet, Rfl: 1    aspirin 81 MG EC tablet, Take 1 tablet by mouth Daily., Disp: 90 tablet, Rfl: 2    atorvastatin (LIPITOR) 40 MG tablet, Take 1 tablet by mouth Daily., Disp: 90 tablet, Rfl: 1    buPROPion XL (Wellbutrin XL) 150 MG 24 hr tablet, Take 1 tablet by mouth Daily. in addition to 300mg for total of 450mg by mouth per day, Disp: 30 tablet, Rfl: 2    buPROPion XL (Wellbutrin XL) 300 MG 24 hr tablet, Take 1 tablet by mouth Every Morning, Disp: 90 tablet, Rfl: 0    busPIRone (BUSPAR) 7.5 MG tablet, Take 1 tablet by mouth 2 (Two) Times a Day., Disp: 180 tablet, Rfl: 0    Cholecalciferol 25 MCG (1000 UT) tablet, Take 1 tablet by mouth Daily., Disp: , Rfl:     cloNIDine (CATAPRES) 0.1 MG tablet, Take 1 tablet by mouth 2 (Two) Times a Day As Needed for High Blood Pressure., Disp: 20 tablet, Rfl: 0    eszopiclone (LUNESTA) 3 MG tablet, Take 1 tablet by mouth Every Night immediately before bedtime., Disp: 30 tablet, Rfl: 0    hydrOXYzine (ATARAX) 25 MG tablet, Take 1 tablet by mouth 3 (Three) Times a Day As Needed for Itching or Allergies., Disp: 90 tablet, Rfl: 3    ketoconazole (NIZORAL) 2 % cream, Apply 1 Application topically to the appropriate area as directed Daily. Apply 1-2 times per day for 2-3 weeks, Then 2-3 times per week as needed, Disp: 60 g, Rfl: 1    lamoTRIgine (LaMICtal) 25 MG tablet, Take 1 tablet by mouth Daily., Disp: 90 tablet, Rfl: 1    levothyroxine (SYNTHROID, LEVOTHROID) 88 MCG tablet, Take 1 tablet by mouth Every Morning Before Breakfast., Disp: 90 tablet,  "Rfl: 1    metoprolol succinate XL (TOPROL-XL) 100 MG 24 hr tablet, Take 1 tablet by mouth Daily., Disp: 90 tablet, Rfl: 1    nitroglycerin (NITROSTAT) 0.4 MG SL tablet, Dissolve 1 tablet under the tongue as needed for angina, may repeat every 5 minutes for up three doses, Disp: 25 tablet, Rfl: 11    pantoprazole (PROTONIX) 40 MG EC tablet, Take 1 tablet by mouth Daily., Disp: 90 tablet, Rfl: 1    spironolactone (Aldactone) 25 MG tablet, Take 1 tablet by mouth Daily., Disp: 90 tablet, Rfl: 1    tacrolimus (PROTOPIC) 0.1 % ointment, Apply 1 Application topically to the appropriate area as directed twice daily for 2 weeks; THEN use 2-3 times per week as needed., Disp: 60 g, Rfl: 1    triamcinolone (KENALOG) 0.025 % ointment, Apply topically to the appropriate area as directed 2 (Two) Times a Day for 1 week, then once every evening for 1 week, then every other evening for 1-2 weeks, then use 1-2 times per week as needed., Disp: 80 g, Rfl: 0    Medication Considerations:  KENNY reviewed and appropriate.      Allergies:   Allergies   Allergen Reactions    Lisinopril Other (See Comments)     Throat tickle    Amoxicillin Hives, Itching and Rash       Results Reviewed:   Yes     Objective     Physical Exam:  Vital Signs:   Vitals:    07/07/25 1532   BP: 122/78   Pulse: 91   SpO2: 95%   Weight: 85.5 kg (188 lb 6.4 oz)   Height: 182.9 cm (72.01\")     Body mass index is 25.55 kg/m².     Mental Status Exam:   MENTAL STATUS EXAM   General Appearance:  Cleanly groomed and dressed and well developed  Eye Contact:  Good eye contact  Attitude:  Cooperative and polite  Motor Activity:  Normal gait, posture and fidgety  Muscle Strength:  Normal  Speech:  Normal rate, tone, volume  Language:  Spontaneous  Mood and affect:  Depressed and anxious  Hopelessness:  2  Loneliness: 2  Thought Process:  Logical  Associations/ Thought Content:  No delusions  Hallucinations:  None  Suicidal Ideations:  Not present  Homicidal Ideation:  Not " present  Sensorium:  Alert and clear  Orientation:  Person, place, time and situation  Immediate Recall, Recent, and Remote Memory:  Intact  Attention Span/ Concentration:  Good  Fund of Knowledge:  Appropriate for age and educational level  Intellectual Functioning:  Average range  Insight:  Good  Judgement:  Good  Reliability:  Good  Impulse Control:  Good       @RESULASTCBCDIFFPANEL,TSH,LABLIPI,JEPOOEYX41,SHTVUGHD51,MG,FOLATE,PROLACTIN,CRPRESULT,CMP,G7LGVPMNZAG)@    Lab Results   Component Value Date    GLUCOSE 94 12/17/2024    BUN 13 12/17/2024    CREATININE 1.29 (H) 12/17/2024    EGFR 69.7 12/17/2024    BCR 10.1 12/17/2024    K 4.4 12/17/2024    CO2 28.1 12/17/2024    CALCIUM 9.9 12/17/2024    ALBUMIN 4.5 03/04/2025    BILITOT 0.7 03/04/2025    AST 24 03/04/2025    ALT 33 03/04/2025       Lab Results   Component Value Date    WBC 5.60 12/17/2024    HGB 15.8 12/17/2024    HCT 47.0 12/17/2024    MCV 89.7 12/17/2024     12/17/2024       Lab Results   Component Value Date    CHOL 116 03/04/2025    CHLPL 229 (H) 03/18/2016    TRIG 86 03/04/2025    HDL 36 (L) 03/04/2025    LDL 63 03/04/2025       Assessment / Plan      Visit Diagnosis/Orders Placed This Visit:  Diagnoses and all orders for this visit:    1. Generalized anxiety disorder (Primary)  -     lamoTRIgine (LaMICtal) 25 MG tablet; Take 1 tablet by mouth Daily.  Dispense: 90 tablet; Refill: 1    2. Post traumatic stress disorder (PTSD)    3. Primary insomnia    4. Major depressive disorder, single episode, moderate    5. Depression, unspecified depression type  -     lamoTRIgine (LaMICtal) 25 MG tablet; Take 1 tablet by mouth Daily.  Dispense: 90 tablet; Refill: 1         Assessment & Plan  1. Anxiety.  His anxiety is currently manageable with no recent panic attacks or outbursts. He reports that driving in certain situations can trigger his anxiety. He will continue his current medication regimen, including BuSpar twice a day and hydroxyzine as  needed.    2. Depression.  His depression is controlled to an extent, with no recent episodes of intense sadness or suicidal thoughts. He reports feelings of hopelessness and loneliness on the low end. He will continue his current medication regimen.       Functional Status: Moderate impairment     Prognosis: Good with Ongoing Treatment     Impression/Formulation:  Patient appeared alert and oriented.  Patient is voluntarily requesting to continue outpatient psychiatric treatment at [facility] [address]  Patient is receptive to assistance with maintaining a stable lifestyle.  Patient presents with history of     ICD-10-CM ICD-9-CM   1. Generalized anxiety disorder  F41.1 300.02   2. Post traumatic stress disorder (PTSD)  F43.10 309.81   3. Primary insomnia  F51.01 307.42   4. Major depressive disorder, single episode, moderate  F32.1 296.22   5. Depression, unspecified depression type  F32.A 311   .    Reviewed patient's previous provider notes. Reviewed most recent labs. Patient meets DSM V diagnostic criteria for diagnoses. Diagnoses may be updated as more information becomes available.       Treatment Plan:   Continue Wellbutrin 450mg PO qam, Lamictal 25mg, Lunesta 3mg, hydroxyzine 25mg PRN and BuSpar 7.5mg. Refilled lamictal  Encouraged to pursue psychotherapy  Patient was informed Provider's last day at [facility] is August 14. Patient verbalized understanding.   Follow-up in 3 months and as needed    Patient will continue supportive psychotherapy efforts and medications as indicated. Clinic will obtain release of information for current treatment team for continuity of care as needed. Patient will contact this office, call 911 or present to the nearest emergency room should suicidal or homicidal ideations occur.  Discussed medication options and treatment plan of prescribed medication(s) as well as the risks, benefits, and potential side effects. Patient  acknowledged and verbally consented to continue with current treatment plan and was educated on the importance of compliance with treatment and follow-up appointments.     Pt has no previous history of seizure or current eating disorder, which would be a contraindication for use. The possibility of activation with initiation was discussed and patient verbalizes understanding.     We discussed the  risk of  Geronimo Flynn Syndrome with use of Lamictal that is most common in younger population and occurs within first few months of starting medication. Lamictal's only serious risk is SJS which can be fatal if left untreated. Slow titration reduces this risk from 1 in 100 to 1 in 2500. (Carlat 2021). Patient is encouraged to monitor skin for correlating rash, lesions in the mucous membranes, or flu-like symptoms. Should any of these symptoms occur, patient is advised to stop medication immediatly and notify provider. Patient should avoid trying new hygiene products or changing laundry soaps at this time. Anytime medication is not taken for five consecutive days, patient must notify provider and the titration must be restarted at 25 mg to minimize risk of SJS. Patient verbalizes understanding.       The patient is being prescribed a controlled substance as part of the treatment plan. The patient has been educated of appropriate use of the medication, including risks and side effects such as somnolence, limited ability to drive and/or work or function safely, potential for dependence, respiratory depression, falls, change in blood pressure, changes in heart rhythm or heart rate, activation of other mental illnesses, and overdose among others. Patient is also informed that the medication is to be used by the patient only, and to avoid any combined use of ETOH, or other substances, with this medication unless prescribed and as directed by a Provider.  The patient verbalized understanding and agreement with this in their own  words.      Quality Measures:   Never smoker    I advised Eduardo Luna of the risks of tobacco use.     Follow Up:   Return in about 3 months (around 10/7/2025).      Patient or patient representative verbalized consent for the use of Ambient Listening during the visit with  CORINNE Woodruff for chart documentation. 7/7/2025  15:54 EDT    CORINNE Woodruff  [facility] [address]    Answers submitted by the patient for this visit:  Anxiety (Submitted on 6/30/2025)  Chief Complaint: Anxiety  Visit: follow-up  Frequency: constantly  Severity: moderate  depressed mood: No  dry mouth: No  excessive worry: Yes  insomnia: Yes  irritability: No  malaise/fatigue: No  obsessions: No  Sleep quality: fair  Hours of sleep per night: 6 Hours  Medication compliance: 51-75%

## 2025-07-14 DIAGNOSIS — F51.01 PRIMARY INSOMNIA: ICD-10-CM

## 2025-07-14 RX ORDER — ESZOPICLONE 3 MG/1
3 TABLET, FILM COATED ORAL NIGHTLY
Qty: 30 TABLET | Refills: 0 | Status: SHIPPED | OUTPATIENT
Start: 2025-07-14

## 2025-07-17 ENCOUNTER — OFFICE VISIT (OUTPATIENT)
Dept: INTERNAL MEDICINE | Facility: CLINIC | Age: 45
End: 2025-07-17
Payer: COMMERCIAL

## 2025-07-17 VITALS
SYSTOLIC BLOOD PRESSURE: 132 MMHG | TEMPERATURE: 98.4 F | WEIGHT: 187.6 LBS | HEIGHT: 72 IN | HEART RATE: 60 BPM | DIASTOLIC BLOOD PRESSURE: 88 MMHG | BODY MASS INDEX: 25.41 KG/M2

## 2025-07-17 DIAGNOSIS — N20.0 NEPHROLITHIASIS: Chronic | ICD-10-CM

## 2025-07-17 DIAGNOSIS — I10 ESSENTIAL HYPERTENSION: Primary | Chronic | ICD-10-CM

## 2025-07-17 DIAGNOSIS — K59.04 CHRONIC IDIOPATHIC CONSTIPATION: Chronic | ICD-10-CM

## 2025-07-17 PROCEDURE — 99214 OFFICE O/P EST MOD 30 MIN: CPT | Performed by: STUDENT IN AN ORGANIZED HEALTH CARE EDUCATION/TRAINING PROGRAM

## 2025-07-17 RX ORDER — POLYETHYLENE GLYCOL 3350 17 G/17G
17-34 POWDER, FOR SOLUTION ORAL DAILY
COMMUNITY

## 2025-07-17 NOTE — PROGRESS NOTES
Chief Complaint  Eduardo Luna is a 44 y.o. male presenting for Constipation.     From [place]. Engaged, in long term relationship with Teresa since 2016, living together.  No children. 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    History of Present Illness  Patient is here for follow-up and concern for chronic issue.    He reports having issues with constipation all his life, also in childhood.  He has tried multiple over-the-counter medications including Dulcolax, Metamucil, MiraLAX, FiberCon.  See also patient response below.  He was on MiraLAX a few years ago, was taking for about 1 month.  He reports having bowel movements every 2-3 days, usually not hard stool, but reports large caliber stool.  Usually no straining.  Does not have abdominal pain or cramping.  Most recently he has been using Dulcolax once every other week.  He does not need a lot of fruits or vegetables, but eats cereal with whole-grains, also drinks a lot of fluids to avoid recurrence of kidney stones.    Otherwise he does keep an eye on his blood pressures at home, typically ranging around 110-130/70-85      Answers submitted by the patient for this visit:  Problem not listed (Submitted on 7/15/2025)  Chief Complaint: Other medical problem  Reason for appointment: Constipation. I woud like to try a prescription medication.  abdominal pain: No  anorexia: No  joint pain: No  change in stool: Yes  chest pain: No  chills: No  nasal congestion: No  cough: No  diaphoresis: No  fatigue: No  fever:  "No  headaches: No  joint swelling: No  myalgias: No  nausea: No  neck pain: No  numbness: No  rash: No  sore throat: No  swollen glands: No  dysuria: No  vertigo: No  visual change: No  vomiting: No  weakness: No  Onset: at an unknown time  Chronicity: chronic  Frequency: constantly  Medications tried: Magnesium citrate, colace, miralax, dulcolax, senokot  Additional information: n/a      The following portions of the patient's history were reviewed and updated as appropriate: allergies, current medications, past family history, past medical history, past social history, past surgical history, and problem list.    Objective  /88 (BP Location: Left arm, Patient Position: Sitting, Cuff Size: Adult)   Pulse 60   Temp 98.4 °F (36.9 °C) (Temporal)   Ht 182.9 cm (72.01\")   Wt 85.1 kg (187 lb 9.6 oz)   BMI 25.44 kg/m²     Physical Exam  Constitutional:       Appearance: Normal appearance.   HENT:      Head: Atraumatic.   Eyes:      Extraocular Movements: Extraocular movements intact.      Conjunctiva/sclera: Conjunctivae normal.   Pulmonary:      Effort: Pulmonary effort is normal. No respiratory distress.   Musculoskeletal:      Cervical back: Neck supple.   Skin:     General: Skin is warm and dry.   Neurological:      Mental Status: He is alert and oriented to person, place, and time. Mental status is at baseline.   Psychiatric:         Behavior: Behavior normal.         Thought Content: Thought content normal.         Assessment/Plan   1. Chronic idiopathic constipation  Chronic problem since childhood.  Discussed dietary changes.  I recommend giving MiraLAX another try.  I recommend taking 1 A day of 17 g for 1-3 weeks, if bowel movements do not get too loose I would increase to twice daily.  If bowel movements get loose or gets diarrhea, he can reduce as needed.  He does have follow-up with me in about a month and we can see how this is working out.  Likely he will need to be on this for 3-6 months, and " potentially longer.  We can always discuss prescription medication later on, but to avoid polypharmacy will give this a try first.    2. Essential hypertension  BP Readings from Last 3 Encounters:   07/17/25 132/88   07/07/25 122/78   06/19/25 106/78   Blood pressure borderline in the office today.  Overall fairly well-controlled based on home blood pressures.  Recommend for now to continue on current medications.    3. Nephrolithiasis  No recurrence.  Encouraged continued adequate fluid intake, also for his constipation.      Return if symptoms worsen or fail to improve, for Next scheduled follow up.    Future Appointments         Provider Department Center    7/30/2025 4:00 PM (Arrive by 3:45 PM) [facility] ULTRASOUND AT [facility]    8/5/2025 3:30 PM (Arrive by 3:15 PM) Eduardo Castellano MD [facility] UROLOGY [facility]    8/12/2025 3:30 PM (Arrive by 3:15 PM) Dallas Ugarte MD [facility] INTERNAL MEDICINE [facility]    6/25/2026 2:40 PM (Arrive by 2:25 PM) Bianca Calvin PA-C [facility] CARDIOLOGY [facility]              Dallas Ugarte MD  Family Medicine  07/17/2025

## 2025-07-24 DIAGNOSIS — E03.9 HYPOTHYROIDISM (ACQUIRED): ICD-10-CM

## 2025-07-24 RX ORDER — LEVOTHYROXINE SODIUM 88 UG/1
88 TABLET ORAL
Qty: 90 TABLET | Refills: 1 | Status: SHIPPED | OUTPATIENT
Start: 2025-07-24

## 2025-07-24 NOTE — TELEPHONE ENCOUNTER
Rx Refill Note  Requested Prescriptions     Pending Prescriptions Disp Refills    levothyroxine (SYNTHROID, LEVOTHROID) 88 MCG tablet 90 tablet 1     Sig: Take 1 tablet by mouth Every Morning Before Breakfast.      Last office visit with prescribing clinician: 7/17/2025   Last telemedicine visit with prescribing clinician: Visit date not found   Next office visit with prescribing clinician: 8/12/2025                         Would you like a call back once the refill request has been completed: [] Yes [] No    If the office needs to give you a call back, can they leave a voicemail: [] Yes [] No    Haley Bro MA  07/24/25, 08:39 EDT

## 2025-07-30 ENCOUNTER — HOSPITAL ENCOUNTER (OUTPATIENT)
Dept: ULTRASOUND IMAGING | Facility: HOSPITAL | Age: 45
Discharge: HOME | End: 2025-07-30
Admitting: UROLOGY
Payer: COMMERCIAL

## 2025-07-30 DIAGNOSIS — N13.30 HYDRONEPHROSIS, UNSPECIFIED HYDRONEPHROSIS TYPE: ICD-10-CM

## 2025-07-30 PROCEDURE — 76775 US EXAM ABDO BACK WALL LIM: CPT

## 2025-07-31 ENCOUNTER — TELEPHONE (OUTPATIENT)
Age: 45
End: 2025-07-31
Payer: COMMERCIAL

## 2025-07-31 DIAGNOSIS — F32.A DEPRESSION, UNSPECIFIED DEPRESSION TYPE: ICD-10-CM

## 2025-07-31 DIAGNOSIS — F41.1 GENERALIZED ANXIETY DISORDER: ICD-10-CM

## 2025-07-31 RX ORDER — BUSPIRONE HYDROCHLORIDE 7.5 MG/1
7.5 TABLET ORAL 2 TIMES DAILY
Qty: 180 TABLET | Refills: 0 | Status: SHIPPED | OUTPATIENT
Start: 2025-07-31

## 2025-07-31 RX ORDER — BUPROPION HYDROCHLORIDE 150 MG/1
150 TABLET ORAL DAILY
Qty: 90 TABLET | Refills: 0 | Status: SHIPPED | OUTPATIENT
Start: 2025-07-31

## 2025-08-05 ENCOUNTER — OFFICE VISIT (OUTPATIENT)
Dept: UROLOGY | Facility: CLINIC | Age: 45
End: 2025-08-05
Payer: COMMERCIAL

## 2025-08-05 VITALS — WEIGHT: 188 LBS | HEIGHT: 72 IN | BODY MASS INDEX: 25.47 KG/M2

## 2025-08-05 DIAGNOSIS — N20.0 NEPHROLITHIASIS: Primary | Chronic | ICD-10-CM

## 2025-08-05 PROCEDURE — 99213 OFFICE O/P EST LOW 20 MIN: CPT | Performed by: UROLOGY

## 2025-08-07 ENCOUNTER — LAB (OUTPATIENT)
Dept: LAB | Facility: HOSPITAL | Age: 45
End: 2025-08-07
Payer: COMMERCIAL

## 2025-08-07 DIAGNOSIS — I10 ESSENTIAL HYPERTENSION: Chronic | ICD-10-CM

## 2025-08-07 DIAGNOSIS — E03.9 HYPOTHYROIDISM (ACQUIRED): Chronic | ICD-10-CM

## 2025-08-07 LAB
ALBUMIN SERPL-MCNC: 4.4 G/DL (ref 3.5–5.2)
ALBUMIN/GLOB SERPL: 1.5 G/DL
ALP SERPL-CCNC: 99 U/L (ref 39–117)
ALT SERPL W P-5'-P-CCNC: 30 U/L (ref 1–41)
ANION GAP SERPL CALCULATED.3IONS-SCNC: 12.3 MMOL/L (ref 5–15)
AST SERPL-CCNC: 20 U/L (ref 1–40)
BILIRUB SERPL-MCNC: 0.6 MG/DL (ref 0–1.2)
BUN SERPL-MCNC: 15 MG/DL (ref 6–20)
BUN/CREAT SERPL: 12.4 (ref 7–25)
CALCIUM SPEC-SCNC: 10.1 MG/DL (ref 8.6–10.5)
CHLORIDE SERPL-SCNC: 102 MMOL/L (ref 98–107)
CO2 SERPL-SCNC: 26.7 MMOL/L (ref 22–29)
CREAT SERPL-MCNC: 1.21 MG/DL (ref 0.76–1.27)
EGFRCR SERPLBLD CKD-EPI 2021: 75.2 ML/MIN/1.73
GLOBULIN UR ELPH-MCNC: 2.9 GM/DL
GLUCOSE SERPL-MCNC: 76 MG/DL (ref 65–99)
POTASSIUM SERPL-SCNC: 4.4 MMOL/L (ref 3.5–5.2)
PROT SERPL-MCNC: 7.3 G/DL (ref 6–8.5)
SODIUM SERPL-SCNC: 141 MMOL/L (ref 136–145)
T4 FREE SERPL-MCNC: 1.51 NG/DL (ref 0.92–1.68)
TSH SERPL DL<=0.05 MIU/L-ACNC: 2.82 UIU/ML (ref 0.27–4.2)

## 2025-08-07 PROCEDURE — 80053 COMPREHEN METABOLIC PANEL: CPT

## 2025-08-07 PROCEDURE — 84439 ASSAY OF FREE THYROXINE: CPT

## 2025-08-07 PROCEDURE — 84443 ASSAY THYROID STIM HORMONE: CPT

## 2025-08-12 ENCOUNTER — OFFICE VISIT (OUTPATIENT)
Dept: INTERNAL MEDICINE | Facility: CLINIC | Age: 45
End: 2025-08-12
Payer: COMMERCIAL

## 2025-08-12 VITALS
HEIGHT: 72 IN | BODY MASS INDEX: 25.57 KG/M2 | HEART RATE: 77 BPM | OXYGEN SATURATION: 97 % | SYSTOLIC BLOOD PRESSURE: 116 MMHG | DIASTOLIC BLOOD PRESSURE: 72 MMHG | WEIGHT: 188.8 LBS | TEMPERATURE: 97.8 F

## 2025-08-12 DIAGNOSIS — F33.1 MODERATE EPISODE OF RECURRENT MAJOR DEPRESSIVE DISORDER: Chronic | ICD-10-CM

## 2025-08-12 DIAGNOSIS — F41.1 GAD (GENERALIZED ANXIETY DISORDER): Chronic | ICD-10-CM

## 2025-08-12 DIAGNOSIS — F51.01 PRIMARY INSOMNIA: Primary | Chronic | ICD-10-CM

## 2025-08-12 DIAGNOSIS — K59.04 CHRONIC IDIOPATHIC CONSTIPATION: Chronic | ICD-10-CM

## 2025-08-12 PROCEDURE — 99214 OFFICE O/P EST MOD 30 MIN: CPT | Performed by: STUDENT IN AN ORGANIZED HEALTH CARE EDUCATION/TRAINING PROGRAM

## 2025-08-12 RX ORDER — ESZOPICLONE 3 MG/1
3 TABLET, FILM COATED ORAL NIGHTLY
Qty: 30 TABLET | Refills: 0 | Status: SHIPPED | OUTPATIENT
Start: 2025-08-12

## 2025-08-12 RX ORDER — FLUOXETINE 10 MG/1
10-20 CAPSULE ORAL DAILY
Qty: 90 CAPSULE | Refills: 1 | Status: SHIPPED | OUTPATIENT
Start: 2025-08-12

## 2025-08-16 DIAGNOSIS — I10 ESSENTIAL HYPERTENSION: Chronic | ICD-10-CM

## 2025-08-18 RX ORDER — ASPIRIN 81 MG/1
81 TABLET ORAL DAILY
Qty: 90 TABLET | Refills: 2 | Status: SHIPPED | OUTPATIENT
Start: 2025-08-18

## 2025-08-18 RX ORDER — SPIRONOLACTONE 25 MG/1
25 TABLET ORAL DAILY
Qty: 90 TABLET | Refills: 1 | Status: SHIPPED | OUTPATIENT
Start: 2025-08-18

## (undated) DEVICE — SYR LL 3CC

## (undated) DEVICE — SHEATH 1912000 5PK 4MM/0DEG STORZ XOMED: Brand: ENDO-SCRUB®

## (undated) DEVICE — TR BAND RADIAL ARTERY COMPRESSION DEVICE: Brand: TR BAND

## (undated) DEVICE — NDL HYPO ECLPS SFTY 25G 1 1/2IN

## (undated) DEVICE — NC TREK NEO™ CORONARY DILATATION CATHETER 3.25 MM X 12 MM / RAPID-EXCHANGE: Brand: NC TREK NEO™

## (undated) DEVICE — NEURO SPONGES: Brand: DEROYAL

## (undated) DEVICE — MODEL BT2000 P/N 700287-012KIT CONTENTS: MANIFOLD WITH SALINE AND CONTRAST PORTS, SALINE TUBING WITH SPIKE AND HAND SYRINGE, TRANSDUCER: Brand: BT2000 AUTOMATED MANIFOLD KIT

## (undated) DEVICE — KT CATH IMG DRAGONFLY/OPSTAR 2.7F 135CM

## (undated) DEVICE — GW PERIPH GUIDERIGHT STD/EXCHNG/J/TIP SS 0.035IN 5X260CM

## (undated) DEVICE — SUT GUT CHRM 3/0 RB1 27IN U204H

## (undated) DEVICE — CATH IV ANGIOCATH FEP 14GA 1.88IN ORNG

## (undated) DEVICE — GLIDESHEATH SLENDER STAINLESS STEEL KIT: Brand: GLIDESHEATH SLENDER

## (undated) DEVICE — GUIDE CATHETER: Brand: MACH1™

## (undated) DEVICE — ADULT, W/LG. BACK PAD, RADIOTRANSPARENT ELEMENT AND LEAD WIRE COMPATIBLE W/: Brand: DEFIBRILLATION ELECTRODES

## (undated) DEVICE — MEDI-VAC YANKAUER SUCTION HANDLE W/BULBOUS TIP: Brand: CARDINAL HEALTH

## (undated) DEVICE — MODEL AT P65, P/N 701554-001KIT CONTENTS: HAND CONTROLLER, 3-WAY HIGH-PRESSURE STOPCOCK WITH ROTATING END AND PREMIUM HIGH-PRESSURE TUBING: Brand: ANGIOTOUCH® KIT

## (undated) DEVICE — NDL SPINE 25G 31/2IN BLU

## (undated) DEVICE — DISH PETRI 3.5IN MD STRL LF

## (undated) DEVICE — ST EXT IV SMRTSTE 2VLV FIX M LL 6ML 41

## (undated) DEVICE — CATH DIAG EXPO M/ PK 5F FL4/FR4 PIG

## (undated) DEVICE — DEV INFL MONARCH 25W

## (undated) DEVICE — DRSNG TELFA PAD NONADH STR 1S 3X8IN

## (undated) DEVICE — SUT ETHLN 3/0 PC5 18IN 1893G

## (undated) DEVICE — COVER,LIGHT HANDLE,FLX,1/PK: Brand: MEDLINE INDUSTRIES, INC.

## (undated) DEVICE — MEDI-VAC NON-CONDUCTIVE SUCTION TUBING: Brand: CARDINAL HEALTH

## (undated) DEVICE — SUT GUT PLN 4/0 SC1 18IN 1824H

## (undated) DEVICE — SUT GUT CHRM 3/0 PS2 27IN 1638H

## (undated) DEVICE — NDL HYPO ECLPS SFTY 18G 1 1/2IN

## (undated) DEVICE — SYR LUERLOK 30CC

## (undated) DEVICE — GLV SURG SIGNATURE TOUCH PF LTX 7 STRL

## (undated) DEVICE — CATH DIAG EXPO .045 FL3  5F 100CM

## (undated) DEVICE — AIRWY SZ11

## (undated) DEVICE — Device: Brand: ASAHI SION BLUE

## (undated) DEVICE — PK MAJ ENT 10

## (undated) DEVICE — BLADE 1882040 5PK INFERIOR TURB 2MM

## (undated) DEVICE — SOL LR 1000ML

## (undated) DEVICE — APPL COTN TP PLSTC 6IN STRL LF PK/2

## (undated) DEVICE — PK CATH CARD 10

## (undated) DEVICE — SPLINT 1524055 DOYLE II AIRWAY SET: Brand: DOYLE II ™

## (undated) DEVICE — CVR PROB ULTRASND/TRANSD W/GEL 7X11IN STRL